# Patient Record
Sex: FEMALE | Race: WHITE | NOT HISPANIC OR LATINO | Employment: OTHER | URBAN - METROPOLITAN AREA
[De-identification: names, ages, dates, MRNs, and addresses within clinical notes are randomized per-mention and may not be internally consistent; named-entity substitution may affect disease eponyms.]

---

## 2017-06-16 ENCOUNTER — HOSPITAL ENCOUNTER (EMERGENCY)
Facility: MEDICAL CENTER | Age: 67
End: 2017-06-16
Attending: EMERGENCY MEDICINE
Payer: MEDICARE

## 2017-06-16 VITALS
HEART RATE: 74 BPM | DIASTOLIC BLOOD PRESSURE: 72 MMHG | OXYGEN SATURATION: 95 % | HEIGHT: 65 IN | TEMPERATURE: 98.1 F | BODY MASS INDEX: 31.65 KG/M2 | RESPIRATION RATE: 18 BRPM | WEIGHT: 190 LBS | SYSTOLIC BLOOD PRESSURE: 130 MMHG

## 2017-06-16 DIAGNOSIS — J40 BRONCHITIS: ICD-10-CM

## 2017-06-16 DIAGNOSIS — J04.0 LARYNGITIS: ICD-10-CM

## 2017-06-16 PROCEDURE — 99283 EMERGENCY DEPT VISIT LOW MDM: CPT

## 2017-06-16 RX ORDER — AMOXICILLIN 500 MG/1
500 CAPSULE ORAL 3 TIMES DAILY
Qty: 30 CAP | Refills: 0 | Status: SHIPPED | OUTPATIENT
Start: 2017-06-16 | End: 2017-06-26

## 2017-06-16 NOTE — DISCHARGE INSTRUCTIONS
Bronchitis  Bronchitis is the body's way of reacting to injury and/or infection (inflammation) of the bronchi. Bronchi are the air tubes that extend from the windpipe into the lungs. If the inflammation becomes severe, it may cause shortness of breath.  CAUSES   Inflammation may be caused by:  · A virus.  · Germs (bacteria).  · Dust.  · Allergens.  · Pollutants and many other irritants.  The cells lining the bronchial tree are covered with tiny hairs (cilia). These constantly beat upward, away from the lungs, toward the mouth. This keeps the lungs free of pollutants. When these cells become too irritated and are unable to do their job, mucus begins to develop. This causes the characteristic cough of bronchitis. The cough clears the lungs when the cilia are unable to do their job. Without either of these protective mechanisms, the mucus would settle in the lungs. Then you would develop pneumonia.  Smoking is a common cause of bronchitis and can contribute to pneumonia. Stopping this habit is the single most important thing you can do to help yourself.  TREATMENT   · Your caregiver may prescribe an antibiotic if the cough is caused by bacteria. Also, medicines that open up your airways make it easier to breathe. Your caregiver may also recommend or prescribe an expectorant. It will loosen the mucus to be coughed up. Only take over-the-counter or prescription medicines for pain, discomfort, or fever as directed by your caregiver.  · Removing whatever causes the problem (smoking, for example) is critical to preventing the problem from getting worse.  · Cough suppressants may be prescribed for relief of cough symptoms.  · Inhaled medicines may be prescribed to help with symptoms now and to help prevent problems from returning.  · For those with recurrent (chronic) bronchitis, there may be a need for steroid medicines.  SEEK IMMEDIATE MEDICAL CARE IF:   · During treatment, you develop more pus-like mucus (purulent  sputum).  · You have a fever.  · Your baby is older than 3 months with a rectal temperature of 102° F (38.9° C) or higher.  · Your baby is 3 months old or younger with a rectal temperature of 100.4° F (38° C) or higher.  · You become progressively more ill.  · You have increased difficulty breathing, wheezing, or shortness of breath.  It is necessary to seek immediate medical care if you are elderly or sick from any other disease.  MAKE SURE YOU:   · Understand these instructions.  · Will watch your condition.  · Will get help right away if you are not doing well or get worse.  Document Released: 12/18/2006 Document Revised: 03/11/2013 Document Reviewed: 10/27/2009  [employer]® Patient Information ©2014 [employer].    Laryngitis  Laryngitis is inflammation of your vocal cords. This causes hoarseness, coughing, loss of voice, sore throat, or a dry throat. Your vocal cords are two bands of muscles that are found in your throat. When you speak, these cords come together and vibrate. These vibrations come out through your mouth as sound. When your vocal cords are inflamed, your voice sounds different.  Laryngitis can be temporary (acute) or long-term (chronic). Most cases of acute laryngitis improve with time. Chronic laryngitis is laryngitis that lasts for more than three weeks.  CAUSES  Acute laryngitis may be caused by:  · A viral infection.  · Lots of talking, yelling, or singing. This is also called vocal strain.  · Bacterial infections.  Chronic laryngitis may be caused by:  · Vocal strain.  · Injury to your vocal cords.  · Acid reflux (gastroesophageal reflux disease or GERD).  · Allergies.  · Sinus infection.  · Smoking.  · Alcohol abuse.  · Breathing in chemicals or dust.  · Growths on the vocal cords.  RISK FACTORS  Risk factors for laryngitis include:  · Smoking.  · Alcohol abuse.  · Having allergies.  SIGNS AND SYMPTOMS  Symptoms of laryngitis may include:  · Low, hoarse voice.  · Loss of voice.  · Dry  cough.  · Sore throat.  · Stuffy nose.  DIAGNOSIS  Laryngitis may be diagnosed by:  · Physical exam.  · Throat culture.  · Blood test.  · Laryngoscopy. This procedure allows your health care provider to look at your vocal cords with a mirror or viewing tube.  TREATMENT  Treatment for laryngitis depends on what is causing it. Usually, treatment involves resting your voice and using medicines to soothe your throat. However, if your laryngitis is caused by a bacterial infection, you may need to take antibiotic medicine. If your laryngitis is caused by a growth, you may need to have a procedure to remove it.  HOME CARE INSTRUCTIONS  · Drink enough fluid to keep your urine clear or pale yellow.  · Breathe in moist air. Use a humidifier if you live in a dry climate.  · Take medicines only as directed by your health care provider.  · If you were prescribed an antibiotic medicine, finish it all even if you start to feel better.  · Do not smoke cigarettes or electronic cigarettes. If you need help quitting, ask your health care provider.  · Talk as little as possible. Also avoid whispering, which can cause vocal strain.  · Write instead of talking. Do this until your voice is back to normal.  SEEK MEDICAL CARE IF:  · You have a fever.  · You have increasing pain.  · You have difficulty swallowing.  SEEK IMMEDIATE MEDICAL CARE IF:  · You cough up blood.  · You have trouble breathing.     This information is not intended to replace advice given to you by your health care provider. Make sure you discuss any questions you have with your health care provider.     Document Released: 12/18/2006 Document Revised: 01/08/2016 Document Reviewed: 06/02/2015  [employer] Interactive Patient Education ©2016 [employer]    Upper Respiratory Infection, Adult  Most upper respiratory infections (URIs) are a viral infection of the air passages leading to the lungs. A URI affects the nose, throat, and upper air passages. The most common type of  "URI is nasopharyngitis and is typically referred to as \"the common cold.\"  URIs run their course and usually go away on their own. Most of the time, a URI does not require medical attention, but sometimes a bacterial infection in the upper airways can follow a viral infection. This is called a secondary infection. Sinus and middle ear infections are common types of secondary upper respiratory infections.  Bacterial pneumonia can also complicate a URI. A URI can worsen asthma and chronic obstructive pulmonary disease (COPD). Sometimes, these complications can require emergency medical care and may be life threatening.   CAUSES  Almost all URIs are caused by viruses. A virus is a type of germ and can spread from one person to another.   RISKS FACTORS  You may be at risk for a URI if:   · You smoke.    · You have chronic heart or lung disease.  · You have a weakened defense (immune) system.    · You are very young or very old.    · You have nasal allergies or asthma.  · You work in crowded or poorly ventilated areas.  · You work in health care facilities or schools.  SIGNS AND SYMPTOMS   Symptoms typically develop 2-3 days after you come in contact with a cold virus. Most viral URIs last 7-10 days. However, viral URIs from the influenza virus (flu virus) can last 14-18 days and are typically more severe. Symptoms may include:   · Runny or stuffy (congested) nose.    · Sneezing.    · Cough.    · Sore throat.    · Headache.    · Fatigue.    · Fever.    · Loss of appetite.    · Pain in your forehead, behind your eyes, and over your cheekbones (sinus pain).  · Muscle aches.    DIAGNOSIS   Your health care provider may diagnose a URI by:  · Physical exam.  · Tests to check that your symptoms are not due to another condition such as:  ¨ Strep throat.  ¨ Sinusitis.  ¨ Pneumonia.  ¨ Asthma.  TREATMENT   A URI goes away on its own with time. It cannot be cured with medicines, but medicines may be prescribed or recommended to " relieve symptoms. Medicines may help:  · Reduce your fever.  · Reduce your cough.  · Relieve nasal congestion.  HOME CARE INSTRUCTIONS   · Take medicines only as directed by your health care provider.    · Gargle warm saltwater or take cough drops to comfort your throat as directed by your health care provider.  · Use a warm mist humidifier or inhale steam from a shower to increase air moisture. This may make it easier to breathe.  · Drink enough fluid to keep your urine clear or pale yellow.    · Eat soups and other clear broths and maintain good nutrition.    · Rest as needed.    · Return to work when your temperature has returned to normal or as your health care provider advises. You may need to stay home longer to avoid infecting others. You can also use a face mask and careful hand washing to prevent spread of the virus.  · Increase the usage of your inhaler if you have asthma.    · Do not use any tobacco products, including cigarettes, chewing tobacco, or electronic cigarettes. If you need help quitting, ask your health care provider.  PREVENTION   The best way to protect yourself from getting a cold is to practice good hygiene.   · Avoid oral or hand contact with people with cold symptoms.    · Wash your hands often if contact occurs.    There is no clear evidence that vitamin C, vitamin E, echinacea, or exercise reduces the chance of developing a cold. However, it is always recommended to get plenty of rest, exercise, and practice good nutrition.   SEEK MEDICAL CARE IF:   · You are getting worse rather than better.    · Your symptoms are not controlled by medicine.    · You have chills.  · You have worsening shortness of breath.  · You have brown or red mucus.  · You have yellow or brown nasal discharge.  · You have pain in your face, especially when you bend forward.  · You have a fever.  · You have swollen neck glands.  · You have pain while swallowing.  · You have white areas in the back of your  throat.  SEEK IMMEDIATE MEDICAL CARE IF:   · You have severe or persistent:  ¨ Headache.  ¨ Ear pain.  ¨ Sinus pain.  ¨ Chest pain.  · You have chronic lung disease and any of the following:  ¨ Wheezing.  ¨ Prolonged cough.  ¨ Coughing up blood.  ¨ A change in your usual mucus.  · You have a stiff neck.  · You have changes in your:  ¨ Vision.  ¨ Hearing.  ¨ Thinking.  ¨ Mood.  MAKE SURE YOU:   · Understand these instructions.  · Will watch your condition.  · Will get help right away if you are not doing well or get worse.     This information is not intended to replace advice given to you by your health care provider. Make sure you discuss any questions you have with your health care provider.     Document Released: 06/13/2002 Document Revised: 05/03/2016 Document Reviewed: 03/25/2015  [employer] Interactive Patient Education ©2016 [employer].

## 2017-06-16 NOTE — ED AVS SNAPSHOT
OPHTHONIX Access Code: [email or web address]  Expires: 7/16/2017  2:32 PM    Your email address is not on file at [facility].  Email Addresses are required for you to sign up for OPHTHONIX, please contact [phone] to verify your personal information and to provide your email address prior to attempting to register for OPHTHONIX.    Fe Jodi Agn\[facility] [place] [facility]\""  [address] [place] [address]    instruMagict  A secure, online tool to manage your health information     [facility]’s OPHTHONIX® is a secure, online tool that connects you to your personalized health information from the privacy of your home -- day or night - making it very easy for you to manage your healthcare. Once the activation process is completed, you can even access your medical information using the OPHTHONIX neri, which is available for free in the Apple Neri store or Google Play store.     To learn more about OPHTHONIX, visit [email or web address]    There are two levels of access available (as shown below):   My Chart Features  [facility] Primary Care Doctor [facility]  Specialists [facility]  Urgent  Care Non-[facility] Primary Care Doctor   Email your healthcare team securely and privately 24/7 X X X    Manage appointments: schedule your next appointment; view details of past/upcoming appointments X      Request prescription refills. X      View recent personal medical records, including lab and immunizations X X X X   View health record, including health history, allergies, medications X X X X   Read reports about your outpatient visits, procedures, consult and ER notes X X X X   See your discharge summary, which is a recap of your hospital and/or ER visit that includes your diagnosis, lab results, and care plan X X  X     How to register for instruMagict:  Once your e-mail address has been verified, follow the following steps to sign up for instruMagict.     1. Go to  [email or web address]  2. Click on the Sign Up Now box, which takes you to the New Member Sign Up page. You  will need to provide the following information:  a. Enter your [employer] Access Code exactly as it appears at the top of this page. (You will not need to use this code after you’ve completed the sign-up process. If you do not sign up before the expiration date, you must request a new code.)   b. Enter your date of birth.   c. Enter your home email address.   d. Click Submit, and follow the next screen’s instructions.  3. Create a V Wavet ID. This will be your [employer]Blog.com login ID and cannot be changed, so think of one that is secure and easy to remember.  4. Create a [employer] password. You can change your password at any time.  5. Enter your Password Reset Question and Answer. This can be used at a later time if you forget your password.   6. Enter your e-mail address. This allows you to receive e-mail notifications when new information is available in SportsBlog.com.  7. Click Sign Up. You can now view your health information.    For assistance activating your [employer]com account, call [phone]

## 2017-06-16 NOTE — ED AVS SNAPSHOT
6/16/2017    Fe [facility]  [address] [place] [address]    Dear Fe:    [facility] wants to ensure your discharge home is safe and you or your loved ones have had all of your questions answered regarding your care after you leave the hospital.    Below is a list of resources and contact information should you have any questions regarding your hospital stay, follow-up instructions, or active medical symptoms.    Questions or Concerns Regarding… Contact   Medical Questions Related to Your Discharge  (7 days a week, 8am-5pm) Contact a Nurse Care Coordinator   [phone]   Medical Questions Not Related to Your Discharge  (24 hours a day / 7 days a week)  Contact the Nurse Health Line   [phone]    Medications or Discharge Instructions Refer to your discharge packet   or contact your [facility] Primary Care Provider   [phone]   Follow-up Appointment(s) Schedule your appointment via [employer]   or contact Scheduling [phone]   Billing Review your statement via [employer]  or contact Billing [phone]   Medical Records Review your records via [employer]   or contact Medical Records [phone]     You may receive a telephone call within two days of discharge. This call is to make certain you understand your discharge instructions and have the opportunity to have any questions answered. You can also easily access your medical information, test results and upcoming appointments via the [employer] free online health management tool. You can learn more and sign up at GoodRx/Atomic M[employer]uls. For assistance setting up your [employer] account, please call [phone].    Once again, we want to ensure your discharge home is safe and that you have a clear understanding of any next steps in your care. If you have any questions or concerns, please do not hesitate to contact us, we are here for you. Thank you for choosing [facility] for your healthcare needs.    Sincerely,    Your [facility] Healthcare Team

## 2017-06-16 NOTE — ED NOTES
"Pt states \"I woke up and didn't have a voice this morning \", pt has hoarse sounding voice at this time, pt states \"my left eye is red and sometimes the words I want to say don't come our right\".  Pt moves all extremities without difficulty, no facial deficits noted, pt does not have any teeth and is not wearing dentures, pt sitting on bed drinking soda and playing games on her Ipad, Grandson at bedside.  "

## 2017-06-16 NOTE — ED PROVIDER NOTES
"ED Provider Note    Scribed for Danny Gibson M.D. by Zulema Sutton. 6/16/2017  2:13 PM    Primary Care Provider: Pcp Pt States None  Means of arrival: walk-in  History limited by: none    CHIEF COMPLAINT  Chief Complaint   Patient presents with   • Possible Stroke   • Numbness       HPI  Fe Clark is a 66 y.o. female who presents to the ED complaining of several upper respiratory symptoms developing over this last week. Patient reports that 1 week ago she was experiencing cough, congestion, and white foamy sputum productions. She states that this cold has worsened since onset to include a sore throat, \"popping\" sensation to left ear, difficulty swallowing secondary to pain, and difficulty speaking secondary to pain, will all of these symptoms beginning about 4 days ago. Patient is also reporting weakness to her left leg, dizziness, and bilateral finger numbness that has been intermittently occuring since onset of symptoms. She states that she is currently being treated for depression at this time, denies smoking and admits to occasional alcohol use.  No complaints of fever,runny nose, blurred vision, weight changes, chest pain, abdominal pain, vomiting, nausea, diarrhea, rashes.    REVIEW OF SYSTEMS    CONSTITUTIONAL:  Denies fever, weight gain/loss,  ENT:  Positive sore throat, congestion, popping sensation in left ear, difficulty swallowing and difficulty with speech  CARDIOVASCULAR:  Denies chest pain  RESPIRATORY: positive cough and white foamy sputum production  GI:  Denies abdominal pain, nausea, vomiting, or diarrhea.  MUSCULOSKELETAL:  Denies weakness, joint swelling, or back pain.  SKIN:  No rash or bruising.  NEUROLOGIC:  Denies headache, focal weakness, or numbness.  PSYCHIATRIC:  Denies depression.    PAST MEDICAL HISTORY  Past Medical History   Diagnosis Date   • High cholesterol    • ASTHMA    • Hypertension    • Psychiatric problem    • GERD (gastroesophageal reflux disease)    • " "Hiatal hernia    • Diverticulosis    • Duodenal ulcer, unspecified as acute or chronic, without hemorrhage, perforation, or obstruction        FAMILY HISTORY  History reviewed. No pertinent family history.    SOCIAL HISTORY   reports that she has never smoked. She does not have any smokeless tobacco history on file. She reports that she uses illicit drugs. She reports that she does not drink alcohol.    SURGICAL HISTORY  Past Surgical History   Procedure Laterality Date   • Bladder suspension     • Bowel resection     • Colonoscopy       with removal of pollyps   • Hysterectomy radical     • Other orthopedic surgery       L ankle   • Ankle arthroscopy  7/31/2013     Performed by Paco Clifton M.D. at [facility]   • Hardware removal ortho  7/31/2013     Performed by Paco Clifton M.D. at [facility]   • Lacrimal duct probe  3/11/2015     Performed by Alo Cheatham M.D. at [facility]   • Submandible abscess incision and drainage  11/19/2016     Procedure: SUBMANDIBLE ABSCESS INCISION AND DRAINAGE;  Surgeon: Lior Hutchison D.D.S.;  Location: SURGERY [facility];  Service:    • Dental extraction(s)  11/19/2016     Procedure: DENTAL EXTRACTION(S);  Surgeon: Lior Hutchison D.D.S.;  Location: [facility];  Service:        CURRENT MEDICATIONS  Home Medications     **Home medications have not yet been reviewed for this encounter**          ALLERGIES  Allergies   Allergen Reactions   • Nkda [No Known Drug Allergy]        PHYSICAL EXAM  VITAL SIGNS: /79 mmHg  Pulse 77  Temp(Src) 36.7 °C (98.1 °F)  Resp 18  Ht 1.651 m (5' 5\")  Wt 86.183 kg (190 lb)  BMI 31.62 kg/m2  SpO2 95%     Constitutional: Patient is awake and alert. No acute respiratory distress. Well developed, Well nourished, Non-toxic appearance. Mostly whispering however intermittently able to use stronger voice that is still raspy  HENT: Normocephalic, Atraumatic, Bilateral external ears " normal, Oropharynx pink moist with no exudates, slight erythema and swelling to posterior oropharynx Nose patent. Left TM has fluid present behind it, right TM normal  Eyes: PERRLA, EOMI, Sclera and conjunctiva clear, No discharge.   Neck:  Supple no nuchal rigidity, no thyromegaly or mass. Non-tender  Lymphatic: No supraclavicular lymph nodes.   Cardiovascular: Heart is regular rate and rhythm no murmur, rub or thrill.   Thorax & Lungs: Chest is symmetrical, with good breath sounds. No wheezing or crackles. No respiratory distress, No chest tenderness.   Abdomen: Soft, No tenderness no hepatosplenomegaly there is no guarding or rebound, No masses, No pulsatile masses. Bowel sounds are present.  Skin: Warm, Dry, no petechia, purpura, or rash.   Back: Non tender with palpation, No CVA tenderness.   Extremities: No edema. Non tender.   Musculoskeletal: Good range of motion to wrists, elbows, shoulders, hips, knees, and ankles. Pulses 2+ radially and femorally. No gross deformities noted.   Neurologic: Alert & oriented to person, time, and place.  Strength is 5 over 5 and symmetric in bilateral upper and lower extremities.  Sensory is intact to light touch to face, arms, and legs.  DTRs are symmetrical in biceps brachioradialis, patella and Achilles. Face is symmetric, no drift to arms and legs, heal to shin normal, finger to nose normal  Psychiatric: Normal affect    COURSE & MEDICAL DECISION MAKING  Pertinent Labs & Imaging studies reviewed. (See chart for details)    Differential diagnoses include but are not limited to URI, bronchitis, ear infection    2:13 PM - Patient seen and examined at bedside. I informed the patient that she is most likely not experiencing a stroke at this time but more likely a URI. I explained that she will be prescribed anti-biotics to treat her ear infection and respiratory infection at this time. I advised the patient to stay out of the heat and keep hydrated. She will be  discharged.    I do not believe that the patient's symptoms at this time demonstrate a stroke.    Decision Making  Patient presents with cough congestion of sputum now with laryngitis and a sore throat. Him and also some crackling to her left ear. She states that she didn't know what to call it so she called a stroke however neurologically do not believe that she has had a stroke. I believe his an upper respiratory tract infection and laryngitis. She is resting comfortably. We'll put her on oral antibiotics for close follow-up as an outpatient.     The patient will return for new or worsening symptoms and is stable at the time of discharge.      DISPOSITION:  Patient will be discharged home in stable condition.    FOLLOW UP:      In 3 days        OUTPATIENT MEDICATIONS:  New Prescriptions    AMOXICILLIN (AMOXIL) 500 MG CAP    Take 1 Cap by mouth 3 times a day for 10 days.           FINAL IMPRESSION  1. Bronchitis    2. Laryngitis        PLAN  1. Follow-up primary care doctor in 3 days  2. Rhonchi T-sheet/pharyngitis sheet/bronchitis sheet  3. Return to the emergency department for increased pains, fevers, vomiting or change in condition.     Zulema LINN (Scribe), am scribing for, and in the presence of, Danny Gibson M.D..    Electronically signed by: Zulema Sutton (Marcial), 6/16/2017    Danny LINN M.D. personally performed the services described in this documentation, as scribed by Zulema Sutton in my presence, and it is both accurate and complete.    The note accurately reflects work and decisions made by me.  Danny Gibson  6/16/2017  6:42 PM

## 2017-06-16 NOTE — ED AVS SNAPSHOT
Home Care Instructions                                                                                                                Fe Clark   MRN: [id number]    Department:  [facility], Emergency Dept   Date of Visit:  6/16/2017            [facility], Emergency Dept    [address] [address]    Phone:  [phone]      You were seen by     Danny Gibson M.D.      Your Diagnosis Was     Bronchitis     J40       Follow-up Information     1. Follow up In 3 days.      Medication Information     Review all of your home medications and newly ordered medications with your primary doctor and/or pharmacist as soon as possible. Follow medication instructions as directed by your doctor and/or pharmacist.     Please keep your complete medication list with you and share with your physician. Update the information when medications are discontinued, doses are changed, or new medications (including over-the-counter products) are added; and carry medication information at all times in the event of emergency situations.               Medication List      START taking these medications        Instructions    Morning Afternoon Evening Bedtime    amoxicillin 500 MG Caps   Commonly known as:  AMOXIL        Take 1 Cap by mouth 3 times a day for 10 days.   Dose:  500 mg                          ASK your doctor about these medications        Instructions    Morning Afternoon Evening Bedtime    albuterol 108 (90 BASE) MCG/ACT Aers inhalation aerosol        Inhale 2 Puffs by mouth every four hours as needed (cough).   Dose:  2 Puff                        alprazolam 0.25 MG Tabs   Commonly known as:  XANAX        Take 0.25 mg by mouth 2 times a day as needed for Sleep or Anxiety.   Dose:  0.25 mg                        atorvastatin 20 MG Tabs   Commonly known as:  LIPITOR        Take 1 Tab by mouth every day.   Dose:  20 mg                        carvedilol 3.125 MG  Tabs   Commonly known as:  COREG        Take 3.125 mg by mouth every day.   Dose:  3.125 mg                        CULTURELLE DIG[employer]IVE HEALTH Caps        Take 1 Cap by mouth every day.   Dose:  1 Cap                        cyclobenzaprine 10 MG Tabs   Commonly known as:  FLEXERIL        Take 1 Tab by mouth 3 times a day as needed for Muscle Spasms.   Dose:  10 mg                        fluoxetine 40 MG capsule   Commonly known as:  PROZAC        Take 40 mg by mouth every day.   Dose:  40 mg                        hydrocodone-acetaminophen 5-325 MG Tabs per tablet   Commonly known as:  NORCO        Take 1-2 Tabs by mouth every 6 hours as needed (pain).   Dose:  1-2 Tab                        lisinopril-hydrochlorothiazide 20-25 MG per tablet   Commonly known as:  PRINZIDE, ZESTORETIC        Take 1 Tab by mouth every day.   Dose:  1 Tab                        naproxen 500 MG Tabs   Commonly known as:  NAPROSYN        Take 1 Tab by mouth 2 times a day, with meals.   Dose:  500 mg                        omeprazole 20 MG delayed-release capsule   Commonly known as:  PRILOSEC        Take 1 Cap by mouth 2 times a day.   Dose:  20 mg                        risperidone 2 MG Tabs   Commonly known as:  RISPERDAL        Take 2 mg by mouth every bedtime.   Dose:  2 mg                             Where to Get Your Medications      You can get these medications from any pharmacy     Bring a paper prescription for each of these medications    - amoxicillin 500 MG Caps              Discharge Instructions       Bronchitis  Bronchitis is the body's way of reacting to injury and/or infection (inflammation) of the bronchi. Bronchi are the air tubes that extend from the windpipe into the lungs. If the inflammation becomes severe, it may cause shortness of breath.  CAUSES   Inflammation may be caused by:  · A virus.  · Germs (bacteria).  · Dust.  · Allergens.  · Pollutants and many other irritants.  The cells lining the bronchial tree  are covered with tiny hairs (cilia). These constantly beat upward, away from the lungs, toward the mouth. This keeps the lungs free of pollutants. When these cells become too irritated and are unable to do their job, mucus begins to develop. This causes the characteristic cough of bronchitis. The cough clears the lungs when the cilia are unable to do their job. Without either of these protective mechanisms, the mucus would settle in the lungs. Then you would develop pneumonia.  Smoking is a common cause of bronchitis and can contribute to pneumonia. Stopping this habit is the single most important thing you can do to help yourself.  TREATMENT   · Your caregiver may prescribe an antibiotic if the cough is caused by bacteria. Also, medicines that open up your airways make it easier to breathe. Your caregiver may also recommend or prescribe an expectorant. It will loosen the mucus to be coughed up. Only take over-the-counter or prescription medicines for pain, discomfort, or fever as directed by your caregiver.  · Removing whatever causes the problem (smoking, for example) is critical to preventing the problem from getting worse.  · Cough suppressants may be prescribed for relief of cough symptoms.  · Inhaled medicines may be prescribed to help with symptoms now and to help prevent problems from returning.  · For those with recurrent (chronic) bronchitis, there may be a need for steroid medicines.  SEEK IMMEDIATE MEDICAL CARE IF:   · During treatment, you develop more pus-like mucus (purulent sputum).  · You have a fever.  · Your baby is older than 3 months with a rectal temperature of 102° F (38.9° C) or higher.  · Your baby is 3 months old or younger with a rectal temperature of 100.4° F (38° C) or higher.  · You become progressively more ill.  · You have increased difficulty breathing, wheezing, or shortness of breath.  It is necessary to seek immediate medical care if you are elderly or sick from any other  disease.  MAKE SURE YOU:   · Understand these instructions.  · Will watch your condition.  · Will get help right away if you are not doing well or get worse.  Document Released: 12/18/2006 Document Revised: 03/11/2013 Document Reviewed: 10/27/2009  [employer]® Patient Information ©2014 [employer]: [employer].    Laryngitis  Laryngitis is inflammation of your vocal cords. This causes hoarseness, coughing, loss of voice, sore throat, or a dry throat. Your vocal cords are two bands of muscles that are found in your throat. When you speak, these cords come together and vibrate. These vibrations come out through your mouth as sound. When your vocal cords are inflamed, your voice sounds different.  Laryngitis can be temporary (acute) or long-term (chronic). Most cases of acute laryngitis improve with time. Chronic laryngitis is laryngitis that lasts for more than three weeks.  CAUSES  Acute laryngitis may be caused by:  · A viral infection.  · Lots of talking, yelling, or singing. This is also called vocal strain.  · Bacterial infections.  Chronic laryngitis may be caused by:  · Vocal strain.  · Injury to your vocal cords.  · Acid reflux (gastroesophageal reflux disease or GERD).  · Allergies.  · Sinus infection.  · Smoking.  · Alcohol abuse.  · Breathing in chemicals or dust.  · Growths on the vocal cords.  RISK FACTORS  Risk factors for laryngitis include:  · Smoking.  · Alcohol abuse.  · Having allergies.  SIGNS AND SYMPTOMS  Symptoms of laryngitis may include:  · Low, hoarse voice.  · Loss of voice.  · Dry cough.  · Sore throat.  · Stuffy nose.  DIAGNOSIS  Laryngitis may be diagnosed by:  · Physical exam.  · Throat culture.  · Blood test.  · Laryngoscopy. This procedure allows your health care provider to look at your vocal cords with a mirror or viewing tube.  TREATMENT  Treatment for laryngitis depends on what is causing it. Usually, treatment involves resting your voice and using medicines to soothe your throat. However, if  "your laryngitis is caused by a bacterial infection, you may need to take antibiotic medicine. If your laryngitis is caused by a growth, you may need to have a procedure to remove it.  HOME CARE INSTRUCTIONS  · Drink enough fluid to keep your urine clear or pale yellow.  · Breathe in moist air. Use a humidifier if you live in a dry climate.  · Take medicines only as directed by your health care provider.  · If you were prescribed an antibiotic medicine, finish it all even if you start to feel better.  · Do not smoke cigarettes or electronic cigarettes. If you need help quitting, ask your health care provider.  · Talk as little as possible. Also avoid whispering, which can cause vocal strain.  · Write instead of talking. Do this until your voice is back to normal.  SEEK MEDICAL CARE IF:  · You have a fever.  · You have increasing pain.  · You have difficulty swallowing.  SEEK IMMEDIATE MEDICAL CARE IF:  · You cough up blood.  · You have trouble breathing.     This information is not intended to replace advice given to you by your health care provider. Make sure you discuss any questions you have with your health care provider.     Document Released: 12/18/2006 Document Revised: 01/08/2016 Document Reviewed: 06/02/2015  "[employer] Interactive Patient Education ©2016 "[employer] Inc.    Upper Respiratory Infection, Adult  Most upper respiratory infections (URIs) are a viral infection of the air passages leading to the lungs. A URI affects the nose, throat, and upper air passages. The most common type of URI is nasopharyngitis and is typically referred to as \"the common cold.\"  URIs run their course and usually go away on their own. Most of the time, a URI does not require medical attention, but sometimes a bacterial infection in the upper airways can follow a viral infection. This is called a secondary infection. Sinus and middle ear infections are common types of secondary upper respiratory infections.  Bacterial pneumonia " can also complicate a URI. A URI can worsen asthma and chronic obstructive pulmonary disease (COPD). Sometimes, these complications can require emergency medical care and may be life threatening.   CAUSES  Almost all URIs are caused by viruses. A virus is a type of germ and can spread from one person to another.   RISKS FACTORS  You may be at risk for a URI if:   · You smoke.    · You have chronic heart or lung disease.  · You have a weakened defense (immune) system.    · You are very young or very old.    · You have nasal allergies or asthma.  · You work in crowded or poorly ventilated areas.  · You work in health care facilities or schools.  SIGNS AND SYMPTOMS   Symptoms typically develop 2-3 days after you come in contact with a cold virus. Most viral URIs last 7-10 days. However, viral URIs from the influenza virus (flu virus) can last 14-18 days and are typically more severe. Symptoms may include:   · Runny or stuffy (congested) nose.    · Sneezing.    · Cough.    · Sore throat.    · Headache.    · Fatigue.    · Fever.    · Loss of appetite.    · Pain in your forehead, behind your eyes, and over your cheekbones (sinus pain).  · Muscle aches.    DIAGNOSIS   Your health care provider may diagnose a URI by:  · Physical exam.  · Tests to check that your symptoms are not due to another condition such as:  ¨ Strep throat.  ¨ Sinusitis.  ¨ Pneumonia.  ¨ Asthma.  TREATMENT   A URI goes away on its own with time. It cannot be cured with medicines, but medicines may be prescribed or recommended to relieve symptoms. Medicines may help:  · Reduce your fever.  · Reduce your cough.  · Relieve nasal congestion.  HOME CARE INSTRUCTIONS   · Take medicines only as directed by your health care provider.    · Gargle warm saltwater or take cough drops to comfort your throat as directed by your health care provider.  · Use a warm mist humidifier or inhale steam from a shower to increase air moisture. This may make it easier to  breathe.  · Drink enough fluid to keep your urine clear or pale yellow.    · Eat soups and other clear broths and maintain good nutrition.    · Rest as needed.    · Return to work when your temperature has returned to normal or as your health care provider advises. You may need to stay home longer to avoid infecting others. You can also use a face mask and careful hand washing to prevent spread of the virus.  · Increase the usage of your inhaler if you have asthma.    · Do not use any tobacco products, including cigarettes, chewing tobacco, or electronic cigarettes. If you need help quitting, ask your health care provider.  PREVENTION   The best way to protect yourself from getting a cold is to practice good hygiene.   · Avoid oral or hand contact with people with cold symptoms.    · Wash your hands often if contact occurs.    There is no clear evidence that vitamin C, vitamin E, echinacea, or exercise reduces the chance of developing a cold. However, it is always recommended to get plenty of rest, exercise, and practice good nutrition.   SEEK MEDICAL CARE IF:   · You are getting worse rather than better.    · Your symptoms are not controlled by medicine.    · You have chills.  · You have worsening shortness of breath.  · You have brown or red mucus.  · You have yellow or brown nasal discharge.  · You have pain in your face, especially when you bend forward.  · You have a fever.  · You have swollen neck glands.  · You have pain while swallowing.  · You have white areas in the back of your throat.  SEEK IMMEDIATE MEDICAL CARE IF:   · You have severe or persistent:  ¨ Headache.  ¨ Ear pain.  ¨ Sinus pain.  ¨ Chest pain.  · You have chronic lung disease and any of the following:  ¨ Wheezing.  ¨ Prolonged cough.  ¨ Coughing up blood.  ¨ A change in your usual mucus.  · You have a stiff neck.  · You have changes in your:  ¨ Vision.  ¨ Hearing.  ¨ Thinking.  ¨ Mood.  MAKE SURE YOU:   · Understand these  instructions.  · Will watch your condition.  · Will get help right away if you are not doing well or get worse.     This information is not intended to replace advice given to you by your health care provider. Make sure you discuss any questions you have with your health care provider.     Document Released: 06/13/2002 Document Revised: 05/03/2016 Document Reviewed: 03/25/2015  [employer] Interactive Patient Education ©2016 [employer]            Patient Information     Patient Information    Following emergency treatment: all patient requiring follow-up care must return either to a private physician or a clinic if your condition worsens before you are able to obtain further medical attention, please return to the emergency room.     Billing Information    At [facility], we work to make the billing process streamlined for our patients.  Our Representatives are here to answer any questions you may have regarding your hospital bill.  If you have insurance coverage and have supplied your insurance information to us, we will submit a claim to your insurer on your behalf.  Should you have any questions regarding your bill, we can be reached online or by phone as follows:  Online: You are able pay your bills online or live chat with our representatives about any billing questions you may have. We are here to help Monday - Friday from 8:00am to 7:30pm and 9:00am - 12:00pm on Saturdays.  Please visit [email or web address]  for more information.   Phone:  [phone] or [phone]    Please note that your emergency physician, surgeon, pathologist, radiologist, anesthesiologist, and other specialists are not employed by [facility] and will therefore bill separately for their services.  Please contact them directly for any questions concerning their bills at the numbers below:     Emergency Physician Services:  [phone]  [facility]:  [phone]  [facility]:  [phone]  [facility]:  [phone]    1. Your final bill may vary from the amount quoted upon discharge if all procedures are not complete at that time, or if your doctor has additional procedures of which we are not aware. You will receive an additional bill if you return to the Emergency Department at [facility] for suture removal regardless of the facility of which the sutures were placed.     2. Please arrange for settlement of this account at the emergency registration.    3. All self-pay accounts are due in full at the time of treatment.  If you are unable to meet this obligation then payment is expected within 4-5 days.     4. If you have had radiology studies (CT, X-ray, Ultrasound, MRI), you have received a preliminary result during your emergency department visit. Please contact the radiology department [phone] to receive a copy of your final result. Please discuss the Final result with your primary physician or with the follow up physician provided.     Crisis Hotline:  [place] Crisis Hotline:  3-452-NRETPVP or 1-462.920.5290  [place] Crisis Hotline:    1-892.102.9084 or 098-259-4447         ED Discharge Follow Up Questions    1. In order to provide you with very good care, we would like to follow up with a phone call in the next few days.  May we have your permission to contact you?     YES /  NO    2. What is the best phone number to call you? (       )_____-__________    3. What is the best time to call you?      Morning  /  Afternoon  /  Evening                   Patient Signature:  ____________________________________________________________    Date:  ____________________________________________________________

## 2017-06-16 NOTE — ED NOTES
Discharge instructions received and reviewed with pt, pt verbalizes understanding of medication and follow up care.

## 2017-06-16 NOTE — ED NOTES
Pt reports waking up with left sided facial numbness and pain. Pt reports she has had recent cold. Pt in NAD. VSS. No facial asymmetry noted. SASHA

## 2017-07-28 ENCOUNTER — HOSPITAL ENCOUNTER (OUTPATIENT)
Dept: RADIOLOGY | Facility: MEDICAL CENTER | Age: 67
End: 2017-07-28
Attending: ANESTHESIOLOGY
Payer: MEDICARE

## 2017-07-28 ENCOUNTER — HOSPITAL ENCOUNTER (OUTPATIENT)
Dept: RADIOLOGY | Facility: MEDICAL CENTER | Age: 67
End: 2017-07-28
Attending: NURSE PRACTITIONER
Payer: MEDICARE

## 2017-07-28 DIAGNOSIS — M47.816 OSTEOARTHRITIS OF LUMBAR SPINE, UNSPECIFIED SPINAL OSTEOARTHRITIS COMPLICATION STATUS: ICD-10-CM

## 2017-07-28 DIAGNOSIS — M79.7 RHEUMATISM, UNSPECIFIED AND FIBROSITIS: ICD-10-CM

## 2017-07-28 DIAGNOSIS — M79.0 RHEUMATISM, UNSPECIFIED AND FIBROSITIS: ICD-10-CM

## 2017-07-28 PROCEDURE — 73030 X-RAY EXAM OF SHOULDER: CPT | Mod: LT

## 2017-07-28 PROCEDURE — 73130 X-RAY EXAM OF HAND: CPT | Mod: RT

## 2017-07-28 PROCEDURE — 72100 X-RAY EXAM L-S SPINE 2/3 VWS: CPT

## 2017-07-28 PROCEDURE — 73521 X-RAY EXAM HIPS BI 2 VIEWS: CPT

## 2017-08-22 ENCOUNTER — HOSPITAL ENCOUNTER (OUTPATIENT)
Dept: PAIN MANAGEMENT | Facility: REHABILITATION | Age: 67
End: 2017-08-22
Attending: SPECIALIST
Payer: MEDICARE

## 2017-08-22 ENCOUNTER — HOSPITAL ENCOUNTER (OUTPATIENT)
Dept: RADIOLOGY | Facility: REHABILITATION | Age: 67
End: 2017-08-22
Attending: SPECIALIST
Payer: MEDICARE

## 2017-08-22 VITALS
SYSTOLIC BLOOD PRESSURE: 176 MMHG | HEIGHT: 65 IN | BODY MASS INDEX: 29.57 KG/M2 | HEART RATE: 57 BPM | RESPIRATION RATE: 17 BRPM | WEIGHT: 177.47 LBS | TEMPERATURE: 95.8 F | DIASTOLIC BLOOD PRESSURE: 87 MMHG | OXYGEN SATURATION: 98 %

## 2017-08-22 PROCEDURE — 64494 INJ PARAVERT F JNT L/S 2 LEV: CPT

## 2017-08-22 PROCEDURE — 64493 INJ PARAVERT F JNT L/S 1 LEV: CPT

## 2017-08-22 PROCEDURE — 700111 HCHG RX REV CODE 636 W/ 250 OVERRIDE (IP)

## 2017-08-22 PROCEDURE — 64495 INJ PARAVERT F JNT L/S 3 LEV: CPT

## 2017-08-22 PROCEDURE — 700117 HCHG RX CONTRAST REV CODE 255

## 2017-08-22 RX ORDER — TRIAMCINOLONE ACETONIDE 40 MG/ML
INJECTION, SUSPENSION INTRA-ARTICULAR; INTRAMUSCULAR
Status: COMPLETED
Start: 2017-08-22 | End: 2017-08-22

## 2017-08-22 RX ORDER — BUPIVACAINE HYDROCHLORIDE 2.5 MG/ML
INJECTION, SOLUTION EPIDURAL; INFILTRATION; INTRACAUDAL
Status: COMPLETED
Start: 2017-08-22 | End: 2017-08-22

## 2017-08-22 RX ADMIN — IOHEXOL 3 ML: 240 INJECTION, SOLUTION INTRATHECAL; INTRAVASCULAR; INTRAVENOUS; ORAL at 07:51

## 2017-08-22 RX ADMIN — BUPIVACAINE HYDROCHLORIDE 5 ML: 2.5 INJECTION, SOLUTION EPIDURAL; INFILTRATION; INTRACAUDAL; PERINEURAL at 07:50

## 2017-08-22 RX ADMIN — TRIAMCINOLONE ACETONIDE 40 MG: 40 INJECTION, SUSPENSION INTRA-ARTICULAR; INTRAMUSCULAR at 07:50

## 2017-08-22 ASSESSMENT — PAIN SCALES - GENERAL
PAINLEVEL_OUTOF10: 0
PAINLEVEL_OUTOF10: 0
PAINLEVEL_OUTOF10: 5

## 2017-08-22 NOTE — PROGRESS NOTES
Medication reconciliation reviewed with patient denied taking any blood thinners  and any anti- inflammatories medication. Discharge instruction given to patient and verbalized understanding. Patient  has ride home ( Gabe/ son /  ). Dr. Johnson notified.

## 2017-08-22 NOTE — PROCEDURES
DATE OF SERVICE:  08/22/2017    PROCEDURES:  1.  Left L2 medial branch block.  2.  Left L3 medial branch block.  3.  Left L4 medial branch block.  4.  Left L5 medial branch block.  5.  Fluoroscopy for needle guidance, confirmation of placement.    DIAGNOSES:  1.  Lumbar spondylosis with facet syndrome.  2.  Left low back pain.    DESCRIPTION OF PROCEDURE:  After informed consent with the patient prone,   fluoroscopy was utilized to identify the junction of the transverse process   with the superior articular process on the left at L3, L4, L5 and the sacral   alar depression.  These areas are prepped with Betadine, sterile draped and   anesthetized.  Under intermittent fluoroscopic guidance and local anesthesia,   a 22-gauge 5-inch spinal needle was passed through the first target at L3   making contact with periosteum.  Contrast was injected confirming spread along   the path where the medial branch nerve.  I slowly injected 0.5 mL of 0.25%   bupivacaine, 5 mg Kenalog.  Needle was removed.    An L4 target was approached with the same technique and injected with the same   medication, needle was removed.    The L5 target was approached with the same technique and injected with the   same medication, needle was removed.    The sacral alar target was approached with the same technique, injected with   the same medication, needle was removed.    She tolerated this well.    PLAN:  She will be discharged with a pain diary to document pain relief today   hourly for the next 6 hours.  She is instructed to bring that with her to her   followup appointment to screen for radiofrequency ablation.       ____________________________________     MD MARYURI STARK / MELISSA    DD:  08/22/2017 07:58:47  DT:  08/22/2017 10:12:04    D#:  [id number]  Job#:  [id number]    cc: [facility]

## 2018-01-04 ENCOUNTER — HOSPITAL ENCOUNTER (INPATIENT)
Facility: MEDICAL CENTER | Age: 68
LOS: 1 days | DRG: 312 | End: 2018-01-05
Attending: EMERGENCY MEDICINE | Admitting: HOSPITALIST
Payer: MEDICARE

## 2018-01-04 ENCOUNTER — APPOINTMENT (OUTPATIENT)
Dept: RADIOLOGY | Facility: MEDICAL CENTER | Age: 68
DRG: 312 | End: 2018-01-04
Attending: EMERGENCY MEDICINE
Payer: MEDICARE

## 2018-01-04 ENCOUNTER — RESOLUTE PROFESSIONAL BILLING HOSPITAL PROF FEE (OUTPATIENT)
Dept: HOSPITALIST | Facility: MEDICAL CENTER | Age: 68
End: 2018-01-04
Payer: MEDICARE
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
## 2018-01-04 DIAGNOSIS — R55 NEAR SYNCOPE: ICD-10-CM

## 2018-01-04 DIAGNOSIS — R42 DIZZINESS: ICD-10-CM

## 2018-01-04 LAB
ALBUMIN SERPL BCP-MCNC: 3.6 G/DL (ref 3.2–4.9)
ALBUMIN/GLOB SERPL: 1.4 G/DL
ALP SERPL-CCNC: 74 U/L (ref 30–99)
ALT SERPL-CCNC: 16 U/L (ref 2–50)
ANION GAP SERPL CALC-SCNC: 8 MMOL/L (ref 0–11.9)
APTT PPP: 30.7 SEC (ref 24.7–36)
AST SERPL-CCNC: 19 U/L (ref 12–45)
BASOPHILS # BLD AUTO: 0.5 % (ref 0–1.8)
BASOPHILS # BLD: 0.04 K/UL (ref 0–0.12)
BILIRUB SERPL-MCNC: 0.3 MG/DL (ref 0.1–1.5)
BUN SERPL-MCNC: 15 MG/DL (ref 8–22)
CALCIUM SERPL-MCNC: 9.2 MG/DL (ref 8.5–10.5)
CHLORIDE SERPL-SCNC: 106 MMOL/L (ref 96–112)
CO2 SERPL-SCNC: 27 MMOL/L (ref 20–33)
CREAT SERPL-MCNC: 0.87 MG/DL (ref 0.5–1.4)
EKG IMPRESSION: NORMAL
EOSINOPHIL # BLD AUTO: 0.24 K/UL (ref 0–0.51)
EOSINOPHIL NFR BLD: 3.1 % (ref 0–6.9)
ERYTHROCYTE [DISTWIDTH] IN BLOOD BY AUTOMATED COUNT: 43.4 FL (ref 35.9–50)
GFR SERPL CREATININE-BSD FRML MDRD: >60 ML/MIN/1.73 M 2
GLOBULIN SER CALC-MCNC: 2.5 G/DL (ref 1.9–3.5)
GLUCOSE SERPL-MCNC: 105 MG/DL (ref 65–99)
HCT VFR BLD AUTO: 40 % (ref 37–47)
HGB BLD-MCNC: 13.6 G/DL (ref 12–16)
IMM GRANULOCYTES # BLD AUTO: 0.03 K/UL (ref 0–0.11)
IMM GRANULOCYTES NFR BLD AUTO: 0.4 % (ref 0–0.9)
INR PPP: 0.99 (ref 0.87–1.13)
LYMPHOCYTES # BLD AUTO: 2.62 K/UL (ref 1–4.8)
LYMPHOCYTES NFR BLD: 33.6 % (ref 22–41)
MCH RBC QN AUTO: 32.9 PG (ref 27–33)
MCHC RBC AUTO-ENTMCNC: 34 G/DL (ref 33.6–35)
MCV RBC AUTO: 96.6 FL (ref 81.4–97.8)
MONOCYTES # BLD AUTO: 0.72 K/UL (ref 0–0.85)
MONOCYTES NFR BLD AUTO: 9.2 % (ref 0–13.4)
NEUTROPHILS # BLD AUTO: 4.15 K/UL (ref 2–7.15)
NEUTROPHILS NFR BLD: 53.2 % (ref 44–72)
NRBC # BLD AUTO: 0 K/UL
NRBC BLD-RTO: 0 /100 WBC
PLATELET # BLD AUTO: 308 K/UL (ref 164–446)
PMV BLD AUTO: 8.8 FL (ref 9–12.9)
POTASSIUM SERPL-SCNC: 3.3 MMOL/L (ref 3.6–5.5)
PROT SERPL-MCNC: 6.1 G/DL (ref 6–8.2)
PROTHROMBIN TIME: 12.8 SEC (ref 12–14.6)
RBC # BLD AUTO: 4.14 M/UL (ref 4.2–5.4)
SODIUM SERPL-SCNC: 141 MMOL/L (ref 135–145)
TROPONIN I SERPL-MCNC: <0.01 NG/ML (ref 0–0.04)
TSH SERPL DL<=0.005 MIU/L-ACNC: 3.17 UIU/ML (ref 0.38–5.33)
WBC # BLD AUTO: 7.8 K/UL (ref 4.8–10.8)

## 2018-01-04 PROCEDURE — 80053 COMPREHEN METABOLIC PANEL: CPT

## 2018-01-04 PROCEDURE — 85610 PROTHROMBIN TIME: CPT

## 2018-01-04 PROCEDURE — 99285 EMERGENCY DEPT VISIT HI MDM: CPT

## 2018-01-04 PROCEDURE — 72125 CT NECK SPINE W/O DYE: CPT

## 2018-01-04 PROCEDURE — 71045 X-RAY EXAM CHEST 1 VIEW: CPT

## 2018-01-04 PROCEDURE — 93005 ELECTROCARDIOGRAM TRACING: CPT | Performed by: EMERGENCY MEDICINE

## 2018-01-04 PROCEDURE — 85025 COMPLETE CBC W/AUTO DIFF WBC: CPT

## 2018-01-04 PROCEDURE — 84484 ASSAY OF TROPONIN QUANT: CPT

## 2018-01-04 PROCEDURE — 72131 CT LUMBAR SPINE W/O DYE: CPT

## 2018-01-04 PROCEDURE — 84443 ASSAY THYROID STIM HORMONE: CPT

## 2018-01-04 PROCEDURE — 85730 THROMBOPLASTIN TIME PARTIAL: CPT

## 2018-01-04 PROCEDURE — 70450 CT HEAD/BRAIN W/O DYE: CPT

## 2018-01-04 PROCEDURE — 72128 CT CHEST SPINE W/O DYE: CPT

## 2018-01-04 PROCEDURE — G0378 HOSPITAL OBSERVATION PER HR: HCPCS

## 2018-01-04 PROCEDURE — 99219 PR INITIAL OBSERVATION CARE,LEVL II: CPT | Performed by: HOSPITALIST

## 2018-01-04 RX ORDER — CYCLOBENZAPRINE HCL 10 MG
10 TABLET ORAL EVERY EVENING
COMMUNITY
End: 2018-06-15

## 2018-01-04 ASSESSMENT — ENCOUNTER SYMPTOMS
HEADACHES: 1
FALLS: 1
BACK PAIN: 1
NAUSEA: 0
LOSS OF CONSCIOUSNESS: 1
VOMITING: 0
SHORTNESS OF BREATH: 0
DIZZINESS: 1
FEVER: 0

## 2018-01-04 ASSESSMENT — PAIN SCALES - GENERAL: PAINLEVEL_OUTOF10: 8

## 2018-01-05 ENCOUNTER — PATIENT OUTREACH (OUTPATIENT)
Dept: HEALTH INFORMATION MANAGEMENT | Facility: OTHER | Age: 68
End: 2018-01-05

## 2018-01-05 VITALS
DIASTOLIC BLOOD PRESSURE: 65 MMHG | HEIGHT: 65 IN | OXYGEN SATURATION: 95 % | RESPIRATION RATE: 19 BRPM | HEART RATE: 64 BPM | WEIGHT: 182.98 LBS | BODY MASS INDEX: 30.49 KG/M2 | TEMPERATURE: 97 F | SYSTOLIC BLOOD PRESSURE: 130 MMHG

## 2018-01-05 PROBLEM — R55 NEAR SYNCOPE: Status: RESOLVED | Noted: 2018-01-04 | Resolved: 2018-01-05

## 2018-01-05 PROBLEM — E87.6 HYPOKALEMIA: Status: RESOLVED | Noted: 2018-01-05 | Resolved: 2018-01-05

## 2018-01-05 PROBLEM — E87.6 HYPOKALEMIA: Status: ACTIVE | Noted: 2018-01-05

## 2018-01-05 LAB
DEPRECATED D DIMER PPP IA-ACNC: 250 NG/ML(D-DU)
LV EJECT FRACT  99904: 55
LV EJECT FRACT MOD 2C 99903: 63.7
LV EJECT FRACT MOD 4C 99902: 51.39
LV EJECT FRACT MOD BP 99901: 58.76
MAGNESIUM SERPL-MCNC: 2 MG/DL (ref 1.5–2.5)
TROPONIN I SERPL-MCNC: <0.01 NG/ML (ref 0–0.04)
TSH SERPL DL<=0.005 MIU/L-ACNC: 3.67 UIU/ML (ref 0.38–5.33)

## 2018-01-05 PROCEDURE — 700105 HCHG RX REV CODE 258: Performed by: HOSPITALIST

## 2018-01-05 PROCEDURE — G8979 MOBILITY GOAL STATUS: HCPCS | Mod: CH

## 2018-01-05 PROCEDURE — 700111 HCHG RX REV CODE 636 W/ 250 OVERRIDE (IP): Performed by: HOSPITALIST

## 2018-01-05 PROCEDURE — 84443 ASSAY THYROID STIM HORMONE: CPT

## 2018-01-05 PROCEDURE — 99239 HOSP IP/OBS DSCHRG MGMT >30: CPT | Performed by: HOSPITALIST

## 2018-01-05 PROCEDURE — 93306 TTE W/DOPPLER COMPLETE: CPT

## 2018-01-05 PROCEDURE — 306637 HCHG MISC ORTHO ITEM RC 0274

## 2018-01-05 PROCEDURE — 83735 ASSAY OF MAGNESIUM: CPT

## 2018-01-05 PROCEDURE — 96372 THER/PROPH/DIAG INJ SC/IM: CPT

## 2018-01-05 PROCEDURE — G8978 MOBILITY CURRENT STATUS: HCPCS | Mod: CH

## 2018-01-05 PROCEDURE — 93880 EXTRACRANIAL BILAT STUDY: CPT

## 2018-01-05 PROCEDURE — G0378 HOSPITAL OBSERVATION PER HR: HCPCS

## 2018-01-05 PROCEDURE — 85379 FIBRIN DEGRADATION QUANT: CPT

## 2018-01-05 PROCEDURE — 700102 HCHG RX REV CODE 250 W/ 637 OVERRIDE(OP): Performed by: HOSPITALIST

## 2018-01-05 PROCEDURE — 93306 TTE W/DOPPLER COMPLETE: CPT | Mod: 26 | Performed by: INTERNAL MEDICINE

## 2018-01-05 PROCEDURE — A9270 NON-COVERED ITEM OR SERVICE: HCPCS | Performed by: NURSE PRACTITIONER

## 2018-01-05 PROCEDURE — G8980 MOBILITY D/C STATUS: HCPCS | Mod: CH

## 2018-01-05 PROCEDURE — 97161 PT EVAL LOW COMPLEX 20 MIN: CPT

## 2018-01-05 PROCEDURE — 84484 ASSAY OF TROPONIN QUANT: CPT

## 2018-01-05 PROCEDURE — 700102 HCHG RX REV CODE 250 W/ 637 OVERRIDE(OP): Performed by: NURSE PRACTITIONER

## 2018-01-05 PROCEDURE — A9270 NON-COVERED ITEM OR SERVICE: HCPCS | Performed by: HOSPITALIST

## 2018-01-05 PROCEDURE — 36415 COLL VENOUS BLD VENIPUNCTURE: CPT

## 2018-01-05 RX ORDER — HYDROCODONE BITARTRATE AND ACETAMINOPHEN 5; 325 MG/1; MG/1
1 TABLET ORAL EVERY 6 HOURS PRN
Status: DISCONTINUED | OUTPATIENT
Start: 2018-01-05 | End: 2018-01-05 | Stop reason: HOSPADM

## 2018-01-05 RX ORDER — CYCLOBENZAPRINE HCL 10 MG
10 TABLET ORAL EVERY EVENING
Status: DISCONTINUED | OUTPATIENT
Start: 2018-01-05 | End: 2018-01-05 | Stop reason: HOSPADM

## 2018-01-05 RX ORDER — AMOXICILLIN 250 MG
2 CAPSULE ORAL 2 TIMES DAILY
Status: DISCONTINUED | OUTPATIENT
Start: 2018-01-05 | End: 2018-01-05 | Stop reason: HOSPADM

## 2018-01-05 RX ORDER — BISACODYL 10 MG
10 SUPPOSITORY, RECTAL RECTAL
Status: DISCONTINUED | OUTPATIENT
Start: 2018-01-05 | End: 2018-01-05 | Stop reason: HOSPADM

## 2018-01-05 RX ORDER — LISINOPRIL 20 MG/1
20 TABLET ORAL
Status: DISCONTINUED | OUTPATIENT
Start: 2018-01-05 | End: 2018-01-05 | Stop reason: HOSPADM

## 2018-01-05 RX ORDER — RISPERIDONE 2 MG/1
2 TABLET ORAL
Status: DISCONTINUED | OUTPATIENT
Start: 2018-01-05 | End: 2018-01-05 | Stop reason: HOSPADM

## 2018-01-05 RX ORDER — HYDROCODONE BITARTRATE AND ACETAMINOPHEN 5; 325 MG/1; MG/1
1 TABLET ORAL EVERY 6 HOURS PRN
Status: DISCONTINUED | OUTPATIENT
Start: 2018-01-05 | End: 2018-01-05

## 2018-01-05 RX ORDER — LISINOPRIL AND HYDROCHLOROTHIAZIDE 25; 20 MG/1; MG/1
1 TABLET ORAL DAILY
Status: DISCONTINUED | OUTPATIENT
Start: 2018-01-05 | End: 2018-01-05

## 2018-01-05 RX ORDER — OMEPRAZOLE 20 MG/1
20 CAPSULE, DELAYED RELEASE ORAL 2 TIMES DAILY
Status: DISCONTINUED | OUTPATIENT
Start: 2018-01-05 | End: 2018-01-05 | Stop reason: HOSPADM

## 2018-01-05 RX ORDER — ACETAMINOPHEN 325 MG/1
650 TABLET ORAL EVERY 6 HOURS
Status: DISCONTINUED | OUTPATIENT
Start: 2018-01-05 | End: 2018-01-05 | Stop reason: HOSPADM

## 2018-01-05 RX ORDER — ENALAPRILAT 1.25 MG/ML
1.25 INJECTION INTRAVENOUS EVERY 6 HOURS PRN
Status: DISCONTINUED | OUTPATIENT
Start: 2018-01-05 | End: 2018-01-05

## 2018-01-05 RX ORDER — POLYETHYLENE GLYCOL 3350 17 G/17G
1 POWDER, FOR SOLUTION ORAL
Status: DISCONTINUED | OUTPATIENT
Start: 2018-01-05 | End: 2018-01-05 | Stop reason: HOSPADM

## 2018-01-05 RX ORDER — ATORVASTATIN CALCIUM 20 MG/1
20 TABLET, FILM COATED ORAL DAILY
Status: DISCONTINUED | OUTPATIENT
Start: 2018-01-05 | End: 2018-01-05 | Stop reason: HOSPADM

## 2018-01-05 RX ORDER — ASPIRIN 325 MG
325 TABLET ORAL DAILY
Status: DISCONTINUED | OUTPATIENT
Start: 2018-01-05 | End: 2018-01-05 | Stop reason: HOSPADM

## 2018-01-05 RX ORDER — POTASSIUM CHLORIDE 20 MEQ/1
40 TABLET, EXTENDED RELEASE ORAL ONCE
Status: COMPLETED | OUTPATIENT
Start: 2018-01-05 | End: 2018-01-05

## 2018-01-05 RX ORDER — HYDROCHLOROTHIAZIDE 25 MG/1
25 TABLET ORAL
Status: DISCONTINUED | OUTPATIENT
Start: 2018-01-05 | End: 2018-01-05 | Stop reason: HOSPADM

## 2018-01-05 RX ORDER — SODIUM CHLORIDE 9 MG/ML
INJECTION, SOLUTION INTRAVENOUS CONTINUOUS
Status: DISCONTINUED | OUTPATIENT
Start: 2018-01-05 | End: 2018-01-05 | Stop reason: HOSPADM

## 2018-01-05 RX ORDER — ACETAMINOPHEN 325 MG/1
650 TABLET ORAL EVERY 6 HOURS PRN
Status: DISCONTINUED | OUTPATIENT
Start: 2018-01-05 | End: 2018-01-05

## 2018-01-05 RX ORDER — ONDANSETRON 4 MG/1
4 TABLET, ORALLY DISINTEGRATING ORAL EVERY 4 HOURS PRN
Status: DISCONTINUED | OUTPATIENT
Start: 2018-01-05 | End: 2018-01-05 | Stop reason: HOSPADM

## 2018-01-05 RX ORDER — FLUOXETINE HYDROCHLORIDE 20 MG/1
40 CAPSULE ORAL DAILY
Status: DISCONTINUED | OUTPATIENT
Start: 2018-01-05 | End: 2018-01-05 | Stop reason: HOSPADM

## 2018-01-05 RX ORDER — ALPRAZOLAM 0.25 MG/1
0.25 TABLET ORAL 2 TIMES DAILY PRN
Status: DISCONTINUED | OUTPATIENT
Start: 2018-01-05 | End: 2018-01-05 | Stop reason: HOSPADM

## 2018-01-05 RX ORDER — ONDANSETRON 2 MG/ML
4 INJECTION INTRAMUSCULAR; INTRAVENOUS EVERY 4 HOURS PRN
Status: DISCONTINUED | OUTPATIENT
Start: 2018-01-05 | End: 2018-01-05 | Stop reason: HOSPADM

## 2018-01-05 RX ADMIN — ATORVASTATIN CALCIUM 20 MG: 20 TABLET, FILM COATED ORAL at 10:13

## 2018-01-05 RX ADMIN — HYDROCHLOROTHIAZIDE 25 MG: 25 TABLET ORAL at 10:13

## 2018-01-05 RX ADMIN — LISINOPRIL 20 MG: 20 TABLET ORAL at 10:13

## 2018-01-05 RX ADMIN — SODIUM CHLORIDE: 9 INJECTION, SOLUTION INTRAVENOUS at 10:18

## 2018-01-05 RX ADMIN — RISPERIDONE 2 MG: 2 TABLET, FILM COATED ORAL at 02:47

## 2018-01-05 RX ADMIN — POTASSIUM CHLORIDE 40 MEQ: 1500 TABLET, EXTENDED RELEASE ORAL at 10:13

## 2018-01-05 RX ADMIN — HYDROCODONE BITARTRATE AND ACETAMINOPHEN 1 TABLET: 5; 325 TABLET ORAL at 01:53

## 2018-01-05 RX ADMIN — FLUOXETINE HYDROCHLORIDE 40 MG: 20 CAPSULE ORAL at 10:13

## 2018-01-05 RX ADMIN — OMEPRAZOLE 20 MG: 20 CAPSULE, DELAYED RELEASE ORAL at 10:13

## 2018-01-05 RX ADMIN — ENOXAPARIN SODIUM 40 MG: 100 INJECTION SUBCUTANEOUS at 10:14

## 2018-01-05 ASSESSMENT — COGNITIVE AND FUNCTIONAL STATUS - GENERAL
SUGGESTED CMS G CODE MODIFIER MOBILITY: CH
MOBILITY SCORE: 24

## 2018-01-05 ASSESSMENT — ENCOUNTER SYMPTOMS
HEADACHES: 0
ABDOMINAL PAIN: 0
WEAKNESS: 0
NERVOUS/ANXIOUS: 0
STRIDOR: 0
CHILLS: 0
NAUSEA: 0
NECK PAIN: 0
FEVER: 0
BACK PAIN: 1
BLOOD IN STOOL: 0
SHORTNESS OF BREATH: 0
FOCAL WEAKNESS: 0
BLURRED VISION: 0
DIARRHEA: 0
VOMITING: 0
COUGH: 0
DIZZINESS: 0
PALPITATIONS: 0
SENSORY CHANGE: 0

## 2018-01-05 ASSESSMENT — GAIT ASSESSMENTS
GAIT LEVEL OF ASSIST: INDEPENDENT
DISTANCE (FEET): 150

## 2018-01-05 ASSESSMENT — LIFESTYLE VARIABLES
EVER_SMOKED: NEVER
ALCOHOL_USE: NO
SUBSTANCE_ABUSE: 0

## 2018-01-05 ASSESSMENT — PAIN SCALES - GENERAL: PAINLEVEL_OUTOF10: 7

## 2018-01-05 NOTE — H&P
Hospital Medicine History and Physical    Date of Service 1/4/2018  Note complete:1/5/2018    Chief Complaint  Chief Complaint   Patient presents with   • T-5000 GLF     got dizzy and fell   • Back Pain       History of Presenting Illness  67 y.o. female who presented 1/4/2018 with A ground-level fall with near syncope while carrying some laundry down her hallway at home. Patient states she felt lightheaded the room was not spinning that she felt weak in her legs and fell down and hit the side of her shoulder and her back. She does not remember passing out doesn't expressly remember falling.  She denies any shortness of breath no nausea or vomiting no palpitations. She denies any chest pain. She has not had prior episode of syncope. Patient did not lose bowel or bladder control. She does have some current low back pain. She denies any loss of sensory of the arms or legs. Speech has been clear.    Primary Care Physician  Aly Renae M.D.    Consultants  None    Code Status  Full code    Review of Systems  Review of Systems   Constitutional: Negative for chills and fever.   HENT: Negative for congestion and tinnitus.    Eyes: Negative for blurred vision.   Respiratory: Negative for cough, shortness of breath and stridor.    Cardiovascular: Negative for chest pain, palpitations and leg swelling.   Gastrointestinal: Negative for abdominal pain, blood in stool, diarrhea, melena, nausea and vomiting.   Genitourinary: Negative for dysuria and hematuria.   Musculoskeletal: Positive for back pain (low back pain after fall). Negative for joint pain and neck pain.   Skin: Negative for rash.   Neurological: Negative for dizziness, sensory change, focal weakness, weakness and headaches.   Psychiatric/Behavioral: Negative for substance abuse. The patient is not nervous/anxious.         Past Medical History  Past Medical History:   Diagnosis Date   • ASTHMA    • Diverticulosis    • Duodenal ulcer, unspecified as acute or  chronic, without hemorrhage, perforation, or obstruction    • GERD (gastroesophageal reflux disease)    • Hiatal hernia    • High cholesterol    • Hypertension    • Psychiatric problem        Surgical History  Past Surgical History:   Procedure Laterality Date   • SUBMANDIBLE ABSCESS INCISION AND DRAINAGE  11/19/2016    Procedure: SUBMANDIBLE ABSCESS INCISION AND DRAINAGE;  Surgeon: Lior Hutchison D.D.S.;  Location: SURGERY [facility];  Service:    • DENTAL EXTRACTION(S)  11/19/2016    Procedure: DENTAL EXTRACTION(S);  Surgeon: Lior Hutchison D.D.S.;  Location: SURGERY [facility];  Service:    • LACRIMAL DUCT PROBE  3/11/2015    Performed by Alo Cheatham M.D. at SURGERY [facility] ORS   • ANKLE ARTHROSCOPY  7/31/2013    Performed by Paco Clifton M.D. at [facility] ORS   • HARDWARE REMOVAL ORTHO  7/31/2013    Performed by Paco Clifton M.D. at [facility] ORS   • BLADDER SUSPENSION     • BOWEL RESECTION     • COLONOSCOPY      with removal of pollyps   • HYSTERECTOMY RADICAL     • OTHER ORTHOPEDIC SURGERY      L ankle       Medications  No current facility-administered medications on file prior to encounter.      Current Outpatient Prescriptions on File Prior to Encounter   Medication Sig Dispense Refill   • omeprazole (PRILOSEC) 20 MG delayed-release capsule Take 1 Cap by mouth 2 times a day. 60 Cap 1   • atorvastatin (LIPITOR) 20 MG Tab Take 1 Tab by mouth every day. 30 Tab 1   • hydrocodone-acetaminophen (NORCO) 5-325 MG Tab per tablet Take 1 Tab by mouth every 6 hours as needed (pain).     • fluoxetine (PROZAC) 40 MG capsule Take 40 mg by mouth every day.     • alprazolam (XANAX) 0.25 MG Tab Take 0.25 mg by mouth 2 times a day as needed for Anxiety (panic attacks).     • risperidone (RISPERDAL) 2 MG Tab Take 2 mg by mouth every bedtime.     • lisinopril-hydrochlorothiazide (PRINZIDE, ZESTORETIC) 20-25 MG per tablet Take 1 Tab by mouth every day.         Family History  Family  History   Problem Relation Age of Onset   • No Known Problems Mother      2017 is age 91   • Other Father 57     unknown issue       Social History  Social History   Substance Use Topics   • Smoking status: Never Smoker   • Smokeless tobacco: Never Used   • Alcohol use No      Comment: recovering alcoholic       Allergies  Allergies   Allergen Reactions   • Nkda [No Known Drug Allergy]         Physical Exam  Laboratory   Hemodynamics  Temp (24hrs), Av.2 °C (97.2 °F), Min:35.9 °C (96.7 °F), Max:36.6 °C (97.9 °F)   Temperature: 36.2 °C (97.2 °F)  Pulse  Av.4  Min: 53  Max: 91 Heart Rate (Monitored): 64  Blood Pressure : 129/60, NIBP: 147/71      Respiratory      Respiration: 16, Pulse Oximetry: 98 %     Work Of Breathing / Effort: Mild       Physical Exam   Constitutional: She is oriented to person, place, and time. She appears well-developed and well-nourished. No distress.   Overweight   HENT:   Head: Normocephalic and atraumatic.   Nose: Nose normal.   Mouth/Throat: Oropharynx is clear and moist.   Eyes: Conjunctivae and EOM are normal. Right eye exhibits no discharge. Left eye exhibits no discharge. No scleral icterus.   Neck: Normal range of motion. Carotid bruit is not present. No tracheal deviation present.   Cardiovascular: Normal rate, regular rhythm, normal heart sounds and intact distal pulses.    No murmur heard.  Pulses:       Radial pulses are 2+ on the right side, and 2+ on the left side.        Dorsalis pedis pulses are 2+ on the right side, and 2+ on the left side.   Pulmonary/Chest: Effort normal and breath sounds normal. No stridor. No respiratory distress. She has no wheezes.   Abdominal: Soft. Bowel sounds are normal. She exhibits no distension. There is no tenderness.   Musculoskeletal: She exhibits no edema.   Lymphadenopathy:     She has no cervical adenopathy.   Neurological: She is alert and oriented to person, place, and time. No cranial nerve deficit. Coordination normal.   Skin:  Skin is warm. She is not diaphoretic.   Psychiatric: She has a normal mood and affect. Her behavior is normal. Thought content normal.   Vitals reviewed.      Recent Labs      01/04/18 2134   WBC  7.8   RBC  4.14*   HEMOGLOBIN  13.6   HEMATOCRIT  40.0   MCV  96.6   MCH  32.9   MCHC  34.0   RDW  43.4   PLATELETCT  308   MPV  8.8*     Recent Labs      01/04/18 2134   SODIUM  141   POTASSIUM  3.3*   CHLORIDE  106   CO2  27   GLUCOSE  105*   BUN  15   CREATININE  0.87   CALCIUM  9.2     Recent Labs      01/04/18 2134   ALTSGPT  16   ASTSGOT  19   ALKPHOSPHAT  74   TBILIRUBIN  0.3   GLUCOSE  105*     Recent Labs      01/04/18 2134   APTT  30.7   INR  0.99             Lab Results   Component Value Date    TROPONINI <0.01 01/05/2018     Urinalysis:    Lab Results  Component Value Date/Time   SPECGRAVITY 1.030 03/17/2010 2135   GLUCOSEUR Negative 03/17/2010 2135   KETONES Negative 03/17/2010 2135   NITRITE Negative 03/17/2010 2135   WBCURINE 0-2 02/20/2009 1930   RBCURINE 0-2 02/20/2009 1930   BACTERIA Moderate (A) 01/15/2008 2200   EPITHELCELL Few 02/20/2009 1930        Imaging  No acute cardiopulmonary abnormality    Assessment/Plan     I anticipate this patient is appropriate for observation status at this time.    Near syncope   Assessment & Plan    Echocardiogram and carotid Doppler ordered  Orthostatics unremarkable  Monitor on telemetry  Carefully ambulate patient prior to discharge  The current signs of any neurologic deficit will hold brain MRI  Aspirin 325 daily        Hypokalemia   Assessment & Plan    Replete  Check a.m. Lab  Check magnesium level  Monitor telemetry        HTN (hypertension)- (present on admission)   Assessment & Plan    Monitor vitals continue outpatient medications for active treatment        Hyperlipidemia- (present on admission)   Assessment & Plan    Atorvastatin            VTE prophylaxis: SCD .

## 2018-01-05 NOTE — DISCHARGE SUMMARY
Hospital Medicine Discharge Note     Patient ID:  Fe Clark  [id number]  67 y.o.female  1950    Admit Date:  1/4/2018       Discharge Date:   1/5/2018    Primary Care Provider: Aly Renae M.D.    Admitting Physician: Alfredo Langston D.O.  Discharging Physician: Luis Kelly M.D.    Chief Complaint: Near Syncope, Fall     Discharge Diagnoses:   Active Problems:    Near syncope- Resolved no further episodes     Hypokalemia- Replaced       Chronic Medical Problems:  Past Medical History:   Diagnosis Date   • ASTHMA    • Diverticulosis    • Duodenal ulcer, unspecified as acute or chronic, without hemorrhage, perforation, or obstruction    • GERD (gastroesophageal reflux disease)    • Hiatal hernia    • High cholesterol    • Hypertension    • Psychiatric problem        Code Status: Full Code    Hospital Summary:       Please refer to H&P by Alfredo Langston D.O.on 1/4/2018 for full details.      In brief, Fe Clark is a 67 y.o. female who was admitted 1/4/2018 for possible syncope, back pain.   Patient has significant medical history of asthma, diverticulosis, duodenal ulcer, GERD, hiatal hernia, high cholesterol, hypertension and psychiatric problem. Patient states she had a ground-level fall with near syncope while caring some laundry down her hallway at home. Patient does not state she remembers passing out. Patient was brought to the emergency room for further evaluation.   Patient was admitted to CDU for further monitoring and workup. Patient was placed on telemetry monitoring with no acute cardiac events noted.   Orthostatic blood pressures remained unremarkable. Patient did not exhibit any neurological deficits. Patient was started on aspirin and statin therapy after CT head was negative for any hemorrhage or bleed. Chest x-ray shows no acute cardiopulmonary abnormality. CT C-spine showed no acute fracture or traumatic subluxation. CT T-spine and L-spine did show some  evidence of hemangiomata. Patient was noted to be hypokalemic this was replaced. Troponin remained negative. TSH within normal limits. Magnesium level to be within normal limits. D-dimer negative. Patient was noted to have moderate stenosis to the right internal carotid artery with 50-69% blockage which she will need to follow up with vascular outpatient. Echocardiogram was obtained which shows EF of 55%. Vital signs remained stable at this time, patient was noted to have episode of hypertension that has since resolved. Patient was provided with IV fluids as well as analgesics and antiemetics as necessary.   On exam today patient states she is ready to go home. Reports her back pain has improved significantly. Patient states no further episodes of dizziness. Patient states no chest pain, shortness of breath, abdominal pain, nausea or vomiting. Patient is evaluated by physical therapy which states she is safe to return home at this time we'll need to ask for help with more intense activities.patient does state that she had palpable available at home. Patient is up ambulating independently in the halls with no assist. Patient's dizziness/presyncope has completely resolved patient states no symptoms at this time.  Patient is on statin therapy at home, and she will continue on discharge. Patient's lungs are clear bilaterally on auscultation. Patient does not have any lower extremity edema noted. Patient will follow up with PCP on discharge. Encouraged to discuss possible sleep study with PCP. Patient cautioned on her home Xanax as this may cause syncopal type symptoms or increased falls.     Therefore, she is discharged in good and stable condition with close outpatient follow-up.  Patient return to baseline health was more rapid than anticipated.     Consultants:      None     Studies:    Imaging/ Testing:      Carotid Duplex ([facility] Only)   CONCLUSIONS   Moderate stenosis of the right internal  carotid (50-69%).    Mild stenosis of the left internal carotid (< 50%).       DX-CHEST-PORTABLE (1 VIEW)   Final Result      No acute cardiopulmonary abnormality.      CT-CSPINE WITHOUT PLUS RECONS   Final Result      No acute fracture or traumatic subluxation.      CT-HEAD W/O   Final Result      Normal CT scan of the head without contrast.               INTERPRETING LOCATION:  [address], [place], [address]      CT-TSPINE W/O PLUS RECONS   Final Result      1.  No evidence of acute fracture or traumatic malalignment.   2.  Hemangiomata involving the T3 and L1 vertebral bodies.      CT-LSPINE W/O PLUS RECONS   Final Result      1.  No evidence of fracture or traumatic subluxation.   2.  Bone hemangiomata involving the L1 and L2 vertebral bodies.      Echocardiogram Comp W/O Cont      CONCLUSIONS  Normal left ventricular systolic function.  Left ventricular ejection fraction is visually estimated to be 55%.  Normal diastolic function.  Dilated inferior vena cava without inspiratory collapse.  Estimated right ventricular systolic pressure  is 36 mmHg.         Laboratory:   Recent Labs      01/04/18 2134   WBC  7.8   RBC  4.14*   HEMOGLOBIN  13.6   HEMATOCRIT  40.0   MCV  96.6   MCH  32.9   MCHC  34.0   RDW  43.4   PLATELETCT  308   MPV  8.8*       Recent Labs      01/04/18 2134   SODIUM  141   POTASSIUM  3.3*   CHLORIDE  106   CO2  27   GLUCOSE  105*   BUN  15   CREATININE  0.87   CALCIUM  9.2       Recent Labs      01/04/18 2134   ALTSGPT  16   ASTSGOT  19   ALKPHOSPHAT  74   TBILIRUBIN  0.3   GLUCOSE  105*                    Recent Labs      01/04/18   2134  01/05/18   0250   TROPONINI  <0.01  <0.01       Recent Labs      01/05/18   0250   TSHULTRASEN  3.670           Procedures/Surgeries:        None    Disposition:    Discharge home    Condition:  Stable    Instructions:   Activity: As tolerated.  Diet: Cardiac  Followup: With PCP   Instructions:  -Change positions slowly  -Stay well hydrated  -Given  instructions to return to the ER if any new or worsening symptoms, worsening condition, or failure to improve  -Call PCP for followup  -No smoking, no alcohol, no caffeine  -Encourage risk factor reduction with tobacco and alcohol abstinence, diet changes, weight loss, and exercise.     Follow-Up  Aly Renae M.D.  [address]  [address]  [place] [address]  [phone]    Go on 1/9/2018  Please arrive at 2:15 pm for your appointment.         Discharge Medications:           Medication List      CONTINUE taking these medications      Instructions   alprazolam 0.25 MG Tabs  Commonly known as:  XANAX   Take 0.25 mg by mouth 2 times a day as needed for Anxiety (panic attacks).  Dose:  0.25 mg     atorvastatin 20 MG Tabs  Commonly known as:  LIPITOR   Take 1 Tab by mouth every day.  Dose:  20 mg     cyclobenzaprine 10 MG Tabs  Commonly known as:  FLEXERIL   Take 10 mg by mouth every evening.  Dose:  10 mg     fluoxetine 40 MG capsule  Commonly known as:  PROZAC   Take 40 mg by mouth every day.  Dose:  40 mg     hydrocodone-acetaminophen 5-325 MG Tabs per tablet  Commonly known as:  NORCO   Take 1 Tab by mouth every 6 hours as needed (pain).  Dose:  1 Tab     lisinopril-hydrochlorothiazide 20-25 MG per tablet  Commonly known as:  PRINZIDE, ZESTORETIC   Take 1 Tab by mouth every day.  Dose:  1 Tab     omeprazole 20 MG delayed-release capsule  Commonly known as:  PRILOSEC   Take 1 Cap by mouth 2 times a day.  Dose:  20 mg     risperidone 2 MG Tabs  Commonly known as:  RISPERDAL   Take 2 mg by mouth every bedtime.  Dose:  2 mg            Total time of the discharge process exceeds 40 minutes. This included face to face with the patient, medication reconciliation, care co ordination with nursing  involved in patient care and discussion and co ordination with case management.   Patient return to baseline health was more rapid than anticipated.     Please CC the above physicians    Gabrielle Crawley,  GEMA  1/5/2018  8:15 AM

## 2018-01-05 NOTE — ED NOTES
"Pt brought back from lobby by wheelchair. Pt aox4, breathing even and unlabored. Pt c/o pain to back of head and pain, 8/10, throughout her back and spine \"from my neck to my tailbone\" after sudden onset dizziness resulting in a fall while walking. Pt reports \"probably\" hitting head and is unsure of loc, \" I don't remember falling\". Pt denies current anticoagulants.   "

## 2018-01-05 NOTE — ASSESSMENT & PLAN NOTE
Echocardiogram and carotid Doppler ordered  Orthostatics unremarkable  Monitor on telemetry  Carefully ambulate patient prior to discharge  The current signs of any neurologic deficit will hold brain MRI  Aspirin 325 daily

## 2018-01-05 NOTE — ED PROVIDER NOTES
ED Provider Note    Scribed for Layne Sandoval M.D. by Bahman Jordan. 1/4/2018, 8:47 PM.    Primary care provider: Aly Renae M.D.  Means of arrival: walk-in  History obtained from: patient  History limited by: none    CHIEF COMPLAINT  Chief Complaint   Patient presents with   • T-5000 GLF     got dizzy and fell   • Back Pain       HPI  Fe Clark is a 67 y.o. female who presents to the Emergency Department for evaluation of back pain status post ground level fall that occurred today at 6:00 PM. Per patient, she was walking down a hallway at home when she had a sudden onset of dizziness. Afterwards, patientBelieves she had a syncopal or near syncopal episode. The patient states she recalls becoming very dizzy, and then she can't recall if she woke up on the ground or continued to stumble down the hallway. She is now complaining of slight posterior headache and back pain from her neck to her lower back. The patient states her pain has been constant since onset. She states the pain is mild and throbbing. It she denies numbness, tingling, or weakness. She does not note any exacerbating or alleviating factors. Patient reports she has never had an episode of dizziness or syncope like the one that occurred tonight. She denies any cardiac history other than hypertension. She reports a family history of CABG from her mother when she was in her 60s. Patient confirms she is taking Lisinopril, Hydrochlorothiazide. She confirms she is non-smoker, and she is currently living with her daughters. Patient denies fevers, nausea, vomiting, chest pain, shortness of breath. Patient is not on blood thinners.     REVIEW OF SYSTEMS  Review of Systems   Constitutional: Negative for fever.   Respiratory: Negative for shortness of breath.    Cardiovascular: Negative for chest pain.   Gastrointestinal: Negative for nausea and vomiting.   Musculoskeletal: Positive for back pain (low back, radiating down to legs and  up to head) and falls.   Neurological: Positive for dizziness, loss of consciousness and headaches.   All other systems reviewed and are negative.    C.      PAST MEDICAL HISTORY   has a past medical history of ASTHMA; Diverticulosis; Duodenal ulcer, unspecified as acute or chronic, without hemorrhage, perforation, or obstruction; GERD (gastroesophageal reflux disease); Hiatal hernia; High cholesterol; Hypertension; and Psychiatric problem.    SURGICAL HISTORY   has a past surgical history that includes bladder suspension; bowel resection; colonoscopy; hysterectomy radical; other orthopedic surgery; ankle arthroscopy (7/31/2013); hardware removal ortho (7/31/2013); lacrimal duct probe (3/11/2015); submandible abscess incision and drainage (11/19/2016); and dental extraction(s) (11/19/2016).    SOCIAL HISTORY  Social History   Substance Use Topics   • Smoking status: Never Smoker   • Smokeless tobacco: Never Used   • Alcohol use No      Comment: recovering alcoholic      History   Drug Use     Comment: jo-ann       FAMILY HISTORY  Family History   Problem Relation Age of Onset   • No Known Problems Mother      2017 is age 91   • Other Father 57     unknown issue       CURRENT MEDICATIONS    Current Outpatient Prescriptions on File Prior to Encounter   Medication Sig Dispense Refill   • Lactobacillus-Inulin ([facility]) Cap Take 1 Cap by mouth every day. 30 Cap 0   • albuterol 108 (90 BASE) MCG/ACT Aero Soln inhalation aerosol Inhale 2 Puffs by mouth every four hours as needed (cough). 8.5 g 0   • omeprazole (PRILOSEC) 20 MG delayed-release capsule Take 1 Cap by mouth 2 times a day. 60 Cap 1   • atorvastatin (LIPITOR) 20 MG Tab Take 1 Tab by mouth every day. 30 Tab 1   • hydrocodone-acetaminophen (NORCO) 5-325 MG Tab per tablet Take 1-2 Tabs by mouth every 6 hours as needed (pain).     • fluoxetine (PROZAC) 40 MG capsule Take 40 mg by mouth every day.     • alprazolam (XANAX) 0.25 MG Tab Take 0.25  "mg by mouth 2 times a day as needed for Sleep or Anxiety.     • risperidone (RISPERDAL) 2 MG Tab Take 2 mg by mouth every bedtime.     • lisinopril-hydrochlorothiazide (PRINZIDE, ZESTORETIC) 20-25 MG per tablet Take 1 Tab by mouth every day.     • carvedilol (COREG) 3.125 MG TABS Take 3.125 mg by mouth every day.     • cyclobenzaprine (FLEXERIL) 10 MG TABS Take 1 Tab by mouth 3 times a day as needed for Muscle Spasms. 20 Each 0      ALLERGIES  Allergies   Allergen Reactions   • Nkda [No Known Drug Allergy]        PHYSICAL EXAM  VITAL SIGNS: /77   Pulse 91   Temp 35.9 °C (96.7 °F)   Resp 18   Ht 1.651 m (5' 5\")   Wt 81.6 kg (180 lb)   SpO2 97%   BMI 29.95 kg/m²   Vitals reviewed by myself.  Physical Exam  Nursing note and vitals reviewed.  Constitutional: Well-developed and well-nourished. No acute distress.   HENT: Head is normocephalic and atraumatic.   Eyes: extra-ocular movements intact  Cardiovascular: Regular rate and regular rhythm. No murmur heard.  Pulmonary/Chest: Breath sounds normal. No wheezes or rales.   Abdominal: Soft and non-tender. No distention.    Musculoskeletal: Extremities exhibit normal range of motion without edema. Tender to palpation on T, C and L spine. 5/5 strength on all extremities.   Neurological: Awake and alert. Sensation intact on all extremities  Skin: Skin is warm and dry. No rash.        DIAGNOSTIC STUDIES /  LABS  Labs Reviewed   CBC WITH DIFFERENTIAL - Abnormal; Notable for the following:        Result Value    RBC 4.14 (*)     MPV 8.8 (*)     All other components within normal limits   COMP METABOLIC PANEL - Abnormal; Notable for the following:     Potassium 3.3 (*)     Glucose 105 (*)     All other components within normal limits   TROPONIN   PROTHROMBIN TIME   APTT   TSH   ESTIMATED GFR    Indicate which anticoagulants the patient is on:->UNKNOWN      All labs reviewed by me.    EKG Interpretation:  Interpreted by myself    12 Lead EKG interpreted by me to " show:  Time: 2100  Normal sinus rhythm  Rate 72  Axis: Normal  Intervals: Normal except for mildly prolonged QT of 486  Nonspecific T wave inversions in lead 3, which is unchanged from EKG from 11/18/2016  Normal ST segments  My impression of this EKG: Does not indicate ischemia or arrhythmia at this time.     RADIOLOGY  DX-CHEST-PORTABLE (1 VIEW)   Final Result      No acute cardiopulmonary abnormality.      CT-CSPINE WITHOUT PLUS RECONS   Final Result      No acute fracture or traumatic subluxation.      CT-HEAD W/O   Final Result      Normal CT scan of the head without contrast.               INTERPRETING LOCATION:  [address], [place], [address]      CT-TSPINE W/O PLUS RECONS   Final Result      1.  No evidence of acute fracture or traumatic malalignment.   2.  Hemangiomata involving the T3 and L1 vertebral bodies.      CT-LSPINE W/O PLUS RECONS   Final Result      1.  No evidence of fracture or traumatic subluxation.   2.  Bone hemangiomata involving the L1 and L2 vertebral bodies.        The radiologist's interpretation of all radiological studies have been reviewed by me.      REASSESSMENT    9:00 PM - The patient was seen and examined at bedside. I discussed plans for EKG, labs, and imaging with the patient. She understood and verbalized agreement.      10:41 PM - Patient was reevaluated at bedside. She is resting comfortably in the gurney. I updated the patient on her lab and imaging results as above. I discussed admission plans with the patient. She understood and verbalized agreement.     11:09 PM Spoke with Dr. Langston (Hospitalist) regarding the patient. They have accepted the patient for admission.      COURSE & MEDICAL DECISION MAKING  Nursing notes, VS, PMSFHx reviewed in chart.    Patient is a67-year-old female who comes in for syncope versus near syncope. Differential diagnosis includes electrolyte abnormality, arrhythmia, thyroid dysfunction, intracranial abnormality, acute coronary syndrome, spinal  injury, sprain, strain. Diagnostic workup includes labs, EKG, and CT of the head and spine.    Patient's initial vitals are within normal limits except for slight hypertension. She is neurologically intact and physical exam. EKG returns and demonstrates no evidence of arrhythmia or ischemia. Labs returned and are unremarkable. CT of the head returns demonstrates no acute intracranial abnormalities. Chest x-ray returns demonstrates no acute cardiopulmonary processes. CT of the spine demonstrates no acute traumatic injuries. As patient had a near syncopal/syncope event, is 67 years old, and has significant family history of cardiac disease I elected to admit patient for further cardiac workup given her episode today. Patient is agreeable to this plan. I discussed the case with Dr. Langston who has accepted the admission.      DISPOSITION:  Patient will be admitted to Dr. Langston in guarded condition.     FINAL IMPRESSION  1. Near syncope    2. Dizziness    3. Fall, initial encounter          Bahman LINN (Scribe), am scribing for, and in the presence of, Layne Sandoval M.D..    Electronically signed by: Bahman Jordan (Scribe), 1/4/2018    ILayne M.D. personally performed the services described in this documentation, as scribed by Bahman Jordan in my presence, and it is both accurate and complete.    The note accurately reflects work and decisions made by me.  Layne Sandoval  1/5/2018  4:20 AM

## 2018-01-05 NOTE — PROGRESS NOTES
A/o,admit from ED via andrew,a/o,assessment completed per CDU,poc discussed,verbalized understanding,rates pain 7/10 on her neck to her tail bone,medicated prn,box meal given,bed alarm on for safety,call button within reach.

## 2018-01-05 NOTE — PROGRESS NOTES
telemetry showed  Short run of svt non-sustained,pt assessed found coughing,denies pain,sob,will continue to monitor.strip placed in pt's chart

## 2018-01-05 NOTE — ED NOTES
"PT to triage c/o a glf, pt reports that she became dizzy while walking down the sin and fell to the ground, PT reports back pain \"from my neck to my tailbone\".  PT denies LOC  Chief Complaint   Patient presents with   • T-5000 GLF     got dizzy and fell   • Back Pain     Blood pressure 159/77, pulse 91, temperature 35.9 °C (96.7 °F), resp. rate 18, height 1.651 m (5' 5\"), weight 81.6 kg (180 lb), SpO2 97 %.      "

## 2018-01-05 NOTE — DISCHARGE PLANNING
This CM spoke to pt and gave her an Advance Directives packet. No safety or other issues identified.  Pt states she has many family members living at home with her to assist her when needed.  This does not appear to be a difficult discharge at this time.    Care Transition Team Assessment    Information Source  Orientation : Oriented x 4  Information Given By: Patient    Readmission Evaluation  Is this a readmission?: No    Elopement Risk  Legal Hold: No  Ambulatory or Self Mobile in Wheelchair: No-Not an Elopement Risk  Elopement Risk: Not at Risk for Elopement    Interdisciplinary Discharge Planning  Does Admitting Nurse Feel This Could be a Complex Discharge?: No  Lives with - Patient's Self Care Capacity: Adult Children (Other family members live in home)  Support Systems: Children, Family Member(s)  Housing / Facility: [address]  Do You Take your Prescribed Medications Regularly: Yes  Able to Return to Previous ADL's: Yes  Mobility Issues: No  Prior Services: None  Patient Expects to be Discharged to:: Home  Assistance Needed: No    Discharge Preparedness  What is your plan after discharge?: Other (comment)  What are your discharge supports?:  (Home - family members can assist pt if needed)  Prior Functional Level: Ambulatory, Independent with Activities of Daily Living  Difficulity with ADLs: None    Functional Assesment  Prior Functional Level: Ambulatory, Independent with Activities of Daily Living    Finances  Financial Barriers to Discharge: No  Prescription Coverage: Yes         Values / Beliefs / Concerns  Values / Beliefs Concerns : No    Advance Directive  Advance Directive?: None  Advance Directive offered?: AD Booklet given    Domestic Abuse  Have you ever been the victim of abuse or violence?: No  Physical Abuse or Sexual Abuse: No  Verbal Abuse or Emotional Abuse: No  Possible Abuse Reported to:: Not Applicable         Discharge Risks or Barriers  Discharge risks or barriers?: No    Anticipated  Discharge Information  Anticipated discharge disposition: Home

## 2018-01-05 NOTE — THERAPY
"Physical Therapy Evaluation completed.   Bed Mobility:  Supine to Sit: Independent  Transfers: Sit to Stand: Independent  Gait: Level Of Assist: Independent with No Equipment Needed       Plan of Care: Patient with no further skilled PT needs in the acute care setting at this time  Discharge Recommendations: Equipment: No Equipment Needed. Post-acute therapy Discharge to home with outpatient or home health for additional skilled therapy services.    See \"Rehab Therapy-Acute\" Patient Summary Report for complete documentation.   Pt. was able to ambulate without an AD and tolerated higher level balance tetsing without LOB.  Following ambulation, pt. BP was 177/81 but admits to having higher BP even prior to syncope episode.  She is motivated to continue with her PLOF but was educated on precautions to move slower and allow her body to adjust when transitioning from sitting <>standing.  Pt. will have help at home but this PT is slightly concerned with her having 2 disabled family members at home and may not have full support to limit activity unpon returning home. Pt. reassured us that she will have help to complete heavier tasks as well as take care of those family members and feels safe to go home.  Pt. with no further skilled acute PT at this moment.  "

## 2018-01-06 NOTE — DISCHARGE INSTRUCTIONS
Discharge Instructions    Discharged to home by car with relative. Discharged via wheelchair, hospital escort: Yes.  Special equipment needed: Not Applicable    Be sure to schedule a follow-up appointment with your primary care doctor or any specialists as instructed.     Discharge Plan:   Diet Plan: Discussed  Activity Level: Discussed  Confirmed Follow up Appointment: Patient to Call and Schedule Appointment  Confirmed Symptoms Management: Discussed  Medication Reconciliation Updated: Yes  Influenza Vaccine Indication: Indicated: 9 to 64 years of age    I understand that a diet low in cholesterol, fat, and sodium is recommended for good health. Unless I have been given specific instructions below for another diet, I accept this instruction as my diet prescription.   Other diet:     Special Instructions: None    · Is patient discharged on Warfarin / Coumadin?   No     · Is patient Post Blood Transfusion?  No    Depression / Suicide Risk    As you are discharged from this [facility] facility, it is important to learn how to keep safe from harming yourself.    Recognize the warning signs:  · Abrupt changes in personality, positive or negative- including increase in energy   · Giving away possessions  · Change in eating patterns- significant weight changes-  positive or negative  · Change in sleeping patterns- unable to sleep or sleeping all the time   · Unwillingness or inability to communicate  · Depression  · Unusual sadness, discouragement and loneliness  · Talk of wanting to die  · Neglect of personal appearance   · Rebelliousness- reckless behavior  · Withdrawal from people/activities they love  · Confusion- inability to concentrate     If you or a loved one observes any of these behaviors or has concerns about self-harm, here's what you can do:  · Talk about it- your feelings and reasons for harming yourself  · Remove any means that you might use to hurt yourself (examples: pills, rope, extension cords,  firearm)  · Get professional help from the community (Mental Health, Substance Abuse, psychological counseling)  · Do not be alone:Call your Safe Contact- someone whom you trust who will be there for you.  · Call your local CRISIS HOTLINE 542-4336 or 849-239-6690  · Call your local Children's Mobile Crisis Response Team [place] (806) 935-8757 or www.[employer] / [employer]  · Call the toll free National Suicide Prevention Hotlines   · National Suicide Prevention Lifeline 108-518-GZNJ (3419)  · National Hope Line Network 800-SUICIDE (897-4008)

## 2018-01-06 NOTE — PROGRESS NOTES
MD visited pt,for discharge, IV D/C'd. Discharge instructions provided to pt. Pt states understanding. Pt states all questions have been answered. Copy of discharge provided to pt. Signed copy in chart. No prescriptions,follow up and if symptom get worse explained to come to ED. Pt states that all personal belongings are in possession. Pt escorted off unit without incident.

## 2018-06-15 ENCOUNTER — APPOINTMENT (OUTPATIENT)
Dept: RADIOLOGY | Facility: MEDICAL CENTER | Age: 68
End: 2018-06-15
Payer: MEDICARE

## 2018-06-15 ENCOUNTER — HOSPITAL ENCOUNTER (OUTPATIENT)
Facility: MEDICAL CENTER | Age: 68
End: 2018-06-16
Attending: EMERGENCY MEDICINE | Admitting: HOSPITALIST
Payer: MEDICARE

## 2018-06-15 ENCOUNTER — APPOINTMENT (OUTPATIENT)
Dept: RADIOLOGY | Facility: MEDICAL CENTER | Age: 68
End: 2018-06-15
Attending: EMERGENCY MEDICINE
Payer: MEDICARE

## 2018-06-15 DIAGNOSIS — R07.9 CHEST PAIN, UNSPECIFIED TYPE: ICD-10-CM

## 2018-06-15 LAB
ALBUMIN SERPL BCP-MCNC: 4 G/DL (ref 3.2–4.9)
ALBUMIN/GLOB SERPL: 1.4 G/DL
ALP SERPL-CCNC: 104 U/L (ref 30–99)
ALT SERPL-CCNC: 16 U/L (ref 2–50)
ANION GAP SERPL CALC-SCNC: 10 MMOL/L (ref 0–11.9)
APTT PPP: 28.6 SEC (ref 24.7–36)
AST SERPL-CCNC: 19 U/L (ref 12–45)
BASOPHILS # BLD AUTO: 0.7 % (ref 0–1.8)
BASOPHILS # BLD: 0.04 K/UL (ref 0–0.12)
BILIRUB SERPL-MCNC: 0.8 MG/DL (ref 0.1–1.5)
BNP SERPL-MCNC: 98 PG/ML (ref 0–100)
BUN SERPL-MCNC: 13 MG/DL (ref 8–22)
CALCIUM SERPL-MCNC: 9.3 MG/DL (ref 8.5–10.5)
CHLORIDE SERPL-SCNC: 109 MMOL/L (ref 96–112)
CO2 SERPL-SCNC: 22 MMOL/L (ref 20–33)
CREAT SERPL-MCNC: 0.81 MG/DL (ref 0.5–1.4)
DEPRECATED D DIMER PPP IA-ACNC: 211 NG/ML(D-DU)
EKG IMPRESSION: NORMAL
EOSINOPHIL # BLD AUTO: 0.26 K/UL (ref 0–0.51)
EOSINOPHIL NFR BLD: 4.6 % (ref 0–6.9)
ERYTHROCYTE [DISTWIDTH] IN BLOOD BY AUTOMATED COUNT: 41.8 FL (ref 35.9–50)
GLOBULIN SER CALC-MCNC: 2.9 G/DL (ref 1.9–3.5)
GLUCOSE SERPL-MCNC: 98 MG/DL (ref 65–99)
HCT VFR BLD AUTO: 41.9 % (ref 37–47)
HGB BLD-MCNC: 14.5 G/DL (ref 12–16)
IMM GRANULOCYTES # BLD AUTO: 0.01 K/UL (ref 0–0.11)
IMM GRANULOCYTES NFR BLD AUTO: 0.2 % (ref 0–0.9)
INR PPP: 1.05 (ref 0.87–1.13)
LIPASE SERPL-CCNC: 24 U/L (ref 11–82)
LYMPHOCYTES # BLD AUTO: 1.96 K/UL (ref 1–4.8)
LYMPHOCYTES NFR BLD: 34.9 % (ref 22–41)
MCH RBC QN AUTO: 32.1 PG (ref 27–33)
MCHC RBC AUTO-ENTMCNC: 34.6 G/DL (ref 33.6–35)
MCV RBC AUTO: 92.7 FL (ref 81.4–97.8)
MONOCYTES # BLD AUTO: 0.41 K/UL (ref 0–0.85)
MONOCYTES NFR BLD AUTO: 7.3 % (ref 0–13.4)
NEUTROPHILS # BLD AUTO: 2.93 K/UL (ref 2–7.15)
NEUTROPHILS NFR BLD: 52.3 % (ref 44–72)
NRBC # BLD AUTO: 0 K/UL
NRBC BLD-RTO: 0 /100 WBC
PLATELET # BLD AUTO: 306 K/UL (ref 164–446)
PMV BLD AUTO: 9.1 FL (ref 9–12.9)
POTASSIUM SERPL-SCNC: 3.6 MMOL/L (ref 3.6–5.5)
PROT SERPL-MCNC: 6.9 G/DL (ref 6–8.2)
PROTHROMBIN TIME: 13.4 SEC (ref 12–14.6)
RBC # BLD AUTO: 4.52 M/UL (ref 4.2–5.4)
SODIUM SERPL-SCNC: 141 MMOL/L (ref 135–145)
TROPONIN I SERPL-MCNC: <0.01 NG/ML (ref 0–0.04)
WBC # BLD AUTO: 5.6 K/UL (ref 4.8–10.8)

## 2018-06-15 PROCEDURE — 85730 THROMBOPLASTIN TIME PARTIAL: CPT

## 2018-06-15 PROCEDURE — 93005 ELECTROCARDIOGRAM TRACING: CPT

## 2018-06-15 PROCEDURE — 83690 ASSAY OF LIPASE: CPT

## 2018-06-15 PROCEDURE — 99220 PR INITIAL OBSERVATION CARE,LEVL III: CPT | Performed by: HOSPITALIST

## 2018-06-15 PROCEDURE — 700102 HCHG RX REV CODE 250 W/ 637 OVERRIDE(OP): Performed by: HOSPITALIST

## 2018-06-15 PROCEDURE — 85610 PROTHROMBIN TIME: CPT

## 2018-06-15 PROCEDURE — 83036 HEMOGLOBIN GLYCOSYLATED A1C: CPT

## 2018-06-15 PROCEDURE — G0378 HOSPITAL OBSERVATION PER HR: HCPCS

## 2018-06-15 PROCEDURE — 85025 COMPLETE CBC W/AUTO DIFF WBC: CPT

## 2018-06-15 PROCEDURE — 84484 ASSAY OF TROPONIN QUANT: CPT

## 2018-06-15 PROCEDURE — 93005 ELECTROCARDIOGRAM TRACING: CPT | Performed by: EMERGENCY MEDICINE

## 2018-06-15 PROCEDURE — 99285 EMERGENCY DEPT VISIT HI MDM: CPT

## 2018-06-15 PROCEDURE — 71045 X-RAY EXAM CHEST 1 VIEW: CPT

## 2018-06-15 PROCEDURE — A9270 NON-COVERED ITEM OR SERVICE: HCPCS | Performed by: HOSPITALIST

## 2018-06-15 PROCEDURE — 85379 FIBRIN DEGRADATION QUANT: CPT

## 2018-06-15 PROCEDURE — 80053 COMPREHEN METABOLIC PANEL: CPT

## 2018-06-15 PROCEDURE — 36415 COLL VENOUS BLD VENIPUNCTURE: CPT

## 2018-06-15 PROCEDURE — 83880 ASSAY OF NATRIURETIC PEPTIDE: CPT

## 2018-06-15 RX ORDER — ATORVASTATIN CALCIUM 40 MG/1
40 TABLET, FILM COATED ORAL NIGHTLY
Status: SHIPPED | COMMUNITY
End: 2022-10-21

## 2018-06-15 RX ORDER — RISPERIDONE 2 MG/1
2 TABLET ORAL
Status: DISCONTINUED | OUTPATIENT
Start: 2018-06-15 | End: 2018-06-15

## 2018-06-15 RX ORDER — OMEPRAZOLE 20 MG/1
20 CAPSULE, DELAYED RELEASE ORAL 2 TIMES DAILY
Status: DISCONTINUED | OUTPATIENT
Start: 2018-06-15 | End: 2018-06-15

## 2018-06-15 RX ORDER — TIZANIDINE 4 MG/1
4 TABLET ORAL 2 TIMES DAILY
COMMUNITY
End: 2020-02-22

## 2018-06-15 RX ORDER — AMOXICILLIN 250 MG
2 CAPSULE ORAL 2 TIMES DAILY
Status: DISCONTINUED | OUTPATIENT
Start: 2018-06-15 | End: 2018-06-16 | Stop reason: HOSPADM

## 2018-06-15 RX ORDER — CYCLOBENZAPRINE HCL 10 MG
10 TABLET ORAL EVERY EVENING
Status: DISCONTINUED | OUTPATIENT
Start: 2018-06-15 | End: 2018-06-15

## 2018-06-15 RX ORDER — LISINOPRIL AND HYDROCHLOROTHIAZIDE 25; 20 MG/1; MG/1
1 TABLET ORAL DAILY
Status: DISCONTINUED | OUTPATIENT
Start: 2018-06-15 | End: 2018-06-15

## 2018-06-15 RX ORDER — BISACODYL 10 MG
10 SUPPOSITORY, RECTAL RECTAL
Status: DISCONTINUED | OUTPATIENT
Start: 2018-06-15 | End: 2018-06-16 | Stop reason: HOSPADM

## 2018-06-15 RX ORDER — GABAPENTIN 300 MG/1
300 CAPSULE ORAL 2 TIMES DAILY
Status: SHIPPED | COMMUNITY
End: 2022-10-21

## 2018-06-15 RX ORDER — ATORVASTATIN CALCIUM 40 MG/1
40 TABLET, FILM COATED ORAL
Status: DISCONTINUED | OUTPATIENT
Start: 2018-06-15 | End: 2018-06-16 | Stop reason: HOSPADM

## 2018-06-15 RX ORDER — ONDANSETRON 2 MG/ML
4 INJECTION INTRAMUSCULAR; INTRAVENOUS EVERY 4 HOURS PRN
Status: DISCONTINUED | OUTPATIENT
Start: 2018-06-15 | End: 2018-06-16 | Stop reason: HOSPADM

## 2018-06-15 RX ORDER — HYDROCHLOROTHIAZIDE 25 MG/1
25 TABLET ORAL
Status: DISCONTINUED | OUTPATIENT
Start: 2018-06-15 | End: 2018-06-16 | Stop reason: HOSPADM

## 2018-06-15 RX ORDER — HYDROCODONE BITARTRATE AND ACETAMINOPHEN 5; 325 MG/1; MG/1
1 TABLET ORAL EVERY 6 HOURS PRN
Status: DISCONTINUED | OUTPATIENT
Start: 2018-06-15 | End: 2018-06-15

## 2018-06-15 RX ORDER — DULOXETIN HYDROCHLORIDE 30 MG/1
30 CAPSULE, DELAYED RELEASE ORAL DAILY
Status: ON HOLD | COMMUNITY
End: 2020-07-04

## 2018-06-15 RX ORDER — AMLODIPINE BESYLATE 5 MG/1
5 TABLET ORAL
Status: DISCONTINUED | OUTPATIENT
Start: 2018-06-15 | End: 2018-06-16 | Stop reason: HOSPADM

## 2018-06-15 RX ORDER — FLUOXETINE HYDROCHLORIDE 40 MG/1
40 CAPSULE ORAL DAILY
Status: DISCONTINUED | OUTPATIENT
Start: 2018-06-15 | End: 2018-06-15

## 2018-06-15 RX ORDER — DULOXETIN HYDROCHLORIDE 30 MG/1
30 CAPSULE, DELAYED RELEASE ORAL DAILY
Status: DISCONTINUED | OUTPATIENT
Start: 2018-06-16 | End: 2018-06-16 | Stop reason: HOSPADM

## 2018-06-15 RX ORDER — LISINOPRIL 20 MG/1
20 TABLET ORAL
Status: DISCONTINUED | OUTPATIENT
Start: 2018-06-15 | End: 2018-06-16 | Stop reason: HOSPADM

## 2018-06-15 RX ORDER — ALPRAZOLAM 0.25 MG/1
0.25 TABLET ORAL 2 TIMES DAILY PRN
Status: DISCONTINUED | OUTPATIENT
Start: 2018-06-15 | End: 2018-06-16 | Stop reason: HOSPADM

## 2018-06-15 RX ORDER — ACETAMINOPHEN 325 MG/1
650 TABLET ORAL EVERY 6 HOURS PRN
Status: DISCONTINUED | OUTPATIENT
Start: 2018-06-15 | End: 2018-06-16 | Stop reason: HOSPADM

## 2018-06-15 RX ORDER — TIZANIDINE 4 MG/1
4 TABLET ORAL 2 TIMES DAILY
Status: DISCONTINUED | OUTPATIENT
Start: 2018-06-15 | End: 2018-06-16 | Stop reason: HOSPADM

## 2018-06-15 RX ORDER — POLYETHYLENE GLYCOL 3350 17 G/17G
1 POWDER, FOR SOLUTION ORAL
Status: DISCONTINUED | OUTPATIENT
Start: 2018-06-15 | End: 2018-06-16 | Stop reason: HOSPADM

## 2018-06-15 RX ORDER — GABAPENTIN 300 MG/1
300 CAPSULE ORAL 2 TIMES DAILY
Status: DISCONTINUED | OUTPATIENT
Start: 2018-06-15 | End: 2018-06-16 | Stop reason: HOSPADM

## 2018-06-15 RX ORDER — ONDANSETRON 4 MG/1
4 TABLET, ORALLY DISINTEGRATING ORAL EVERY 4 HOURS PRN
Status: DISCONTINUED | OUTPATIENT
Start: 2018-06-15 | End: 2018-06-16 | Stop reason: HOSPADM

## 2018-06-15 RX ORDER — ASPIRIN 325 MG
325 TABLET ORAL DAILY
Status: DISCONTINUED | OUTPATIENT
Start: 2018-06-15 | End: 2018-06-16 | Stop reason: HOSPADM

## 2018-06-15 RX ADMIN — AMLODIPINE BESYLATE 5 MG: 5 TABLET ORAL at 18:25

## 2018-06-15 RX ADMIN — TIZANIDINE 4 MG: 4 TABLET ORAL at 20:24

## 2018-06-15 RX ADMIN — ALPRAZOLAM 0.25 MG: 0.25 TABLET ORAL at 21:24

## 2018-06-15 RX ADMIN — ASPIRIN 325 MG: 325 TABLET ORAL at 15:25

## 2018-06-15 RX ADMIN — DOCUSATE SODIUM AND SENNOSIDES 2 TABLET: 8.6; 5 TABLET, FILM COATED ORAL at 20:24

## 2018-06-15 RX ADMIN — LISINOPRIL 20 MG: 20 TABLET ORAL at 15:25

## 2018-06-15 RX ADMIN — GABAPENTIN 300 MG: 300 CAPSULE ORAL at 20:24

## 2018-06-15 RX ADMIN — ATORVASTATIN CALCIUM 40 MG: 40 TABLET, FILM COATED ORAL at 20:24

## 2018-06-15 RX ADMIN — HYDROCHLOROTHIAZIDE 25 MG: 25 TABLET ORAL at 15:25

## 2018-06-15 ASSESSMENT — PAIN SCALES - GENERAL
PAINLEVEL_OUTOF10: 0
PAINLEVEL_OUTOF10: 2
PAINLEVEL_OUTOF10: 0
PAINLEVEL_OUTOF10: 3
PAINLEVEL_OUTOF10: 0

## 2018-06-15 ASSESSMENT — LIFESTYLE VARIABLES
DO YOU DRINK ALCOHOL: NO
ALCOHOL_USE: NO
EVER_SMOKED: NEVER

## 2018-06-15 ASSESSMENT — PATIENT HEALTH QUESTIONNAIRE - PHQ9
SUM OF ALL RESPONSES TO PHQ9 QUESTIONS 1 AND 2: 0
1. LITTLE INTEREST OR PLEASURE IN DOING THINGS: NOT AT ALL
2. FEELING DOWN, DEPRESSED, IRRITABLE, OR HOPELESS: NOT AT ALL

## 2018-06-15 NOTE — ED NOTES
IV placed.  Plan for stress test tomorrow, pt informed and requesting food, will provided box lunch per in-pt diet orders.

## 2018-06-15 NOTE — H&P
DATE OF ADMISSION:  06/15/2018    PRIMARY CARE PHYSICIAN:  Aly Renae MD    CHIEF COMPLAINT:  Chest pain.    HISTORY OF PRESENT ILLNESS:  The patient is a 67-year-old female that has a   past medical history of hyperlipidemia, hypertension, and asthma.  She comes   in to the hospital complaining of chest pain.  The patient reports that she   was doing yard work in the heat and initially she thought she had a heat   stroke.  This was about 3 days ago.  Since that time period, she has had   intermittent chest pain on and off.  She also does not feel well.  She feels   that she has been lethargic and foggy.  She has a cough, which is   nonproductive.  Her chest pain is located substernally in the middle of her   chest, does not radiate anywhere.  She states she has been drinking alcohol to   help relieve the pain.  She also says that she had a stress test in the past   and that was negative.  She has no other complaints.    REVIEW OF SYSTEMS:  As per HPI.  All other systems have been reviewed and are   negative.    ALLERGIES:  No known drug allergies.    PAST MEDICAL HISTORY:  1.  GERD.  2.  Hyperlipidemia.  3.  Hypertension.  4.  Asthma.  5.  Psychiatric disorder/bipolar.    PAST SURGICAL HISTORY:  1.  She has had submandibular abscess incision and drainage.  2.  Lacrimal duct probe.  3.  Ankle surgery.  4.  Bladder suspension.  5.  Hysterectomy.    FAMILY HISTORY:  Has been reviewed and is not pertinent to her current   presentation.  Negative for any cardiac disease.    SOCIAL HISTORY:  Negative for tobacco, drugs, or alcohol use.  She has a prior   history of alcohol dependency.    HOME MEDICATIONS:  1.  Lipitor 40 mg daily.  2.  Zanaflex 4 mg b.i.d.  3.  Neurontin 300 mg b.i.d.  4.  Cymbalta 30 mg daily.  5.  Xanax 0.25 mg b.i.d. as needed.  6.  Lisinopril/hydrochlorothiazide 20/25 mg daily.    PHYSICAL EXAMINATION:  VITAL SIGNS:  Blood pressure is 196/92, pulse of 60, respirations 18,   temperature  is 36, oxygen saturation 97% on room air, weight 86.3 kilograms.  GENERAL:  The patient is pleasant, resting comfortably.  She is malodorous.  HEENT:  Dry mucous membranes.  Missing teeth.  Eyes, EOMI, PERRLA.  NECK:  No lymphadenopathy, no JVD.  CARDIOVASCULAR:  Regular rate and rhythm.  No murmurs.  LUNGS:  Clear to auscultation bilaterally.  No rales, rhonchi, or wheezes.  ABDOMEN:  Positive bowel sounds, soft, nontender, nondistended.  EXTREMITIES:  No clubbing, cyanosis, or edema.  NEUROLOGIC:  Awake, alert, and oriented to person, place, time, and situation.    LABORATORY DATA:  CBC is unremarkable.  CMP is within normal limits.  Troponin   less than 0.01.    IMAGING STUDIES:  EKG shows normal sinus rhythm.    ASSESSMENT AND PLAN:  The patient is a 67-year-old female who presents to the   hospital complaining of chest pain.  1.  Chest pain.  Initial workup is negative.  Cardiac enzymes are negative.    EKG is unremarkable.  At this point, we will obtain 2 more sets of cardiac   enzymes and I will treat her with aspirin and we will monitor her on   telemetry.  Furthermore, we will check her cardiac stress test in the morning.  2.  Hypertension.  We are going to continue her   lisinopril/hydrochlorothiazide, but it appears that she needs another agent;   therefore, I will add Norvasc 5 mg daily.  3.  History of alcohol dependence.  4.  Hyperlipidemia.  Continue Lipitor 40 mg at bedtime.  5.  History of bipolar disorder.  Continue with Cymbalta.  6.  Deep venous thrombosis prophylaxis, none needed.  She is observation   status and fully ambulatory.  7.  She is a full code.       ____________________________________     MD DAISY Stewart / MELISSA    DD:  06/15/2018 15:23:41  DT:  06/15/2018 15:34:24    D#:  [id number]  Job#:  [id number]

## 2018-06-15 NOTE — ED NOTES
Chief Complaint   Patient presents with   • Chest Pain     x3days, radiating to left shoulder and arm with sob   • Cough   • Ankle Swelling     Pt wheeled to ekg, here with above complaints.

## 2018-06-15 NOTE — ED PROVIDER NOTES
"ED Provider Note    Scribed for Michael Mandel M.D. by Dana Gutierrez. 6/15/2018  12:23 PM    Primary care provider: Aly Renae M.D.  Means of arrival: Walk in  History obtained from: Patient  History limited by: None    CHIEF COMPLAINT  Chief Complaint   Patient presents with   • Chest Pain     x3days, radiating to left shoulder and arm with sob   • Cough   • Ankle Swelling       HPI  Fe Clark is a 67 y.o. female who presents to the Emergency Department for evaluation of chest pain with associated shortness of breath and ankle swelling, onset 3 days ago. She rates her pain 5/10 here. The pain comes on intermittently and lasts 5-10 minutes each time. Her shortness of breath worsens with walking. She describes feeling \"gaggy\". Patient denies any diaphoresis. Patient also reports a non-productive cough, noting that she has \"thick\" phlegm stuck in the back of her throat which she cannot cough up. She denies any personal history of MI. She was admitted for chest pain last year but cannot recall whether she received a stress test. Patient has a family history of CAD and CABG. She denies tobacco use. Patient does have 1 shot of alcohol per week to relieve stress. She uses CBD oil and lotion for arthritic pain. Patient lives with her children and grandchildren.    REVIEW OF SYSTEMS  Pertinent positives include chest pain, shortness of breath, nonproductive cough, ankle swelling, and ankle pain. Pertinent negatives include no diaphoresis.  All other systems reviewed and negative. C    PAST MEDICAL HISTORY   has a past medical history of ASTHMA; Diverticulosis; Duodenal ulcer, unspecified as acute or chronic, without hemorrhage, perforation, or obstruction; GERD (gastroesophageal reflux disease); Hiatal hernia; High cholesterol; Hypertension; and Psychiatric problem.    SURGICAL HISTORY   has a past surgical history that includes bladder suspension; bowel resection; colonoscopy; hysterectomy " "radical; other orthopedic surgery; ankle arthroscopy (7/31/2013); hardware removal ortho (7/31/2013); lacrimal duct probe (3/11/2015); submandible abscess incision and drainage (11/19/2016); and dental extraction(s) (11/19/2016).    SOCIAL HISTORY  Social History   Substance Use Topics   • Smoking status: Never Smoker   • Smokeless tobacco: Never Used   • Alcohol use No      Comment: recovering alcoholic      History   Drug Use     Comment: rigorobynreji       FAMILY HISTORY  Family History   Problem Relation Age of Onset   • No Known Problems Mother      2017 is age 91   • Other Father 57     unknown issue       CURRENT MEDICATIONS  No current facility-administered medications for this encounter.     Current Outpatient Prescriptions:   •  cyclobenzaprine (FLEXERIL) 10 MG Tab, Take 10 mg by mouth every evening., Disp: , Rfl:   •  omeprazole (PRILOSEC) 20 MG delayed-release capsule, Take 1 Cap by mouth 2 times a day., Disp: 60 Cap, Rfl: 1  •  atorvastatin (LIPITOR) 20 MG Tab, Take 1 Tab by mouth every day., Disp: 30 Tab, Rfl: 1  •  hydrocodone-acetaminophen (NORCO) 5-325 MG Tab per tablet, Take 1 Tab by mouth every 6 hours as needed (pain)., Disp: , Rfl:   •  fluoxetine (PROZAC) 40 MG capsule, Take 40 mg by mouth every day., Disp: , Rfl:   •  alprazolam (XANAX) 0.25 MG Tab, Take 0.25 mg by mouth 2 times a day as needed for Anxiety (panic attacks)., Disp: , Rfl:   •  risperidone (RISPERDAL) 2 MG Tab, Take 2 mg by mouth every bedtime., Disp: , Rfl:   •  lisinopril-hydrochlorothiazide (PRINZIDE, ZESTORETIC) 20-25 MG per tablet, Take 1 Tab by mouth every day., Disp: , Rfl:       ALLERGIES  Allergies   Allergen Reactions   • Nkda [No Known Drug Allergy]        PHYSICAL EXAM  VITAL SIGNS: BP (!) 161/98   Pulse 60   Temp 36.6 °C (97.8 °F)   Resp 20   Ht 1.651 m (5' 5\")   Wt 86.2 kg (190 lb)   SpO2 91%   BMI 31.62 kg/m²     Vital signs reviewed.  Constitutional:  Appears well-developed and well-nourished. No distress. " Elderly appearing.  Head: Normocephalic.   Eyes: EOM are normal. Pupils are equal, round, and reactive to light.  Mouth/Throat: Oropharynx is clear and dry, dentulous   Neck: No JVD  Cardiovascular: Normal rate, regular rhythm and normal heart sounds, pulses are equal  Pulmonary/Chest: Effort normal and breath sounds normal. No wheezes.  Abdominal: Soft. No tenderness, not distended  Musculoskeletal: pitting edema up the mid shins  Lymphadenopathy: No lymphadenopathy  Skin: Skin is warm and dry.    LABS  Results for orders placed or performed during the hospital encounter of 06/15/18   Troponin   Result Value Ref Range    Troponin I <0.01 0.00 - 0.04 ng/mL   Btype Natriuretic Peptide   Result Value Ref Range    B Natriuretic Peptide 98 0 - 100 pg/mL   CBC with Differential   Result Value Ref Range    WBC 5.6 4.8 - 10.8 K/uL    RBC 4.52 4.20 - 5.40 M/uL    Hemoglobin 14.5 12.0 - 16.0 g/dL    Hematocrit 41.9 37.0 - 47.0 %    MCV 92.7 81.4 - 97.8 fL    MCH 32.1 27.0 - 33.0 pg    MCHC 34.6 33.6 - 35.0 g/dL    RDW 41.8 35.9 - 50.0 fL    Platelet Count 306 164 - 446 K/uL    MPV 9.1 9.0 - 12.9 fL    Neutrophils-Polys 52.30 44.00 - 72.00 %    Lymphocytes 34.90 22.00 - 41.00 %    Monocytes 7.30 0.00 - 13.40 %    Eosinophils 4.60 0.00 - 6.90 %    Basophils 0.70 0.00 - 1.80 %    Immature Granulocytes 0.20 0.00 - 0.90 %    Nucleated RBC 0.00 /100 WBC    Neutrophils (Absolute) 2.93 2.00 - 7.15 K/uL    Lymphs (Absolute) 1.96 1.00 - 4.80 K/uL    Monos (Absolute) 0.41 0.00 - 0.85 K/uL    Eos (Absolute) 0.26 0.00 - 0.51 K/uL    Baso (Absolute) 0.04 0.00 - 0.12 K/uL    Immature Granulocytes (abs) 0.01 0.00 - 0.11 K/uL    NRBC (Absolute) 0.00 K/uL   Complete Metabolic Panel (CMP)   Result Value Ref Range    Sodium 141 135 - 145 mmol/L    Potassium 3.6 3.6 - 5.5 mmol/L    Chloride 109 96 - 112 mmol/L    Co2 22 20 - 33 mmol/L    Anion Gap 10.0 0.0 - 11.9    Glucose 98 65 - 99 mg/dL    Bun 13 8 - 22 mg/dL    Creatinine 0.81 0.50 - 1.40  mg/dL    Calcium 9.3 8.5 - 10.5 mg/dL    AST(SGOT) 19 12 - 45 U/L    ALT(SGPT) 16 2 - 50 U/L    Alkaline Phosphatase 104 (H) 30 - 99 U/L    Total Bilirubin 0.8 0.1 - 1.5 mg/dL    Albumin 4.0 3.2 - 4.9 g/dL    Total Protein 6.9 6.0 - 8.2 g/dL    Globulin 2.9 1.9 - 3.5 g/dL    A-G Ratio 1.4 g/dL   Prothrombin Time   Result Value Ref Range    PT 13.4 12.0 - 14.6 sec    INR 1.05 0.87 - 1.13   APTT   Result Value Ref Range    APTT 28.6 24.7 - 36.0 sec   Lipase   Result Value Ref Range    Lipase 24 11 - 82 U/L   ESTIMATED GFR   Result Value Ref Range    GFR If African American >60 >60 mL/min/1.73 m 2    GFR If Non African American >60 >60 mL/min/1.73 m 2   D-DIMER   Result Value Ref Range    D-Dimer Screen 211 <250 ng/mL(D-DU)   EKG (NOW)   Result Value Ref Range    Report       [facility] Emergency Dept.    Test Date:  2018-06-15  Pt Name:    DOROTA AGUIRRE             Department: ER  MRN:        [id number]                      Room:  Gender:     Female                       Technician: [id number]  :        [id number]                   Requested By:ER TRIAGE PROTOCOL  Order #:    [id number]                    Reading MD:    Measurements  Intervals                                Axis  Rate:       66                           P:          -21  OH:         144                          QRS:        -25  QRSD:       84                           T:          12  QT:         444  QTc:        466    Interpretive Statements  SINUS RHYTHM  CONSIDER LEFT VENTRICULAR HYPERTROPHY  Compared to ECG 2018 21:00:19  T-wave abnormality no longer present       All labs reviewed by me.    EKG  12 Lead EKG interpreted by me to show sinus rhythm at 66. Normal P waves. Abnormal QRS with Q waves in V1 and V2. Late R wave progression. LVH. Normal ST segments. Normal T waves. Abnormal EKG. Compared to EKG in January, no acute change.    RADIOLOGY  DX-CHEST-LIMITED (1 VIEW)    (Results Pending)   The radiologist's interpretation  of all radiological studies have been reviewed by me.    COURSE & MEDICAL DECISION MAKING  Pertinent Labs & Imaging studies reviewed. (See chart for details) The patient's [facility] and past medical records were reviewed    12:23 PM - Patient seen and examined at bedside. Ordered DX-Chest, D-Dimer, estimated GFR, Troponin, BNP, CBC, CMP, Prothrombin, APTT, Lipase, and EKG to evaluate her symptoms. The differential diagnoses include but are not limited to: MI, PNA, or DVT.  Her chest x-ray is unremarkable.  Troponin was negative.  D-dimer was negative.  Patient has multiple cardiac risk factors and the oral will therefore be admitted to the CDU.  She will require a Persantine thallium.  She has not had a stress test for over 2 years.        Patient was admitted in stable condition    FINAL IMPRESSION  Chest pain  Unstable angina     Dana LINN (Marcial), am scribing for, and in the presence of, Michael Mandel M.D..    Electronically signed by: Dana Gutierrez (Marcial), 6/15/2018    IMichael M.D. personally performed the services described in this documentation, as scribed by Dana Gutierrez in my presence, and it is both accurate and complete.    The note accurately reflects work and decisions made by me.  Michael Mandel  6/15/2018  2:15 PM

## 2018-06-15 NOTE — DISCHARGE PLANNING
Care Transition Team Assessment    Information Source  Orientation : Oriented x 4  Information Given By: Patient  Who is responsible for making decisions for patient? : Patient         Elopement Risk  Legal Hold: No  Ambulatory or Self Mobile in Wheelchair: No-Not an Elopement Risk    Interdisciplinary Discharge Planning  Primary Care Physician: Dr. Kinney  Lives with - Patient's Self Care Capacity: Spouse, Child Less than 18 Years of Age, Adult Children  Support Systems: Family Member(s)  Able to Return to Previous ADL's: Yes  Mobility Issues: No  Prior Services: None  Patient Expects to be Discharged to:: home  Assistance Needed: No              Finances  Financial Barriers to Discharge: No  Prescription Coverage: Yes                   Domestic Abuse  Have you ever been the victim of abuse or violence?: No         Discharge Risks or Barriers  Discharge risks or barriers?: No

## 2018-06-15 NOTE — ED NOTES
Pt is resting in bed. Assessment complete without issue. Pt denies any needs or concerns at this time.

## 2018-06-15 NOTE — PROGRESS NOTES
PT ARRIVED TO THE  UNIT ORIENTED TO ROOM DISCUSSED PLAN OF CARE VERY STEADY ON HER FEET BED IN LOW POSITION  CALL LIGHT WITHIN REACH NURSING HISTORY AND ASSESSMENT DONE CONDITION STABLE

## 2018-06-15 NOTE — ED NOTES
Pt escorted to [facility] via hospital w/c.  Agree with triage note.  Pt reports chest pressure/ heaviness at this time.  Pt changed in to gown and placed on monitors.  Blood drawn while pt waiting.  ERP to see.

## 2018-06-16 ENCOUNTER — APPOINTMENT (OUTPATIENT)
Dept: RADIOLOGY | Facility: MEDICAL CENTER | Age: 68
End: 2018-06-16
Attending: HOSPITALIST
Payer: MEDICARE

## 2018-06-16 VITALS
SYSTOLIC BLOOD PRESSURE: 129 MMHG | HEIGHT: 65 IN | TEMPERATURE: 97.4 F | HEART RATE: 53 BPM | RESPIRATION RATE: 16 BRPM | DIASTOLIC BLOOD PRESSURE: 62 MMHG | BODY MASS INDEX: 31.7 KG/M2 | OXYGEN SATURATION: 94 % | WEIGHT: 190.26 LBS

## 2018-06-16 PROBLEM — R07.9 CHEST PAIN: Status: RESOLVED | Noted: 2018-06-15 | Resolved: 2018-06-16

## 2018-06-16 LAB
EST. AVERAGE GLUCOSE BLD GHB EST-MCNC: 140 MG/DL
HBA1C MFR BLD: 6.5 % (ref 0–5.6)

## 2018-06-16 PROCEDURE — A9502 TC99M TETROFOSMIN: HCPCS

## 2018-06-16 PROCEDURE — G0378 HOSPITAL OBSERVATION PER HR: HCPCS

## 2018-06-16 PROCEDURE — 700102 HCHG RX REV CODE 250 W/ 637 OVERRIDE(OP): Performed by: HOSPITALIST

## 2018-06-16 PROCEDURE — 700111 HCHG RX REV CODE 636 W/ 250 OVERRIDE (IP)

## 2018-06-16 PROCEDURE — 99217 PR OBSERVATION CARE DISCHARGE: CPT | Performed by: HOSPITALIST

## 2018-06-16 PROCEDURE — A9270 NON-COVERED ITEM OR SERVICE: HCPCS | Performed by: HOSPITALIST

## 2018-06-16 RX ORDER — HYDROCHLOROTHIAZIDE 25 MG/1
25 TABLET ORAL DAILY
Qty: 30 TAB | Refills: 0 | Status: SHIPPED | OUTPATIENT
Start: 2018-06-17 | End: 2018-06-16

## 2018-06-16 RX ORDER — REGADENOSON 0.08 MG/ML
INJECTION, SOLUTION INTRAVENOUS
Status: COMPLETED
Start: 2018-06-16 | End: 2018-06-16

## 2018-06-16 RX ORDER — AMLODIPINE BESYLATE 5 MG/1
5 TABLET ORAL DAILY
Qty: 30 TAB | Refills: 0 | Status: ON HOLD | OUTPATIENT
Start: 2018-06-17 | End: 2020-07-05 | Stop reason: SDUPTHER

## 2018-06-16 RX ADMIN — DOCUSATE SODIUM AND SENNOSIDES 2 TABLET: 8.6; 5 TABLET, FILM COATED ORAL at 09:41

## 2018-06-16 RX ADMIN — GABAPENTIN 300 MG: 300 CAPSULE ORAL at 09:41

## 2018-06-16 RX ADMIN — HYDROCHLOROTHIAZIDE 25 MG: 25 TABLET ORAL at 09:41

## 2018-06-16 RX ADMIN — DULOXETINE HYDROCHLORIDE 30 MG: 30 CAPSULE, DELAYED RELEASE ORAL at 09:41

## 2018-06-16 RX ADMIN — LISINOPRIL 20 MG: 20 TABLET ORAL at 09:41

## 2018-06-16 RX ADMIN — REGADENOSON 0.4 MG: 0.08 INJECTION, SOLUTION INTRAVENOUS at 08:22

## 2018-06-16 RX ADMIN — TIZANIDINE 4 MG: 4 TABLET ORAL at 09:41

## 2018-06-16 RX ADMIN — ASPIRIN 325 MG: 325 TABLET ORAL at 09:41

## 2018-06-16 RX ADMIN — AMLODIPINE BESYLATE 5 MG: 5 TABLET ORAL at 09:41

## 2018-06-16 ASSESSMENT — PATIENT HEALTH QUESTIONNAIRE - PHQ9
2. FEELING DOWN, DEPRESSED, IRRITABLE, OR HOPELESS: NOT AT ALL
1. LITTLE INTEREST OR PLEASURE IN DOING THINGS: NOT AT ALL
SUM OF ALL RESPONSES TO PHQ9 QUESTIONS 1 AND 2: 0

## 2018-06-16 ASSESSMENT — PAIN SCALES - GENERAL
PAINLEVEL_OUTOF10: 0

## 2018-06-16 NOTE — DISCHARGE INSTRUCTIONS
Discharge Instructions    Discharged to home by car with relative. Discharged via wheelchair, hospital escort: Yes.  Special equipment needed: Not Applicable    Be sure to schedule a follow-up appointment with your primary care doctor or any specialists as instructed.     Discharge Plan:   Diet Plan: Discussed  Activity Level: Discussed  Confirmed Follow up Appointment: Patient to Call and Schedule Appointment  Confirmed Symptoms Management: Discussed  Medication Reconciliation Updated: Yes  Influenza Vaccine Indication: Indicated: Not available from distributor/    I understand that a diet low in cholesterol, fat, and sodium is recommended for good health. Unless I have been given specific instructions below for another diet, I accept this instruction as my diet prescription.   Other diet: LOW FAT    Special Instructions: None    · Is patient discharged on Warfarin / Coumadin?   No     Depression / Suicide Risk    As you are discharged from this [facility] facility, it is important to learn how to keep safe from harming yourself.    Recognize the warning signs:  · Abrupt changes in personality, positive or negative- including increase in energy   · Giving away possessions  · Change in eating patterns- significant weight changes-  positive or negative  · Change in sleeping patterns- unable to sleep or sleeping all the time   · Unwillingness or inability to communicate  · Depression  · Unusual sadness, discouragement and loneliness  · Talk of wanting to die  · Neglect of personal appearance   · Rebelliousness- reckless behavior  · Withdrawal from people/activities they love  · Confusion- inability to concentrate     If you or a loved one observes any of these behaviors or has concerns about self-harm, here's what you can do:  · Talk about it- your feelings and reasons for harming yourself  · Remove any means that you might use to hurt yourself (examples: pills, rope, extension cords, firearm)  · Get  professional help from the community (Mental Health, Substance Abuse, psychological counseling)  · Do not be alone:Call your Safe Contact- someone whom you trust who will be there for you.  · Call your local CRISIS HOTLINE 838-2845 or 934-957-9177  · Call your local Children's Mobile Crisis Response Team [place] (983) 407-3668 or www.POTATOSOFT  · Call the toll free National Suicide Prevention Hotlines   · National Suicide Prevention Lifeline 224-430-FNFQ (0852)  · National Hope Line Network 800-SUICIDE (090-9803)

## 2018-06-16 NOTE — DISCHARGE SUMMARY
Discharge Summary    CHIEF COMPLAINT ON ADMISSION  Chief Complaint   Patient presents with   • Chest Pain     x3days, radiating to left shoulder and arm with sob   • Cough   • Ankle Swelling       Reason for Admission  Chest Pain     Admission Date  6/15/2018    CODE STATUS  Full Code    HPI & HOSPITAL COURSE  The patient is a 67-year-old female that has a   past medical history of hyperlipidemia, hypertension, and asthma.  She comes   in to the hospital complaining of chest pain.patient was admitted for further evaluation to rull out cardiac causes. Labs were monitored, vitals were monitored, her BP was elevated and we added on Amlodipine 5mg to her current home BP med regimen. BP controlled better.  CBC, CBMP were negative. Troponin were negative and trended, she had a cardiac stress test which was negative for ischemia or infarct., BNP was normal. Patients chest pain symptoms resolved. She was discharge in stable medical conditions.patient to fu with PCP next week. She understood and agreed with the above plan.     Therefore, she is discharged in good and stable condition to home with close outpatient follow-up.    The patient recovered much more quickly than anticipated on admission.    Discharge Date  6/16/18    FOLLOW UP ITEMS POST DISCHARGE  PCP    DISCHARGE DIAGNOSES  Principal Problem (Resolved):    Chest pain POA: Unknown  Active Problems:    HTN (hypertension) POA: Yes      FOLLOW UP  No future appointments.  No follow-up provider specified.    MEDICATIONS ON DISCHARGE     Medication List      START taking these medications      Instructions   amLODIPine 5 MG Tabs  Start taking on:  6/17/2018  Commonly known as:  NORVASC   Take 1 Tab by mouth every day.  Dose:  5 mg     aspirin EC 81 MG Tbec  Commonly known as:  ECOTRIN   Take 1 Tab by mouth every day.  Dose:  81 mg        CONTINUE taking these medications      Instructions   ALPRAZolam 0.25 MG Tabs  Commonly known as:  XANAX   Take 0.25 mg by mouth 2  times a day as needed for Anxiety (panic attacks).  Dose:  0.25 mg     DULoxetine 30 MG Cpep  Commonly known as:  CYMBALTA   Take 30 mg by mouth every day.  Dose:  30 mg     gabapentin 300 MG Caps  Commonly known as:  NEURONTIN   Take 300 mg by mouth 2 Times a Day.  Dose:  300 mg     LIPITOR 40 MG Tabs  Generic drug:  atorvastatin   Take 40 mg by mouth every evening.  Dose:  40 mg     lisinopril-hydrochlorothiazide 20-25 MG per tablet  Commonly known as:  PRINZIDE, ZESTORETIC   Take 1 Tab by mouth every day.  Dose:  1 Tab     tizanidine 4 MG Tabs  Commonly known as:  ZANAFLEX   Take 4 mg by mouth 2 times a day.  Dose:  4 mg            Allergies  Allergies   Allergen Reactions   • Nkda [No Known Drug Allergy]        DIET  Orders Placed This Encounter   Procedures   • Diet Order     Standing Status:   Standing     Number of Occurrences:   1     Order Specific Question:   Diet:     Answer:   Regular [1]     Order Specific Question:   Texture/Fiber modifications:     Answer:   Dysphagia 3(Mechanical Soft)specify fluid consistency(question 6) [3]     Comments:   chopped food no dentures     Order Specific Question:   Miscellaneous modifications:     Answer:   No Decaf, No Caffeine(for test) [11]       ACTIVITY  As tolerated.  Weight bearing as tolerated    CONSULTATIONS  none    PROCEDURES  none    LABORATORY  Lab Results   Component Value Date    SODIUM 141 06/15/2018    POTASSIUM 3.6 06/15/2018    CHLORIDE 109 06/15/2018    CO2 22 06/15/2018    GLUCOSE 98 06/15/2018    BUN 13 06/15/2018    CREATININE 0.81 06/15/2018    CREATININE 1.0 03/27/2009        Lab Results   Component Value Date    WBC 5.6 06/15/2018    HEMOGLOBIN 14.5 06/15/2018    HEMATOCRIT 41.9 06/15/2018    PLATELETCT 306 06/15/2018        Total time of the discharge process exceeds 40   minutes.

## 2018-06-16 NOTE — PROGRESS NOTES
A/o,assessment completed,poc discussed,verbalized understanding,denies CP,sob, SR on tele hr=66,no ectopy,tolerating soft diet  Well, call button within reach,will continue to monitor.

## 2018-06-16 NOTE — PROGRESS NOTES
Assessment done, Pt educated on plan of care.currently npo for test this am  Waiting on dr rounds  Patient resting in bed, no signs of distress,  no complains of pain at this time. Call light within reach,  side rails up, will monitor. Condition stable

## 2018-12-27 ENCOUNTER — HOSPITAL ENCOUNTER (EMERGENCY)
Facility: MEDICAL CENTER | Age: 68
End: 2018-12-27
Attending: EMERGENCY MEDICINE
Payer: MEDICARE

## 2018-12-27 VITALS
BODY MASS INDEX: 32.03 KG/M2 | HEIGHT: 65 IN | OXYGEN SATURATION: 97 % | WEIGHT: 192.24 LBS | TEMPERATURE: 98 F | RESPIRATION RATE: 17 BRPM | SYSTOLIC BLOOD PRESSURE: 157 MMHG | DIASTOLIC BLOOD PRESSURE: 87 MMHG | HEART RATE: 80 BPM

## 2018-12-27 DIAGNOSIS — B02.8 HERPES ZOSTER WITH OTHER COMPLICATION: ICD-10-CM

## 2018-12-27 DIAGNOSIS — L03.116 CELLULITIS OF LEFT LOWER EXTREMITY: ICD-10-CM

## 2018-12-27 PROCEDURE — 99284 EMERGENCY DEPT VISIT MOD MDM: CPT

## 2018-12-27 RX ORDER — METHYLPREDNISOLONE 4 MG/1
TABLET ORAL
Qty: 1 KIT | Refills: 0 | Status: SHIPPED | OUTPATIENT
Start: 2018-12-27 | End: 2020-03-02

## 2018-12-27 RX ORDER — SULFAMETHOXAZOLE AND TRIMETHOPRIM 800; 160 MG/1; MG/1
1 TABLET ORAL 2 TIMES DAILY
Qty: 14 TAB | Refills: 0 | Status: SHIPPED | OUTPATIENT
Start: 2018-12-27 | End: 2019-01-03

## 2018-12-27 RX ORDER — AMOXICILLIN 500 MG/1
500 CAPSULE ORAL 3 TIMES DAILY
Qty: 21 CAP | Refills: 0 | Status: SHIPPED | OUTPATIENT
Start: 2018-12-27 | End: 2019-01-03

## 2018-12-27 RX ORDER — VALACYCLOVIR HYDROCHLORIDE 1 G/1
1000 TABLET, FILM COATED ORAL 3 TIMES DAILY
Qty: 21 TAB | Refills: 0 | Status: SHIPPED | OUTPATIENT
Start: 2018-12-27 | End: 2019-01-03

## 2018-12-27 ASSESSMENT — LIFESTYLE VARIABLES: DO YOU DRINK ALCOHOL: NO

## 2018-12-27 ASSESSMENT — PAIN SCALES - GENERAL: PAINLEVEL_OUTOF10: 6

## 2018-12-27 NOTE — DISCHARGE INSTRUCTIONS
Like we talked about, this is not classic for shingles, but certainly suspicious.  We will treat for this as well as for a skin infection from bacteria.  Rest, minimize activity, keep leg elevated.  Follow up this upcoming week with Dr BE for recheck.  I expect you to be improving in the next 24-36 hrs, if not or for any turn for the worse, return to ER sooner.

## 2018-12-27 NOTE — ED TRIAGE NOTES
"67 y/o female ambulatory to triage with c/o left lower leg pain along with a red \"rash\", pt states her symptoms began on Tuesday.   "

## 2018-12-27 NOTE — ED PROVIDER NOTES
ED Provider Note    CHIEF COMPLAINT  Chief Complaint   Patient presents with   • Leg Pain   • Rash       HPI  Fe Clark is a 68 y.o. female who presents with a leg pain and leg rash.  On Christmas day she has subjective fever, was just not feeling well.  Then her leg started hurting, it feels like a burn.  This was followed by some itching.  This was followed by some lumps that started popping up.  She describes as looking like blisters without a head.  She has been rubbing at them.  She denies any lesions elsewhere.  Denies any new exposures at all.  No one else has this.  She has some ongoing achiness in her shoulder at her neck and base of head, this is nothing new and she attributes this to old age.  She denies any new aches or pains elsewhere.  There is no other complaint.    PAST MEDICAL HISTORY  Past Medical History:   Diagnosis Date   • ASTHMA    • Diverticulosis    • Duodenal ulcer, unspecified as acute or chronic, without hemorrhage, perforation, or obstruction    • GERD (gastroesophageal reflux disease)    • Hiatal hernia    • High cholesterol    • Hypertension    • Psychiatric problem        FAMILY HISTORY  Family History   Problem Relation Age of Onset   • No Known Problems Mother         2017 is age 91   • Other Father 57        unknown issue       SOCIAL HISTORY  Social History   Substance Use Topics   • Smoking status: Never Smoker   • Smokeless tobacco: Never Used   • Alcohol use No      Comment: recovering alcoholic         SURGICAL HISTORY  Past Surgical History:   Procedure Laterality Date   • SUBMANDIBLE ABSCESS INCISION AND DRAINAGE  11/19/2016    Procedure: SUBMANDIBLE ABSCESS INCISION AND DRAINAGE;  Surgeon: Lior Hutchison D.D.S.;  Location: SURGERY [facility];  Service:    • DENTAL EXTRACTION(S)  11/19/2016    Procedure: DENTAL EXTRACTION(S);  Surgeon: Lior Hutchison D.D.S.;  Location: SURGERY [facility];  Service:    • LACRIMAL DUCT PROBE  3/11/2015    Performed by  "Alo Cheatham M.D. at SURGERY [facility]   • ANKLE ARTHROSCOPY  7/31/2013    Performed by Paco Clifton M.D. at SURGERY [facility]   • HARDWARE REMOVAL ORTHO  7/31/2013    Performed by Paco Clifton M.D. at SURGERY [facility]   • BLADDER SUSPENSION     • BOWEL RESECTION     • COLONOSCOPY      with removal of pollyps   • HYSTERECTOMY RADICAL     • OTHER ORTHOPEDIC SURGERY      L ankle       CURRENT MEDICATIONS  Home Medications    **Home medications have not yet been reviewed for this encounter**         I have reviewed the nurses notes and/or the list brought with the patient.    ALLERGIES  Allergies   Allergen Reactions   • Nkda [No Known Drug Allergy]        REVIEW OF SYSTEMS  See HPI for further details. Review of systems as above, otherwise all other systems are negative.     PHYSICAL EXAM  VITAL SIGNS: /87   Pulse 80   Temp 36.7 °C (98 °F) (Temporal)   Resp 17   Ht 1.651 m (5' 5\")   Wt 87.2 kg (192 lb 3.9 oz)   SpO2 97%   BMI 31.99 kg/m²     Constitutional: Well appearing patient in no acute distress.  Not toxic, nor ill in appearance.  HENT: Mucus membranes moist.  Oropharynx is clear.  Eyes: Pupils equally round.  No scleral icterus.   Neck: Full nontender range of motion.  Lymphatic: No popliteal lymphadenopathy noted.   Cardiovascular: Regular heart rate and rhythm.  No murmurs, rubs, nor gallop appreciated.   Thorax & Lungs: Chest is nontender.  Lungs are clear to auscultation with good air movement bilaterally.  No wheeze, rhonchi, nor rales.   Abdomen: Soft, with no tenderness, rebound nor guarding.  No mass, pulsatile mass, nor hepatosplenomegaly appreciated.  Skin: No purpura nor petechia noted.  See below.  Extremities/Musculoskeletal: The left leg is notable for a patch of papular erythematous lesions over the mid anterior shin around to the lateral shin.  There is some associated edema here.  The entire area is quite erythematous and warm to the touch.  There is " no tenderness at all.  She is uninvolved more distally, as well as more proximally.  Neurologic: Alert & oriented.  Strength and sensation is intact all around.  Gait is normal.  Psychiatric: Normal affect appropriate for the clinical situation.  MEDICAL RECORD  I have reviewed patient's medical record and pertinent results are listed above.    COURSE & MEDICAL DECISION MAKING  I have reviewed any medical record information, laboratory studies and radiographic results as noted above.  Patient presents with leg pain and a rash.  Given the quality of her symptoms with pain and then a rash developing, I am concerned about the possibility of shingles.  All this does not look vesicular at this time, she is giving a description of this.  We will go ahead and treat her with valacyclovir.  Given her age, as well as the amount of inflammation she is having, and that associated itch, I will also add on some Medrol.  I do believe there is likely an element of superinfection as well, however this could just be inflammation.  Regardless, Out of an abundance of caution, I will go ahead and start her on Bactrim and amoxicillin to cover for cellulitis.  She is given instructions about both cellulitis as well as shingles.  I recommended rest for the next few days.  Minimize any activity keeping elevated wall at rest.  If she is not improving in 24-36 hours, or should she have any turn for the worse she is to return to the ER for recheck.  Otherwise, I like her to follow-up with her primary, Dr. Stanley pittman this upcoming week.    FINAL IMPRESSION  1. Herpes zoster with other complication    2. Cellulitis of left lower extremity           This dictation was created using voice recognition software.    Electronically signed by: Paco Parish, 12/27/2018 9:23 AM

## 2018-12-27 NOTE — ED NOTES
Pt discharged home. Explained discharge and medication instructions. Questions and comments addressed. Pt verbalized understanding of instructions. Pt advised to follow-up with PCP or return to ED for any new or worsening of symptoms. Pt is ambulating well and steady on feet. VS stable.

## 2020-02-22 ENCOUNTER — OFFICE VISIT (OUTPATIENT)
Dept: URGENT CARE | Facility: CLINIC | Age: 70
End: 2020-02-22
Payer: MEDICARE

## 2020-02-22 VITALS
HEIGHT: 65 IN | OXYGEN SATURATION: 93 % | WEIGHT: 209 LBS | HEART RATE: 74 BPM | DIASTOLIC BLOOD PRESSURE: 74 MMHG | SYSTOLIC BLOOD PRESSURE: 120 MMHG | BODY MASS INDEX: 34.82 KG/M2 | RESPIRATION RATE: 14 BRPM | TEMPERATURE: 97 F

## 2020-02-22 DIAGNOSIS — M62.830 BACK MUSCLE SPASM: ICD-10-CM

## 2020-02-22 DIAGNOSIS — M62.830 SPASM OF THORACIC BACK MUSCLE: ICD-10-CM

## 2020-02-22 PROCEDURE — 99204 OFFICE O/P NEW MOD 45 MIN: CPT | Performed by: NURSE PRACTITIONER

## 2020-02-22 RX ORDER — CYCLOBENZAPRINE HCL 5 MG
5-10 TABLET ORAL 3 TIMES DAILY PRN
Qty: 30 TAB | Refills: 0 | Status: ON HOLD | OUTPATIENT
Start: 2020-02-22 | End: 2020-07-04

## 2020-02-22 ASSESSMENT — ENCOUNTER SYMPTOMS
VOMITING: 0
FEVER: 0
WEAKNESS: 0
NAUSEA: 0
TINGLING: 0
LEG PAIN: 0
DIZZINESS: 0
CHILLS: 0
PERIANAL NUMBNESS: 0
NUMBNESS: 0
MYALGIAS: 0
SORE THROAT: 0
BACK PAIN: 1
WEIGHT LOSS: 0
EYE PAIN: 0
SHORTNESS OF BREATH: 0

## 2020-02-23 NOTE — PROGRESS NOTES
Subjective:     Fe Clark  is a 69 y.o. female who presents for Head Pain (migraine, back pain, hurts to breathe on the side on the right side, when it cramps up it pulls her down,  right side leg, and shoulder right side, the back pops and cracks that you can here it, has osteoporisis, x this morning 6 am treating it like spasms took tylenol did not help but tried CBD oil helps a little)       Back Pain   This is a new problem. The current episode started today. The problem occurs constantly. The problem is unchanged. The pain is present in the thoracic spine. Quality: spasm. The pain does not radiate. The pain is at a severity of 7/10. The pain is severe. The pain is the same all the time. The symptoms are aggravated by standing. Stiffness is present all day. Pertinent negatives include no chest pain, fever, leg pain, numbness, perianal numbness, tingling, weakness or weight loss. Risk factors include history of osteoporosis. Treatments tried: cbd oil. The treatment provided no relief.     Past Medical History:   Diagnosis Date   • ASTHMA    • Diverticulosis    • Duodenal ulcer, unspecified as acute or chronic, without hemorrhage, perforation, or obstruction    • GERD (gastroesophageal reflux disease)    • Hiatal hernia    • High cholesterol    • Hypertension    • Psychiatric problem      Past Surgical History:   Procedure Laterality Date   • SUBMANDIBLE ABSCESS INCISION AND DRAINAGE  11/19/2016    Procedure: SUBMANDIBLE ABSCESS INCISION AND DRAINAGE;  Surgeon: Lior Hutchison D.D.S.;  Location: [facility];  Service:    • DENTAL EXTRACTION(S)  11/19/2016    Procedure: DENTAL EXTRACTION(S);  Surgeon: Lior Hutchison D.D.S.;  Location: [facility];  Service:    • LACRIMAL DUCT PROBE  3/11/2015    Performed by Alo Cheatham M.D. at [facility]   • ANKLE ARTHROSCOPY  7/31/2013    Performed by Paco Clifton M.D. at [facility]   • HARDWARE REMOVAL  ORTHO  7/31/2013    Performed by Paco Clifton M.D. at [facility]   • BLADDER SUSPENSION     • BOWEL RESECTION     • COLONOSCOPY      with removal of pollyps   • HYSTERECTOMY RADICAL     • OTHER ORTHOPEDIC SURGERY      L ankle     Social History     Socioeconomic History   • Marital status:      Spouse name: Not on file   • Number of children: Not on file   • Years of education: Not on file   • Highest education level: Not on file   Occupational History   • Not on file   Social Needs   • Financial resource strain: Not on file   • Food insecurity     Worry: Not on file     Inability: Not on file   • Transportation needs     Medical: Not on file     Non-medical: Not on file   Tobacco Use   • Smoking status: Never Smoker   • Smokeless tobacco: Never Used   Substance and Sexual Activity   • Alcohol use: No     Comment: recovering alcoholic   • Drug use: Yes     Comment: marajuna   • Sexual activity: Not on file   Lifestyle   • Physical activity     Days per week: Not on file     Minutes per session: Not on file   • Stress: Not on file   Relationships   • Social connections     Talks on phone: Not on file     Gets together: Not on file     Attends [employer] service: Not on file     Active member of club or organization: Not on file     Attends meetings of clubs or organizations: Not on file     Relationship status: Not on file   • Intimate partner violence     Fear of current or ex partner: Not on file     Emotionally abused: Not on file     Physically abused: Not on file     Forced sexual activity: Not on file   Other Topics Concern   • Not on file   Social History Narrative   • Not on file      Family History   Problem Relation Age of Onset   • No Known Problems Mother         2017 is age 91   • Other Father 57        unknown issue    Review of Systems   Constitutional: Negative for chills, fever and weight loss.   HENT: Negative for sore throat.    Eyes: Negative for pain.   Respiratory:  "Negative for shortness of breath.    Cardiovascular: Negative for chest pain.   Gastrointestinal: Negative for nausea and vomiting.   Genitourinary: Negative for hematuria.   Musculoskeletal: Positive for back pain. Negative for myalgias.   Skin: Negative for rash.   Neurological: Negative for dizziness, tingling, weakness and numbness.     Allergies   Allergen Reactions   • Nkda [No Known Drug Allergy]       Objective:   /74   Pulse 74   Temp 36.1 °C (97 °F) (Temporal)   Resp 14   Ht 1.651 m (5' 5\")   Wt 94.8 kg (209 lb)   SpO2 93%   BMI 34.78 kg/m²   Physical Exam  Vitals signs and nursing note reviewed.   Constitutional:       General: She is not in acute distress.     Appearance: She is well-developed.   HENT:      Head: Normocephalic and atraumatic.      Right Ear: External ear normal.      Left Ear: External ear normal.      Nose: Nose normal.      Mouth/Throat:      Mouth: Mucous membranes are moist.   Eyes:      Conjunctiva/sclera: Conjunctivae normal.      Pupils: Pupils are equal, round, and reactive to light.   Cardiovascular:      Rate and Rhythm: Normal rate and regular rhythm.      Heart sounds: No murmur.   Pulmonary:      Effort: Pulmonary effort is normal. No respiratory distress.      Breath sounds: Normal breath sounds.   Abdominal:      General: There is no distension.      Palpations: Abdomen is soft.      Tenderness: There is no abdominal tenderness.   Musculoskeletal:      Thoracic back: She exhibits tenderness, pain and spasm. She exhibits normal range of motion, no bony tenderness and no swelling.        Back:    Skin:     General: Skin is warm and dry.   Neurological:      General: No focal deficit present.      Mental Status: She is alert and oriented to person, place, and time. Mental status is at baseline.      Gait: Gait (gait at baseline) normal.   Psychiatric:         Judgment: Judgment normal.           Assessment/Plan:   Assessment    1. Back muscle spasm  " cyclobenzaprine (FLEXERIL) 5 MG tablet   2. Spasm of thoracic back muscle       Avoid bending, stooping, heavy or repetitive lifting until symptoms resolve.  Avoid prolonged sitting; frequent changes of position may be helpful.  Begin gentle stretching before activity when tolerated.  Pt instructed to take Flexeril only while not at work or while driving. PT instructed not to drive or operate heavy machinery while taking this medication as it causes drowsiness.  PT also instructed not to drink alcohol while taking this medication because it contains benzodiazepines.  PT verbalized understanding of these instructions.   Patient and/or guardian given precautionary s/sx that mandate immediate follow up and evaluation in the ED. Advised of risks of not doing so.  Side effects of the above medications discussed.   DDX, Supportive care, and indications for immediate follow-up discussed with patient.    Instructed to return to clinic or nearest emergency department if we are not available for any change in condition, further concerns, or worsening of symptoms.    The patient demonstrated a good understanding and agreed with the treatment plan.    Please note that this dictation was created using voice recognition software. I have made every reasonable attempt to correct obvious errors, but I expect that there are errors of grammar and possibly content that I did not discover before finalizing the note.

## 2020-03-02 ENCOUNTER — OFFICE VISIT (OUTPATIENT)
Dept: URGENT CARE | Facility: CLINIC | Age: 70
End: 2020-03-02
Payer: MEDICARE

## 2020-03-02 VITALS
OXYGEN SATURATION: 96 % | HEIGHT: 65 IN | HEART RATE: 88 BPM | WEIGHT: 206 LBS | DIASTOLIC BLOOD PRESSURE: 76 MMHG | RESPIRATION RATE: 16 BRPM | BODY MASS INDEX: 34.32 KG/M2 | SYSTOLIC BLOOD PRESSURE: 124 MMHG | TEMPERATURE: 98.1 F

## 2020-03-02 DIAGNOSIS — R05.9 COUGH: ICD-10-CM

## 2020-03-02 DIAGNOSIS — J11.1 INFLUENZA-LIKE ILLNESS: ICD-10-CM

## 2020-03-02 DIAGNOSIS — J45.901 MODERATE ASTHMA WITH ACUTE EXACERBATION, UNSPECIFIED WHETHER PERSISTENT: ICD-10-CM

## 2020-03-02 LAB
FLUAV+FLUBV AG SPEC QL IA: NEGATIVE
INT CON NEG: NEGATIVE
INT CON POS: POSITIVE

## 2020-03-02 PROCEDURE — 99214 OFFICE O/P EST MOD 30 MIN: CPT | Performed by: NURSE PRACTITIONER

## 2020-03-02 PROCEDURE — 87804 INFLUENZA ASSAY W/OPTIC: CPT | Performed by: NURSE PRACTITIONER

## 2020-03-02 RX ORDER — OMEPRAZOLE 20 MG/1
20 CAPSULE, DELAYED RELEASE ORAL 2 TIMES DAILY
COMMUNITY
Start: 2020-02-23 | End: 2023-01-09 | Stop reason: SDUPTHER

## 2020-03-02 RX ORDER — DOXYCYCLINE HYCLATE 100 MG
100 TABLET ORAL 2 TIMES DAILY
Qty: 10 TAB | Refills: 0 | Status: ON HOLD | OUTPATIENT
Start: 2020-03-02 | End: 2020-07-04

## 2020-03-02 RX ORDER — PREDNISONE 20 MG/1
40 TABLET ORAL 2 TIMES DAILY
Qty: 10 TAB | Refills: 0 | Status: SHIPPED | OUTPATIENT
Start: 2020-03-02 | End: 2020-03-07

## 2020-03-02 RX ORDER — POTASSIUM CHLORIDE 750 MG/1
TABLET, FILM COATED, EXTENDED RELEASE ORAL
Status: ON HOLD | COMMUNITY
Start: 2020-02-23 | End: 2020-07-04

## 2020-03-02 RX ORDER — FUROSEMIDE 40 MG/1
TABLET ORAL
Status: ON HOLD | COMMUNITY
Start: 2020-02-23 | End: 2020-07-04

## 2020-03-02 RX ORDER — BENZONATATE 100 MG/1
100 CAPSULE ORAL 3 TIMES DAILY PRN
Qty: 30 CAP | Refills: 0 | Status: ON HOLD | OUTPATIENT
Start: 2020-03-02 | End: 2020-07-04

## 2020-03-02 RX ORDER — OLANZAPINE 5 MG/1
TABLET ORAL
Status: ON HOLD | COMMUNITY
Start: 2020-02-22 | End: 2020-07-04

## 2020-03-02 ASSESSMENT — FIBROSIS 4 INDEX: FIB4 SCORE: 1.09

## 2020-03-02 NOTE — PROGRESS NOTES
"Subjective:      Fe Clark is a 69 y.o. female who presents with Cough (x2 days chest congestion, post nasal drip, cough, sore throat, bilateral ear fullness, SOB, fever, chills and bodyaches)          Cough  This is a new problem. The current episode started 2 days ago. The problem has been worsening. The problem occurs constantly. The cough is dry. Associated symptoms include : fatigue, congestion, headaches, chills, muscle aches.   Pertinent negatives include no fevers, nausea, vomiting, diarrhea, sweats, weight loss or wheezing. Nothing aggravates the symptoms.  Patient has tried ibuprofen and whiskey for sore throat symptoms. There is history of asthma.         Past Medical History:   Diagnosis Date   • Heart burn     rarely         Social History     Tobacco Use   • Smoking status: Former Smoker     Packs/day: 1.00     Years: 10.00     Pack years: 10.00     Types: Cigarettes     Last attempt to quit: 1990     Years since quittin.1   • Smokeless tobacco: Never Used   Substance Use Topics   • Alcohol use: No   • Drug use: No           No family history on file.    Review of Systems   Constitutional: positive for chills. Negative for fever  HENT: Positive for otalgia, sore throat  Cardiovascular - denies chest pain or dyspnea  Respiratory: Positive for cough. Negative for wheezing or SOB.    Neurological: Positive for headaches. Denies dizziness   GI - denies nausea, vomiting or diarrhea   - denies dysuria, discharge  Psych - denies depression, anxiety  Neuro - denies numbness or tingling.   10 point ROS otherwise negative, except per HPI         Objective:     /84 (BP Location: Right arm)   Pulse 82   Temp 36.8 °C (98.2 °F) (Temporal)   Resp 18   Ht 1.753 m (5' 9\")   Wt 97.5 kg (215 lb)   SpO2 99%       Physical Exam   Constitutional: Obese. patient is oriented to person, place, and time. Patient appears well-developed and well-nourished. No distress.   HENT:   Head: " Normocephalic and atraumatic.   Right Ear: External ear normal.   Left Ear: External ear normal.   TMs with cerumen impaction  Nose: Mucosal edema and rhinorrhea  present. Right sinus exhibits no maxillary sinus tenderness. Left sinus exhibits no maxillary sinus tenderness.   Mouth/Throat: Mucous membranes are normal. No oral lesions.  There is posterior pharyngeal erythema.  No oropharyngeal exudate or posterior oropharyngeal edema.   Eyes: Conjunctivae and EOM are normal. Pupils are equal, round, and reactive to light. Right eye exhibits no discharge. Left eye exhibits no discharge. No scleral icterus.   Neck: Normal range of motion. Neck supple. No tracheal deviation present.   Cardiovascular: Normal rate, regular rhythm and normal heart sounds.  Exam reveals no friction rub.    Pulmonary/Chest: Effort normal. No respiratory distress. Patient has no wheezes or rhonchi. Patient has no rales.    Musculoskeletal:  exhibits no edema.   Lymphadenopathy:     Patient has no cervical adenopathy.      Neurological: patient is alert and oriented to person, place, and time.   Skin: Skin is warm and dry. No rash noted. No erythema.   Psychiatric: patient  has a normal mood and affect.  behavior is normal.       Assesment/Plan:    POCT Influenza A/B: Negative for Influenza A    1. Influenza-like symptoms  Discussed likely viral etiology, possibly influenza.  Vitals and exam unremarkable.  Low suspicion for pneumonia.  Discussed appropriate over-the-counter symptomatic medication, and when to return to clinic. Follow up for worsening or persistent.  Rest, fluids encouraged.  OTC decongestant for congestion/cough  Pt was in full understanding and agreement with the plan.    Declines tamiflu  - POCT Influenza A/B  - benzonatate (TESSALON) 100 MG Cap; Take 1 Cap by mouth 3 times a day as needed for Cough.  Dispense: 30 Cap; Refill: 0    2. Cough  - benzonatate (TESSALON) 100 MG Cap; Take 1 Cap by mouth 3 times a day as needed for  Cough.  Dispense: 30 Cap; Refill: 0    3. Moderate asthma with acute exacerbation, unspecified whether persistent  -It is not recommended that you fill the antibiotic unless you start to feel worse in 3-5 days because at this time your illness is most likely viral and taking an antibiotic will not help.    -An antibiotic was also prescribed with instructions that pt should wait several days before filling and if symptoms do not improve they can then fill prescription. Pt understands that at this time it is unlikely the symptoms are of bacterial origin and agrees with the plan. Pt also understands the risks associated with taking abx for viral infections.  - predniSONE (DELTASONE) 20 MG Tab; Take 2 Tabs by mouth 1 time a day for 5 days.  Dispense: 10 Tab; Refill: 0  - doxycycline (VIBRAMYCIN) 100 MG Tab; Take 1 Tab by mouth 2 times a day.  Dispense: 10 Tab; Refill: 0  Differential diagnosis, natural history, supportive care, and indications for immediate follow-up discussed. All questions answered. Patient agrees with the plan of care.  Follow-up as needed if symptoms worsen or fail to improve to PCP, Urgent care or Emergency Room.

## 2020-06-23 ENCOUNTER — APPOINTMENT (OUTPATIENT)
Dept: RADIOLOGY | Facility: MEDICAL CENTER | Age: 70
End: 2020-06-23
Attending: EMERGENCY MEDICINE
Payer: MEDICARE

## 2020-06-23 ENCOUNTER — HOSPITAL ENCOUNTER (EMERGENCY)
Facility: MEDICAL CENTER | Age: 70
End: 2020-06-23
Attending: EMERGENCY MEDICINE
Payer: MEDICARE

## 2020-06-23 VITALS
BODY MASS INDEX: 35.37 KG/M2 | SYSTOLIC BLOOD PRESSURE: 169 MMHG | WEIGHT: 212.3 LBS | TEMPERATURE: 97.8 F | HEIGHT: 65 IN | OXYGEN SATURATION: 91 % | DIASTOLIC BLOOD PRESSURE: 76 MMHG | HEART RATE: 58 BPM | RESPIRATION RATE: 16 BRPM

## 2020-06-23 DIAGNOSIS — R60.9 PERIPHERAL EDEMA: ICD-10-CM

## 2020-06-23 LAB
ALBUMIN SERPL BCP-MCNC: 4.1 G/DL (ref 3.2–4.9)
ALBUMIN/GLOB SERPL: 1.6 G/DL
ALP SERPL-CCNC: 133 U/L (ref 30–99)
ALT SERPL-CCNC: 49 U/L (ref 2–50)
ANION GAP SERPL CALC-SCNC: 13 MMOL/L (ref 7–16)
AST SERPL-CCNC: 42 U/L (ref 12–45)
BASOPHILS # BLD AUTO: 0.6 % (ref 0–1.8)
BASOPHILS # BLD: 0.06 K/UL (ref 0–0.12)
BILIRUB SERPL-MCNC: 0.3 MG/DL (ref 0.1–1.5)
BUN SERPL-MCNC: 17 MG/DL (ref 8–22)
CALCIUM SERPL-MCNC: 9.2 MG/DL (ref 8.5–10.5)
CHLORIDE SERPL-SCNC: 105 MMOL/L (ref 96–112)
CO2 SERPL-SCNC: 20 MMOL/L (ref 20–33)
CREAT SERPL-MCNC: 1.45 MG/DL (ref 0.5–1.4)
EOSINOPHIL # BLD AUTO: 0.29 K/UL (ref 0–0.51)
EOSINOPHIL NFR BLD: 2.9 % (ref 0–6.9)
ERYTHROCYTE [DISTWIDTH] IN BLOOD BY AUTOMATED COUNT: 46.5 FL (ref 35.9–50)
GLOBULIN SER CALC-MCNC: 2.6 G/DL (ref 1.9–3.5)
GLUCOSE SERPL-MCNC: 142 MG/DL (ref 65–99)
HCT VFR BLD AUTO: 41.9 % (ref 37–47)
HGB BLD-MCNC: 14.2 G/DL (ref 12–16)
IMM GRANULOCYTES # BLD AUTO: 0.06 K/UL (ref 0–0.11)
IMM GRANULOCYTES NFR BLD AUTO: 0.6 % (ref 0–0.9)
LYMPHOCYTES # BLD AUTO: 3.24 K/UL (ref 1–4.8)
LYMPHOCYTES NFR BLD: 32.3 % (ref 22–41)
MCH RBC QN AUTO: 33.1 PG (ref 27–33)
MCHC RBC AUTO-ENTMCNC: 33.9 G/DL (ref 33.6–35)
MCV RBC AUTO: 97.7 FL (ref 81.4–97.8)
MONOCYTES # BLD AUTO: 0.86 K/UL (ref 0–0.85)
MONOCYTES NFR BLD AUTO: 8.6 % (ref 0–13.4)
NEUTROPHILS # BLD AUTO: 5.52 K/UL (ref 2–7.15)
NEUTROPHILS NFR BLD: 55 % (ref 44–72)
NRBC # BLD AUTO: 0 K/UL
NRBC BLD-RTO: 0 /100 WBC
NT-PROBNP SERPL IA-MCNC: 149 PG/ML (ref 0–125)
PLATELET # BLD AUTO: 289 K/UL (ref 164–446)
PMV BLD AUTO: 9.3 FL (ref 9–12.9)
POTASSIUM SERPL-SCNC: 3.5 MMOL/L (ref 3.6–5.5)
PROT SERPL-MCNC: 6.7 G/DL (ref 6–8.2)
RBC # BLD AUTO: 4.29 M/UL (ref 4.2–5.4)
SODIUM SERPL-SCNC: 138 MMOL/L (ref 135–145)
WBC # BLD AUTO: 10 K/UL (ref 4.8–10.8)

## 2020-06-23 PROCEDURE — 80053 COMPREHEN METABOLIC PANEL: CPT

## 2020-06-23 PROCEDURE — 83880 ASSAY OF NATRIURETIC PEPTIDE: CPT

## 2020-06-23 PROCEDURE — 99283 EMERGENCY DEPT VISIT LOW MDM: CPT

## 2020-06-23 PROCEDURE — 85025 COMPLETE CBC W/AUTO DIFF WBC: CPT

## 2020-06-23 PROCEDURE — 93970 EXTREMITY STUDY: CPT

## 2020-06-23 PROCEDURE — 36415 COLL VENOUS BLD VENIPUNCTURE: CPT

## 2020-06-24 NOTE — ED NOTES
Patient verbalizes understanding of follow up, care and when to return to the ED.  All questions answered.  Discharged with family

## 2020-06-24 NOTE — ED TRIAGE NOTES
"Chief Complaint   Patient presents with   • Leg Swelling     BLE edema x few days.      Pt amb to triage with steady gait for above complaint. Pt anxious, states she has panic attacks in past. Denies SOB, CP.   Pt is alert and oriented, speaking in full sentences, follows commands and responds appropriately to questions. Resp are even and unlabored. No behavioral indicators of pain.   Pt placed in lobby. Pt educated on triage process. Pt encouraged to alert staff for any changes.    BP (!) 169/86   Pulse 81   Temp 36.6 °C (97.8 °F) (Temporal)   Resp 18   Ht 1.651 m (5' 5\")   Wt 96.3 kg (212 lb 4.9 oz)   SpO2 95%   BMI 35.33 kg/m²     "

## 2020-06-24 NOTE — ED PROVIDER NOTES
ED Provider Note    CHIEF COMPLAINT  Chief Complaint   Patient presents with   • Leg Swelling     BLE edema x few days.        HPI  Fe Clark is a 69 y.o. female who presents to the emergency department with complaint of bilateral lower extremity swelling and discomfort.  Past medical history as documented below and reviewed with the patient.  She explains that her primary care physician had actually placed her on Lasix and potassium which she finished 2 weeks ago with no significant changes in her lower extremity swelling.  She notes that the swelling has not significantly changed but became slightly more prominent and more uncomfortable this afternoon after 4 PM over the last 5 hours.  She tried to elevate her legs for approximate 1 hour with no significant change ultimately leading her to come to the emergency department.  Denies any recent travel.  No prior history of DVT although this is a concern of hers.  No chest pain or shortness of breath.  She states that she is relatively active with daily chores around the house.  She is noncompliant with her compression stockings.    REVIEW OF SYSTEMS  See HPI for further details. All other systems are negative.     PAST MEDICAL HISTORY   has a past medical history of ASTHMA, Diverticulosis, Duodenal ulcer, unspecified as acute or chronic, without hemorrhage, perforation, or obstruction, GERD (gastroesophageal reflux disease), Hiatal hernia, High cholesterol, Hypertension, and Psychiatric problem.    SOCIAL HISTORY  Social History     Tobacco Use   • Smoking status: Never Smoker   • Smokeless tobacco: Never Used   Substance and Sexual Activity   • Alcohol use: No     Comment: recovering alcoholic   • Drug use: Yes     Comment: marajuna   • Sexual activity: Not on file       SURGICAL HISTORY   has a past surgical history that includes bladder suspension; bowel resection; colonoscopy; hysterectomy radical; other orthopedic surgery; ankle arthroscopy  "(7/31/2013); hardware removal ortho (7/31/2013); lacrimal duct probe (3/11/2015); submandible abscess incision and drainage (11/19/2016); and dental extraction(s) (11/19/2016).    CURRENT MEDICATIONS  Home Medications     Reviewed by Jennifer Irizarry R.N. (Registered Nurse) on 06/23/20 at 2000  Med List Status: <None>   Medication Last Dose Status   alprazolam (XANAX) 0.25 MG Tab  Active   amLODIPine (NORVASC) 5 MG Tab  Active   aspirin EC (ECOTRIN) 81 MG Tablet Delayed Response  Active   atorvastatin (LIPITOR) 40 MG Tab  Active   benzonatate (TESSALON) 100 MG Cap  Active   cyclobenzaprine (FLEXERIL) 5 MG tablet  Active   doxycycline (VIBRAMYCIN) 100 MG Tab  Active   DULoxetine (CYMBALTA) 30 MG Cap DR Particles  Active   furosemide (LASIX) 40 MG Tab  Active   gabapentin (NEURONTIN) 300 MG Cap  Active   lisinopril-hydrochlorothiazide (PRINZIDE, ZESTORETIC) 20-25 MG per tablet  Active   OLANZapine (ZYPREXA) 5 MG Tab  Active   omeprazole (PRILOSEC) 20 MG delayed-release capsule  Active   potassium chloride ER (KLOR-CON) 10 MEQ tablet  Active                ALLERGIES  Allergies   Allergen Reactions   • Nkda [No Known Drug Allergy]        PHYSICAL EXAM  VITAL SIGNS: BP (!) 169/76   Pulse (!) 58   Temp 36.6 °C (97.8 °F) (Temporal)   Resp 16   Ht 1.651 m (5' 5\")   Wt 96.3 kg (212 lb 4.9 oz)   SpO2 91%   BMI 35.33 kg/m²  @CHULA[[id number]::@   Pulse ox interpretation: I interpret this pulse ox as normal.  Constitutional: Alert in no apparent distress.  HENT: No signs of trauma, Bilateral external ears normal, Nose normal.   Eyes: Pupils are equal and reactive, Conjunctiva normal, Non-icteric.   Neck: Normal range of motion, No tenderness, Supple, No stridor.    Cardiovascular: Regular rate and rhythm, no murmurs.   Thorax & Lungs: Normal breath sounds, No respiratory distress, No wheezing, No chest tenderness.   Abdomen: Bowel sounds normal, Soft, No tenderness, No masses, No pulsatile masses. No peritoneal signs.  Skin: " Warm, Dry, No erythema, No rash.   Back: No bony tenderness, No CVA tenderness.   Extremities: Intact distal pulses, 2+ pitting edema bilateral lower extremities, remedies are symmetric, no tenderness, No cyanosis  Musculoskeletal: Good range of motion in all major joints. No tenderness to palpation or major deformities noted.   Neurologic: Alert , Normal motor function, Normal sensory function, No focal deficits noted.   Psychiatric: Affect normal, Judgment normal, Mood normal.       DIAGNOSTIC STUDIES / PROCEDURES    EKG  Results for orders placed or performed during the hospital encounter of 06/15/18   EKG (NOW)   Result Value Ref Range    Report       [facility] Emergency Dept.    Test Date:  2018-06-15  Pt Name:    DOROTA AGUIRRE             Department: ER  MRN:        [id number]                      Room:  Gender:     Female                       Technician: [id number]  :        1950                   Requested By:ER TRIAGE PROTOCOL  Order #:    [id number]                    Reading MD:    Measurements  Intervals                                Axis  Rate:       66                           P:          -21  MO:         144                          QRS:        -25  QRSD:       84                           T:          12  QT:         444  QTc:        466    Interpretive Statements  SINUS RHYTHM  CONSIDER LEFT VENTRICULAR HYPERTROPHY  Compared to ECG 2018 21:00:19  T-wave abnormality no longer present           LABS  Results for orders placed or performed during the hospital encounter of 20   CBC w/ Differential   Result Value Ref Range    WBC 10.0 4.8 - 10.8 K/uL    RBC 4.29 4.20 - 5.40 M/uL    Hemoglobin 14.2 12.0 - 16.0 g/dL    Hematocrit 41.9 37.0 - 47.0 %    MCV 97.7 81.4 - 97.8 fL    MCH 33.1 (H) 27.0 - 33.0 pg    MCHC 33.9 33.6 - 35.0 g/dL    RDW 46.5 35.9 - 50.0 fL    Platelet Count 289 164 - 446 K/uL    MPV 9.3 9.0 - 12.9 fL    Neutrophils-Polys 55.00 44.00 - 72.00 %     Lymphocytes 32.30 22.00 - 41.00 %    Monocytes 8.60 0.00 - 13.40 %    Eosinophils 2.90 0.00 - 6.90 %    Basophils 0.60 0.00 - 1.80 %    Immature Granulocytes 0.60 0.00 - 0.90 %    Nucleated RBC 0.00 /100 WBC    Neutrophils (Absolute) 5.52 2.00 - 7.15 K/uL    Lymphs (Absolute) 3.24 1.00 - 4.80 K/uL    Monos (Absolute) 0.86 (H) 0.00 - 0.85 K/uL    Eos (Absolute) 0.29 0.00 - 0.51 K/uL    Baso (Absolute) 0.06 0.00 - 0.12 K/uL    Immature Granulocytes (abs) 0.06 0.00 - 0.11 K/uL    NRBC (Absolute) 0.00 K/uL   Complete Metabolic Panel (CMP)   Result Value Ref Range    Sodium 138 135 - 145 mmol/L    Potassium 3.5 (L) 3.6 - 5.5 mmol/L    Chloride 105 96 - 112 mmol/L    Co2 20 20 - 33 mmol/L    Anion Gap 13.0 7.0 - 16.0    Glucose 142 (H) 65 - 99 mg/dL    Bun 17 8 - 22 mg/dL    Creatinine 1.45 (H) 0.50 - 1.40 mg/dL    Calcium 9.2 8.5 - 10.5 mg/dL    AST(SGOT) 42 12 - 45 U/L    ALT(SGPT) 49 2 - 50 U/L    Alkaline Phosphatase 133 (H) 30 - 99 U/L    Total Bilirubin 0.3 0.1 - 1.5 mg/dL    Albumin 4.1 3.2 - 4.9 g/dL    Total Protein 6.7 6.0 - 8.2 g/dL    Globulin 2.6 1.9 - 3.5 g/dL    A-G Ratio 1.6 g/dL   proBrain Natriuretic Peptide, NT   Result Value Ref Range    NT-proBNP 149 (H) 0 - 125 pg/mL   ESTIMATED GFR   Result Value Ref Range    GFR If  43 (A) >60 mL/min/1.73 m 2    GFR If Non  36 (A) >60 mL/min/1.73 m 2         RADIOLOGY  US-EXTREMITY VENOUS LOWER BILAT   Final Result            COURSE & MEDICAL DECISION MAKING  Pertinent Labs & Imaging studies reviewed. (See chart for details)  Patient presenting to the emergency department for chronic leg edema.  Sounds like her PCP is continue to monitor this.  Patient admits noncompliance with home instructions and diet.  I do believe that she would benefit from ongoing usage of stockings, leg elevations and potentially even a second round of diuretics to assist with her peripheral edema.  There is no evidence of DVT tonight and I strongly suspect  that the finding on the right lower extremity is more artifact than true DVT.  I will have her PCP follow-up and do a second ultrasound to confirm this suspicion to continue to rule out the DVT pathology.  If anything however the left leg looks slightly more swollen than the right which is opposite of the findings or consistency of DVT on the right.  Again I have very low suspicion for this.  At this point I will discharge home with follow-up with PCP but she is understanding return precautions here to the ER if needed.    The patient will return for worsening symptoms and is stable at the time of discharge. The patient verbalizes understanding and will comply.    FINAL IMPRESSION  1. Peripheral edema            Electronically signed by: Bereket Magana M.D., 6/23/2020 9:15 PM

## 2020-07-02 NOTE — DOCUMENTATION QUERY
[facility]                                                                       Query Response Note      PATIENT:               DOROTA AGUIRRE  ACCT #:                  [id number]  MRN:                     [id number]  :                      1950  ADMIT DATE:       2020 8:36 PM  DISCH DATE:        2020 10:52 PM  RESPONDING  PROVIDER #:        [id number]           QUERY TEXT:    Your assistance is needed in determining the reason  for NATRIURETIC PEPTIDE (BNP) that was ordered.  This service is non-covered by the diagnoses documented in the medical record.      Can you please provide an additional diagnosis that describes the sign or symptom that  (prompted/initiated) the NATRIURETIC PEPTIDE (BNP).    NOTE:  If an appropriate answer is not listed below, please respond with a new note.        The patient's Clinical Indicators include:  NATRIURETIC PEPTIDE (BNP)  Options provided:   -- Other related diagnosis, Please specify diagnosis demonstrating medical necessity for lab/imaging/other test.)   -- Unable to determine      Query created by: Lisa Argueta on 2020 10:47 AM    RESPONSE TEXT:    Peripheral edema secondary to chf          Electronically signed by:  DENI HERRERA MD 2020 2:24 AM

## 2020-07-04 ENCOUNTER — APPOINTMENT (OUTPATIENT)
Dept: RADIOLOGY | Facility: MEDICAL CENTER | Age: 70
End: 2020-07-04
Attending: INTERNAL MEDICINE
Payer: MEDICARE

## 2020-07-04 ENCOUNTER — APPOINTMENT (OUTPATIENT)
Dept: CARDIOLOGY | Facility: MEDICAL CENTER | Age: 70
End: 2020-07-04
Attending: INTERNAL MEDICINE
Payer: MEDICARE

## 2020-07-04 ENCOUNTER — HOSPITAL ENCOUNTER (OUTPATIENT)
Facility: MEDICAL CENTER | Age: 70
End: 2020-07-05
Attending: EMERGENCY MEDICINE | Admitting: INTERNAL MEDICINE
Payer: MEDICARE

## 2020-07-04 ENCOUNTER — APPOINTMENT (OUTPATIENT)
Dept: RADIOLOGY | Facility: MEDICAL CENTER | Age: 70
End: 2020-07-04
Attending: EMERGENCY MEDICINE
Payer: MEDICARE

## 2020-07-04 DIAGNOSIS — R07.9 CHEST PAIN, UNSPECIFIED TYPE: ICD-10-CM

## 2020-07-04 DIAGNOSIS — R53.1 GENERALIZED WEAKNESS: ICD-10-CM

## 2020-07-04 PROBLEM — R06.02 SHORTNESS OF BREATH: Status: ACTIVE | Noted: 2020-07-04

## 2020-07-04 PROBLEM — R60.0 LOWER EXTREMITY EDEMA: Status: ACTIVE | Noted: 2020-07-04

## 2020-07-04 LAB
ALBUMIN SERPL BCP-MCNC: 4.2 G/DL (ref 3.2–4.9)
ALBUMIN/GLOB SERPL: 1.6 G/DL
ALP SERPL-CCNC: 140 U/L (ref 30–99)
ALT SERPL-CCNC: 48 U/L (ref 2–50)
ANION GAP SERPL CALC-SCNC: 10 MMOL/L (ref 7–16)
APPEARANCE UR: CLEAR
AST SERPL-CCNC: 42 U/L (ref 12–45)
BACTERIA #/AREA URNS HPF: NEGATIVE /HPF
BASOPHILS # BLD AUTO: 0.7 % (ref 0–1.8)
BASOPHILS # BLD: 0.06 K/UL (ref 0–0.12)
BILIRUB SERPL-MCNC: 0.6 MG/DL (ref 0.1–1.5)
BILIRUB UR QL STRIP.AUTO: NEGATIVE
BLOOD CULTURE HOLD CXBCH: NORMAL
BUN SERPL-MCNC: 16 MG/DL (ref 8–22)
CALCIUM SERPL-MCNC: 9.5 MG/DL (ref 8.5–10.5)
CHLORIDE SERPL-SCNC: 104 MMOL/L (ref 96–112)
CK SERPL-CCNC: 114 U/L (ref 0–154)
CO2 SERPL-SCNC: 19 MMOL/L (ref 20–33)
COLOR UR: YELLOW
COVID ORDER STATUS COVID19: NORMAL
CREAT SERPL-MCNC: 0.75 MG/DL (ref 0.5–1.4)
D DIMER PPP IA.FEU-MCNC: 0.34 UG/ML (FEU) (ref 0–0.5)
EKG IMPRESSION: NORMAL
EOSINOPHIL # BLD AUTO: 0.43 K/UL (ref 0–0.51)
EOSINOPHIL NFR BLD: 5.1 % (ref 0–6.9)
EPI CELLS #/AREA URNS HPF: NEGATIVE /HPF
ERYTHROCYTE [DISTWIDTH] IN BLOOD BY AUTOMATED COUNT: 46.4 FL (ref 35.9–50)
GLOBULIN SER CALC-MCNC: 2.7 G/DL (ref 1.9–3.5)
GLUCOSE SERPL-MCNC: 122 MG/DL (ref 65–99)
GLUCOSE UR STRIP.AUTO-MCNC: NEGATIVE MG/DL
HCT VFR BLD AUTO: 45.2 % (ref 37–47)
HGB BLD-MCNC: 14.6 G/DL (ref 12–16)
HYALINE CASTS #/AREA URNS LPF: ABNORMAL /LPF
IMM GRANULOCYTES # BLD AUTO: 0.03 K/UL (ref 0–0.11)
IMM GRANULOCYTES NFR BLD AUTO: 0.4 % (ref 0–0.9)
KETONES UR STRIP.AUTO-MCNC: NEGATIVE MG/DL
LEUKOCYTE ESTERASE UR QL STRIP.AUTO: NEGATIVE
LIPASE SERPL-CCNC: 31 U/L (ref 11–82)
LV EJECT FRACT  99904: 55
LV EJECT FRACT MOD 2C 99903: 60.53
LV EJECT FRACT MOD 4C 99902: 58.34
LV EJECT FRACT MOD BP 99901: 59.1
LYMPHOCYTES # BLD AUTO: 3.31 K/UL (ref 1–4.8)
LYMPHOCYTES NFR BLD: 39.2 % (ref 22–41)
MAGNESIUM SERPL-MCNC: 2 MG/DL (ref 1.5–2.5)
MCH RBC QN AUTO: 31.7 PG (ref 27–33)
MCHC RBC AUTO-ENTMCNC: 32.3 G/DL (ref 33.6–35)
MCV RBC AUTO: 98.3 FL (ref 81.4–97.8)
MICRO URNS: ABNORMAL
MONOCYTES # BLD AUTO: 0.74 K/UL (ref 0–0.85)
MONOCYTES NFR BLD AUTO: 8.8 % (ref 0–13.4)
NEUTROPHILS # BLD AUTO: 3.87 K/UL (ref 2–7.15)
NEUTROPHILS NFR BLD: 45.8 % (ref 44–72)
NITRITE UR QL STRIP.AUTO: NEGATIVE
NRBC # BLD AUTO: 0 K/UL
NRBC BLD-RTO: 0 /100 WBC
NT-PROBNP SERPL IA-MCNC: 215 PG/ML (ref 0–125)
PH UR STRIP.AUTO: 6.5 [PH] (ref 5–8)
PLATELET # BLD AUTO: 317 K/UL (ref 164–446)
PMV BLD AUTO: 9.2 FL (ref 9–12.9)
POTASSIUM SERPL-SCNC: 3.8 MMOL/L (ref 3.6–5.5)
PROT SERPL-MCNC: 6.9 G/DL (ref 6–8.2)
PROT UR QL STRIP: NEGATIVE MG/DL
RBC # BLD AUTO: 4.6 M/UL (ref 4.2–5.4)
RBC # URNS HPF: ABNORMAL /HPF
RBC UR QL AUTO: ABNORMAL
SARS-COV-2 RNA RESP QL NAA+PROBE: NOTDETECTED
SODIUM SERPL-SCNC: 133 MMOL/L (ref 135–145)
SP GR UR STRIP.AUTO: 1.01
SPECIMEN SOURCE: NORMAL
TROPONIN T SERPL-MCNC: 12 NG/L (ref 6–19)
TROPONIN T SERPL-MCNC: 15 NG/L (ref 6–19)
TROPONIN T SERPL-MCNC: 8 NG/L (ref 6–19)
UROBILINOGEN UR STRIP.AUTO-MCNC: 0.2 MG/DL
WBC # BLD AUTO: 8.4 K/UL (ref 4.8–10.8)
WBC #/AREA URNS HPF: ABNORMAL /HPF

## 2020-07-04 PROCEDURE — 82550 ASSAY OF CK (CPK): CPT

## 2020-07-04 PROCEDURE — 83735 ASSAY OF MAGNESIUM: CPT

## 2020-07-04 PROCEDURE — 700111 HCHG RX REV CODE 636 W/ 250 OVERRIDE (IP): Performed by: EMERGENCY MEDICINE

## 2020-07-04 PROCEDURE — 93005 ELECTROCARDIOGRAM TRACING: CPT | Performed by: INTERNAL MEDICINE

## 2020-07-04 PROCEDURE — 99285 EMERGENCY DEPT VISIT HI MDM: CPT

## 2020-07-04 PROCEDURE — C9803 HOPD COVID-19 SPEC COLLECT: HCPCS | Performed by: EMERGENCY MEDICINE

## 2020-07-04 PROCEDURE — 85025 COMPLETE CBC W/AUTO DIFF WBC: CPT

## 2020-07-04 PROCEDURE — 84484 ASSAY OF TROPONIN QUANT: CPT | Mod: 91

## 2020-07-04 PROCEDURE — 96372 THER/PROPH/DIAG INJ SC/IM: CPT | Mod: XU

## 2020-07-04 PROCEDURE — 71045 X-RAY EXAM CHEST 1 VIEW: CPT

## 2020-07-04 PROCEDURE — 96375 TX/PRO/DX INJ NEW DRUG ADDON: CPT | Mod: XU

## 2020-07-04 PROCEDURE — 96374 THER/PROPH/DIAG INJ IV PUSH: CPT | Mod: XU

## 2020-07-04 PROCEDURE — 83880 ASSAY OF NATRIURETIC PEPTIDE: CPT

## 2020-07-04 PROCEDURE — G0378 HOSPITAL OBSERVATION PER HR: HCPCS

## 2020-07-04 PROCEDURE — 700102 HCHG RX REV CODE 250 W/ 637 OVERRIDE(OP): Performed by: INTERNAL MEDICINE

## 2020-07-04 PROCEDURE — 700111 HCHG RX REV CODE 636 W/ 250 OVERRIDE (IP): Performed by: INTERNAL MEDICINE

## 2020-07-04 PROCEDURE — 36415 COLL VENOUS BLD VENIPUNCTURE: CPT

## 2020-07-04 PROCEDURE — U0004 COV-19 TEST NON-CDC HGH THRU: HCPCS

## 2020-07-04 PROCEDURE — 93306 TTE W/DOPPLER COMPLETE: CPT | Mod: 26 | Performed by: INTERNAL MEDICINE

## 2020-07-04 PROCEDURE — 80053 COMPREHEN METABOLIC PANEL: CPT

## 2020-07-04 PROCEDURE — 81001 URINALYSIS AUTO W/SCOPE: CPT

## 2020-07-04 PROCEDURE — 85379 FIBRIN DEGRADATION QUANT: CPT

## 2020-07-04 PROCEDURE — 76705 ECHO EXAM OF ABDOMEN: CPT

## 2020-07-04 PROCEDURE — 83690 ASSAY OF LIPASE: CPT

## 2020-07-04 PROCEDURE — 99220 PR INITIAL OBSERVATION CARE,LEVL III: CPT | Performed by: INTERNAL MEDICINE

## 2020-07-04 PROCEDURE — A9270 NON-COVERED ITEM OR SERVICE: HCPCS | Performed by: INTERNAL MEDICINE

## 2020-07-04 PROCEDURE — 93306 TTE W/DOPPLER COMPLETE: CPT

## 2020-07-04 PROCEDURE — 93005 ELECTROCARDIOGRAM TRACING: CPT | Performed by: EMERGENCY MEDICINE

## 2020-07-04 RX ORDER — GABAPENTIN 300 MG/1
300 CAPSULE ORAL 2 TIMES DAILY
Status: DISCONTINUED | OUTPATIENT
Start: 2020-07-04 | End: 2020-07-05 | Stop reason: HOSPADM

## 2020-07-04 RX ORDER — OMEPRAZOLE 20 MG/1
20 CAPSULE, DELAYED RELEASE ORAL 2 TIMES DAILY
Status: DISCONTINUED | OUTPATIENT
Start: 2020-07-04 | End: 2020-07-05 | Stop reason: HOSPADM

## 2020-07-04 RX ORDER — AMLODIPINE BESYLATE 5 MG/1
5 TABLET ORAL DAILY
Status: DISCONTINUED | OUTPATIENT
Start: 2020-07-04 | End: 2020-07-05 | Stop reason: HOSPADM

## 2020-07-04 RX ORDER — ATORVASTATIN CALCIUM 20 MG/1
40 TABLET, FILM COATED ORAL NIGHTLY
Status: DISCONTINUED | OUTPATIENT
Start: 2020-07-04 | End: 2020-07-05 | Stop reason: HOSPADM

## 2020-07-04 RX ORDER — LISINOPRIL AND HYDROCHLOROTHIAZIDE 25; 20 MG/1; MG/1
1 TABLET ORAL DAILY
Status: DISCONTINUED | OUTPATIENT
Start: 2020-07-04 | End: 2020-07-04

## 2020-07-04 RX ORDER — DULOXETIN HYDROCHLORIDE 30 MG/1
30 CAPSULE, DELAYED RELEASE ORAL DAILY
Status: DISCONTINUED | OUTPATIENT
Start: 2020-07-04 | End: 2020-07-04

## 2020-07-04 RX ORDER — ASPIRIN 81 MG/1
324 TABLET, CHEWABLE ORAL DAILY
Status: DISCONTINUED | OUTPATIENT
Start: 2020-07-04 | End: 2020-07-04

## 2020-07-04 RX ORDER — SUCRALFATE 1 G/1
1 TABLET ORAL EVERY 6 HOURS
Status: DISCONTINUED | OUTPATIENT
Start: 2020-07-04 | End: 2020-07-05 | Stop reason: HOSPADM

## 2020-07-04 RX ORDER — ALBUTEROL SULFATE 90 UG/1
2 AEROSOL, METERED RESPIRATORY (INHALATION)
Status: DISCONTINUED | OUTPATIENT
Start: 2020-07-04 | End: 2020-07-05 | Stop reason: HOSPADM

## 2020-07-04 RX ORDER — ONDANSETRON 4 MG/1
4 TABLET, ORALLY DISINTEGRATING ORAL EVERY 4 HOURS PRN
Status: DISCONTINUED | OUTPATIENT
Start: 2020-07-04 | End: 2020-07-05 | Stop reason: HOSPADM

## 2020-07-04 RX ORDER — OXYCODONE HYDROCHLORIDE 5 MG/1
5 TABLET ORAL
Status: DISCONTINUED | OUTPATIENT
Start: 2020-07-04 | End: 2020-07-05 | Stop reason: HOSPADM

## 2020-07-04 RX ORDER — POLYETHYLENE GLYCOL 3350 17 G/17G
1 POWDER, FOR SOLUTION ORAL
Status: DISCONTINUED | OUTPATIENT
Start: 2020-07-04 | End: 2020-07-05 | Stop reason: HOSPADM

## 2020-07-04 RX ORDER — AMINOPHYLLINE 25 MG/ML
100 INJECTION, SOLUTION INTRAVENOUS
Status: DISCONTINUED | OUTPATIENT
Start: 2020-07-04 | End: 2020-07-05 | Stop reason: HOSPADM

## 2020-07-04 RX ORDER — ACETAMINOPHEN 325 MG/1
650 TABLET ORAL EVERY 6 HOURS PRN
Status: DISCONTINUED | OUTPATIENT
Start: 2020-07-04 | End: 2020-07-05 | Stop reason: HOSPADM

## 2020-07-04 RX ORDER — HYDRALAZINE HYDROCHLORIDE 20 MG/ML
20 INJECTION INTRAMUSCULAR; INTRAVENOUS EVERY 6 HOURS PRN
Status: DISCONTINUED | OUTPATIENT
Start: 2020-07-04 | End: 2020-07-05 | Stop reason: HOSPADM

## 2020-07-04 RX ORDER — REGADENOSON 0.08 MG/ML
0.4 INJECTION, SOLUTION INTRAVENOUS
Status: COMPLETED | OUTPATIENT
Start: 2020-07-04 | End: 2020-07-05

## 2020-07-04 RX ORDER — OXYCODONE HYDROCHLORIDE 5 MG/1
10 TABLET ORAL
Status: DISCONTINUED | OUTPATIENT
Start: 2020-07-04 | End: 2020-07-05 | Stop reason: HOSPADM

## 2020-07-04 RX ORDER — MORPHINE SULFATE 4 MG/ML
4 INJECTION, SOLUTION INTRAMUSCULAR; INTRAVENOUS
Status: DISCONTINUED | OUTPATIENT
Start: 2020-07-04 | End: 2020-07-05 | Stop reason: HOSPADM

## 2020-07-04 RX ORDER — CYCLOBENZAPRINE HCL 5 MG
5-10 TABLET ORAL 3 TIMES DAILY PRN
Status: DISCONTINUED | OUTPATIENT
Start: 2020-07-04 | End: 2020-07-04

## 2020-07-04 RX ORDER — BISACODYL 10 MG
10 SUPPOSITORY, RECTAL RECTAL
Status: DISCONTINUED | OUTPATIENT
Start: 2020-07-04 | End: 2020-07-05 | Stop reason: HOSPADM

## 2020-07-04 RX ORDER — OLANZAPINE 5 MG/1
5 TABLET ORAL EVERY EVENING
Status: DISCONTINUED | OUTPATIENT
Start: 2020-07-04 | End: 2020-07-04

## 2020-07-04 RX ORDER — AMOXICILLIN 250 MG
2 CAPSULE ORAL 2 TIMES DAILY
Status: DISCONTINUED | OUTPATIENT
Start: 2020-07-04 | End: 2020-07-05 | Stop reason: HOSPADM

## 2020-07-04 RX ORDER — POTASSIUM CHLORIDE 750 MG/1
10 TABLET, FILM COATED, EXTENDED RELEASE ORAL DAILY
Status: DISCONTINUED | OUTPATIENT
Start: 2020-07-04 | End: 2020-07-04

## 2020-07-04 RX ORDER — METHYLPREDNISOLONE SODIUM SUCCINATE 125 MG/2ML
125 INJECTION, POWDER, LYOPHILIZED, FOR SOLUTION INTRAMUSCULAR; INTRAVENOUS ONCE
Status: COMPLETED | OUTPATIENT
Start: 2020-07-04 | End: 2020-07-04

## 2020-07-04 RX ORDER — LABETALOL HYDROCHLORIDE 5 MG/ML
10 INJECTION, SOLUTION INTRAVENOUS EVERY 4 HOURS PRN
Status: DISCONTINUED | OUTPATIENT
Start: 2020-07-04 | End: 2020-07-05 | Stop reason: HOSPADM

## 2020-07-04 RX ORDER — ONDANSETRON 2 MG/ML
4 INJECTION INTRAMUSCULAR; INTRAVENOUS EVERY 4 HOURS PRN
Status: DISCONTINUED | OUTPATIENT
Start: 2020-07-04 | End: 2020-07-05 | Stop reason: HOSPADM

## 2020-07-04 RX ORDER — ASPIRIN 325 MG
325 TABLET ORAL DAILY
Status: DISCONTINUED | OUTPATIENT
Start: 2020-07-04 | End: 2020-07-05 | Stop reason: HOSPADM

## 2020-07-04 RX ORDER — GUAIFENESIN/DEXTROMETHORPHAN 100-10MG/5
10 SYRUP ORAL EVERY 6 HOURS PRN
Status: DISCONTINUED | OUTPATIENT
Start: 2020-07-04 | End: 2020-07-05 | Stop reason: HOSPADM

## 2020-07-04 RX ORDER — GABAPENTIN 300 MG/1
300 CAPSULE ORAL 2 TIMES DAILY
Status: DISCONTINUED | OUTPATIENT
Start: 2020-07-04 | End: 2020-07-04

## 2020-07-04 RX ORDER — ALPRAZOLAM 0.25 MG/1
0.25 TABLET ORAL 2 TIMES DAILY PRN
Status: DISCONTINUED | OUTPATIENT
Start: 2020-07-04 | End: 2020-07-04

## 2020-07-04 RX ORDER — ASPIRIN 300 MG/1
300 SUPPOSITORY RECTAL DAILY
Status: DISCONTINUED | OUTPATIENT
Start: 2020-07-04 | End: 2020-07-04

## 2020-07-04 RX ADMIN — METHYLPREDNISOLONE SODIUM SUCCINATE 125 MG: 125 INJECTION, POWDER, FOR SOLUTION INTRAMUSCULAR; INTRAVENOUS at 08:31

## 2020-07-04 RX ADMIN — ENOXAPARIN SODIUM 40 MG: 40 INJECTION SUBCUTANEOUS at 12:31

## 2020-07-04 RX ADMIN — GABAPENTIN 300 MG: 300 CAPSULE ORAL at 17:53

## 2020-07-04 RX ADMIN — SUCRALFATE 1 G: 1 TABLET ORAL at 17:53

## 2020-07-04 RX ADMIN — MORPHINE SULFATE 4 MG: 4 INJECTION INTRAVENOUS at 14:28

## 2020-07-04 RX ADMIN — ASPIRIN 325 MG: 325 TABLET, FILM COATED ORAL at 12:31

## 2020-07-04 RX ADMIN — HYDRALAZINE HYDROCHLORIDE 20 MG: 20 INJECTION INTRAMUSCULAR; INTRAVENOUS at 12:31

## 2020-07-04 RX ADMIN — SUCRALFATE 1 G: 1 TABLET ORAL at 12:31

## 2020-07-04 RX ADMIN — AMLODIPINE BESYLATE 5 MG: 5 TABLET ORAL at 12:31

## 2020-07-04 RX ADMIN — DOCUSATE SODIUM 50 MG AND SENNOSIDES 8.6 MG 2 TABLET: 8.6; 5 TABLET, FILM COATED ORAL at 17:53

## 2020-07-04 RX ADMIN — ATORVASTATIN CALCIUM 40 MG: 20 TABLET, FILM COATED ORAL at 20:42

## 2020-07-04 RX ADMIN — OMEPRAZOLE 20 MG: 20 CAPSULE, DELAYED RELEASE ORAL at 17:53

## 2020-07-04 ASSESSMENT — ENCOUNTER SYMPTOMS
HEARTBURN: 0
PALPITATIONS: 0
DIAPHORESIS: 0
FLANK PAIN: 0
POLYDIPSIA: 0
HEMOPTYSIS: 0
MYALGIAS: 1
DIARRHEA: 1
DIZZINESS: 1
ABDOMINAL PAIN: 0
COUGH: 1
SPEECH CHANGE: 0
SHORTNESS OF BREATH: 1
PHOTOPHOBIA: 0
SORE THROAT: 1
DOUBLE VISION: 0
HEADACHES: 0
VOMITING: 0
HEADACHES: 1
WEIGHT LOSS: 0
HALLUCINATIONS: 0
NAUSEA: 1
CHILLS: 0
NERVOUS/ANXIOUS: 0
ORTHOPNEA: 0
NAUSEA: 0
BLURRED VISION: 0
WHEEZING: 0
BRUISES/BLEEDS EASILY: 0
TREMORS: 0
FEVER: 0
FOCAL WEAKNESS: 0
SPUTUM PRODUCTION: 0
NECK PAIN: 0
BACK PAIN: 1

## 2020-07-04 ASSESSMENT — LIFESTYLE VARIABLES
TOTAL SCORE: 0
ON A TYPICAL DAY WHEN YOU DRINK ALCOHOL HOW MANY DRINKS DO YOU HAVE: 0
AVERAGE NUMBER OF DAYS PER WEEK YOU HAVE A DRINK CONTAINING ALCOHOL: 0
EVER_SMOKED: NEVER
EVER_SMOKED: NEVER
EVER HAD A DRINK FIRST THING IN THE MORNING TO STEADY YOUR NERVES TO GET RID OF A HANGOVER: NO
HOW MANY TIMES IN THE PAST YEAR HAVE YOU HAD 5 OR MORE DRINKS IN A DAY: 0
ALCOHOL_USE: NO
CONSUMPTION TOTAL: NEGATIVE
TOTAL SCORE: 0
HAVE PEOPLE ANNOYED YOU BY CRITICIZING YOUR DRINKING: NO
HAVE YOU EVER FELT YOU SHOULD CUT DOWN ON YOUR DRINKING: NO
EVER FELT BAD OR GUILTY ABOUT YOUR DRINKING: NO
TOTAL SCORE: 0
SUBSTANCE_ABUSE: 0

## 2020-07-04 ASSESSMENT — PATIENT HEALTH QUESTIONNAIRE - PHQ9
SUM OF ALL RESPONSES TO PHQ9 QUESTIONS 1 AND 2: 0
2. FEELING DOWN, DEPRESSED, IRRITABLE, OR HOPELESS: NOT AT ALL
1. LITTLE INTEREST OR PLEASURE IN DOING THINGS: NOT AT ALL

## 2020-07-04 ASSESSMENT — COPD QUESTIONNAIRES
DURING THE PAST 4 WEEKS HOW MUCH DID YOU FEEL SHORT OF BREATH: NONE/LITTLE OF THE TIME
DO YOU EVER COUGH UP ANY MUCUS OR PHLEGM?: YES, A FEW DAYS A WEEK OR MONTH
HAVE YOU SMOKED AT LEAST 100 CIGARETTES IN YOUR ENTIRE LIFE: NO/DON'T KNOW
COPD SCREENING SCORE: 4

## 2020-07-04 ASSESSMENT — FIBROSIS 4 INDEX
FIB4 SCORE: 1.34
FIB4 SCORE: 1.45

## 2020-07-04 NOTE — ED PROVIDER NOTES
ED Provider Note    ED Provider Note    Primary care provider: Aly Renae M.D.  Means of arrival: POV  History obtained from: patient  History limited by: None    CHIEF COMPLAINT  Chief Complaint   Patient presents with   • Cough     since this AM   • Chest Pain     since yesterday    • Dizziness       HPI  Fe Clark is a 69 y.o. female who presents to the Emergency Department with a chief complaint of feeling lightheaded and dizzy, occasionally near syncopal.  She is complaining of shortness of breath and cough and chest pain.  She states that she has had chest pain for a few days.  She has mostly been staying indoors but yesterday, she was feeling well she states that she decided to go to the [employer] store.  When asked further, she does state that she had chest pain when she was at the [employer] store.  She was feeling weak while she was out so she came back home and went back to bed.  She denies having a fever.  She does have a history of asthma and emphysema.  She used her [place], prior to arrival and does report some improvement.  Denies nausea or vomiting.  He does have some radiation of her chest pain which she describes into her left chest, just to the left of her sternal border, radiating into her left arm.  Nothing seems to make it better or worse.  NO Known exposures to COVID.  She does complain of a sore throat.  Patient has a history of lower extremity edema which she states is chronic and unchanged.  She previously had been on diuretics but her doctor told her to stop and she did as instructed.    REVIEW OF SYSTEMS  Review of Systems   Constitutional: Positive for malaise/fatigue. Negative for diaphoresis and fever.   HENT: Positive for sore throat. Negative for congestion.    Respiratory: Positive for cough and shortness of breath.    Cardiovascular: Positive for chest pain and leg swelling.   Gastrointestinal: Negative for abdominal pain, nausea and vomiting.   Genitourinary:  Negative for dysuria.   Neurological: Positive for dizziness. Negative for headaches.   All other systems reviewed and are negative.      PAST MEDICAL HISTORY   has a past medical history of ASTHMA, Diverticulosis, Duodenal ulcer, unspecified as acute or chronic, without hemorrhage, perforation, or obstruction, GERD (gastroesophageal reflux disease), Hiatal hernia, High cholesterol, Hypertension, and Psychiatric problem.    SURGICAL HISTORY   has a past surgical history that includes bladder suspension; bowel resection; colonoscopy; hysterectomy radical; other orthopedic surgery; ankle arthroscopy (7/31/2013); hardware removal ortho (7/31/2013); lacrimal duct probe (3/11/2015); submandible abscess incision and drainage (11/19/2016); and dental extraction(s) (11/19/2016).    SOCIAL HISTORY  Social History     Tobacco Use   • Smoking status: Never Smoker   • Smokeless tobacco: Never Used   Substance Use Topics   • Alcohol use: No     Comment: recovering alcoholic   • Drug use: Yes     Comment: jo-ann      Social History     Substance and Sexual Activity   Drug Use Yes    Comment: jo-ann       FAMILY HISTORY  Family History   Problem Relation Age of Onset   • No Known Problems Mother         2017 is age 91   • Other Father 57        unknown issue       CURRENT MEDICATIONS  Home Medications     Reviewed by Elise Rao R.N. (Registered Nurse) on 07/04/20 at 0728  Med List Status: <None>   Medication Last Dose Status   alprazolam (XANAX) 0.25 MG Tab  Active   amLODIPine (NORVASC) 5 MG Tab  Active   aspirin EC (ECOTRIN) 81 MG Tablet Delayed Response  Active   atorvastatin (LIPITOR) 40 MG Tab  Active   benzonatate (TESSALON) 100 MG Cap  Active   cyclobenzaprine (FLEXERIL) 5 MG tablet  Active   doxycycline (VIBRAMYCIN) 100 MG Tab  Active   DULoxetine (CYMBALTA) 30 MG Cap DR Particles  Active   furosemide (LASIX) 40 MG Tab  Active   gabapentin (NEURONTIN) 300 MG Cap  Active   lisinopril-hydrochlorothiazide  "(PRINZIDE, ZESTORETIC) 20-25 MG per tablet  Active   OLANZapine (ZYPREXA) 5 MG Tab  Active   omeprazole (PRILOSEC) 20 MG delayed-release capsule  Active   potassium chloride ER (KLOR-CON) 10 MEQ tablet  Active                ALLERGIES  Allergies   Allergen Reactions   • Nkda [No Known Drug Allergy]        PHYSICAL EXAM  VITAL SIGNS: BP (!) 199/90 Comment: RN notified  Pulse (!) 57   Temp 36.2 °C (97.1 °F) (Temporal)   Resp (!) 22   Ht 1.651 m (5' 5\")   Wt 93.5 kg (206 lb 2.1 oz)   SpO2 95%   BMI 34.30 kg/m²   Vitals reviewed.  Constitutional: Patient is oriented to person, place, and time. Appears well-developed and well-nourished.  Mild distress.    Head: Normocephalic and atraumatic.   Ears: Normal external ears bilaterally.   Mouth/Throat: Oropharynx is clear and moist, no exudates.   Eyes: Conjunctivae are normal. Pupils are equal, round, and reactive to light.   Neck: Normal range of motion. Neck supple.  Cardiovascular: Normal rate, regular rhythm and normal heart sounds. Normal peripheral pulses.  Pulmonary/Chest: Effort normal and breath sounds normal. No respiratory distress, no wheezes, rhonchi, or rales. left lower parasternal  chest wall tenderness.  Abdominal: Soft. Bowel sounds are normal. There is no tenderness. No rebound or guarding, or peritoneal signs.   Musculoskeletal: bilateral LE pitting edema and no tenderness.   Neurological: No focal deficits.   Skin: Skin is warm and dry. No erythema. No pallor.   Psychiatric: Patient has a normal mood and affect.     LABS  Results for orders placed or performed during the hospital encounter of 07/04/20   CBC with Differential   Result Value Ref Range    WBC 8.4 4.8 - 10.8 K/uL    RBC 4.60 4.20 - 5.40 M/uL    Hemoglobin 14.6 12.0 - 16.0 g/dL    Hematocrit 45.2 37.0 - 47.0 %    MCV 98.3 (H) 81.4 - 97.8 fL    MCH 31.7 27.0 - 33.0 pg    MCHC 32.3 (L) 33.6 - 35.0 g/dL    RDW 46.4 35.9 - 50.0 fL    Platelet Count 317 164 - 446 K/uL    MPV 9.2 9.0 - 12.9 " fL    Neutrophils-Polys 45.80 44.00 - 72.00 %    Lymphocytes 39.20 22.00 - 41.00 %    Monocytes 8.80 0.00 - 13.40 %    Eosinophils 5.10 0.00 - 6.90 %    Basophils 0.70 0.00 - 1.80 %    Immature Granulocytes 0.40 0.00 - 0.90 %    Nucleated RBC 0.00 /100 WBC    Neutrophils (Absolute) 3.87 2.00 - 7.15 K/uL    Lymphs (Absolute) 3.31 1.00 - 4.80 K/uL    Monos (Absolute) 0.74 0.00 - 0.85 K/uL    Eos (Absolute) 0.43 0.00 - 0.51 K/uL    Baso (Absolute) 0.06 0.00 - 0.12 K/uL    Immature Granulocytes (abs) 0.03 0.00 - 0.11 K/uL    NRBC (Absolute) 0.00 K/uL   Complete Metabolic Panel (CMP)   Result Value Ref Range    Sodium 133 (L) 135 - 145 mmol/L    Potassium 3.8 3.6 - 5.5 mmol/L    Chloride 104 96 - 112 mmol/L    Co2 19 (L) 20 - 33 mmol/L    Anion Gap 10.0 7.0 - 16.0    Glucose 122 (H) 65 - 99 mg/dL    Bun 16 8 - 22 mg/dL    Creatinine 0.75 0.50 - 1.40 mg/dL    Calcium 9.5 8.5 - 10.5 mg/dL    AST(SGOT) 42 12 - 45 U/L    ALT(SGPT) 48 2 - 50 U/L    Alkaline Phosphatase 140 (H) 30 - 99 U/L    Total Bilirubin 0.6 0.1 - 1.5 mg/dL    Albumin 4.2 3.2 - 4.9 g/dL    Total Protein 6.9 6.0 - 8.2 g/dL    Globulin 2.7 1.9 - 3.5 g/dL    A-G Ratio 1.6 g/dL   Troponin   Result Value Ref Range    Troponin T 15 6 - 19 ng/L   URINALYSIS,CULTURE IF INDICATED    Specimen: Urine, Clean Catch   Result Value Ref Range    Color Yellow     Character Clear     Specific Gravity 1.015 <1.035    Ph 6.5 5.0 - 8.0    Glucose Negative Negative mg/dL    Ketones Negative Negative mg/dL    Protein Negative Negative mg/dL    Bilirubin Negative Negative    Urobilinogen, Urine 0.2 Negative    Nitrite Negative Negative    Leukocyte Esterase Negative Negative    Occult Blood Trace (A) Negative    Micro Urine Req Microscopic    ESTIMATED GFR   Result Value Ref Range    GFR If African American >60 >60 mL/min/1.73 m 2    GFR If Non African American >60 >60 mL/min/1.73 m 2   COVID/SARS CoV-2 PCR    Specimen: Nasopharyngeal; Respirate   Result Value Ref Range    COVID  Order Status Received    URINE MICROSCOPIC (W/UA)   Result Value Ref Range    WBC 2-5 /hpf    RBC 2-5 (A) /hpf    Bacteria Negative None /hpf    Epithelial Cells Negative /hpf    Hyaline Cast 0-2 /lpf   SARS-CoV-2, PCR (In-House)   Result Value Ref Range    SARS-CoV-2 Source NP Swab     SARS-CoV-2 by PCR NotDetected    Blood Culture,Hold   Result Value Ref Range    Blood Culture Hold Collected    EKG   Result Value Ref Range    Report       [facility] Emergency Dept.    Test Date:  2020  Pt Name:    DOROTA AGUIRRE             Department: ER  MRN:        [id number]                      Room:       [facility]  Gender:     Female                       Technician: [id number]  :        [id number]                   Requested By:ER TRIAGE PROTOCOL  Order #:    [id number]                    Reading MD:    Measurements  Intervals                                Axis  Rate:       69                           P:          0  MN:         130                          QRS:        -22  QRSD:       90                           T:          31  QT:         432  QTc:        463    Interpretive Statements  SINUS RHYTHM  LEFT VENTRICULAR HYPERTROPHY  Compared to ECG 06/15/2018 10:57:53  No significant changes         All labs reviewed by me.    EKG Interpretation  Interpreted by me    Rhythm: normal sinus   Rate: 69  Axis: normal  Ectopy: none  Conduction: normal  ST Segments: no acute change  T Waves: no acute change  Q Waves: none    Clinical Impression: Comparison made to prior EKG from 2018.  No significant changes noted.  Previously noted LVH which is persistent.  Sinus rhythm today no acute ST segment elevation.  No acute changes and normal EKG    RADIOLOGY  DX-CHEST-PORTABLE (1 VIEW)   Final Result      No acute cardiac or pulmonary abnormality is noted.      NM-CARDIAC STRESS TEST    (Results Pending)   EC-ECHOCARDIOGRAM LTD W/O CONT    (Results Pending)   US-RUQ    (Results Pending)     The radiologist's  interpretation of all radiological studies have been reviewed by me.    COURSE & MEDICAL DECISION MAKING  Pertinent Labs & Imaging studies reviewed. (See chart for details)    Obtained and reviewed past medical records.  Since last encounter is from June 23 of this year he was seen for bilateral lower extremity edema.    7:57 AM - Patient seen and examined at bedside.  Is a 69-year-old female with a history of asthma and emphysema who presents with cough and as of breath left-sided chest pain.  She reports the symptoms for the last few days.  No fever.  Reports chronic, unchanged bilateral lower extremity edema.  She is experiencing chest pain which she describes as heaviness with radiation to her left arm.  EKG is reassuring and unchanged from prior.  And IVs been established, labs drawn per nursing protocols including CBC, chemistry, troponin.     The differential diagnoses include but are not limited to: ACS, heart failure, pneumonia    1030AM patient's reevaluated bedside.  She reports still having heaviness in her chest.  Troponin was negative, labs overall unrevealing.  White blood cell count is normal.  She is not anemic.  There is no neutrophilic predominance.  Glucose slightly elevated 122.  Sodium low 123.  Troponin was normal at 15.  I recommended admission to the hospital for their evaluation of her ongoing symptoms.  Patient is agreeable.    1048AM D/W Dr. Brooks, hospitalist, who agrees to admit this patient for further evaluation of her chest pain symptoms.    Patient is admitted in stable condition.    FINAL IMPRESSION  1. Chest pain, unspecified type    2. Generalized weakness

## 2020-07-04 NOTE — ASSESSMENT & PLAN NOTE
With cough.  This did negative for COVID-19, low risk  Chest x-ray without acute abnormalities  Plan: D-dimer, transthoracic echo, RT protocol, stress test pulse oximetry

## 2020-07-04 NOTE — ASSESSMENT & PLAN NOTE
Chest Pain ruleout  - Troponin, chest x-ray and EKG were unremarkable in the ER.  Check d-dimer, transthoracic echo, lipase, right upper quadrant ultrasound  - The patient will be monitored on telemetry and troponins will be trended. If these are negative, then a stress test will be performed  - lipid panel will be checked.   - Aspirin

## 2020-07-04 NOTE — PROGRESS NOTES
Triage officer note /transfer note     Room G24    D/W Dr Shea     CC: CP     ED course: trop (-)     Need to do: CP rule out     Admit to     Obstelemetry admission with cardiac monitoring.      Admitting hospitalist is Dr Harrington

## 2020-07-04 NOTE — ED NOTES
Pt brought back to room via w/c. Mask applied to pt from triage and droplet precautions sign placed on doorway.

## 2020-07-04 NOTE — ED TRIAGE NOTES
Pt arrived from triage via w/c c/o cough, chest pain, and dizziness. Pt states she has not been feeling well x 1 week. Pt denies being around anyone with covid. Pt A&O x 4 and able to talk in full sentences.

## 2020-07-04 NOTE — PROGRESS NOTES
Stress test got cancel. Patient got Morphine dose and contraindicated per Nuc Med. First in line tomorrow for stress test.

## 2020-07-04 NOTE — H&P
Hospital Medicine History & Physical Note    Date of Service  7/4/2020    Primary Care Physician  Aly Renae M.D.    Code Status  Full Code    Chief Complaint  Chief Complaint   Patient presents with   • Cough     since this AM   • Chest Pain     since yesterday    • Dizziness       History of Presenting Illness  69 y.o. female with past medical history of asthma, GERD, hypertension, dyslipidemia, schizophrenia, who presented 7/4/2020 with complaints of chest pain, shortness of breath, cough.  Patient states she feels ill for the last 3 days.  She denies sick contact or recent travel.  Chest pain is left-sided, worsening with inspiration, sharp, radiating to the left side, up to 6 out of 10.      Review of Systems  Review of Systems   Constitutional: Positive for malaise/fatigue. Negative for chills, fever and weight loss.   HENT: Negative for ear pain, hearing loss and tinnitus.    Eyes: Negative for blurred vision, double vision and photophobia.   Respiratory: Positive for cough and shortness of breath. Negative for hemoptysis, sputum production and wheezing.    Cardiovascular: Positive for chest pain and leg swelling. Negative for palpitations and orthopnea.   Gastrointestinal: Positive for diarrhea and nausea. Negative for abdominal pain, heartburn and vomiting.   Genitourinary: Negative for dysuria, flank pain, frequency and hematuria.   Musculoskeletal: Positive for back pain, joint pain and myalgias. Negative for neck pain.   Skin: Negative for itching and rash.   Neurological: Positive for dizziness and headaches. Negative for tremors, speech change and focal weakness.   Endo/Heme/Allergies: Negative for environmental allergies and polydipsia. Does not bruise/bleed easily.   Psychiatric/Behavioral: Negative for hallucinations and substance abuse. The patient is not nervous/anxious.        Past Medical History   has a past medical history of ASTHMA, Diverticulosis, Duodenal ulcer, unspecified as  acute or chronic, without hemorrhage, perforation, or obstruction, GERD (gastroesophageal reflux disease), Hiatal hernia, High cholesterol, Hypertension, and Psychiatric problem.    Surgical History   has a past surgical history that includes bladder suspension; bowel resection; colonoscopy; hysterectomy radical; other orthopedic surgery; ankle arthroscopy (7/31/2013); hardware removal ortho (7/31/2013); lacrimal duct probe (3/11/2015); submandible abscess incision and drainage (11/19/2016); and dental extraction(s) (11/19/2016).     Family History  family history includes No Known Problems in her mother; Other (age of onset: 57) in her father.     Social History   reports that she has never smoked. She has never used smokeless tobacco. She reports current drug use. She reports that she does not drink alcohol.    Allergies  Allergies   Allergen Reactions   • Nkda [No Known Drug Allergy]        Medications  Prior to Admission Medications   Prescriptions Last Dose Informant Patient Reported? Taking?   DULoxetine (CYMBALTA) 30 MG Cap DR Particles  Patient Yes No   Sig: Take 30 mg by mouth every day.   OLANZapine (ZYPREXA) 5 MG Tab   Yes No   Sig: TAKE 1 TABLET BY MOUTH ONCE DAILY FOR 90 DAYS   alprazolam (XANAX) 0.25 MG Tab  Patient Yes No   Sig: Take 0.25 mg by mouth 2 times a day as needed for Anxiety (panic attacks).   amLODIPine (NORVASC) 5 MG Tab   No No   Sig: Take 1 Tab by mouth every day.   aspirin EC (ECOTRIN) 81 MG Tablet Delayed Response   No No   Sig: Take 1 Tab by mouth every day.   atorvastatin (LIPITOR) 40 MG Tab  Patient Yes No   Sig: Take 40 mg by mouth every evening.   benzonatate (TESSALON) 100 MG Cap   No No   Sig: Take 1 Cap by mouth 3 times a day as needed for Cough.   cyclobenzaprine (FLEXERIL) 5 MG tablet   No No   Sig: Take 1-2 Tabs by mouth 3 times a day as needed.   Patient not taking: Reported on 3/2/2020   doxycycline (VIBRAMYCIN) 100 MG Tab   No No   Sig: Take 1 Tab by mouth 2 times a day.    furosemide (LASIX) 40 MG Tab   Yes No   Sig: TAKE 1 TABLET BY MOUTH ONCE DAILY FOR 30 DAYS   gabapentin (NEURONTIN) 300 MG Cap  Patient Yes No   Sig: Take 300 mg by mouth 2 Times a Day.   lisinopril-hydrochlorothiazide (PRINZIDE, ZESTORETIC) 20-25 MG per tablet  Patient Yes No   Sig: Take 1 Tab by mouth every day.   omeprazole (PRILOSEC) 20 MG delayed-release capsule   Yes No   Sig: TAKE 1 CAPSULE BY MOUTH TWICE DAILY FOR 90 DAYS   potassium chloride ER (KLOR-CON) 10 MEQ tablet   Yes No   Sig: TAKE 1 TABLET BY MOUTH TWICE DAILY FOR 30 DAYS      Facility-Administered Medications: None       Physical Exam  Temp:  [36.9 °C (98.5 °F)] 36.9 °C (98.5 °F)  Pulse:  [58-75] 58  Resp:  [14-20] 17  BP: (176-198)/(81-89) 198/89  SpO2:  [95 %-98 %] 95 %    Physical Exam  Vitals signs and nursing note reviewed.   Constitutional:       General: She is not in acute distress.     Appearance: Normal appearance. She is ill-appearing.   HENT:      Head: Normocephalic and atraumatic.      Nose: Nose normal.      Mouth/Throat:      Mouth: Mucous membranes are moist.   Eyes:      Extraocular Movements: Extraocular movements intact.      Pupils: Pupils are equal, round, and reactive to light.   Neck:      Musculoskeletal: Normal range of motion and neck supple.   Cardiovascular:      Rate and Rhythm: Normal rate and regular rhythm.      Heart sounds: Heart sounds are distant.   Pulmonary:      Effort: Pulmonary effort is normal.      Breath sounds: Normal breath sounds.   Chest:      Chest wall: Tenderness present.   Abdominal:      General: Abdomen is flat. There is no distension.      Tenderness: There is no abdominal tenderness.   Musculoskeletal: Normal range of motion.         General: No swelling or deformity.      Right lower leg: Edema present.      Left lower leg: Edema present.   Skin:     General: Skin is warm and dry.   Neurological:      General: No focal deficit present.      Mental Status: She is alert and oriented to  person, place, and time.   Psychiatric:         Mood and Affect: Mood normal.         Behavior: Behavior normal.         Laboratory:  Recent Labs     07/04/20  0728   WBC 8.4   RBC 4.60   HEMOGLOBIN 14.6   HEMATOCRIT 45.2   MCV 98.3*   MCH 31.7   MCHC 32.3*   RDW 46.4   PLATELETCT 317   MPV 9.2     Recent Labs     07/04/20  0728   SODIUM 133*   POTASSIUM 3.8   CHLORIDE 104   CO2 19*   GLUCOSE 122*   BUN 16   CREATININE 0.75   CALCIUM 9.5     Recent Labs     07/04/20  0728   ALTSGPT 48   ASTSGOT 42   ALKPHOSPHAT 140*   TBILIRUBIN 0.6   GLUCOSE 122*         No results for input(s): NTPROBNP in the last 72 hours.      Recent Labs     07/04/20  0728   TROPONINT 15       Imaging:  DX-CHEST-PORTABLE (1 VIEW)   Final Result      No acute cardiac or pulmonary abnormality is noted.      NM-CARDIAC STRESS TEST    (Results Pending)   EC-ECHOCARDIOGRAM LTD W/O CONT    (Results Pending)         Assessment/Plan:      Atypical chest pain- (present on admission)  Assessment & Plan  Chest Pain ruleout  - Troponin, chest x-ray and EKG were unremarkable in the ER.  Check d-dimer, transthoracic echo, lipase, right upper quadrant ultrasound  - The patient will be monitored on telemetry and troponins will be trended. If these are negative, then a stress test will be performed  - lipid panel will be checked.   - Aspirin      Shortness of breath  Assessment & Plan  With cough.  This did negative for COVID-19, low risk  Chest x-ray without acute abnormalities  Plan: D-dimer, transthoracic echo, RT protocol, stress test pulse oximetry    Lower extremity edema  Assessment & Plan  Bilateral.  Recent ultrasound DVT study negative  Ordered echo    HTN (hypertension)- (present on admission)  Assessment & Plan  Uncontrolled.  Resume her blood pressure medications  IV hydralazine and labetalol as needed    GERD (gastroesophageal reflux disease)- (present on admission)  Assessment & Plan  Uncontrolled  Continue PPI, sucralfate, clear liquid diet

## 2020-07-04 NOTE — ED NOTES
Pt resting in stretcher with monitor and call bell in reach. Pt has no further requests at this time. Pt awaiting to be seen by ERP.

## 2020-07-05 ENCOUNTER — APPOINTMENT (OUTPATIENT)
Dept: RADIOLOGY | Facility: MEDICAL CENTER | Age: 70
End: 2020-07-05
Attending: INTERNAL MEDICINE
Payer: MEDICARE

## 2020-07-05 VITALS
SYSTOLIC BLOOD PRESSURE: 153 MMHG | BODY MASS INDEX: 34.34 KG/M2 | DIASTOLIC BLOOD PRESSURE: 68 MMHG | TEMPERATURE: 97.6 F | OXYGEN SATURATION: 94 % | WEIGHT: 206.13 LBS | RESPIRATION RATE: 18 BRPM | HEIGHT: 65 IN | HEART RATE: 76 BPM

## 2020-07-05 LAB
ALBUMIN SERPL BCP-MCNC: 3.9 G/DL (ref 3.2–4.9)
ALBUMIN/GLOB SERPL: 1.7 G/DL
ALP SERPL-CCNC: 121 U/L (ref 30–99)
ALT SERPL-CCNC: 45 U/L (ref 2–50)
ANION GAP SERPL CALC-SCNC: 13 MMOL/L (ref 7–16)
AST SERPL-CCNC: 34 U/L (ref 12–45)
BASOPHILS # BLD AUTO: 0.2 % (ref 0–1.8)
BASOPHILS # BLD: 0.02 K/UL (ref 0–0.12)
BILIRUB SERPL-MCNC: 0.4 MG/DL (ref 0.1–1.5)
BUN SERPL-MCNC: 11 MG/DL (ref 8–22)
CALCIUM SERPL-MCNC: 9.3 MG/DL (ref 8.5–10.5)
CHLORIDE SERPL-SCNC: 105 MMOL/L (ref 96–112)
CO2 SERPL-SCNC: 18 MMOL/L (ref 20–33)
CREAT SERPL-MCNC: 0.63 MG/DL (ref 0.5–1.4)
EKG IMPRESSION: NORMAL
EOSINOPHIL # BLD AUTO: 0 K/UL (ref 0–0.51)
EOSINOPHIL NFR BLD: 0 % (ref 0–6.9)
ERYTHROCYTE [DISTWIDTH] IN BLOOD BY AUTOMATED COUNT: 45.1 FL (ref 35.9–50)
GLOBULIN SER CALC-MCNC: 2.3 G/DL (ref 1.9–3.5)
GLUCOSE SERPL-MCNC: 145 MG/DL (ref 65–99)
HCT VFR BLD AUTO: 43.9 % (ref 37–47)
HGB BLD-MCNC: 14.4 G/DL (ref 12–16)
IMM GRANULOCYTES # BLD AUTO: 0.07 K/UL (ref 0–0.11)
IMM GRANULOCYTES NFR BLD AUTO: 0.8 % (ref 0–0.9)
LYMPHOCYTES # BLD AUTO: 1.02 K/UL (ref 1–4.8)
LYMPHOCYTES NFR BLD: 11.3 % (ref 22–41)
MCH RBC QN AUTO: 31.5 PG (ref 27–33)
MCHC RBC AUTO-ENTMCNC: 32.8 G/DL (ref 33.6–35)
MCV RBC AUTO: 96.1 FL (ref 81.4–97.8)
MONOCYTES # BLD AUTO: 0.19 K/UL (ref 0–0.85)
MONOCYTES NFR BLD AUTO: 2.1 % (ref 0–13.4)
NEUTROPHILS # BLD AUTO: 7.69 K/UL (ref 2–7.15)
NEUTROPHILS NFR BLD: 85.6 % (ref 44–72)
NRBC # BLD AUTO: 0 K/UL
NRBC BLD-RTO: 0 /100 WBC
PLATELET # BLD AUTO: 308 K/UL (ref 164–446)
PMV BLD AUTO: 9.1 FL (ref 9–12.9)
POTASSIUM SERPL-SCNC: 3.9 MMOL/L (ref 3.6–5.5)
PROT SERPL-MCNC: 6.2 G/DL (ref 6–8.2)
RBC # BLD AUTO: 4.57 M/UL (ref 4.2–5.4)
SODIUM SERPL-SCNC: 136 MMOL/L (ref 135–145)
WBC # BLD AUTO: 9 K/UL (ref 4.8–10.8)

## 2020-07-05 PROCEDURE — A9502 TC99M TETROFOSMIN: HCPCS

## 2020-07-05 PROCEDURE — 36415 COLL VENOUS BLD VENIPUNCTURE: CPT

## 2020-07-05 PROCEDURE — 99217 PR OBSERVATION CARE DISCHARGE: CPT | Performed by: INTERNAL MEDICINE

## 2020-07-05 PROCEDURE — 80053 COMPREHEN METABOLIC PANEL: CPT

## 2020-07-05 PROCEDURE — 700102 HCHG RX REV CODE 250 W/ 637 OVERRIDE(OP): Performed by: INTERNAL MEDICINE

## 2020-07-05 PROCEDURE — 93970 EXTREMITY STUDY: CPT

## 2020-07-05 PROCEDURE — 85025 COMPLETE CBC W/AUTO DIFF WBC: CPT

## 2020-07-05 PROCEDURE — 93970 EXTREMITY STUDY: CPT | Mod: 26 | Performed by: INTERNAL MEDICINE

## 2020-07-05 PROCEDURE — 96372 THER/PROPH/DIAG INJ SC/IM: CPT | Mod: XU

## 2020-07-05 PROCEDURE — G0378 HOSPITAL OBSERVATION PER HR: HCPCS

## 2020-07-05 PROCEDURE — 93010 ELECTROCARDIOGRAM REPORT: CPT | Performed by: INTERNAL MEDICINE

## 2020-07-05 PROCEDURE — 700111 HCHG RX REV CODE 636 W/ 250 OVERRIDE (IP)

## 2020-07-05 PROCEDURE — 700111 HCHG RX REV CODE 636 W/ 250 OVERRIDE (IP): Performed by: INTERNAL MEDICINE

## 2020-07-05 PROCEDURE — A9270 NON-COVERED ITEM OR SERVICE: HCPCS | Performed by: INTERNAL MEDICINE

## 2020-07-05 RX ORDER — AMLODIPINE BESYLATE 5 MG/1
10 TABLET ORAL DAILY
Qty: 30 TAB | Refills: 0 | Status: SHIPPED | OUTPATIENT
Start: 2020-07-05 | End: 2022-10-21 | Stop reason: SDUPTHER

## 2020-07-05 RX ORDER — ALBUTEROL SULFATE 90 UG/1
2 AEROSOL, METERED RESPIRATORY (INHALATION) EVERY 6 HOURS PRN
Qty: 8.5 G | Refills: 0 | Status: SHIPPED | OUTPATIENT
Start: 2020-07-05 | End: 2022-11-16

## 2020-07-05 RX ORDER — SUCRALFATE 1 G/1
1 TABLET ORAL
Qty: 30 TAB | Refills: 3 | Status: SHIPPED | OUTPATIENT
Start: 2020-07-05 | End: 2022-10-21

## 2020-07-05 RX ORDER — REGADENOSON 0.08 MG/ML
INJECTION, SOLUTION INTRAVENOUS
Status: COMPLETED
Start: 2020-07-05 | End: 2020-07-05

## 2020-07-05 RX ADMIN — SUCRALFATE 1 G: 1 TABLET ORAL at 06:16

## 2020-07-05 RX ADMIN — SUCRALFATE 1 G: 1 TABLET ORAL at 00:11

## 2020-07-05 RX ADMIN — ENOXAPARIN SODIUM 40 MG: 40 INJECTION SUBCUTANEOUS at 06:16

## 2020-07-05 RX ADMIN — REGADENOSON 0.4 MG: 0.08 INJECTION, SOLUTION INTRAVENOUS at 08:41

## 2020-07-05 RX ADMIN — OMEPRAZOLE 20 MG: 20 CAPSULE, DELAYED RELEASE ORAL at 06:15

## 2020-07-05 RX ADMIN — ASPIRIN 325 MG: 325 TABLET, FILM COATED ORAL at 06:15

## 2020-07-05 RX ADMIN — GABAPENTIN 300 MG: 300 CAPSULE ORAL at 06:15

## 2020-07-05 RX ADMIN — AMLODIPINE BESYLATE 5 MG: 5 TABLET ORAL at 06:15

## 2020-07-05 RX ADMIN — DOCUSATE SODIUM 50 MG AND SENNOSIDES 8.6 MG 2 TABLET: 8.6; 5 TABLET, FILM COATED ORAL at 06:16

## 2020-07-05 NOTE — DISCHARGE INSTRUCTIONS
Discharge Instructions    Discharged to home by car with relative. Discharged via wheelchair, hospital escort: Yes.  Special equipment needed: Not Applicable    Be sure to schedule a follow-up appointment with your primary care doctor or any specialists as instructed.     Discharge Plan:   Diet Plan: Discussed  Activity Level: Discussed  Confirmed Follow up Appointment: Patient to Call and Schedule Appointment  Confirmed Symptoms Management: Discussed  Medication Reconciliation Updated: Yes    I understand that a diet low in cholesterol, fat, and sodium is recommended for good health. Unless I have been given specific instructions below for another diet, I accept this instruction as my diet prescription.   Other diet: Regular Diet    Special Instructions: None    · Is patient discharged on Warfarin / Coumadin?   No     Depression / Suicide Risk    As you are discharged from this [facility] facility, it is important to learn how to keep safe from harming yourself.    Recognize the warning signs:  · Abrupt changes in personality, positive or negative- including increase in energy   · Giving away possessions  · Change in eating patterns- significant weight changes-  positive or negative  · Change in sleeping patterns- unable to sleep or sleeping all the time   · Unwillingness or inability to communicate  · Depression  · Unusual sadness, discouragement and loneliness  · Talk of wanting to die  · Neglect of personal appearance   · Rebelliousness- reckless behavior  · Withdrawal from people/activities they love  · Confusion- inability to concentrate     If you or a loved one observes any of these behaviors or has concerns about self-harm, here's what you can do:  · Talk about it- your feelings and reasons for harming yourself  · Remove any means that you might use to hurt yourself (examples: pills, rope, extension cords, firearm)  · Get professional help from the community (Mental Health, Substance Abuse, psychological  counseling)  · Do not be alone:Call your Safe Contact- someone whom you trust who will be there for you.  · Call your local CRISIS HOTLINE 267-2734 or 177-289-2249  · Call your local Children's Mobile Crisis Response Team [place] (058) 740-4065 or www.[employer]  · Call the toll free National Suicide Prevention Hotlines   · National Suicide Prevention Lifeline 135-645-JPUA (4407)  · National Hope Line Network 800-SUICIDE (743-5960)

## 2020-07-05 NOTE — DISCHARGE SUMMARY
Discharge Summary    CHIEF COMPLAINT ON ADMISSION  Chief Complaint   Patient presents with   • Cough     since this AM   • Chest Pain     since yesterday    • Dizziness       Reason for Admission  OTHER     Admission Date  7/4/2020    CODE STATUS  Full Code    HPI & HOSPITAL COURSE  69 y.o. female with past medical history of asthma, GERD, hypertension, dyslipidemia, schizophrenia, who presented 7/4/2020 with complaints of chest pain, shortness of breath, cough, lower extremity edema.  Patient states she feels ill for the last 3 days.  She denies sick contact or recent travel.  Chest pain is left-sided, worsening with inspiration, sharp, radiating to the left side, up to 6 out of 10.  Patient was evaluated with transthoracic echo and stress test, both of which were normal.  Patient had repeat lower extremity ultrasound, which did not reveal DVT.  Patient was treated for uncontrolled hypertension with improvement of her blood pressure control.  She received PPI and sucralfate for GERD.  Right upper quadrant ultrasound revealed fatty liver infiltration.  Recommended weight loss, diet modifications and avoiding alcohol.    Therefore, she is discharged in good and stable condition to home with close outpatient follow-up.        Discharge Date  7/5/2020    FOLLOW UP ITEMS POST DISCHARGE  PCP    DISCHARGE DIAGNOSES  Active Problems:    Atypical chest pain POA: Yes    Lower extremity edema POA: Unknown    Shortness of breath POA: Unknown    GERD (gastroesophageal reflux disease) POA: Yes    HTN (hypertension) POA: Yes  Resolved Problems:    * No resolved hospital problems. *      FOLLOW UP  No future appointments.  Aly Renae M.D.  [address] [facility] [id number]  [phone]    In 2 weeks        MEDICATIONS ON DISCHARGE     Medication List      START taking these medications      Instructions   albuterol 108 (90 Base) MCG/ACT Aers inhalation aerosol   Inhale 2 Puffs by mouth every 6 hours as needed  for Shortness of Breath.  Dose:  2 Puff     sucralfate 1 GM Tabs  Commonly known as:  CARAFATE   Take 1 Tab by mouth 4 Times a Day,Before Meals and at Bedtime.  Dose:  1 g        CHANGE how you take these medications      Instructions   amLODIPine 5 MG Tabs  What changed:  how much to take  Commonly known as:  NORVASC   Take 2 Tabs by mouth every day.  Dose:  10 mg        CONTINUE taking these medications      Instructions   aspirin EC 81 MG Tbec  Commonly known as:  ECOTRIN   Take 1 Tab by mouth every day.  Dose:  81 mg     gabapentin 300 MG Caps  Commonly known as:  NEURONTIN   Take 300 mg by mouth 2 Times a Day.  Dose:  300 mg     Lipitor 40 MG Tabs  Generic drug:  atorvastatin   Take 40 mg by mouth every evening.  Dose:  40 mg     omeprazole 20 MG delayed-release capsule  Commonly known as:  PRILOSEC   TAKE 1 CAPSULE BY MOUTH TWICE DAILY FOR 90 DAYS            Allergies  Allergies   Allergen Reactions   • Nkda [No Known Drug Allergy]        DIET  Orders Placed This Encounter   Procedures   • Diet Order Cardiac     Standing Status:   Standing     Number of Occurrences:   1     Order Specific Question:   Diet:     Answer:   Cardiac [6]     Order Specific Question:   Miscellaneous modifications:     Answer:   No Decaf, No Caffeine(for test) [11]       ACTIVITY  As tolerated.  Weight bearing as tolerated    CONSULTATIONS  None    PROCEDURES  None    LABORATORY  Lab Results   Component Value Date    SODIUM 136 07/05/2020    POTASSIUM 3.9 07/05/2020    CHLORIDE 105 07/05/2020    CO2 18 (L) 07/05/2020    GLUCOSE 145 (H) 07/05/2020    BUN 11 07/05/2020    CREATININE 0.63 07/05/2020    CREATININE 1.0 03/27/2009        Lab Results   Component Value Date    WBC 9.0 07/05/2020    HEMOGLOBIN 14.4 07/05/2020    HEMATOCRIT 43.9 07/05/2020    PLATELETCT 308 07/05/2020      US-EXTREMITY VENOUS LOWER BILAT   Final Result      NM-CARDIAC STRESS TEST   Final Result      EC-ECHOCARDIOGRAM COMPLETE W/O CONT   Final Result      US-RUQ    Final Result      1.  The liver is diffusely echogenic, most compatible with fatty infiltration, however other hepatocellular disease processes are in the differential diagnosis.      DX-CHEST-PORTABLE (1 VIEW)   Final Result      No acute cardiac or pulmonary abnormality is noted.          Total time of the discharge process exceeds 37 minutes.

## 2020-07-05 NOTE — PROGRESS NOTES
Sleeping, no distress, sr on tele. Kept npo for stress test. Call light within reach. To continue to monitor.

## 2020-07-30 ENCOUNTER — HOSPITAL ENCOUNTER (OUTPATIENT)
Dept: CARDIOLOGY | Facility: MEDICAL CENTER | Age: 70
End: 2020-07-30
Attending: STUDENT IN AN ORGANIZED HEALTH CARE EDUCATION/TRAINING PROGRAM
Payer: MEDICARE

## 2020-07-30 DIAGNOSIS — R94.31 NONSPECIFIC ABNORMAL ELECTROCARDIOGRAM (ECG) (EKG): ICD-10-CM

## 2020-07-30 LAB — EKG IMPRESSION: NORMAL

## 2020-07-30 PROCEDURE — 93010 ELECTROCARDIOGRAM REPORT: CPT | Performed by: INTERNAL MEDICINE

## 2020-07-30 PROCEDURE — 93005 ELECTROCARDIOGRAM TRACING: CPT

## 2021-01-15 DIAGNOSIS — Z23 NEED FOR VACCINATION: ICD-10-CM

## 2022-06-11 ENCOUNTER — APPOINTMENT (OUTPATIENT)
Dept: RADIOLOGY | Facility: MEDICAL CENTER | Age: 72
End: 2022-06-11
Attending: EMERGENCY MEDICINE
Payer: MEDICARE

## 2022-06-11 ENCOUNTER — HOSPITAL ENCOUNTER (EMERGENCY)
Facility: MEDICAL CENTER | Age: 72
End: 2022-06-11
Attending: EMERGENCY MEDICINE
Payer: MEDICARE

## 2022-06-11 VITALS
HEIGHT: 65 IN | OXYGEN SATURATION: 93 % | SYSTOLIC BLOOD PRESSURE: 147 MMHG | BODY MASS INDEX: 32.51 KG/M2 | TEMPERATURE: 99.7 F | WEIGHT: 195.11 LBS | HEART RATE: 65 BPM | DIASTOLIC BLOOD PRESSURE: 76 MMHG | RESPIRATION RATE: 20 BRPM

## 2022-06-11 DIAGNOSIS — R60.0 LOWER EXTREMITY EDEMA: ICD-10-CM

## 2022-06-11 DIAGNOSIS — R06.02 SHORTNESS OF BREATH: ICD-10-CM

## 2022-06-11 DIAGNOSIS — R60.9 PERIPHERAL EDEMA: ICD-10-CM

## 2022-06-11 LAB
ALBUMIN SERPL BCP-MCNC: 4.4 G/DL (ref 3.2–4.9)
ALBUMIN/GLOB SERPL: 1.8 G/DL
ALP SERPL-CCNC: 121 U/L (ref 30–99)
ALT SERPL-CCNC: 21 U/L (ref 2–50)
ANION GAP SERPL CALC-SCNC: 13 MMOL/L (ref 7–16)
AST SERPL-CCNC: 19 U/L (ref 12–45)
BASOPHILS # BLD AUTO: 0.5 % (ref 0–1.8)
BASOPHILS # BLD: 0.04 K/UL (ref 0–0.12)
BILIRUB SERPL-MCNC: 0.4 MG/DL (ref 0.1–1.5)
BUN SERPL-MCNC: 22 MG/DL (ref 8–22)
CALCIUM SERPL-MCNC: 9.9 MG/DL (ref 8.5–10.5)
CHLORIDE SERPL-SCNC: 106 MMOL/L (ref 96–112)
CO2 SERPL-SCNC: 21 MMOL/L (ref 20–33)
CREAT SERPL-MCNC: 0.88 MG/DL (ref 0.5–1.4)
D DIMER PPP IA.FEU-MCNC: 0.39 UG/ML (FEU) (ref 0–0.5)
EKG IMPRESSION: NORMAL
EOSINOPHIL # BLD AUTO: 0.25 K/UL (ref 0–0.51)
EOSINOPHIL NFR BLD: 3.2 % (ref 0–6.9)
ERYTHROCYTE [DISTWIDTH] IN BLOOD BY AUTOMATED COUNT: 45.1 FL (ref 35.9–50)
GFR SERPLBLD CREATININE-BSD FMLA CKD-EPI: 69 ML/MIN/1.73 M 2
GLOBULIN SER CALC-MCNC: 2.5 G/DL (ref 1.9–3.5)
GLUCOSE SERPL-MCNC: 115 MG/DL (ref 65–99)
HCT VFR BLD AUTO: 43.7 % (ref 37–47)
HGB BLD-MCNC: 14.4 G/DL (ref 12–16)
IMM GRANULOCYTES # BLD AUTO: 0.03 K/UL (ref 0–0.11)
IMM GRANULOCYTES NFR BLD AUTO: 0.4 % (ref 0–0.9)
LYMPHOCYTES # BLD AUTO: 2.22 K/UL (ref 1–4.8)
LYMPHOCYTES NFR BLD: 28.8 % (ref 22–41)
MCH RBC QN AUTO: 31.6 PG (ref 27–33)
MCHC RBC AUTO-ENTMCNC: 33 G/DL (ref 33.6–35)
MCV RBC AUTO: 95.8 FL (ref 81.4–97.8)
MONOCYTES # BLD AUTO: 0.7 K/UL (ref 0–0.85)
MONOCYTES NFR BLD AUTO: 9.1 % (ref 0–13.4)
NEUTROPHILS # BLD AUTO: 4.48 K/UL (ref 2–7.15)
NEUTROPHILS NFR BLD: 58 % (ref 44–72)
NRBC # BLD AUTO: 0 K/UL
NRBC BLD-RTO: 0 /100 WBC
NT-PROBNP SERPL IA-MCNC: 104 PG/ML (ref 0–125)
PLATELET # BLD AUTO: 333 K/UL (ref 164–446)
PMV BLD AUTO: 9.2 FL (ref 9–12.9)
POTASSIUM SERPL-SCNC: 3.9 MMOL/L (ref 3.6–5.5)
PROT SERPL-MCNC: 6.9 G/DL (ref 6–8.2)
RBC # BLD AUTO: 4.56 M/UL (ref 4.2–5.4)
SODIUM SERPL-SCNC: 140 MMOL/L (ref 135–145)
TROPONIN T SERPL-MCNC: 13 NG/L (ref 6–19)
WBC # BLD AUTO: 7.7 K/UL (ref 4.8–10.8)

## 2022-06-11 PROCEDURE — 83880 ASSAY OF NATRIURETIC PEPTIDE: CPT

## 2022-06-11 PROCEDURE — 84484 ASSAY OF TROPONIN QUANT: CPT

## 2022-06-11 PROCEDURE — 71045 X-RAY EXAM CHEST 1 VIEW: CPT

## 2022-06-11 PROCEDURE — 85379 FIBRIN DEGRADATION QUANT: CPT

## 2022-06-11 PROCEDURE — 80053 COMPREHEN METABOLIC PANEL: CPT

## 2022-06-11 PROCEDURE — 99284 EMERGENCY DEPT VISIT MOD MDM: CPT

## 2022-06-11 PROCEDURE — 93005 ELECTROCARDIOGRAM TRACING: CPT | Performed by: EMERGENCY MEDICINE

## 2022-06-11 PROCEDURE — 93970 EXTREMITY STUDY: CPT

## 2022-06-11 PROCEDURE — 85025 COMPLETE CBC W/AUTO DIFF WBC: CPT

## 2022-06-11 PROCEDURE — 93005 ELECTROCARDIOGRAM TRACING: CPT

## 2022-06-11 PROCEDURE — 36415 COLL VENOUS BLD VENIPUNCTURE: CPT

## 2022-06-11 ASSESSMENT — FIBROSIS 4 INDEX: FIB4 SCORE: 1.18

## 2022-06-12 NOTE — ED NOTES
"Patient asleep in bed in no acute distress. vss as below.   BP (!) 147/76   Pulse 65   Temp 37.6 °C (99.6 °F) (Temporal)   Resp 20   Ht 1.651 m (5' 5\")   Wt 88.5 kg (195 lb 1.7 oz)   SpO2 93%   BMI 32.47 kg/m²   "

## 2022-06-12 NOTE — DISCHARGE INSTRUCTIONS
Please call your primary care physician Monday morning to schedule follow-up appointment for complete recheck.  Be sure to get up and walk around frequently to make sure that you do not develop blood clots in your legs, as we discussed there is no evidence of blood clot in the legs or the lungs today.  Continue to take your asthma medications.  Return to the emergency department if you develop any new or worsening symptoms including worsening shortness of breath, fevers, leg pain, leg swelling, or if you have any further concerns.

## 2022-06-12 NOTE — ED PROVIDER NOTES
ED Physician Note    Chief Concern:   Lower extremity edema, shortness of breath    HPI:  Fe Clark is a very pleasant 71-year-old woman who presents to the emergency department for lower extremity swelling, and shortness of breath.  Her symptoms began about a week ago when she noticed symmetric swelling to the lower extremities bilaterally.  She has had peripheral edema in the past, and has previously been evaluated for DVT, though she states her symptoms are not as severe.  She tried use compression stockings but was worried she would not be able to get them off due to the swelling.  This did not alleviate her symptoms.  Ambulation made the swelling worse, so she states she remained in bed for the past week as much as possible to try and elevate her legs, though her swelling did not improve.  Within the past 24 to 48 hours she developed chest tightness and shortness of breath, exacerbated by activity and ambulation.  She has noticed chest discomfort is worse by taking a deep breath.  She reports no prior history of DVT, no prior history of pulmonary embolism.  She is not currently on any exogenous estrogen, has no history of recent travel or immobilization, though states she stayed in bed for the past week.  She has no recent history of hospitalization, or surgical procedures.  She is unable to identify any alleviating factors.    Review of Systems:  See HPI for pertinent positives and negatives. All other systems negative.    Past Medical History:   has a past medical history of ASTHMA, Diverticulosis, Duodenal ulcer, unspecified as acute or chronic, without hemorrhage, perforation, or obstruction, GERD (gastroesophageal reflux disease), Hiatal hernia, High cholesterol, Hypertension, and Psychiatric problem.    Social History:  Social History     Tobacco Use   • Smoking status: Never Smoker   • Smokeless tobacco: Never Used   Vaping Use   • Vaping Use: Never used   Substance and Sexual Activity  "  • Alcohol use: Yes     Comment: daily - bottle of wine/day   • Drug use: Not Currently   • Sexual activity: Not on file       Surgical History:   has a past surgical history that includes bladder suspension; bowel resection; colonoscopy; hysterectomy radical; other orthopedic surgery; ankle arthroscopy (7/31/2013); hardware removal ortho (7/31/2013); lacrimal duct probe (3/11/2015); submandible abscess incision and drainage (11/19/2016); and dental extraction(s) (11/19/2016).    Current Medications:  Home Medications     Reviewed by Theresa Hayden R.N. (Registered Nurse) on 06/11/22 at 1731  Med List Status: Partial   Medication Last Dose Status   albuterol 108 (90 Base) MCG/ACT Aero Soln inhalation aerosol  Active   amLODIPine (NORVASC) 5 MG Tab  Active   aspirin EC (ECOTRIN) 81 MG Tablet Delayed Response  Active   atorvastatin (LIPITOR) 40 MG Tab  Active   gabapentin (NEURONTIN) 300 MG Cap  Active   omeprazole (PRILOSEC) 20 MG delayed-release capsule  Active   sucralfate (CARAFATE) 1 GM Tab  Active                Allergies:  Allergies   Allergen Reactions   • Nkda [No Known Drug Allergy]        Physical Exam:  Vital Signs: BP (!) 147/76   Pulse 65   Temp 37.6 °C (99.6 °F) (Temporal)   Resp 20   Ht 1.651 m (5' 5\")   Wt 88.5 kg (195 lb 1.7 oz)   SpO2 93%   BMI 32.47 kg/m²   Constitutional: Alert, no acute distress  HENT: Normocephalic, mask in place  Eyes: Pupils equal and reactive, normal conjunctiva  Neck: Supple, normal range of motion, no stridor  Cardiovascular: Extremities are warm and well perfused, no murmur appreciated, normal cardiac auscultation  Pulmonary: No respiratory distress, normal work of breathing, no accessory muscule usage, breath sounds clear and equal bilaterally, no wheezing, no coarse breath sounds, no tachypnea, room air oxygen saturation 94%.  Abdomen: Soft, non-distended, non-tender to palpation, no peritoneal signs  Skin: Warm, dry, no rashes or lesions  Musculoskeletal: Normal " range of motion in all extremities, 2+ pretibial edema bilaterally, lower extremities are symmetric bilaterally, posterior calves are nontender to palpation bilaterally  Neurologic: Alert, oriented, normal speech, normal motor function  Psychiatric: Normal and appropriate mood and affect    Medical records reviewed for continuity of care.  Discharge summary reviewed from 7/5/2020. Ms. Clark reports presented to the emergency department for evaluation of left-sided chest pain.  She is noted history of asthma, GERD, hypertension, dyslipidemia, schizophrenia.  She was evaluated transthoracic echo and stress test, both of which were normal.  She did repeat lower extremity ultrasound which did not reveal DVT.  Right upper quadrant ultrasound revealed fatty liver infiltration.  She was discharged in stable condition with close outpatient follow-up.    EKG: EKG independently interpreted.  Rate 80, normal sinus rhythm, no ST elevation or depression, no T wave inversions, T wave flattening in inferior leads, unchanged from prior.    Labs:  Labs Reviewed   CBC WITH DIFFERENTIAL - Abnormal; Notable for the following components:       Result Value    MCHC 33.0 (*)     All other components within normal limits   COMP METABOLIC PANEL - Abnormal; Notable for the following components:    Glucose 115 (*)     Alkaline Phosphatase 121 (*)     All other components within normal limits   TROPONIN   ESTIMATED GFR   D-DIMER   PROBRAIN NATRIURETIC PEPTIDE, NT       Radiology:  US-EXTREMITY VENOUS LOWER BILAT   Final Result      DX-CHEST-PORTABLE (1 VIEW)   Final Result      1.  There is no acute cardiopulmonary process.           ED Medications Administered:  Medications - No data to display    Differential diagnosis:  Fluid overload, new onset heart failure, peripheral edema, DVT, pulmonary embolism, asthma exacerbation    MDM:  Ms. Clark presents to the emergency department today for lower extremity edema for the past week, as well  as shortness of breath and chest discomfort for the past 24 hours.  Risk factors for pulmonary embolism include clinical symptoms of DVT, as well as self-imposed bedrest for the past week.  She is hemodynamically stable on arrival with no fever, no tachycardia, no hypotension.  There is no evidence of infectious etiology.  She has no associated cough or congestion, no flulike symptoms.  She has normal pulmonary auscultation with no crackles, no tachypnea, no clinical evidence of pulmonary edema.  She has no wheezing on physical exam, less likely asthma exacerbation.    On laboratory evaluation CMP is reassuring without any significant changes.  High-sensitivity troponin is negative, less concerning for myocardial injury.  She has a normal white blood count, normal hemoglobin without evidence of anemia, and a normal platelet count.  D-dimer is negative, proBNP is within normal limits, no evidence of fluid overload, no evidence of new onset heart failure.    Chest x-ray is negative for acute process.  No radiographic evidence of pulmonary edema.    Bilateral lower extremity ultrasound is negative for evidence of DVT on pulmonary read.    At this time, I do not believe she requires any further emergent diagnostics nor treatment.  She may follow-up with her primary care clinic for further evaluation.  She is counseled to keep her legs elevated, and continue with her compression stockings, she is also counseled to ambulate, rather than staying in bed.  She will contact her primary care clinician Monday morning to schedule follow-up appointment for complete recheck. Return precautions were discussed with the patient, and provided in written form with the patient's discharge instructions.     Personal protective equipment including N95 surgical respirator, goggles, and gloves were used during this encounter.       Disposition:  Discharge home in stable condition    Final Impression:  1. Peripheral edema    2. Shortness of  breath        Electronically signed by: Emliee Anglin MD, 6/11/2022 9:27 PM

## 2022-06-12 NOTE — ED TRIAGE NOTES
"Chief Complaint   Patient presents with   • Chest Pressure     Generalized mid sternal \"chest heaviness\", gradual onset yesterday, non radiating   • Shortness of Breath     Gradual onset this afternoon; speaking in full sentences.    • Peripheral Edema     BLE edema x1 week     Patient to triage via ambulation, with a personal cane, patient A&O x4.  Appropriate precautions in place.     Protocol initiated. EKG completed.       Explained wait time and triage process to pt. Pt placed back in lobby, told to notify ED tech or triage RN of any changes, verbalized understanding.    "

## 2022-06-12 NOTE — ED NOTES
Patient given discharge instructions, including the importance of establishing primary care, and verbalized understanding appropriately, denies any further questions or concerns at this time. Patient is alert and oriented and ambulates with a steady gait. Discharged to self.

## 2022-06-12 NOTE — ED NOTES
Assessment made. Chart up for MD to see. C/o chest pain x 4 days and bilateral lower leg swelling x 3 days.

## 2022-06-13 ENCOUNTER — PATIENT OUTREACH (OUTPATIENT)
Dept: SCHEDULING | Facility: IMAGING CENTER | Age: 72
End: 2022-06-13
Payer: MEDICARE

## 2022-06-15 ENCOUNTER — TELEPHONE (OUTPATIENT)
Dept: SCHEDULING | Facility: IMAGING CENTER | Age: 72
End: 2022-06-15

## 2022-07-14 ENCOUNTER — APPOINTMENT (OUTPATIENT)
Dept: MEDICAL GROUP | Facility: PHYSICIAN GROUP | Age: 72
End: 2022-07-14
Payer: MEDICARE

## 2022-09-02 NOTE — PROGRESS NOTES
[facility] refill request for Atorvastatin 40MG has been REFUSED and [facility] notified by fax that patient needs appointment.

## 2022-10-21 ENCOUNTER — OFFICE VISIT (OUTPATIENT)
Dept: INTERNAL MEDICINE | Facility: OTHER | Age: 72
End: 2022-10-21
Payer: MEDICARE

## 2022-10-21 VITALS
DIASTOLIC BLOOD PRESSURE: 76 MMHG | BODY MASS INDEX: 31.75 KG/M2 | SYSTOLIC BLOOD PRESSURE: 141 MMHG | WEIGHT: 190.6 LBS | HEART RATE: 60 BPM | OXYGEN SATURATION: 96 % | HEIGHT: 65 IN | TEMPERATURE: 97.5 F

## 2022-10-21 DIAGNOSIS — E66.09 CLASS 1 OBESITY DUE TO EXCESS CALORIES WITHOUT SERIOUS COMORBIDITY WITH BODY MASS INDEX (BMI) OF 31.0 TO 31.9 IN ADULT: ICD-10-CM

## 2022-10-21 DIAGNOSIS — G89.29 CHRONIC PAIN OF RIGHT KNEE: ICD-10-CM

## 2022-10-21 DIAGNOSIS — G62.9 NEUROPATHY: ICD-10-CM

## 2022-10-21 DIAGNOSIS — E78.5 HYPERLIPIDEMIA, UNSPECIFIED HYPERLIPIDEMIA TYPE: ICD-10-CM

## 2022-10-21 DIAGNOSIS — F33.9 RECURRENT MAJOR DEPRESSIVE DISORDER, REMISSION STATUS UNSPECIFIED (HCC): ICD-10-CM

## 2022-10-21 DIAGNOSIS — F10.10 ALCOHOL ABUSE: ICD-10-CM

## 2022-10-21 DIAGNOSIS — Z79.899 CURRENT USE OF PROTON PUMP INHIBITOR: ICD-10-CM

## 2022-10-21 DIAGNOSIS — M25.561 CHRONIC PAIN OF RIGHT KNEE: ICD-10-CM

## 2022-10-21 DIAGNOSIS — I10 PRIMARY HYPERTENSION: ICD-10-CM

## 2022-10-21 DIAGNOSIS — Z23 NEEDS FLU SHOT: ICD-10-CM

## 2022-10-21 DIAGNOSIS — K21.9 GASTROESOPHAGEAL REFLUX DISEASE, UNSPECIFIED WHETHER ESOPHAGITIS PRESENT: ICD-10-CM

## 2022-10-21 PROBLEM — R06.02 SHORTNESS OF BREATH: Status: RESOLVED | Noted: 2020-07-04 | Resolved: 2022-10-21

## 2022-10-21 PROBLEM — R60.0 LOWER EXTREMITY EDEMA: Status: RESOLVED | Noted: 2020-07-04 | Resolved: 2022-10-21

## 2022-10-21 PROCEDURE — 90662 IIV NO PRSV INCREASED AG IM: CPT

## 2022-10-21 PROCEDURE — 99204 OFFICE O/P NEW MOD 45 MIN: CPT | Mod: 25,GC

## 2022-10-21 PROCEDURE — G0008 ADMIN INFLUENZA VIRUS VAC: HCPCS

## 2022-10-21 RX ORDER — AMLODIPINE BESYLATE 5 MG/1
10 TABLET ORAL DAILY
Qty: 30 TABLET | Refills: 1 | Status: SHIPPED | OUTPATIENT
Start: 2022-10-21 | End: 2022-11-16

## 2022-10-21 RX ORDER — LISINOPRIL 10 MG/1
20 TABLET ORAL DAILY
Qty: 30 TABLET | Refills: 1 | Status: SHIPPED | OUTPATIENT
Start: 2022-10-21 | End: 2022-11-16

## 2022-10-21 RX ORDER — LISINOPRIL 10 MG/1
20 TABLET ORAL DAILY
COMMUNITY
End: 2022-10-21 | Stop reason: SDUPTHER

## 2022-10-21 RX ORDER — DULOXETIN HYDROCHLORIDE 20 MG/1
20 CAPSULE, DELAYED RELEASE ORAL DAILY
Qty: 60 CAPSULE | Refills: 1 | Status: SHIPPED | OUTPATIENT
Start: 2022-10-21 | End: 2023-03-20 | Stop reason: SDUPTHER

## 2022-10-21 ASSESSMENT — ENCOUNTER SYMPTOMS
SORE THROAT: 0
NERVOUS/ANXIOUS: 0
BLURRED VISION: 0
MYALGIAS: 0
BACK PAIN: 0
TINGLING: 1
FEVER: 0
SHORTNESS OF BREATH: 0
WEAKNESS: 0
DOUBLE VISION: 0
INSOMNIA: 1
VOMITING: 0
WEIGHT LOSS: 0
ABDOMINAL PAIN: 0
EYE REDNESS: 0
PALPITATIONS: 0
SENSORY CHANGE: 1
DIARRHEA: 0
HEARTBURN: 0
LOSS OF CONSCIOUSNESS: 0
DEPRESSION: 1
CONSTIPATION: 0
SPUTUM PRODUCTION: 0
CLAUDICATION: 0
NAUSEA: 0
HEADACHES: 0
COUGH: 0
DIZZINESS: 0
CHILLS: 0

## 2022-10-21 ASSESSMENT — PATIENT HEALTH QUESTIONNAIRE - PHQ9
CLINICAL INTERPRETATION OF PHQ2 SCORE: 6
5. POOR APPETITE OR OVEREATING: 0 - NOT AT ALL
SUM OF ALL RESPONSES TO PHQ QUESTIONS 1-9: 14

## 2022-10-21 ASSESSMENT — FIBROSIS 4 INDEX: FIB4 SCORE: 0.91

## 2022-10-21 ASSESSMENT — LIFESTYLE VARIABLES: SUBSTANCE_ABUSE: 1

## 2022-10-21 NOTE — ASSESSMENT & PLAN NOTE
Right knee pain + sensation of instability + locking sensation since 1 month ago.   No trauma she can recall. Uses cane for ambulatory support.   On physical exam: medial tenderness, ACL & PCL not affected, elicited pain with knee flexion/extension/eversion.   We will placed Home Health referral for PT/OT as this is more convenient for patient.   Voltaren gel for pain management.

## 2022-10-21 NOTE — ASSESSMENT & PLAN NOTE
BP upon admission to clinic was 141/76; when rechecked it was 140/70.   We will continue current BP regimen with Amlodipine and Lisinopril.

## 2022-10-21 NOTE — ASSESSMENT & PLAN NOTE
Decreased sensitivity and numbness/tingling sensation in stocking like pattern bilaterally.   Possible due to Alcohol Abuse VS Vit B12 deficiency VS Hypothyroidism.   We will check Vit B12 level, TSH.

## 2022-10-21 NOTE — PROGRESS NOTES
Fe Clark is a 72 y.o. female who presents today with the following:    Chief Complaint:  Establishment of care.  Right knee pain and instability.  Falls.    History of Present Illness:   Mrs. Clark is a 72-year-old female patient with a past medical history of hypertension, hyperlipidemia, asthma, osteoporosis, GERD (has been using omeprazole for more than 10 years per her report), RAMIRO diagnosed on 2006 but has never used a CPAP machine, and questionable psychiatric diagnosis of schizophrenia.  Presents to our clinic to establish care and referring right knee pain and instability for almost 1 month as well as falling up to 6 times in the past year due to decreased sensation and tingling in both feet; and feeling depressed since her  passed away earlier this year in February.  Right knee pain has no trauma associated, waxes and wanes, notices it is worse when extending her leg as well as locking sensation; she uses cane for ambulation support.  She also notes lower back pain more to the right side, episodic, stabbing.  Regarding decreased sensation and tingling in both feet, she reports this has been going on for approximately 2 years and has progressively worsened within the last year; denies similar symptoms in any other region of her body.  Regarding her mood, she refers loss of interest on daily and extracurricular activities, loss of energy, altered sleep.  Denies recent acute illnesses, shortness of breath, chest pain, headaches, dizziness, changes in bowel movement or any other symptoms asides from the previously specified.    *Patient received flu shot her during this admission.  *For her convenience we have requested home health for PT/OT.  *Patient is not interested in medical aid regarding alcohol cessation and states she will do it on her own.  *Patient is not interested in mental health referral as she believes she has good support system at home.    Review of Systems    Constitutional:  Negative for chills, fever, malaise/fatigue and weight loss.   HENT:  Negative for congestion, hearing loss, sore throat and tinnitus.    Eyes:  Negative for blurred vision, double vision and redness.   Respiratory:  Negative for cough, sputum production and shortness of breath.    Cardiovascular:  Negative for chest pain, palpitations, claudication and leg swelling.   Gastrointestinal:  Negative for abdominal pain, constipation, diarrhea, heartburn, nausea and vomiting.   Genitourinary:  Positive for frequency (Overnight for the past 5 year.). Negative for dysuria.   Musculoskeletal:  Positive for joint pain (Right knee pain; Lower back pain.). Negative for back pain and myalgias.   Skin:  Negative for rash.   Neurological:  Positive for tingling (Distal BLE) and sensory change (Decreased sensation on soles of feet.). Negative for dizziness, loss of consciousness, weakness and headaches.   Endo/Heme/Allergies:  Negative for environmental allergies.   Psychiatric/Behavioral:  Positive for depression (Loss of enery. Loss of interest.) and substance abuse (Alcohol: 1 bottle of wine/6 shots of hard liquor). Negative for suicidal ideas. The patient has insomnia. The patient is not nervous/anxious.       Past Medical History:   Past Medical History:   Diagnosis Date    ASTHMA     Diverticulosis     Duodenal ulcer, unspecified as acute or chronic, without hemorrhage, perforation, or obstruction     GERD (gastroesophageal reflux disease)     Hiatal hernia     High cholesterol     Hypertension     Psychiatric problem        Patient Active Problem List    Diagnosis Date Noted    Alcohol abuse 10/21/2022    Recurrent major depressive disorder (HCC) 10/21/2022    Neuropathy 10/21/2022    Current use of proton pump inhibitor 10/21/2022    Chronic pain of right knee 10/21/2022    Needs flu shot 10/21/2022    GERD (gastroesophageal reflux disease) 02/07/2016    Hyperlipidemia 02/07/2016    HTN (hypertension)  "02/07/2016    Pain in joint, ankle and foot 07/31/2013       Past Surgical History:   Procedure Laterality Date    SUBMANDIBLE ABSCESS INCISION AND DRAINAGE  11/19/2016    Procedure: SUBMANDIBLE ABSCESS INCISION AND DRAINAGE;  Surgeon: Lior Hutchison D.D.S.;  Location: [facility];  Service:     DENTAL EXTRACTION(S)  11/19/2016    Procedure: DENTAL EXTRACTION(S);  Surgeon: Lior Hutchison D.D.S.;  Location: SURGERY [facility];  Service:     LACRIMAL DUCT PROBE  3/11/2015    Performed by Alo Cheatham M.D. at SURGERY [facility]    ANKLE ARTHROSCOPY  7/31/2013    Performed by Paco Clifton M.D. at [facility] ORS    HARDWARE REMOVAL ORTHO  7/31/2013    Performed by Paco Clifton M.D. at SURGERY [facility]    BLADDER SUSPENSION      BOWEL RESECTION      COLONOSCOPY      with removal of pollyps    HYSTERECTOMY RADICAL      OTHER ORTHOPEDIC SURGERY      L ankle        Allergies:  Nkda [no known drug allergy]    Medications:     Current Outpatient Medications:     DULoxetine, 20 mg, Oral, DAILY    diclofenac sodium, 2 g, Topical, 4X/DAY PRN    lisinopril, 20 mg, Oral, DAILY    amLODIPine, 10 mg, Oral, DAILY    albuterol, 2 Puff, Inhalation, Q6HRS PRN, PRN    omeprazole, TAKE 1 CAPSULE BY MOUTH TWICE DAILY FOR 90 DAYS, Taking    aspirin EC, 81 mg, Oral, DAILY, Taking     Social History:   Social History     Tobacco Use    Smoking status: Never    Smokeless tobacco: Never   Vaping Use    Vaping Use: Never used   Substance Use Topics    Alcohol use: Yes     Comment: daily - bottle of wine/day    Drug use: Not Currently       Family History:   Family History   Problem Relation Age of Onset    No Known Problems Mother         2017 is age 91    Other Father 57        unknown issue       Vitals:   BP (!) 141/76 (BP Location: Right arm, Patient Position: Sitting, BP Cuff Size: Adult)   Pulse 60   Temp 36.4 °C (97.5 °F) (Temporal)   Ht 1.651 m (5' 5\")   Wt 86.5 kg (190 lb 9.6 oz)   " SpO2 96%  Body mass index is 31.72 kg/m².    Physical Exam  Constitutional:       General: She is not in acute distress.     Appearance: She is obese. She is not ill-appearing or toxic-appearing.   HENT:      Head: Normocephalic and atraumatic.      Right Ear: External ear normal.      Left Ear: External ear normal.      Nose: Nose normal. No congestion.      Mouth/Throat:      Mouth: Mucous membranes are moist.      Pharynx: No oropharyngeal exudate or posterior oropharyngeal erythema.   Eyes:      Extraocular Movements: Extraocular movements intact.      Pupils: Pupils are equal, round, and reactive to light.   Cardiovascular:      Rate and Rhythm: Normal rate and regular rhythm.      Pulses: Normal pulses.      Heart sounds: Normal heart sounds. No murmur heard.    No gallop.   Pulmonary:      Effort: Pulmonary effort is normal.      Breath sounds: Normal breath sounds. No stridor. No wheezing, rhonchi or rales.   Abdominal:      General: Bowel sounds are normal. There is no distension.      Palpations: Abdomen is soft. There is no mass.      Tenderness: There is no abdominal tenderness. There is no right CVA tenderness or left CVA tenderness.   Musculoskeletal:         General: Swelling, tenderness and deformity present. Normal range of motion.      Cervical back: Normal range of motion. No rigidity or tenderness.      Right lower leg: No edema.      Left lower leg: No edema.      Comments: Right knee: increased in sized compared to left knee; medial tenderness to palpation; pain to extension, flexion, and eversion of knee.   Lower back: mild tenderness to palpation (more right).   No positive leg raise.      Lymphadenopathy:      Cervical: No cervical adenopathy.   Skin:     General: Skin is warm.      Capillary Refill: Capillary refill takes less than 2 seconds.      Coloration: Skin is not jaundiced or pale.      Findings: No lesion or rash.   Neurological:      General: No focal deficit present.      Mental  Status: She is alert and oriented to person, place, and time.      Cranial Nerves: No cranial nerve deficit.      Sensory: Sensory deficit (Distal BLE.) present.      Motor: No weakness.      Gait: Gait abnormal.        Assessment and Plan    Recurrent major depressive disorder (HCC)  Decreased level of energy, loss of interest of daily and extracurricular activities, altered sleep pattern, grieving. This started on Feb 2022 after her  passed away.   Will start Duloxetine 20 mg daily and assess response to medication during follow-up in 5 weeks.   Patient is not interested on mental health referral at this time.       Hyperlipidemia  Currently not taking atorvastatin since June 2022.   Last lipid panel was on 2016: total chol 229, .  We will order lipid panel and assess need for medication based on results.   Patient is currently working towards improving her diet.     Alcohol abuse  Patient states she drinks 1 bottle of wine per day and on occasions has up to 6 shots of hard liquor for the past year.   We have counseled her on the importance of discontinuing her current amount of alcohol consumption and she states she is currently cutting down and can do it on her own with no medical aid.     HTN (hypertension)  BP upon admission to clinic was 141/76; when rechecked it was 140/70.   We will continue current BP regimen with Amlodipine and Lisinopril.       GERD (gastroesophageal reflux disease)  Patient states chronic use of PPI: Omeprazole for approximately 10 years.   No specific GI symptoms reported.   We will address need for waning medications due to possible side effects due to chronic use on next appointment.       Chronic pain of right knee  Right knee pain + sensation of instability + locking sensation since 1 month ago.   No trauma she can recall. Uses cane for ambulatory support.   On physical exam: medial tenderness, ACL & PCL not affected, elicited pain with knee  flexion/extension/eversion.   We will placed Home Health referral for PT/OT as this is more convenient for patient.   Voltaren gel for pain management.       Current use of proton pump inhibitor  Refers use of Omeprazole for >10 years.   We will check Vit D levels, Vit B12.     Neuropathy  Decreased sensitivity and numbness/tingling sensation in stocking like pattern bilaterally.   Possible due to Alcohol Abuse VS Vit B12 deficiency VS Hypothyroidism.   We will check Vit B12 level, TSH.    Needs flu shot  Patient received seasonal flu shot during this visit.      Orders Placed This Encounter    INFLUENZA VACCINE, HIGH DOSE (65+ ONLY)    VITAMIN B12    VITAMIN D,25 HYDROXY (DEFICIENCY)    Lipid Profile    HEMOGLOBIN A1C    TSH+FREE T4    Referral to Home Health    DISCONTD: lisinopril (PRINIVIL) 10 MG Tab    DULoxetine (CYMBALTA) 20 MG Cap DR Particles    diclofenac sodium (VOLTAREN) 1 % Gel    lisinopril (PRINIVIL) 10 MG Tab    amLODIPine (NORVASC) 5 MG Tab       Return in about 5 weeks (around 11/25/2022).      Please note that this dictation was created using voice recognition software. I have made every reasonable attempt to correct obvious errors, but I expect that there are errors of grammar and possibly content that I did not discover before finalizing the note.    Patient case was seen/ assessed/ discussed with Dr. Rakesh Page, PGY-1   Internal Medicine

## 2022-10-21 NOTE — ASSESSMENT & PLAN NOTE
Patient states chronic use of PPI: Omeprazole for approximately 10 years.   No specific GI symptoms reported.   We will address need for waning medications due to possible side effects due to chronic use on next appointment.

## 2022-10-21 NOTE — PATIENT INSTRUCTIONS
Complete lab work prior to next appointment.   Start Duloxetine 20 mg daily; do not stop medication suddenly.   Referral to Home Health for physical and occupational therapy placed; will be called to schedule.   Use Voltaren gel for knee pain; 4 times a day.   We encourage decrease of alcohol consumption.   Follow-up in 5 weeks.

## 2022-10-21 NOTE — ASSESSMENT & PLAN NOTE
Patient states she drinks 1 bottle of wine per day and on occasions has up to 6 shots of hard liquor for the past year.   We have counseled her on the importance of discontinuing her current amount of alcohol consumption and she states she is currently cutting down and can do it on her own with no medical aid.

## 2022-10-21 NOTE — ASSESSMENT & PLAN NOTE
Decreased level of energy, loss of interest of daily and extracurricular activities, altered sleep pattern, grieving. This started on Feb 2022 after her  passed away.   Will start Duloxetine 20 mg daily and assess response to medication during follow-up in 5 weeks.   Patient is not interested on mental health referral at this time.

## 2022-10-21 NOTE — ASSESSMENT & PLAN NOTE
Currently not taking atorvastatin since June 2022.   Last lipid panel was on 2016: total chol 229, .  We will order lipid panel and assess need for medication based on results.   Patient is currently working towards improving her diet.

## 2022-11-10 ENCOUNTER — APPOINTMENT (OUTPATIENT)
Dept: LAB | Facility: MEDICAL CENTER | Age: 72
End: 2022-11-10
Payer: MEDICARE

## 2022-11-16 ENCOUNTER — HOSPITAL ENCOUNTER (OUTPATIENT)
Facility: MEDICAL CENTER | Age: 72
End: 2022-11-17
Attending: EMERGENCY MEDICINE | Admitting: GENERAL PRACTICE
Payer: MEDICARE

## 2022-11-16 ENCOUNTER — APPOINTMENT (OUTPATIENT)
Dept: RADIOLOGY | Facility: MEDICAL CENTER | Age: 72
End: 2022-11-16
Attending: GENERAL PRACTICE
Payer: MEDICARE

## 2022-11-16 ENCOUNTER — APPOINTMENT (OUTPATIENT)
Dept: RADIOLOGY | Facility: MEDICAL CENTER | Age: 72
End: 2022-11-16
Attending: EMERGENCY MEDICINE
Payer: MEDICARE

## 2022-11-16 ENCOUNTER — APPOINTMENT (OUTPATIENT)
Dept: CARDIOLOGY | Facility: MEDICAL CENTER | Age: 72
End: 2022-11-16
Attending: GENERAL PRACTICE
Payer: MEDICARE

## 2022-11-16 DIAGNOSIS — E78.2 MIXED HYPERLIPIDEMIA: ICD-10-CM

## 2022-11-16 DIAGNOSIS — I10 PRIMARY HYPERTENSION: ICD-10-CM

## 2022-11-16 DIAGNOSIS — R07.81 PLEURITIC CHEST PAIN: ICD-10-CM

## 2022-11-16 DIAGNOSIS — J06.9 UPPER RESPIRATORY TRACT INFECTION, UNSPECIFIED TYPE: ICD-10-CM

## 2022-11-16 DIAGNOSIS — J13 COMMUNITY ACQUIRED PNEUMONIA DUE TO PNEUMOCOCCUS (HCC): ICD-10-CM

## 2022-11-16 DIAGNOSIS — R07.9 ACUTE CHEST PAIN: ICD-10-CM

## 2022-11-16 DIAGNOSIS — I16.0 HYPERTENSIVE URGENCY: ICD-10-CM

## 2022-11-16 PROBLEM — E83.52 HYPERCALCEMIA: Status: ACTIVE | Noted: 2022-11-16

## 2022-11-16 LAB
ALBUMIN SERPL BCP-MCNC: 4.5 G/DL (ref 3.2–4.9)
ALBUMIN/GLOB SERPL: 1.7 G/DL
ALP SERPL-CCNC: 112 U/L (ref 30–99)
ALT SERPL-CCNC: 24 U/L (ref 2–50)
AMPHET UR QL SCN: NEGATIVE
ANION GAP SERPL CALC-SCNC: 12 MMOL/L (ref 7–16)
AST SERPL-CCNC: 24 U/L (ref 12–45)
BARBITURATES UR QL SCN: NEGATIVE
BASOPHILS # BLD AUTO: 0.9 % (ref 0–1.8)
BASOPHILS # BLD: 0.06 K/UL (ref 0–0.12)
BENZODIAZ UR QL SCN: NEGATIVE
BILIRUB SERPL-MCNC: 0.6 MG/DL (ref 0.1–1.5)
BUN SERPL-MCNC: 14 MG/DL (ref 8–22)
BZE UR QL SCN: NEGATIVE
CALCIUM SERPL-MCNC: 10.6 MG/DL (ref 8.5–10.5)
CANNABINOIDS UR QL SCN: NEGATIVE
CHLORIDE SERPL-SCNC: 106 MMOL/L (ref 96–112)
CO2 SERPL-SCNC: 22 MMOL/L (ref 20–33)
CREAT SERPL-MCNC: 0.91 MG/DL (ref 0.5–1.4)
CRP SERPL HS-MCNC: 1.7 MG/L (ref 0–3)
D DIMER PPP IA.FEU-MCNC: 1.01 UG/ML (FEU) (ref 0–0.5)
EKG IMPRESSION: NORMAL
EKG IMPRESSION: NORMAL
EOSINOPHIL # BLD AUTO: 0.28 K/UL (ref 0–0.51)
EOSINOPHIL NFR BLD: 4.2 % (ref 0–6.9)
ERYTHROCYTE [DISTWIDTH] IN BLOOD BY AUTOMATED COUNT: 45 FL (ref 35.9–50)
ERYTHROCYTE [SEDIMENTATION RATE] IN BLOOD BY WESTERGREN METHOD: 6 MM/HOUR (ref 0–25)
FLUAV RNA SPEC QL NAA+PROBE: NEGATIVE
FLUBV RNA SPEC QL NAA+PROBE: NEGATIVE
GFR SERPLBLD CREATININE-BSD FMLA CKD-EPI: 67 ML/MIN/1.73 M 2
GLOBULIN SER CALC-MCNC: 2.7 G/DL (ref 1.9–3.5)
GLUCOSE SERPL-MCNC: 104 MG/DL (ref 65–99)
HCT VFR BLD AUTO: 47.6 % (ref 37–47)
HGB BLD-MCNC: 15.7 G/DL (ref 12–16)
IMM GRANULOCYTES # BLD AUTO: 0.02 K/UL (ref 0–0.11)
IMM GRANULOCYTES NFR BLD AUTO: 0.3 % (ref 0–0.9)
LV EJECT FRACT MOD 2C 99903: 51.67
LV EJECT FRACT MOD 4C 99902: 65.34
LV EJECT FRACT MOD BP 99901: 58.77
LYMPHOCYTES # BLD AUTO: 2.26 K/UL (ref 1–4.8)
LYMPHOCYTES NFR BLD: 33.5 % (ref 22–41)
MCH RBC QN AUTO: 32 PG (ref 27–33)
MCHC RBC AUTO-ENTMCNC: 33 G/DL (ref 33.6–35)
MCV RBC AUTO: 96.9 FL (ref 81.4–97.8)
METHADONE UR QL SCN: NEGATIVE
MONOCYTES # BLD AUTO: 0.49 K/UL (ref 0–0.85)
MONOCYTES NFR BLD AUTO: 7.3 % (ref 0–13.4)
NEUTROPHILS # BLD AUTO: 3.63 K/UL (ref 2–7.15)
NEUTROPHILS NFR BLD: 53.8 % (ref 44–72)
NRBC # BLD AUTO: 0 K/UL
NRBC BLD-RTO: 0 /100 WBC
OPIATES UR QL SCN: NEGATIVE
OXYCODONE UR QL SCN: NEGATIVE
PCP UR QL SCN: NEGATIVE
PLATELET # BLD AUTO: 333 K/UL (ref 164–446)
PMV BLD AUTO: 9.4 FL (ref 9–12.9)
POTASSIUM SERPL-SCNC: 4 MMOL/L (ref 3.6–5.5)
PROPOXYPH UR QL SCN: NEGATIVE
PROT SERPL-MCNC: 7.2 G/DL (ref 6–8.2)
RBC # BLD AUTO: 4.91 M/UL (ref 4.2–5.4)
RSV RNA SPEC QL NAA+PROBE: NEGATIVE
SARS-COV-2 RNA RESP QL NAA+PROBE: NOTDETECTED
SODIUM SERPL-SCNC: 140 MMOL/L (ref 135–145)
SPECIMEN SOURCE: NORMAL
TROPONIN T SERPL-MCNC: 15 NG/L (ref 6–19)
TROPONIN T SERPL-MCNC: 16 NG/L (ref 6–19)
WBC # BLD AUTO: 6.7 K/UL (ref 4.8–10.8)

## 2022-11-16 PROCEDURE — G0378 HOSPITAL OBSERVATION PER HR: HCPCS

## 2022-11-16 PROCEDURE — 93010 ELECTROCARDIOGRAM REPORT: CPT | Performed by: INTERNAL MEDICINE

## 2022-11-16 PROCEDURE — C9803 HOPD COVID-19 SPEC COLLECT: HCPCS | Performed by: EMERGENCY MEDICINE

## 2022-11-16 PROCEDURE — A9270 NON-COVERED ITEM OR SERVICE: HCPCS | Performed by: GENERAL PRACTICE

## 2022-11-16 PROCEDURE — 94760 N-INVAS EAR/PLS OXIMETRY 1: CPT

## 2022-11-16 PROCEDURE — 85025 COMPLETE CBC W/AUTO DIFF WBC: CPT

## 2022-11-16 PROCEDURE — 80053 COMPREHEN METABOLIC PANEL: CPT

## 2022-11-16 PROCEDURE — 80307 DRUG TEST PRSMV CHEM ANLYZR: CPT

## 2022-11-16 PROCEDURE — 85652 RBC SED RATE AUTOMATED: CPT

## 2022-11-16 PROCEDURE — 0241U HCHG SARS-COV-2 COVID-19 NFCT DS RESP RNA 4 TRGT MIC: CPT

## 2022-11-16 PROCEDURE — 87899 AGENT NOS ASSAY W/OPTIC: CPT

## 2022-11-16 PROCEDURE — 87449 NOS EACH ORGANISM AG IA: CPT

## 2022-11-16 PROCEDURE — 85379 FIBRIN DEGRADATION QUANT: CPT

## 2022-11-16 PROCEDURE — 99285 EMERGENCY DEPT VISIT HI MDM: CPT

## 2022-11-16 PROCEDURE — 36415 COLL VENOUS BLD VENIPUNCTURE: CPT

## 2022-11-16 PROCEDURE — 700102 HCHG RX REV CODE 250 W/ 637 OVERRIDE(OP): Performed by: GENERAL PRACTICE

## 2022-11-16 PROCEDURE — 71045 X-RAY EXAM CHEST 1 VIEW: CPT

## 2022-11-16 PROCEDURE — 93005 ELECTROCARDIOGRAM TRACING: CPT | Performed by: EMERGENCY MEDICINE

## 2022-11-16 PROCEDURE — 96372 THER/PROPH/DIAG INJ SC/IM: CPT

## 2022-11-16 PROCEDURE — 700111 HCHG RX REV CODE 636 W/ 250 OVERRIDE (IP): Performed by: GENERAL PRACTICE

## 2022-11-16 PROCEDURE — 99220 PR INITIAL OBSERVATION CARE,LEVL III: CPT | Performed by: GENERAL PRACTICE

## 2022-11-16 PROCEDURE — 84484 ASSAY OF TROPONIN QUANT: CPT

## 2022-11-16 PROCEDURE — 93306 TTE W/DOPPLER COMPLETE: CPT

## 2022-11-16 PROCEDURE — 93306 TTE W/DOPPLER COMPLETE: CPT | Mod: 26 | Performed by: STUDENT IN AN ORGANIZED HEALTH CARE EDUCATION/TRAINING PROGRAM

## 2022-11-16 PROCEDURE — 93005 ELECTROCARDIOGRAM TRACING: CPT

## 2022-11-16 PROCEDURE — 71101 X-RAY EXAM UNILAT RIBS/CHEST: CPT | Mod: LT

## 2022-11-16 PROCEDURE — 86141 C-REACTIVE PROTEIN HS: CPT

## 2022-11-16 RX ORDER — POLYETHYLENE GLYCOL 3350 17 G/17G
1 POWDER, FOR SOLUTION ORAL
Status: DISCONTINUED | OUTPATIENT
Start: 2022-11-16 | End: 2022-11-17 | Stop reason: HOSPADM

## 2022-11-16 RX ORDER — LABETALOL HYDROCHLORIDE 5 MG/ML
10 INJECTION, SOLUTION INTRAVENOUS EVERY 4 HOURS PRN
Status: DISCONTINUED | OUTPATIENT
Start: 2022-11-16 | End: 2022-11-17 | Stop reason: HOSPADM

## 2022-11-16 RX ORDER — LORAZEPAM 2 MG/ML
2 INJECTION INTRAMUSCULAR
Status: DISCONTINUED | OUTPATIENT
Start: 2022-11-16 | End: 2022-11-16

## 2022-11-16 RX ORDER — LORAZEPAM 2 MG/1
2 TABLET ORAL
Status: DISCONTINUED | OUTPATIENT
Start: 2022-11-16 | End: 2022-11-16

## 2022-11-16 RX ORDER — DEXTROMETHORPHAN HBR. AND GUAIFENESIN 10; 100 MG/5ML; MG/5ML
10 SOLUTION ORAL EVERY 6 HOURS PRN
Status: DISCONTINUED | OUTPATIENT
Start: 2022-11-16 | End: 2022-11-17 | Stop reason: HOSPADM

## 2022-11-16 RX ORDER — DULOXETIN HYDROCHLORIDE 20 MG/1
20 CAPSULE, DELAYED RELEASE ORAL DAILY
Status: DISCONTINUED | OUTPATIENT
Start: 2022-11-17 | End: 2022-11-17 | Stop reason: HOSPADM

## 2022-11-16 RX ORDER — AMLODIPINE BESYLATE 10 MG/1
10 TABLET ORAL
Status: DISCONTINUED | OUTPATIENT
Start: 2022-11-16 | End: 2022-11-17 | Stop reason: HOSPADM

## 2022-11-16 RX ORDER — LORAZEPAM 2 MG/ML
0.5 INJECTION INTRAMUSCULAR EVERY 4 HOURS PRN
Status: DISCONTINUED | OUTPATIENT
Start: 2022-11-16 | End: 2022-11-16

## 2022-11-16 RX ORDER — LORAZEPAM 2 MG/ML
1.5 INJECTION INTRAMUSCULAR
Status: DISCONTINUED | OUTPATIENT
Start: 2022-11-16 | End: 2022-11-16

## 2022-11-16 RX ORDER — GUAIFENESIN 600 MG/1
1200 TABLET, EXTENDED RELEASE ORAL EVERY 12 HOURS
Status: DISCONTINUED | OUTPATIENT
Start: 2022-11-16 | End: 2022-11-17 | Stop reason: HOSPADM

## 2022-11-16 RX ORDER — LISINOPRIL 10 MG/1
10 TABLET ORAL EVERY MORNING
Status: ON HOLD | COMMUNITY
End: 2022-11-17

## 2022-11-16 RX ORDER — ECHINACEA PURPUREA EXTRACT 125 MG
2 TABLET ORAL
Status: DISCONTINUED | OUTPATIENT
Start: 2022-11-16 | End: 2022-11-17 | Stop reason: HOSPADM

## 2022-11-16 RX ORDER — LORAZEPAM 2 MG/1
4 TABLET ORAL
Status: DISCONTINUED | OUTPATIENT
Start: 2022-11-16 | End: 2022-11-16

## 2022-11-16 RX ORDER — ONDANSETRON 4 MG/1
4 TABLET, ORALLY DISINTEGRATING ORAL EVERY 4 HOURS PRN
Status: DISCONTINUED | OUTPATIENT
Start: 2022-11-16 | End: 2022-11-17 | Stop reason: HOSPADM

## 2022-11-16 RX ORDER — ACETAMINOPHEN 325 MG/1
650 TABLET ORAL EVERY 6 HOURS PRN
Status: DISCONTINUED | OUTPATIENT
Start: 2022-11-16 | End: 2022-11-17 | Stop reason: HOSPADM

## 2022-11-16 RX ORDER — LISINOPRIL 20 MG/1
40 TABLET ORAL
Status: DISCONTINUED | OUTPATIENT
Start: 2022-11-16 | End: 2022-11-17 | Stop reason: HOSPADM

## 2022-11-16 RX ORDER — AMOXICILLIN 250 MG
2 CAPSULE ORAL 2 TIMES DAILY
Status: DISCONTINUED | OUTPATIENT
Start: 2022-11-16 | End: 2022-11-17 | Stop reason: HOSPADM

## 2022-11-16 RX ORDER — OMEPRAZOLE 20 MG/1
20 CAPSULE, DELAYED RELEASE ORAL 2 TIMES DAILY
Status: DISCONTINUED | OUTPATIENT
Start: 2022-11-16 | End: 2022-11-17 | Stop reason: HOSPADM

## 2022-11-16 RX ORDER — LORAZEPAM 1 MG/1
0.5 TABLET ORAL EVERY 4 HOURS PRN
Status: DISCONTINUED | OUTPATIENT
Start: 2022-11-16 | End: 2022-11-16

## 2022-11-16 RX ORDER — HEPARIN SODIUM 5000 [USP'U]/ML
5000 INJECTION, SOLUTION INTRAVENOUS; SUBCUTANEOUS EVERY 8 HOURS
Status: DISCONTINUED | OUTPATIENT
Start: 2022-11-16 | End: 2022-11-17 | Stop reason: HOSPADM

## 2022-11-16 RX ORDER — ATORVASTATIN CALCIUM 80 MG/1
80 TABLET, FILM COATED ORAL EVERY EVENING
Status: DISCONTINUED | OUTPATIENT
Start: 2022-11-16 | End: 2022-11-17 | Stop reason: HOSPADM

## 2022-11-16 RX ORDER — BISACODYL 10 MG
10 SUPPOSITORY, RECTAL RECTAL
Status: DISCONTINUED | OUTPATIENT
Start: 2022-11-16 | End: 2022-11-17 | Stop reason: HOSPADM

## 2022-11-16 RX ORDER — LORAZEPAM 1 MG/1
1 TABLET ORAL EVERY 4 HOURS PRN
Status: DISCONTINUED | OUTPATIENT
Start: 2022-11-16 | End: 2022-11-16

## 2022-11-16 RX ORDER — ONDANSETRON 2 MG/ML
4 INJECTION INTRAMUSCULAR; INTRAVENOUS EVERY 4 HOURS PRN
Status: DISCONTINUED | OUTPATIENT
Start: 2022-11-16 | End: 2022-11-17 | Stop reason: HOSPADM

## 2022-11-16 RX ORDER — LORAZEPAM 2 MG/ML
1 INJECTION INTRAMUSCULAR
Status: DISCONTINUED | OUTPATIENT
Start: 2022-11-16 | End: 2022-11-16

## 2022-11-16 RX ADMIN — ACETAMINOPHEN 650 MG: 325 TABLET, FILM COATED ORAL at 14:06

## 2022-11-16 RX ADMIN — OMEPRAZOLE 20 MG: 20 CAPSULE, DELAYED RELEASE ORAL at 17:46

## 2022-11-16 RX ADMIN — ATORVASTATIN CALCIUM 80 MG: 80 TABLET, FILM COATED ORAL at 17:46

## 2022-11-16 RX ADMIN — HEPARIN SODIUM 5000 UNITS: 5000 INJECTION, SOLUTION INTRAVENOUS; SUBCUTANEOUS at 22:48

## 2022-11-16 RX ADMIN — HEPARIN SODIUM 5000 UNITS: 5000 INJECTION, SOLUTION INTRAVENOUS; SUBCUTANEOUS at 14:06

## 2022-11-16 RX ADMIN — GUAIFENESIN 1200 MG: 600 TABLET, EXTENDED RELEASE ORAL at 17:46

## 2022-11-16 RX ADMIN — LISINOPRIL 40 MG: 20 TABLET ORAL at 14:06

## 2022-11-16 RX ADMIN — AMLODIPINE BESYLATE 10 MG: 10 TABLET ORAL at 14:06

## 2022-11-16 ASSESSMENT — LIFESTYLE VARIABLES
EVER HAD A DRINK FIRST THING IN THE MORNING TO STEADY YOUR NERVES TO GET RID OF A HANGOVER: NO
HOW MANY TIMES IN THE PAST YEAR HAVE YOU HAD 5 OR MORE DRINKS IN A DAY: 0
TOTAL SCORE: 0
ON A TYPICAL DAY WHEN YOU DRINK ALCOHOL HOW MANY DRINKS DO YOU HAVE: 0
HAVE YOU EVER FELT YOU SHOULD CUT DOWN ON YOUR DRINKING: NO
EVER FELT BAD OR GUILTY ABOUT YOUR DRINKING: NO
CONSUMPTION TOTAL: NEGATIVE
TOTAL SCORE: 0
AVERAGE NUMBER OF DAYS PER WEEK YOU HAVE A DRINK CONTAINING ALCOHOL: 0
TOTAL SCORE: 0
HAVE PEOPLE ANNOYED YOU BY CRITICIZING YOUR DRINKING: NO
ALCOHOL_USE: NO
DOES PATIENT WANT TO STOP DRINKING: NO

## 2022-11-16 ASSESSMENT — HEART SCORE
HEART SCORE: 5
RISK FACTORS: >2 RISK FACTORS OR HX OF ATHEROSCLEROTIC DISEASE
HISTORY: SLIGHTLY SUSPICIOUS
ECG: NON-SPECIFIC REPOLARIZATION DISTURBANCE
TROPONIN: LESS THAN OR EQUAL TO NORMAL LIMIT
AGE: 65+

## 2022-11-16 ASSESSMENT — ENCOUNTER SYMPTOMS
SPUTUM PRODUCTION: 1
COUGH: 1
WEAKNESS: 1
SHORTNESS OF BREATH: 1

## 2022-11-16 ASSESSMENT — FIBROSIS 4 INDEX
FIB4 SCORE: 1.07
FIB4 SCORE: 0.91

## 2022-11-16 ASSESSMENT — PAIN DESCRIPTION - PAIN TYPE: TYPE: ACUTE PAIN

## 2022-11-16 NOTE — ASSESSMENT & PLAN NOTE
Patient formally consumed 1 bottle of wine per day last consumed about 2-3 weeks ago  Patient had a few shots of hard liquor greater than a week ago   No concern for any active alcoholic withdrawal

## 2022-11-16 NOTE — ED TRIAGE NOTES
"Fe Jodimary Clark  72 y.o. female  Chief Complaint   Patient presents with    Chest Pain     Starting 0700    Headache     Woke her form a sleep 0700    Shortness of Breath     SOB 1x week with a cough     BP (!) 217/86   Pulse 60   Temp 36.7 °C (98.1 °F) (Temporal)   Resp (!) 22   Ht 1.651 m (5' 5\")   Wt 83.6 kg (184 lb 4.9 oz)   SpO2 97%   BMI 30.67 kg/m²       Patient educated on triage process and encouraged to alert staff of any changes in condition.    Pt states chest pain is 8/10. Pt has had a cough, a sore throat, and fevers for the last week. Pt lives with other sick people in the house currently. Pt is partially vaccinated for covid and has been vaccinated for flu this season.  "

## 2022-11-16 NOTE — ED PROVIDER NOTES
ED Provider Note  CHIEF COMPLAINT  Chief Complaint   Patient presents with    Chest Pain     Starting 0700    Headache     Woke her form a sleep 0700    Shortness of Breath     SOB 1x week with a cough       HPI  Fe Clark is a 72 y.o. female who presents with chest pain headache and shortness of breath.  Patient states she has had fever diarrhea and a cough for about a week.  She has a grandson and son who are ill as well.  She woke up this morning and she said her eyes were swollen and she had a little bit of laryngitis.  However she also experienced chest pain.  She states that the pressure in her chest.  It is worse with coughing.  It is a constant pressure.  Patient denies any previous cardiac history.  She does have a history of hypertension.  She has been taking her medicines as directed but has noticed her blood pressures been elevated over the last few days.  Denies any blood in her sputum.  No leg swelling or calf pain.  Denies dizziness or syncope.    REVIEW OF SYSTEMS  See HPI for further details. All other systems are negative.     PAST MEDICAL HISTORY  Past Medical History:   Diagnosis Date    ASTHMA     Diverticulosis     Duodenal ulcer, unspecified as acute or chronic, without hemorrhage, perforation, or obstruction     GERD (gastroesophageal reflux disease)     Hiatal hernia     High cholesterol     Hypertension     Psychiatric problem        FAMILY HISTORY  [unfilled]    SOCIAL HISTORY  Social History     Socioeconomic History    Marital status:    Tobacco Use    Smoking status: Never    Smokeless tobacco: Never   Vaping Use    Vaping Use: Never used   Substance and Sexual Activity    Alcohol use: Yes    Drug use: Not Currently       SURGICAL HISTORY  Past Surgical History:   Procedure Laterality Date    SUBMANDIBLE ABSCESS INCISION AND DRAINAGE  11/19/2016    Procedure: SUBMANDIBLE ABSCESS INCISION AND DRAINAGE;  Surgeon: Lior Hutchison D.D.S.;  Location: SURGERY [facility]  "ORS;  Service:     DENTAL EXTRACTION(S)  11/19/2016    Procedure: DENTAL EXTRACTION(S);  Surgeon: Lior Hutchison D.D.S.;  Location: [facility];  Service:     LACRIMAL DUCT PROBE  3/11/2015    Performed by Alo Cheatham M.D. at SURGERY [facility] ORS    ANKLE ARTHROSCOPY  7/31/2013    Performed by Paco Clifton M.D. at SURGERY [facility] ORS    HARDWARE REMOVAL ORTHO  7/31/2013    Performed by Paco Clifton M.D. at SURGERY [facility] ORS    BLADDER SUSPENSION      BOWEL RESECTION      COLONOSCOPY      with removal of pollyps    HYSTERECTOMY RADICAL      OTHER ORTHOPEDIC SURGERY      L ankle       CURRENT MEDICATIONS   Home Medications    **Home medications have not yet been reviewed for this encounter**         ALLERGIES  Allergies   Allergen Reactions    Nkda [No Known Drug Allergy]        PHYSICAL EXAM  VITAL SIGNS: BP (!) 195/91   Pulse 60   Temp 36.7 °C (98.1 °F) (Temporal)   Resp 19   Ht 1.651 m (5' 5\")   Wt 83.6 kg (184 lb 4.9 oz)   SpO2 95%   BMI 30.67 kg/m²       Constitutional:  Well developed, No acute distress, Non-toxic appearance.   HENT: Normocephalic, Atraumatic, Bilateral external ears normal, Nose normal. Normal voice  Eyes: PERRL, EOMI, Conjunctiva normal, No discharge.  No swelling to the eyelids noted.  Neck: Normal range of motion, No tenderness, Supple, No stridor.   Cardiovascular: Normal heart rate, Normal rhythm  Thorax & Lungs: Normal breath sounds, No respiratory distress,  No wheezing, No chest tenderness.   Abdomen: Benign abdominal exam, no guarding no rebound, no pulsatile mass, no tenderness, no distention  Skin: Warm, Dry, No erythema, No rash.   Back: No tenderness, No CVA tenderness.   Extremities: Intact distal pulses, No edema, No tenderness   Neurologic: Alert & oriented x 3, Normal motor function, Normal sensory function, No focal deficits noted.   Psychiatric: appropriate      Labs  Results for orders placed or performed during the hospital " encounter of 11/16/22   Comp Metabolic Panel   Result Value Ref Range    Sodium 140 135 - 145 mmol/L    Potassium 4.0 3.6 - 5.5 mmol/L    Chloride 106 96 - 112 mmol/L    Co2 22 20 - 33 mmol/L    Anion Gap 12.0 7.0 - 16.0    Glucose 104 (H) 65 - 99 mg/dL    Bun 14 8 - 22 mg/dL    Creatinine 0.91 0.50 - 1.40 mg/dL    Calcium 10.6 (H) 8.5 - 10.5 mg/dL    AST(SGOT) 24 12 - 45 U/L    ALT(SGPT) 24 2 - 50 U/L    Alkaline Phosphatase 112 (H) 30 - 99 U/L    Total Bilirubin 0.6 0.1 - 1.5 mg/dL    Albumin 4.5 3.2 - 4.9 g/dL    Total Protein 7.2 6.0 - 8.2 g/dL    Globulin 2.7 1.9 - 3.5 g/dL    A-G Ratio 1.7 g/dL   CBC with Differential   Result Value Ref Range    WBC 6.7 4.8 - 10.8 K/uL    RBC 4.91 4.20 - 5.40 M/uL    Hemoglobin 15.7 12.0 - 16.0 g/dL    Hematocrit 47.6 (H) 37.0 - 47.0 %    MCV 96.9 81.4 - 97.8 fL    MCH 32.0 27.0 - 33.0 pg    MCHC 33.0 (L) 33.6 - 35.0 g/dL    RDW 45.0 35.9 - 50.0 fL    Platelet Count 333 164 - 446 K/uL    MPV 9.4 9.0 - 12.9 fL    Neutrophils-Polys 53.80 44.00 - 72.00 %    Lymphocytes 33.50 22.00 - 41.00 %    Monocytes 7.30 0.00 - 13.40 %    Eosinophils 4.20 0.00 - 6.90 %    Basophils 0.90 0.00 - 1.80 %    Immature Granulocytes 0.30 0.00 - 0.90 %    Nucleated RBC 0.00 /100 WBC    Neutrophils (Absolute) 3.63 2.00 - 7.15 K/uL    Lymphs (Absolute) 2.26 1.00 - 4.80 K/uL    Monos (Absolute) 0.49 0.00 - 0.85 K/uL    Eos (Absolute) 0.28 0.00 - 0.51 K/uL    Baso (Absolute) 0.06 0.00 - 0.12 K/uL    Immature Granulocytes (abs) 0.02 0.00 - 0.11 K/uL    NRBC (Absolute) 0.00 K/uL   Troponins NOW   Result Value Ref Range    Troponin T 16 6 - 19 ng/L   CoV-2, FLU A/B, and RSV by PCR (2-4 Hours CEPHEID) : Collect NP swab in VTM    Specimen: Respirate   Result Value Ref Range    Influenza virus A RNA Negative Negative    Influenza virus B, PCR Negative Negative    RSV, PCR Negative Negative    SARS-CoV-2 by PCR NotDetected     SARS-CoV-2 Source NP Swab    ESTIMATED GFR   Result Value Ref Range    GFR (CKD-EPI)  67 >60 mL/min/1.73 m 2   D-DIMER   Result Value Ref Range    D-Dimer Screen 1.01 (H) 0.00 - 0.50 ug/mL (FEU)   EKG (NOW)   Result Value Ref Range    Report       [facility] Emergency Dept.    Test Date:  2022  Pt Name:    DOROTA AGUIRRE             Department: ER  MRN:        [id number]                      Room:  Gender:     Female                       Technician: TCM  :        [id number]                   Requested By:ER TRIAGE PROTOCOL  Order #:    [id number]                    Reading MD: Alesha Alexis MD    Measurements  Intervals                                Axis  Rate:       61                           P:          264  NJ:         142                          QRS:        -21  QRSD:       126                          T:          45  QT:         451  QTc:        455    Interpretive Statements  Sinus or ectopic atrial rhythm  Probable left ventricular hypertrophy  Anterior Q waves, possibly due to LVH  Compared to ECG 2022 17:12:44  Ectopic atrial rhythm now present  Left ventricular hypertrophy now present  Q waves now present  Sinus rhythm no longer present  ST (T wave) deviation no longer present   T-wave abnormality no longer present  Electronically Signed On 2022 11:50:56 PST by Alesha Alexis MD         RADIOLOGY/PROCEDURES  DX-CHEST-PORTABLE (1 VIEW)   Final Result      1.  No acute cardiac or pulmonary abnormalities are identified.          COURSE & MEDICAL DECISION MAKING  Pertinent Labs & Imaging studies reviewed. (See chart for details)  Patient presents to the emergency department with chest pain.  She describes it as a pressure.  She is also had URI type symptoms including cough fever and a sore throat.  Recent exposure to family who are also ill.  Patient is not hypoxic here in acute respiratory distress.  EKG shows no evidence of acute MI.  She has some nonspecific changes.  Her blood pressure is elevated here.  She is not having any ripping or  tearing back pain to suggest dissection.  We will obtain a chest x-ray evaluate for possible pneumonia.  We will also check laboratory studies as well as flu and COVID.    Laboratory studies have returned.  Patient is a normal white count.  No anemia.  Glucose was 104.  No significant electrolyte abnormalities.  Troponin is 16.  EKG shows some nonspecific changes but no evidence of acute MI.    Patient's heart score is 5.  Patient will be hospitalized for further evaluation of her chest pressure.  She also continues to have elevated blood pressure.  We will continue to monitor this although I do not see any signs of endorgan damage except for some mild LVH on her EKG.  She does not have any active chest pressure.  COVID and flu are still pending.    Patient will be hospitalized and monitored for her chest pressure.  Patient has also had 1 episode where her oxygen dipped into the 80s.  She took some deep breaths and it went back up.  We will add a D-dimer.  Chest x-ray was unremarkable.    Discussed the case with the [facility] medicine team.  D-dimer still pending at time of admission.    Patient hospitalized in guarded condition.    FINAL IMPRESSION     1. Acute chest pain    2. Upper respiratory tract infection, unspecified type             Electronically signed by: Alesha Saldivar M.D., 11/16/2022 11:46 AM

## 2022-11-16 NOTE — H&P
Hospital Medicine History & Physical Note    Date of Service  11/16/2022    Primary Care Physician  Leonard Camargo M.D.    Consultants  None    Specialist Names: N/A    Code Status  Full Code    Chief Complaint  Chief Complaint   Patient presents with    Chest Pain     Starting 0700    Headache     Woke her form a sleep 0700    Shortness of Breath     SOB 1x week with a cough       History of Presenting Illness  This is a 72-year-old female with past medical history of hypertension, hyperlipidemia, prediabetic A1C 5.8, RAMIRO, alcohol use disorder, depression, GERD, who presented to the ER on 11/16/2022 with cough, sore throat, subjective fevers over the past week and chest pain x1 day.    Patient reports that she has been having fatigue, malaise, weakness, sore throat, productive cough with white sputum, poor oral intake, diarrhea over the past week.  She admits there are sick contacts at home to have similar symptoms.  Chest pain started started this morning after patient had a coughing episode.  She reports her coughing episodes have been getting worse resulting in almost dry heaving.  Patient has been not taking anything for her fevers or cough.    Patient does have sick contacts at home.  Patient is currently on room air, influenza, RSV, COVID-19 negative. CXR with no evidence of pleural effusion or infiltrate.  Patient is vaccinated against COVID-19 x1, received flu vaccine 3 weeks ago, and no pneumonia vaccine this year or shingles vaccine.     Troponin is negative, EKG with no evidence of acute ischemia.  Echocardiogram ordered.  Serial troponins. Ddimer negative when age adjusted.  Chest pain is reproducible on exam on palpation and when patient was undergoing echocardiogram pain was reproduced.  Added ESR and CRP given pain worsening on deep inspiration.  Patient reports history of pericarditis about 30 years ago.      I discussed the plan of care with patient and bedside RN.    Review of Systems  Review  of Systems   Constitutional:  Positive for malaise/fatigue.   Respiratory:  Positive for cough, sputum production and shortness of breath.    Cardiovascular:  Positive for chest pain.   Neurological:  Positive for weakness.   All other systems reviewed and are negative.    Past Medical History   has a past medical history of ASTHMA, Diverticulosis, Duodenal ulcer, unspecified as acute or chronic, without hemorrhage, perforation, or obstruction, GERD (gastroesophageal reflux disease), Hiatal hernia, High cholesterol, Hypertension, and Psychiatric problem.    Surgical History   has a past surgical history that includes bladder suspension; bowel resection; colonoscopy; hysterectomy radical; other orthopedic surgery; ankle arthroscopy (7/31/2013); hardware removal ortho (7/31/2013); lacrimal duct probe (3/11/2015); submandible abscess incision and drainage (11/19/2016); and dental extraction(s) (11/19/2016).     Family History  family history includes No Known Problems in her mother; Other (age of onset: 57) in her father.   Family history reviewed with patient. There is no family history that is pertinent to the chief complaint.     Social History   reports that she has never smoked. She has never used smokeless tobacco. She reports current alcohol use. She reports that she does not currently use drugs.    Allergies  Allergies   Allergen Reactions    Nkda [No Known Drug Allergy]        Medications  Prior to Admission Medications   Prescriptions Last Dose Informant Patient Reported? Taking?   DULoxetine (CYMBALTA) 20 MG Cap DR Particles   No No   Sig: Take 1 Capsule by mouth every day.   albuterol 108 (90 Base) MCG/ACT Aero Soln inhalation aerosol   No No   Sig: Inhale 2 Puffs by mouth every 6 hours as needed for Shortness of Breath.   amLODIPine (NORVASC) 5 MG Tab   No No   Sig: Take 2 Tablets by mouth every day.   aspirin EC (ECOTRIN) 81 MG Tablet Delayed Response   No No   Sig: Take 1 Tab by mouth every day.    diclofenac sodium (VOLTAREN) 1 % Gel   No No   Sig: Apply 2 g topically 4 times a day as needed (Knee pain .).   lisinopril (PRINIVIL) 10 MG Tab   No No   Sig: Take 2 Tablets by mouth every day.   omeprazole (PRILOSEC) 20 MG delayed-release capsule   Yes No   Sig: TAKE 1 CAPSULE BY MOUTH TWICE DAILY FOR 90 DAYS      Facility-Administered Medications: None       Physical Exam  Temp:  [36.7 °C (98.1 °F)] 36.7 °C (98.1 °F)  Pulse:  [60] 60  Resp:  [19-22] 20  BP: (185-217)/(77-91) 185/77  SpO2:  [95 %-97 %] 95 %  Blood Pressure : (!) 185/77   Temperature: 36.7 °C (98.1 °F)   Pulse: 60   Respiration: 20   Pulse Oximetry: 95 %       Physical Exam  Vitals and nursing note reviewed.   Constitutional:       General: She is not in acute distress.     Appearance: She is ill-appearing.   HENT:      Head: Normocephalic and atraumatic.      Mouth/Throat:      Mouth: Mucous membranes are moist.      Pharynx: No oropharyngeal exudate.   Eyes:      Extraocular Movements: Extraocular movements intact.      Pupils: Pupils are equal, round, and reactive to light.   Cardiovascular:      Rate and Rhythm: Normal rate and regular rhythm.      Pulses: Normal pulses.      Heart sounds: No murmur heard.    No friction rub. No gallop.   Pulmonary:      Effort: Pulmonary effort is normal. No respiratory distress.      Breath sounds: No wheezing, rhonchi or rales.   Abdominal:      General: Bowel sounds are normal. There is no distension.      Palpations: Abdomen is soft. There is no mass.      Tenderness: There is no abdominal tenderness.   Musculoskeletal:         General: No swelling or tenderness. Normal range of motion.      Cervical back: Normal range of motion. No rigidity. No muscular tenderness.      Right lower leg: No edema.      Left lower leg: No edema.   Skin:     General: Skin is warm and dry.      Capillary Refill: Capillary refill takes less than 2 seconds.      Findings: No erythema or rash.   Neurological:      General: No  focal deficit present.      Mental Status: She is alert and oriented to person, place, and time.      Motor: No weakness.      Gait: Gait normal.       Laboratory:  Recent Labs     11/16/22  1103   WBC 6.7   RBC 4.91   HEMOGLOBIN 15.7   HEMATOCRIT 47.6*   MCV 96.9   MCH 32.0   MCHC 33.0*   RDW 45.0   PLATELETCT 333   MPV 9.4     Recent Labs     11/16/22  1103   SODIUM 140   POTASSIUM 4.0   CHLORIDE 106   CO2 22   GLUCOSE 104*   BUN 14   CREATININE 0.91   CALCIUM 10.6*     Recent Labs     11/16/22  1103   ALTSGPT 24   ASTSGOT 24   ALKPHOSPHAT 112*   TBILIRUBIN 0.6   GLUCOSE 104*         No results for input(s): NTPROBNP in the last 72 hours.      Recent Labs     11/16/22  1103   TROPONINT 16       Imaging:  EC-ECHOCARDIOGRAM COMPLETE W/O CONT         DX-CHEST-PORTABLE (1 VIEW)   Final Result      1.  No acute cardiac or pulmonary abnormalities are identified.      IE-[id number] (WITH 1-VIEW CXR) LEFT    (Results Pending)       X-Ray:  I have personally reviewed the images and compared with prior images.  EKG:  I have personally reviewed the images and compared with prior images.    Assessment/Plan:  Justification for Admission Status  I anticipate this patient is appropriate for observation status at this time because will require serial troponins, telemetry monitoring and echocardiogram.    Patient will need a  bed on telemetry service .  The need is secondary to will require serial troponins, telemetry monitoring and echocardiogram.    * Pleuritic chest pain  Assessment & Plan  Patient has been experiencing URI symptoms for the past week associated with productive white sputum excessive coughing resulting in dry heaving, diarrhea  Chest pain is reproducible on palpation, worse on inspiration  Troponins negative  EKG with no evidence of acute ischemia  Echocardiogram pending  D-dimer when age corrected is negative  ESR and CRP pending -given viral prodrome, want to rule out pericarditis  Patient reports having  pericarditis about 30 years ago    Viral URI with cough  Assessment & Plan  Symptomatic management  Strep, Legionella sent    Hypercalcemia  Assessment & Plan  Corrected calcium is 10.2  Monitor  Serial BMP    Hypertensive urgency  Assessment & Plan  Patient reports she was not taking her amlodipine correctly  Increased her lisinopril  Restart her amlodipine    Recurrent major depressive disorder (HCC)- (present on admission)  Assessment & Plan  Resume home medications    Alcohol abuse- (present on admission)  Assessment & Plan  Patient formally consumed 1 bottle of wine per day last consumed about 2-3 weeks ago  Patient had a few shots of hard liquor greater than a week ago   No concern for any active alcoholic withdrawal    HTN (hypertension)- (present on admission)  Assessment & Plan  Increase patient's lisinopril and restarted her amlodipine    Hyperlipidemia- (present on admission)  Assessment & Plan  Increase her risk statin therapy based off of lipid panel ordered by PCP    GERD (gastroesophageal reflux disease)- (present on admission)  Assessment & Plan  Resume PPI      VTE prophylaxis: SCDs/TEDs and heparin ppx

## 2022-11-16 NOTE — ASSESSMENT & PLAN NOTE
Patient has been experiencing URI symptoms for the past week associated with productive white sputum excessive coughing resulting in dry heaving, diarrhea  Chest pain is reproducible on palpation, worse on inspiration  Troponins negative  EKG with no evidence of acute ischemia  Echocardiogram pending  D-dimer when age corrected is negative  ESR and CRP pending -given viral prodrome, want to rule out pericarditis  Patient reports having pericarditis about 30 years ago

## 2022-11-16 NOTE — ASSESSMENT & PLAN NOTE
Patient reports she was not taking her amlodipine correctly  Increased her lisinopril  Restart her amlodipine

## 2022-11-17 ENCOUNTER — APPOINTMENT (OUTPATIENT)
Dept: RADIOLOGY | Facility: MEDICAL CENTER | Age: 72
End: 2022-11-17
Attending: GENERAL PRACTICE
Payer: MEDICARE

## 2022-11-17 VITALS
BODY MASS INDEX: 30.78 KG/M2 | TEMPERATURE: 98.7 F | HEIGHT: 65 IN | DIASTOLIC BLOOD PRESSURE: 72 MMHG | SYSTOLIC BLOOD PRESSURE: 152 MMHG | OXYGEN SATURATION: 95 % | RESPIRATION RATE: 18 BRPM | HEART RATE: 77 BPM | WEIGHT: 184.75 LBS

## 2022-11-17 PROBLEM — I16.0 HYPERTENSIVE URGENCY: Status: RESOLVED | Noted: 2022-11-16 | Resolved: 2022-11-17

## 2022-11-17 PROBLEM — E83.52 HYPERCALCEMIA: Status: RESOLVED | Noted: 2022-11-16 | Resolved: 2022-11-17

## 2022-11-17 LAB
ANION GAP SERPL CALC-SCNC: 11 MMOL/L (ref 7–16)
BUN SERPL-MCNC: 11 MG/DL (ref 8–22)
CALCIUM SERPL-MCNC: 9.3 MG/DL (ref 8.5–10.5)
CHLORIDE SERPL-SCNC: 107 MMOL/L (ref 96–112)
CO2 SERPL-SCNC: 21 MMOL/L (ref 20–33)
CREAT SERPL-MCNC: 0.62 MG/DL (ref 0.5–1.4)
GFR SERPLBLD CREATININE-BSD FMLA CKD-EPI: 94 ML/MIN/1.73 M 2
GLUCOSE SERPL-MCNC: 90 MG/DL (ref 65–99)
POTASSIUM SERPL-SCNC: 3.5 MMOL/L (ref 3.6–5.5)
SODIUM SERPL-SCNC: 139 MMOL/L (ref 135–145)
TROPONIN T SERPL-MCNC: 13 NG/L (ref 6–19)
TSH SERPL DL<=0.005 MIU/L-ACNC: 1.86 UIU/ML (ref 0.38–5.33)

## 2022-11-17 PROCEDURE — 700102 HCHG RX REV CODE 250 W/ 637 OVERRIDE(OP): Performed by: NURSE PRACTITIONER

## 2022-11-17 PROCEDURE — 96372 THER/PROPH/DIAG INJ SC/IM: CPT | Mod: XU

## 2022-11-17 PROCEDURE — 99217 PR OBSERVATION CARE DISCHARGE: CPT | Mod: FS | Performed by: NURSE PRACTITIONER

## 2022-11-17 PROCEDURE — G0378 HOSPITAL OBSERVATION PER HR: HCPCS

## 2022-11-17 PROCEDURE — A9502 TC99M TETROFOSMIN: HCPCS

## 2022-11-17 PROCEDURE — 80048 BASIC METABOLIC PNL TOTAL CA: CPT

## 2022-11-17 PROCEDURE — 84443 ASSAY THYROID STIM HORMONE: CPT

## 2022-11-17 PROCEDURE — 36415 COLL VENOUS BLD VENIPUNCTURE: CPT

## 2022-11-17 PROCEDURE — A9270 NON-COVERED ITEM OR SERVICE: HCPCS | Performed by: NURSE PRACTITIONER

## 2022-11-17 PROCEDURE — A9270 NON-COVERED ITEM OR SERVICE: HCPCS | Performed by: GENERAL PRACTICE

## 2022-11-17 PROCEDURE — 700102 HCHG RX REV CODE 250 W/ 637 OVERRIDE(OP): Performed by: GENERAL PRACTICE

## 2022-11-17 PROCEDURE — 84484 ASSAY OF TROPONIN QUANT: CPT

## 2022-11-17 PROCEDURE — 700111 HCHG RX REV CODE 636 W/ 250 OVERRIDE (IP)

## 2022-11-17 PROCEDURE — 700111 HCHG RX REV CODE 636 W/ 250 OVERRIDE (IP): Performed by: GENERAL PRACTICE

## 2022-11-17 RX ORDER — REGADENOSON 0.08 MG/ML
INJECTION, SOLUTION INTRAVENOUS
Status: DISCONTINUED
Start: 2022-11-17 | End: 2022-11-17 | Stop reason: HOSPADM

## 2022-11-17 RX ORDER — AMINOPHYLLINE 25 MG/ML
100 INJECTION, SOLUTION INTRAVENOUS
Status: DISCONTINUED | OUTPATIENT
Start: 2022-11-17 | End: 2022-11-17 | Stop reason: HOSPADM

## 2022-11-17 RX ORDER — FAMOTIDINE 20 MG/1
20 TABLET, FILM COATED ORAL 2 TIMES DAILY
Status: DISCONTINUED | OUTPATIENT
Start: 2022-11-17 | End: 2022-11-17 | Stop reason: HOSPADM

## 2022-11-17 RX ORDER — ATORVASTATIN CALCIUM 80 MG/1
80 TABLET, FILM COATED ORAL EVERY EVENING
Qty: 30 TABLET | Refills: 3 | Status: SHIPPED | OUTPATIENT
Start: 2022-11-17 | End: 2023-04-17 | Stop reason: SDUPTHER

## 2022-11-17 RX ORDER — LISINOPRIL 20 MG/1
20 TABLET ORAL DAILY
Qty: 30 TABLET | Refills: 3 | Status: SHIPPED | OUTPATIENT
Start: 2022-11-17 | End: 2023-04-17 | Stop reason: SDUPTHER

## 2022-11-17 RX ORDER — DEXTROMETHORPHAN HBR. AND GUAIFENESIN 10; 100 MG/5ML; MG/5ML
10 SOLUTION ORAL EVERY 6 HOURS PRN
Qty: 500 ML | Refills: 0 | Status: SHIPPED | OUTPATIENT
Start: 2022-11-17 | End: 2023-02-23

## 2022-11-17 RX ORDER — SIMETHICONE 125 MG
125 TABLET,CHEWABLE ORAL 3 TIMES DAILY PRN
Status: DISCONTINUED | OUTPATIENT
Start: 2022-11-17 | End: 2022-11-17 | Stop reason: HOSPADM

## 2022-11-17 RX ORDER — REGADENOSON 0.08 MG/ML
0.4 INJECTION, SOLUTION INTRAVENOUS ONCE
Status: DISCONTINUED | OUTPATIENT
Start: 2022-11-17 | End: 2022-11-17 | Stop reason: HOSPADM

## 2022-11-17 RX ORDER — ACETAMINOPHEN 325 MG/1
650 TABLET ORAL EVERY 6 HOURS PRN
Qty: 30 TABLET | Refills: 0 | COMMUNITY
Start: 2022-11-17

## 2022-11-17 RX ORDER — AMLODIPINE BESYLATE 10 MG/1
10 TABLET ORAL DAILY
Qty: 30 TABLET | Refills: 3 | Status: SHIPPED | OUTPATIENT
Start: 2022-11-18 | End: 2022-11-22 | Stop reason: SDUPTHER

## 2022-11-17 RX ORDER — POTASSIUM CHLORIDE 20 MEQ/1
40 TABLET, EXTENDED RELEASE ORAL ONCE
Status: COMPLETED | OUTPATIENT
Start: 2022-11-17 | End: 2022-11-17

## 2022-11-17 RX ADMIN — POTASSIUM CHLORIDE 40 MEQ: 1500 TABLET, EXTENDED RELEASE ORAL at 10:24

## 2022-11-17 RX ADMIN — DULOXETINE HYDROCHLORIDE 20 MG: 20 CAPSULE, DELAYED RELEASE ORAL at 05:49

## 2022-11-17 RX ADMIN — FAMOTIDINE 20 MG: 20 TABLET, FILM COATED ORAL at 10:23

## 2022-11-17 RX ADMIN — HEPARIN SODIUM 5000 UNITS: 5000 INJECTION, SOLUTION INTRAVENOUS; SUBCUTANEOUS at 05:48

## 2022-11-17 RX ADMIN — ACETAMINOPHEN 650 MG: 325 TABLET, FILM COATED ORAL at 05:58

## 2022-11-17 RX ADMIN — OMEPRAZOLE 20 MG: 20 CAPSULE, DELAYED RELEASE ORAL at 05:49

## 2022-11-17 RX ADMIN — SIMETHICONE 125 MG: 125 TABLET, CHEWABLE ORAL at 10:30

## 2022-11-17 RX ADMIN — GUAIFENESIN 1200 MG: 600 TABLET, EXTENDED RELEASE ORAL at 05:49

## 2022-11-17 RX ADMIN — AMLODIPINE BESYLATE 10 MG: 10 TABLET ORAL at 05:49

## 2022-11-17 RX ADMIN — ASPIRIN 81 MG: 81 TABLET, COATED ORAL at 05:49

## 2022-11-17 RX ADMIN — REGADENOSON 0.4 MG: 0.08 INJECTION, SOLUTION INTRAVENOUS at 08:30

## 2022-11-17 RX ADMIN — LISINOPRIL 40 MG: 20 TABLET ORAL at 05:49

## 2022-11-17 ASSESSMENT — PAIN DESCRIPTION - PAIN TYPE: TYPE: ACUTE PAIN

## 2022-11-17 NOTE — PROGRESS NOTES
Report successfully received, assuming care. Pt in bed resting, no complaints or request at this time. Discussed plan of care & shift goals.     Personal items and call light within reach, bed locked and in lowest position, bed alarm on.   Will continue to monitor.

## 2022-11-17 NOTE — PROGRESS NOTES
"Report received from night ER RN. Assume care. Pt. AAOx4 pt is bed,  Assessment completed. VSS. Denies CP  pain, only when coughing some pressure to chest. \"I feel a lot better than when I came in\" able to walk to bathroom with steady gait, good CMS and pulses to anyi LE, denies numbness and tingling.  Pt was update for the care for the day. White board updated, All question answered. Pt has call light within reach,  bed is in the lowest position. Pt has no other needs at this time.    "

## 2022-11-17 NOTE — DISCHARGE SUMMARY
Discharge Summary    CHIEF COMPLAINT ON ADMISSION  Chief Complaint   Patient presents with    Chest Pain     Starting 0700    Headache     Woke her form a sleep 0700    Shortness of Breath     SOB 1x week with a cough       Reason for Admission  chest pain     Admission Date  11/16/2022    CODE STATUS  Full Code    HPI & HOSPITAL COURSE  This is a 72 y.o. female here with past medical history of hypertension, hyperlipidemia,, RAMIRO, alcohol use disorder, depression, GERD, who presented to the ER on 11/16/2022 with cough, sore throat, subjective fevers over the past week and chest pain x1 day.     Patient reports that she has been having fatigue, malaise, weakness, sore throat, productive cough with white sputum, poor oral intake, diarrhea over the past week.     Patient began having chest pain this morning after a severe coughing episode.  She reports her coughing episodes have been getting worse resulting in almost dry heaving.  Patient has been not taking anything for her fevers or cough.     Serial troponins were negative, EKG did not demonstrate any evidence of acute ischemia, echocardiogram demonstrated normal left ventricular systolic function, ejection fraction 55 to 60%.  Grade 1 diastolic dysfunction was noted, mild mitral valve regurgitation and aortic valve sclerosis without significant stenosis.     Nuclear medicine cardiac stress test demonstrated no evidence of significant jeopardized viable myocardium or prior myocardial infarction, normal left ventricular size ejection fraction and wall motion.       Patient had reproducible musculoskeletal pain on my exam across her mid sternal chest area, highly suspicious for costochondritis.     Therefore, she is discharged in good and stable condition to home with close outpatient follow-up.    The patient recovered much more quickly than anticipated on admission.    Discharge Date  11/17/2022    FOLLOW UP ITEMS POST DISCHARGE      DISCHARGE DIAGNOSES  Principal  Problem:    Pleuritic chest pain POA: Unknown  Active Problems:    GERD (gastroesophageal reflux disease) POA: Yes    Hyperlipidemia POA: Yes    HTN (hypertension) POA: Yes    Alcohol abuse POA: Yes    Recurrent major depressive disorder (HCC) POA: Yes    Neuropathy POA: Yes    Viral URI with cough POA: Unknown  Resolved Problems:    Hypertensive urgency POA: Unknown    Hypercalcemia POA: Unknown      FOLLOW UP  Future Appointments   Date Time Provider Department Center   11/22/2022  1:30 PM Leonard Camargo M.D. [facility]     No follow-up provider specified.    MEDICATIONS ON DISCHARGE     Medication List        START taking these medications        Instructions   acetaminophen 325 MG Tabs  Commonly known as: Tylenol   Take 2 Tablets by mouth every 6 hours as needed for Moderate Pain.  Dose: 650 mg     amLODIPine 10 MG Tabs  Start taking on: November 18, 2022  Commonly known as: NORVASC   Take 1 Tablet by mouth every day.  Dose: 10 mg     atorvastatin 80 MG tablet  Commonly known as: LIPITOR   Take 1 Tablet by mouth every evening.  Dose: 80 mg     guaifenesin dextromethorphan sugar free  MG/5ML Liqd soln  Commonly known as: DIABETIC TUSSIN DM   Take 10 mL by mouth every 6 hours as needed for Cough.  Dose: 10 mL            CHANGE how you take these medications        Instructions   lisinopril 20 MG Tabs  What changed:   medication strength  how much to take  when to take this  Commonly known as: PRINIVIL   Take 1 Tablet by mouth every day.  Dose: 20 mg            CONTINUE taking these medications        Instructions   aspirin EC 81 MG Tbec  Commonly known as: ECOTRIN   Take 1 Tab by mouth every day.  Dose: 81 mg     Centrum Silver 50+Women Tabs   Take 1 Tablet by mouth every morning.  Dose: 1 Tablet     diclofenac sodium 1 % Gel  Commonly known as: Voltaren   Apply 2 g topically 4 times a day as needed (Knee pain .).  Dose: 2 g     DULoxetine 20 MG Cpep  Commonly known as: CYMBALTA   Take 1 Capsule by  mouth every day.  Dose: 20 mg     omeprazole 20 MG delayed-release capsule  Commonly known as: PRILOSEC   Take 1 Capsule by mouth 2 times a day.  Dose: 20 mg              Allergies  Allergies   Allergen Reactions    Nkda [No Known Drug Allergy]        DIET  Orders Placed This Encounter   Procedures    Diet Order Diet: Regular     Standing Status:   Standing     Number of Occurrences:   1     Order Specific Question:   Diet:     Answer:   Regular [1]       ACTIVITY  As tolerated.  Weight bearing as tolerated    CONSULTATIONS  None    PROCEDURES      LABORATORY  Lab Results   Component Value Date    SODIUM 139 11/17/2022    POTASSIUM 3.5 (L) 11/17/2022    CHLORIDE 107 11/17/2022    CO2 21 11/17/2022    GLUCOSE 90 11/17/2022    BUN 11 11/17/2022    CREATININE 0.62 11/17/2022    CREATININE 1.0 03/27/2009        Lab Results   Component Value Date    WBC 6.7 11/16/2022    HEMOGLOBIN 15.7 11/16/2022    HEMATOCRIT 47.6 (H) 11/16/2022    PLATELETCT 333 11/16/2022        Total time of the discharge process required the 31 minutes.

## 2022-11-17 NOTE — PROGRESS NOTES
4 Eyes Skin Assessment Completed by Caho, RN and Nely, RN.    Head WDL  Ears WDL  Nose WDL  Mouth WDL  Neck WDL  Breast/Chest WDL  Shoulder Blades WDL  Spine WDL  (R) Arm/Elbow/Hand WDL  (L) Arm/Elbow/Hand WDL  Abdomen WDL  Groin WDL  Scrotum/Coccyx/Buttocks WDL  (R) Leg WDL  (L) Leg WDL  (R) Heel/Foot/Toe WDL  (L) Heel/Foot/Toe WDL          Devices In Places ECG, Tele Box, Blood Pressure Cuff, and Pulse Ox      Interventions In Place N/A    Possible Skin Injury No    Pictures Uploaded Into Epic N/A  Wound Consult Placed N/A  RN Wound Prevention Protocol Ordered No

## 2022-11-17 NOTE — CARE PLAN
Problem: Psychosocial  Goal: Patient's level of anxiety will decrease  Outcome: Progressing     Problem: Pain - Standard  Goal: Alleviation of pain or a reduction in pain to the patient’s comfort goal  Outcome: Progressing   The patient is Stable - Low risk of patient condition declining or worsening    Shift Goals  Clinical Goals: stress test by 1000  Patient Goals: go home today    Progress made toward(s) clinical / shift goals:  Yes Pt is doing her stress test at this time    Patient is not progressing towards the following goals:

## 2022-11-17 NOTE — PROGRESS NOTES
Report received from night shift RN. Assume care. Pt. AAOx4 pt is bed,  Assessment completed. VSS. Denies chest pain, SOB only discomfort to L shoulder, able to wiggle toes and dorsi/plantar flex feet, good CMS and pulses to anyi LE, denies numbness and tingling to all extremities .  Pt was update for the care for the day. White board updated, All question answered. Pt has call light within reach,  bed is in the lowest position. Pt has no other needs at this time.      0741 Pt leaving to get Stress test done.  1000 Pt back from getting stress test done   [facility] EMERGENCY DEPT      CHIEF COMPLAINT       Chief Complaint   Patient presents with    Hypertension     went to get teeth pulled and bp was to high to get them pulled       Nurses Notes reviewed and I agree except as noted in the HPI. HISTORY OF PRESENT ILLNESS    Maria Del Rosario Shaw is a 36 y.o. male who presents for blood pressure medication. 6 days ago patient went to the dentist to get his teeth pulled. Dentist would not pull his teeth secondary to elevated blood pressure. Wife states patient has had hypertension for many years. Patient was put on hypertension medication along with Chantix sometime ago. He stopped both medications because he experienced adverse drug reaction of dizziness and feeling overheated. Patient would like to get on hypertension medication so he can get his teeth pulled. He went to urgent care for that purpose and was sent to the emergency department because they were uncomfortable putting him on medication. Blood pressure at urgent care was 203/127. Patient has no complaints. He denies headache, vision changes, chest pain, dyspnea, dizziness, diaphoresis, or urinary difficulty. REVIEW OF SYSTEMS     Review of Systems   Constitutional: Negative for chills, diaphoresis and fever. Eyes: Negative for visual disturbance. Respiratory: Negative for shortness of breath. Cardiovascular: Negative for chest pain and leg swelling. Gastrointestinal: Negative for abdominal pain, nausea and vomiting. Genitourinary: Negative for decreased urine volume and difficulty urinating. Musculoskeletal: Negative for gait problem and neck stiffness. Skin: Negative for rash. Neurological: Negative for dizziness, weakness, light-headedness and headaches. Psychiatric/Behavioral: Negative for confusion. The patient is not nervous/anxious. PAST MEDICAL HISTORY    has no past medical history on file.     SURGICAL HISTORY      has no past surgical history on file.    CURRENT MEDICATIONS       Current Discharge Medication List      CONTINUE these medications which have NOT CHANGED    Details   AMOXICILLIN PO Take by mouth      acetaminophen (TYLENOL) 500 MG tablet Take 500 mg by mouth every 6 hours as needed for Pain      ibuprofen (ADVIL;MOTRIN) 400 MG tablet Take 400 mg by mouth every 6 hours as needed for Pain             ALLERGIES     has No Known Allergies. FAMILY HISTORY     has no family status information on file. family history is not on file. SOCIAL HISTORY    reports that he has been smoking. He has been smoking about 1.00 pack per day. He has never used smokeless tobacco. He reports that he does not drink alcohol or use drugs. PHYSICAL EXAM     INITIAL VITALS:  height is 6' 1\" (1.854 m) and weight is 270 lb (122.5 kg). His temporal temperature is 97.6 °F (36.4 °C). His blood pressure is 185/118 (abnormal) and his pulse is 94. His respiration is 16 and oxygen saturation is 99%. Physical Exam  Vitals signs and nursing note reviewed. Constitutional:       General: He is not in acute distress. Appearance: He is well-developed. He is not toxic-appearing or diaphoretic. HENT:      Head: Normocephalic and atraumatic. Right Ear: Hearing normal.      Left Ear: Hearing normal.      Nose: Nose normal. No rhinorrhea. Mouth/Throat:      Pharynx: Uvula midline. No oropharyngeal exudate. Eyes:      General: Lids are normal. No scleral icterus. Conjunctiva/sclera: Conjunctivae normal.      Pupils: Pupils are equal, round, and reactive to light. Neck:      Musculoskeletal: Normal range of motion and neck supple. No neck rigidity. Trachea: No tracheal deviation. Cardiovascular:      Rate and Rhythm: Normal rate and regular rhythm. Heart sounds: Normal heart sounds. No murmur. Pulmonary:      Effort: Pulmonary effort is normal. No respiratory distress. Breath sounds: Normal breath sounds. No stridor.  No decreased breath sounds or wheezing. Abdominal:      General: There is no distension. Palpations: Abdomen is soft. Abdomen is not rigid. Tenderness: There is no abdominal tenderness. There is no guarding. Musculoskeletal: Normal range of motion. Lymphadenopathy:      Cervical: No cervical adenopathy. Skin:     General: Skin is warm and dry. Coloration: Skin is not pale. Findings: No rash. Neurological:      Mental Status: He is alert and oriented to person, place, and time. GCS: GCS eye subscore is 4. GCS verbal subscore is 5. GCS motor subscore is 6. Gait: Gait normal.      Comments: No gross deficits observed   Psychiatric:         Mood and Affect: Mood normal.         Speech: Speech normal.         Behavior: Behavior normal.         Thought Content: Thought content normal.         DIFFERENTIAL DIAGNOSIS:   Including but not limited to: Uncontrolled hypertension, obesity, dental caries,    DIAGNOSTIC RESULTS     EKG: All E[facility]'s are [facility] who either signs or Co-signs this chart in the absence of a cardiologist.  Declined    RADIOLOGY: non-plain film images(s) such as CT,Ultrasound and MRI are read by the radiologist.  Plain radiographic images are visualized and preliminarily interpreted by the emergency physician unless otherwise stated below. No orders to display       LABS:   Labs Reviewed - No data to display    EMERGENCY DEPARTMENT COURSE:   Vitals:    Vitals:    03/02/21 1257 03/02/21 1317 03/02/21 1446   BP: (!) 210/125 (!) 203/127 (!) 185/118   Pulse: 85  94   Resp: 16  16   Temp: 97.6 °F (36.4 °C)     TempSrc: Temporal     SpO2: 99%  99%   Weight: 270 lb (122.5 kg)     Height: 6' 1\" (1.854 m)         MDM:  Patient was seen by me in the emergency department with request for hypertensive medication. Patient has known uncontrolled hypertension which is now become a problem since he is unable to get his teeth pulled.   He was sent here from urgent care for evaluation. Patient has no complaints. Vital signs reviewed and patient was found to be hypertensive at 185/118. Vital signs otherwise stable. Old records were reviewed. I offered EKG labs and imaging to the patient which he declined. He had no complaints and felt completely fine. Education given in regards to uncontrolled hypertension. I discussed this patient with my attending physician Dr. Tyler Santana who agreed patient can be discharged and advised Norvasc. I discussed Norvasc dosage with our pharmacist Dr. Mihaela Gonzalez who advised starting the patient on 5 mg daily. Patient was comfortable with plan of discharge home and follow-up at [facility]. I was unable to make the patient appointment because he works and cannot attend an appointment until 4 PM.  Patient instructed to call and make the soonest available proximal appointment. I have given the patient strict written and verbal instructions about care at home, follow-up, and signs and symptoms of worsening of condition and they did verbalize understanding. CRITICAL CARE:   None    CONSULTS:  None    PROCEDURES:  None    FINAL IMPRESSION      1. Uncontrolled hypertension    2. Asymptomatic hypertension          DISPOSITION/PLAN     1. Uncontrolled hypertension    2. Asymptomatic hypertension        PATIENT REFERRED TO:  [address] [place] [address],[address] [address] [facility] [place] [address] [facility]  Schedule an appointment as soon as possible for a visit   Please arrive 15 minutes early for paperwork.       DISCHARGE MEDICATIONS:  Current Discharge Medication List      START taking these medications    Details   amLODIPine (NORVASC) 5 MG tablet Take 1 tablet by mouth daily  Qty: 30 tablet, Refills: 0             (Please note that portions of this note were completed with a voice recognition program.  Efforts were made to edit the dictations but occasionally words are mis-transcribed.)    Aric Martinez PA-C 03/02/21 3:05 PM    TAYLER Kerns PA-C  03/02/21 0088

## 2022-11-18 PROBLEM — J18.9 COMMUNITY ACQUIRED PNEUMONIA: Status: ACTIVE | Noted: 2022-11-18

## 2022-11-18 LAB
L PNEUMO AG UR QL IA: NEGATIVE
S PNEUM AG UR QL: POSITIVE

## 2022-11-18 RX ORDER — AMOXICILLIN AND CLAVULANATE POTASSIUM 875; 125 MG/1; MG/1
1 TABLET, FILM COATED ORAL 2 TIMES DAILY
Qty: 10 TABLET | Refills: 0 | Status: SHIPPED | OUTPATIENT
Start: 2022-11-18 | End: 2022-11-22

## 2022-11-22 ENCOUNTER — OFFICE VISIT (OUTPATIENT)
Dept: INTERNAL MEDICINE | Facility: OTHER | Age: 72
End: 2022-11-22
Payer: MEDICARE

## 2022-11-22 VITALS
OXYGEN SATURATION: 97 % | WEIGHT: 184.4 LBS | HEIGHT: 65 IN | TEMPERATURE: 97 F | BODY MASS INDEX: 30.72 KG/M2 | SYSTOLIC BLOOD PRESSURE: 131 MMHG | DIASTOLIC BLOOD PRESSURE: 71 MMHG | HEART RATE: 70 BPM

## 2022-11-22 DIAGNOSIS — I16.0 HYPERTENSIVE URGENCY: ICD-10-CM

## 2022-11-22 DIAGNOSIS — E78.5 DYSLIPIDEMIA: ICD-10-CM

## 2022-11-22 DIAGNOSIS — I10 PRIMARY HYPERTENSION: ICD-10-CM

## 2022-11-22 DIAGNOSIS — E78.2 MIXED HYPERLIPIDEMIA: ICD-10-CM

## 2022-11-22 DIAGNOSIS — L21.9 SEBORRHEIC DERMATITIS: ICD-10-CM

## 2022-11-22 DIAGNOSIS — G89.29 CHRONIC PAIN OF RIGHT KNEE: ICD-10-CM

## 2022-11-22 DIAGNOSIS — J45.909 UNCOMPLICATED ASTHMA, UNSPECIFIED ASTHMA SEVERITY, UNSPECIFIED WHETHER PERSISTENT: ICD-10-CM

## 2022-11-22 DIAGNOSIS — M25.561 CHRONIC PAIN OF RIGHT KNEE: ICD-10-CM

## 2022-11-22 DIAGNOSIS — M25.571 PAIN IN JOINT INVOLVING RIGHT ANKLE AND FOOT: ICD-10-CM

## 2022-11-22 DIAGNOSIS — E66.09 CLASS 1 OBESITY DUE TO EXCESS CALORIES WITHOUT SERIOUS COMORBIDITY WITH BODY MASS INDEX (BMI) OF 31.0 TO 31.9 IN ADULT: ICD-10-CM

## 2022-11-22 DIAGNOSIS — Z23 NEED FOR PROPHYLACTIC VACCINATION AGAINST STREPTOCOCCUS PNEUMONIAE (PNEUMOCOCCUS): ICD-10-CM

## 2022-11-22 DIAGNOSIS — J06.9 VIRAL URI WITH COUGH: ICD-10-CM

## 2022-11-22 PROBLEM — J18.9 COMMUNITY ACQUIRED PNEUMONIA: Status: RESOLVED | Noted: 2022-11-18 | Resolved: 2022-11-22

## 2022-11-22 PROBLEM — R07.81 PLEURITIC CHEST PAIN: Status: RESOLVED | Noted: 2022-11-16 | Resolved: 2022-11-22

## 2022-11-22 PROCEDURE — 99214 OFFICE O/P EST MOD 30 MIN: CPT | Mod: 25,GC

## 2022-11-22 PROCEDURE — 90677 PCV20 VACCINE IM: CPT

## 2022-11-22 PROCEDURE — 90471 IMMUNIZATION ADMIN: CPT

## 2022-11-22 RX ORDER — SELENIUM SULFIDE 22.5 MG/ML
1 SHAMPOO TOPICAL DAILY
Qty: 180 ML | Refills: 1 | Status: SHIPPED | OUTPATIENT
Start: 2022-11-22 | End: 2023-02-23

## 2022-11-22 RX ORDER — ALBUTEROL SULFATE 90 UG/1
2 AEROSOL, METERED RESPIRATORY (INHALATION) EVERY 4 HOURS PRN
Qty: 1 EACH | Refills: 1 | Status: SHIPPED | OUTPATIENT
Start: 2022-11-22

## 2022-11-22 RX ORDER — AMLODIPINE BESYLATE 10 MG/1
10 TABLET ORAL DAILY
Qty: 30 TABLET | Refills: 3 | Status: SHIPPED | OUTPATIENT
Start: 2022-11-22 | End: 2023-02-23 | Stop reason: SDUPTHER

## 2022-11-22 ASSESSMENT — FIBROSIS 4 INDEX: FIB4 SCORE: 1.07

## 2022-11-22 NOTE — ASSESSMENT & PLAN NOTE
Long standing history of dandruff, scalp pruritus.   Noticeable redness in scalp and seborrheic areas.   Will indicate Selenium shampoo and assess response in following appointment.

## 2022-11-22 NOTE — ASSESSMENT & PLAN NOTE
Recent ED visit for apparent viral respiratory infection > CXR did not shows signs of consolidation/pneumonia. WBC was WNL. Since then no fever, no SOB, no chills, no chest pain, only episodic dry cough. Was prescribed antibiotic after discharge but wasn't able to  prescription and currently feels better in comparison to ED visit and no concerns on physical evaluation; for these reasons, we have reassured patient that there is no need to start antibiotics at this time.

## 2022-11-22 NOTE — PROGRESS NOTES
Fe Clark is a 72 y.o. female who presents today with the following:    Chief Complaint:  Follow-up appointment.  Knee pain    History of Present Illness:   Mrs. Clark is a 72-year-old female patient with a past medical history of hypertension, hyperlipidemia, asthma, osteoporosis, GERD, RAMIRO diagnosed on 2006 but does not use CPAP machine, and depression. Comes in for follow-up appointment regarding right knee pain; she has had 5 sessions of physical therapy with improvement in balance, strength and ambulation per her recollection, and does note the although right knee pain has markedly improved it still persists.   On a non related subject to the follow-up problem, on 11/16/22 patient went to ED due to Upper respiratory symptoms with associated general malaise and discharged on antibiotic therapy URI that she was unfortunately unable to  from pharmacy; nonetheless, patient's symptoms continued to improve without them and denies SOB, cough, chest pain, odynophagia, fever, malaise, chills or any other specific physical complaints.    Review of Systems   Constitutional:  Negative for chills, fever, malaise/fatigue and weight loss.   HENT:  Negative for congestion, hearing loss and sore throat.    Eyes:  Negative for blurred vision, double vision and redness.   Respiratory:  Negative for cough, sputum production and shortness of breath.    Cardiovascular:  Negative for chest pain, palpitations and leg swelling.   Gastrointestinal:  Negative for abdominal pain, constipation, diarrhea, heartburn, nausea and vomiting.   Genitourinary:  Negative for dysuria and flank pain.   Musculoskeletal:  Positive for joint pain. Negative for back pain and myalgias.   Skin:  Negative for rash.   Neurological:  Negative for dizziness, sensory change, loss of consciousness, weakness and headaches.   Endo/Heme/Allergies:  Negative for environmental allergies.   Psychiatric/Behavioral:  Negative for depression and  substance abuse. The patient is not nervous/anxious and does not have insomnia.       Past Medical History:   Past Medical History:   Diagnosis Date    ASTHMA     Diverticulosis     Duodenal ulcer, unspecified as acute or chronic, without hemorrhage, perforation, or obstruction     GERD (gastroesophageal reflux disease)     Hiatal hernia     High cholesterol     Hypertension     Psychiatric problem        Patient Active Problem List    Diagnosis Date Noted    Seborrheic dermatitis 11/22/2022    Viral URI with cough 11/16/2022    Alcohol abuse 10/21/2022    Recurrent major depressive disorder (HCC) 10/21/2022    Neuropathy 10/21/2022    Current use of proton pump inhibitor 10/21/2022    Chronic pain of right knee 10/21/2022    GERD (gastroesophageal reflux disease) 02/07/2016    Hyperlipidemia 02/07/2016    HTN (hypertension) 02/07/2016       Past Surgical History:   Procedure Laterality Date    SUBMANDIBLE ABSCESS INCISION AND DRAINAGE  11/19/2016    Procedure: SUBMANDIBLE ABSCESS INCISION AND DRAINAGE;  Surgeon: Lior Hutchison D.D.S.;  Location: SURGERY [facility];  Service:     DENTAL EXTRACTION(S)  11/19/2016    Procedure: DENTAL EXTRACTION(S);  Surgeon: Lior Hutchison D.D.S.;  Location: SURGERY [facility];  Service:     LACRIMAL DUCT PROBE  3/11/2015    Performed by Alo Cheatham M.D. at SURGERY [facility] ORS    ANKLE ARTHROSCOPY  7/31/2013    Performed by Paco Clifton M.D. at [facility] ORS    HARDWARE REMOVAL ORTHO  7/31/2013    Performed by Paco Clifton M.D. at SURGERY [facility] ORS    BLADDER SUSPENSION      BOWEL RESECTION      COLONOSCOPY      with removal of pollyps    HYSTERECTOMY RADICAL      OTHER ORTHOPEDIC SURGERY      L ankle        Allergies:  Nkda [no known drug allergy]    Medications:     Current Outpatient Medications:     amLODIPine, 10 mg, Oral, DAILY    albuterol, 2 Puff, Inhalation, Q4HRS PRN    Selenium Sulfide, 1 Tube, Apply externally, DAILY     "lisinopril, 20 mg, Oral, DAILY, Taking    acetaminophen, 650 mg, Oral, Q6HRS PRN, PRN    atorvastatin, 80 mg, Oral, Q EVENING, Taking    guaifenesin dextromethorphan sugar free, 10 mL, Oral, Q6HRS PRN, Taking    Centrum Silver 50+Women, 1 Tablet, Oral, QAM, Taking    DULoxetine, 20 mg, Oral, DAILY, Taking    diclofenac sodium, 2 g, Topical, 4X/DAY PRN, PRN    omeprazole, 20 mg, Oral, BID, Taking    aspirin EC, 81 mg, Oral, DAILY, Taking     Social History:   Social History     Tobacco Use    Smoking status: Never    Smokeless tobacco: Never   Vaping Use    Vaping Use: Never used   Substance Use Topics    Alcohol use: Not Currently    Drug use: Not Currently       Family History:   Family History   Problem Relation Age of Onset    No Known Problems Mother         2017 is age 91    Other Father 57        unknown issue       Vitals:   /71 (BP Location: Left arm, Patient Position: Sitting, BP Cuff Size: Adult)   Pulse 70   Temp 36.1 °C (97 °F) (Temporal)   Ht 1.651 m (5' 5\")   Wt 83.6 kg (184 lb 6.4 oz)   SpO2 97%  Body mass index is 30.69 kg/m².    Physical Exam  Constitutional:       General: She is not in acute distress.     Appearance: She is not ill-appearing or toxic-appearing.   HENT:      Head: Normocephalic and atraumatic.      Nose: Nose normal. No congestion.      Mouth/Throat:      Mouth: Mucous membranes are moist.      Pharynx: No posterior oropharyngeal erythema.   Eyes:      General: No scleral icterus.     Extraocular Movements: Extraocular movements intact.      Pupils: Pupils are equal, round, and reactive to light.   Cardiovascular:      Rate and Rhythm: Normal rate and regular rhythm.      Pulses: Normal pulses.      Heart sounds: Normal heart sounds. No murmur heard.  Pulmonary:      Breath sounds: Normal breath sounds. No stridor. No wheezing, rhonchi or rales.   Abdominal:      General: Bowel sounds are normal. There is no distension.      Palpations: Abdomen is soft.      Tenderness: " There is no abdominal tenderness. There is no right CVA tenderness or left CVA tenderness.   Musculoskeletal:         General: Tenderness (Knees bilaterally, more right.) present. Normal range of motion.      Cervical back: Normal range of motion. No tenderness.      Right lower leg: No edema.      Left lower leg: No edema.   Lymphadenopathy:      Cervical: No cervical adenopathy.   Skin:     General: Skin is warm.      Capillary Refill: Capillary refill takes less than 2 seconds.      Findings: No lesion or rash.   Neurological:      General: No focal deficit present.      Mental Status: She is alert and oriented to person, place, and time.      Cranial Nerves: No cranial nerve deficit.      Sensory: No sensory deficit.      Motor: No weakness.        Labs:   11/10/22: A1C 5.8%  22: Hgb 15.7. , WBC 6.7.  22: Total Cholesterol 285, Triglycerides 185, .     Imagin22: CXR - No acute changes noted.     Assessment and Plan    Chronic pain of right knee  Patient has had 5 sessions of physical therapy and notices improvement in lower body strength and stability.   Knee pain still present but controlled with Voltaren gel and PRN Tylenol. Has not worsened.   Will request X-Ray of knee for further assessment.     HTN (hypertension)  BP on admission to clinic was 131/71.   In recent ED visit had SBP > 170s in the setting of apparent viral vs bacterial respiratory infection. Stayed overnight and had stress test and echocardiogram done that did not report specific abnormalities.   We will continue current treatment with Amlodipine and Lisinopril.     Viral URI with cough  Recent ED visit for apparent viral respiratory infection > CXR did not shows signs of consolidation/pneumonia. WBC was WNL. Since then no fever, no SOB, no chills, no chest pain, only episodic dry cough. Was prescribed antibiotic after discharge but wasn't able to  prescription and currently feels better in  comparison to ED visit and no concerns on physical evaluation; for these reasons, we have reassured patient that there is no need to start antibiotics at this time.     Hyperlipidemia  11/17/22: Total Cholesterol 285, Triglycerides 185, .   ASCVD score 25.1%  Started on high dose statin: Atorvastatin 80mg in ED.   Will continue current medication and assess need for dose changes in next appointment with new lab work.   Patient was educated in possible side effects of statin medication.     Seborrheic dermatitis  Long standing history of dandruff, scalp pruritus.   Noticeable redness in scalp and seborrheic areas.   Will indicate Selenium shampoo and assess response in following appointment.      Orders Placed This Encounter    DX-KNEE 2- RIGHT    Pneumococcal Conjugate Vaccine 20-Valent (19 yrs+)    Lipid Profile    Comp Metabolic Panel    Referral to Nutrition Services    amLODIPine (NORVASC) 10 MG Tab    albuterol 108 (90 Base) MCG/ACT Aero Soln inhalation aerosol    Selenium Sulfide 2.25 % Shampoo       Return in about 3 months (around 2/22/2023).      Please note that this dictation was created using voice recognition software. I have made every reasonable attempt to correct obvious errors, but I expect that there are errors of grammar and possibly content that I did not discover before finalizing the note.    Patient case was seen/ assessed/ discussed with Dr. Virginie Page, PGY-1   Internal Medicine

## 2022-11-22 NOTE — ASSESSMENT & PLAN NOTE
BP on admission to clinic was 131/71.   In recent ED visit had SBP > 170s in the setting of apparent viral vs bacterial respiratory infection. Stayed overnight and had stress test and echocardiogram done that did not report specific abnormalities.   We will continue current treatment with Amlodipine and Lisinopril.

## 2022-11-22 NOTE — PATIENT INSTRUCTIONS
Complete lab work prior to next appointment.   Schedule X-Ray of right knee.   Selenium shampoo for seborrheic dermatitis.  Continue physical therapy.  Schedule Nutritionist appointment.   Follow-up in 3 weeks.   Schedule appointment sooner if needed.

## 2022-11-22 NOTE — ASSESSMENT & PLAN NOTE
Patient has had 5 sessions of physical therapy and notices improvement in lower body strength and stability.   Knee pain still present but controlled with Voltaren gel and PRN Tylenol. Has not worsened.   Will request X-Ray of knee for further assessment.

## 2022-11-22 NOTE — ASSESSMENT & PLAN NOTE
11/17/22: Total Cholesterol 285, Triglycerides 185, .   ASCVD score 25.1%  Started on high dose statin: Atorvastatin 80mg in ED.   Will continue current medication and assess need for dose changes in next appointment with new lab work.   Patient was educated in possible side effects of statin medication.

## 2022-12-19 ASSESSMENT — ENCOUNTER SYMPTOMS
DIZZINESS: 0
SPUTUM PRODUCTION: 0
CONSTIPATION: 0
NAUSEA: 0
MYALGIAS: 0
FEVER: 0
DOUBLE VISION: 0
VOMITING: 0
CHILLS: 0
PALPITATIONS: 0
NERVOUS/ANXIOUS: 0
FLANK PAIN: 0
WEIGHT LOSS: 0
WEAKNESS: 0
LOSS OF CONSCIOUSNESS: 0
BLURRED VISION: 0
EYE REDNESS: 0
HEARTBURN: 0
INSOMNIA: 0
DIARRHEA: 0
HEADACHES: 0
COUGH: 0
SHORTNESS OF BREATH: 0
ABDOMINAL PAIN: 0
DEPRESSION: 0
BACK PAIN: 0
SORE THROAT: 0
SENSORY CHANGE: 0

## 2022-12-19 ASSESSMENT — LIFESTYLE VARIABLES: SUBSTANCE_ABUSE: 0

## 2023-01-09 NOTE — TELEPHONE ENCOUNTER
Omeprazole Refill     Received request via: Pharmacy    Was the patient seen in the last year in this department? Yes    Does the patient have an active prescription (recently filled or refills available) for medication(s) requested? No    Does the patient have assisted Plus and need 100 day supply (blood pressure, diabetes and cholesterol meds only)? Patient does not have SCP

## 2023-01-12 ENCOUNTER — TELEPHONE (OUTPATIENT)
Dept: INTERNAL MEDICINE | Facility: OTHER | Age: 73
End: 2023-01-12

## 2023-01-14 RX ORDER — OMEPRAZOLE 20 MG/1
20 CAPSULE, DELAYED RELEASE ORAL 2 TIMES DAILY
Qty: 30 CAPSULE | Refills: 0 | Status: SHIPPED | OUTPATIENT
Start: 2023-01-14 | End: 2023-03-20 | Stop reason: SDUPTHER

## 2023-02-23 ENCOUNTER — OFFICE VISIT (OUTPATIENT)
Dept: INTERNAL MEDICINE | Facility: OTHER | Age: 73
End: 2023-02-23
Payer: MEDICARE

## 2023-02-23 VITALS
BODY MASS INDEX: 30.19 KG/M2 | OXYGEN SATURATION: 98 % | HEART RATE: 68 BPM | SYSTOLIC BLOOD PRESSURE: 139 MMHG | WEIGHT: 181.2 LBS | TEMPERATURE: 97.3 F | HEIGHT: 65 IN | DIASTOLIC BLOOD PRESSURE: 74 MMHG

## 2023-02-23 DIAGNOSIS — R05.8 POST-VIRAL COUGH SYNDROME: ICD-10-CM

## 2023-02-23 DIAGNOSIS — L21.9 SEBORRHEIC DERMATITIS: ICD-10-CM

## 2023-02-23 DIAGNOSIS — G89.29 CHRONIC PAIN OF RIGHT KNEE: ICD-10-CM

## 2023-02-23 DIAGNOSIS — K21.9 GASTROESOPHAGEAL REFLUX DISEASE, UNSPECIFIED WHETHER ESOPHAGITIS PRESENT: ICD-10-CM

## 2023-02-23 DIAGNOSIS — M25.561 CHRONIC PAIN OF RIGHT KNEE: ICD-10-CM

## 2023-02-23 DIAGNOSIS — I16.0 HYPERTENSIVE URGENCY: ICD-10-CM

## 2023-02-23 DIAGNOSIS — E78.2 MIXED HYPERLIPIDEMIA: ICD-10-CM

## 2023-02-23 DIAGNOSIS — I10 PRIMARY HYPERTENSION: ICD-10-CM

## 2023-02-23 PROBLEM — J06.9 VIRAL URI WITH COUGH: Status: RESOLVED | Noted: 2022-11-16 | Resolved: 2023-02-23

## 2023-02-23 PROCEDURE — 99213 OFFICE O/P EST LOW 20 MIN: CPT | Mod: GE

## 2023-02-23 RX ORDER — CELECOXIB 100 MG/1
100 CAPSULE ORAL 2 TIMES DAILY
Qty: 20 CAPSULE | Refills: 0 | Status: SHIPPED | OUTPATIENT
Start: 2023-02-23 | End: 2023-07-02

## 2023-02-23 RX ORDER — AMLODIPINE BESYLATE 10 MG/1
10 TABLET ORAL DAILY
Qty: 30 TABLET | Refills: 3 | Status: SHIPPED | OUTPATIENT
Start: 2023-02-23 | End: 2023-07-21 | Stop reason: SDUPTHER

## 2023-02-23 RX ORDER — LORATADINE 10 MG/1
10 TABLET ORAL DAILY
Qty: 10 TABLET | Refills: 0 | Status: SHIPPED | OUTPATIENT
Start: 2023-02-23 | End: 2023-06-19

## 2023-02-23 RX ORDER — FLUTICASONE PROPIONATE 50 MCG
1 SPRAY, SUSPENSION (ML) NASAL DAILY
Qty: 16 G | Refills: 0 | Status: SHIPPED | OUTPATIENT
Start: 2023-02-23 | End: 2023-07-02

## 2023-02-23 ASSESSMENT — ENCOUNTER SYMPTOMS
PALPITATIONS: 0
LOSS OF CONSCIOUSNESS: 0
NAUSEA: 0
HEARTBURN: 0
EYE REDNESS: 0
INSOMNIA: 0
COUGH: 1
CHILLS: 0
SORE THROAT: 1
WEIGHT LOSS: 0
EYE DISCHARGE: 1
FEVER: 0
NERVOUS/ANXIOUS: 0
CONSTIPATION: 0
STRIDOR: 0
DEPRESSION: 0
MYALGIAS: 0
DOUBLE VISION: 0
SPUTUM PRODUCTION: 0
DIARRHEA: 0
DIZZINESS: 0
EYE PAIN: 0
SINUS PAIN: 0
BLURRED VISION: 0
HEADACHES: 0
VOMITING: 0
ABDOMINAL PAIN: 0
BACK PAIN: 0
SHORTNESS OF BREATH: 0
WEAKNESS: 0

## 2023-02-23 ASSESSMENT — PATIENT HEALTH QUESTIONNAIRE - PHQ9: CLINICAL INTERPRETATION OF PHQ2 SCORE: 0

## 2023-02-23 ASSESSMENT — FIBROSIS 4 INDEX: FIB4 SCORE: 1.07

## 2023-02-23 NOTE — ASSESSMENT & PLAN NOTE
BP during visit 139/74.   Patient has not been controlling BP at home in past month.   Encouraged to continue BP monitor 1-2/week and to bring log to next appointment.   Will continue current treatment with Amlodipine and Lisinopril as patient is compliant and no evidence of side effects.   No CV symptoms or concerns for end organ damage at this time.

## 2023-02-23 NOTE — ASSESSMENT & PLAN NOTE
Patient had COVID-19 approximately late Jan 2022 (3-4 weeks ago). Since then episodic dry cough, post nasal drip/nasal congestion and scratchy/dry throat sensation.   Evaluation: Mild oropharyngeal erythema; nasal mucosa erythema; lungs clear; no lymphadenopathies.   Indicated: Celecoxib 100 mg BID; Flonase once a day; Loratadine 10 mg daily.   Discussed alarm symptoms that would prompt ED visit.   Encouraged increased fluid intake.

## 2023-02-23 NOTE — ASSESSMENT & PLAN NOTE
Was unable to obtain Selenium Sulfide shampoo through insurance due to cost.   Pruritus, dandruff persist. Evident erythema of scalp.   Encouraged to obtain over the counter Selenium Sulfide shampoo at local pharmacy.

## 2023-02-23 NOTE — PROGRESS NOTES
OFFICE VISIT    Fe Clark is a 72 y.o. female who presents today with the following:    Reason for visit:  Post-COVID dry cough and nasal congestion/post-nasal drip.    HPI:  71 yo female with PMH of Hypertension, Dyslipidemia, GERD, chronic right knee pain, Seborrheic dermatitis, and OH use. Presents 02/23/23 due to post-covid 19 dry cough and nasal congestion/post-nasal drip for past 3-4 weeks. Denies fever, SOB, chest pain, body aches, sputum production or other specific symptoms at this time.     Evaluation: Lungs clear; oropharyngeal and nasal mucosa erythema; no cervical lymphadenopathies; no oropharyngeal lesions. Indicated Celecoxib, Loratadine, and Flonase. Informed regarding alarm symptoms that would prompt ED visit. Encouraged hydration.     Regarding GERD. Had stopped Omeprazole and dyspepsia type symptoms, nausea, heartburn, recurred so restarted Omeprazole 5-7 days prior to this visit. Discussed discontinuation to test for H. Pylori 10 days after this visit. Patient in agreement. Follow-up in 2 weeks with results.     Regarding Right knee pain. Has not improved despite Physical therapy and symptom management. Ordered MRI for better assessment and possible need to refer to Orthopedic surgery. Follow-up in 2 weeks with results.     Regarding seborrheic dermatitis. Was unable to obtain Selenium shampoo through insurance. Encouraged to attempt to buy over the counter at local pharmacy.     Review of Systems   Constitutional:  Negative for chills, fever, malaise/fatigue and weight loss.   HENT:  Positive for congestion and sore throat (Scrathcy/dry). Negative for ear discharge, ear pain and sinus pain.         Post-nasal drip.    Eyes:  Positive for discharge. Negative for blurred vision, double vision, pain and redness.   Respiratory:  Positive for cough (Dry). Negative for sputum production, shortness of breath and stridor.    Cardiovascular:  Negative for chest pain and palpitations.    Gastrointestinal:  Negative for abdominal pain, constipation, diarrhea, heartburn, nausea and vomiting.   Genitourinary:  Negative for dysuria.   Musculoskeletal:  Negative for back pain, joint pain and myalgias.   Skin:  Negative for rash.   Neurological:  Negative for dizziness, loss of consciousness, weakness and headaches.   Endo/Heme/Allergies:  Negative for environmental allergies.   Psychiatric/Behavioral:  Negative for depression and suicidal ideas. The patient is not nervous/anxious and does not have insomnia.      Past Medical History:   Diagnosis Date    ASTHMA     Diverticulosis     Duodenal ulcer, unspecified as acute or chronic, without hemorrhage, perforation, or obstruction     GERD (gastroesophageal reflux disease)     Hiatal hernia     High cholesterol     Hypertension     Psychiatric problem      Current Outpatient Medications   Medication Sig Dispense Refill    amLODIPine (NORVASC) 10 MG Tab Take 1 Tablet by mouth every day. 30 Tablet 3    celecoxib (CELEBREX) 100 MG Cap Take 1 Capsule by mouth 2 times a day. 20 Capsule 0    loratadine (CLARITIN) 10 MG Tab Take 1 Tablet by mouth every day. 10 Tablet 0    fluticasone (FLONASE) 50 MCG/ACT nasal spray Administer 1 Spray into affected nostril(S) every day. 16 g 0    omeprazole (PRILOSEC) 20 MG delayed-release capsule Take 1 Capsule by mouth 2 times a day. 30 Capsule 0    albuterol 108 (90 Base) MCG/ACT Aero Soln inhalation aerosol Inhale 2 Puffs every four hours as needed for Shortness of Breath. 1 Each 1    lisinopril (PRINIVIL) 20 MG Tab Take 1 Tablet by mouth every day. 30 Tablet 3    acetaminophen (TYLENOL) 325 MG Tab Take 2 Tablets by mouth every 6 hours as needed for Moderate Pain. 30 Tablet 0    atorvastatin (LIPITOR) 80 MG tablet Take 1 Tablet by mouth every evening. 30 Tablet 3    Multiple Vitamins-Minerals (CENTRUM SILVER 50+WOMEN) Tab Take 1 Tablet by mouth every morning.      DULoxetine (CYMBALTA) 20 MG Cap DR Particles Take 1 Capsule  "by mouth every day. 60 Capsule 1    diclofenac sodium (VOLTAREN) 1 % Gel Apply 2 g topically 4 times a day as needed (Knee pain .). 50 g 2    aspirin EC (ECOTRIN) 81 MG Tablet Delayed Response Take 1 Tab by mouth every day. 30 Tab 0     No current facility-administered medications for this visit.       /74 (BP Location: Left arm, Patient Position: Sitting, BP Cuff Size: Adult)   Pulse 68   Temp 36.3 °C (97.3 °F) (Temporal)   Ht 1.651 m (5' 5\")   Wt 82.2 kg (181 lb 3.2 oz)   SpO2 98%   BMI 30.15 kg/m²     Physical Exam  Constitutional:       General: She is not in acute distress.     Appearance: She is not ill-appearing, toxic-appearing or diaphoretic.   HENT:      Head: Normocephalic and atraumatic.      Nose: Congestion present.      Comments: Nasal mucosa inflammation.      Mouth/Throat:      Mouth: Mucous membranes are moist.      Pharynx: Posterior oropharyngeal erythema present. No oropharyngeal exudate.   Eyes:      Extraocular Movements: Extraocular movements intact.      Pupils: Pupils are equal, round, and reactive to light.   Cardiovascular:      Rate and Rhythm: Normal rate and regular rhythm.      Pulses: Normal pulses.      Heart sounds: Normal heart sounds. No murmur heard.  Pulmonary:      Effort: Pulmonary effort is normal. No respiratory distress.      Breath sounds: Normal breath sounds. No stridor. No wheezing, rhonchi or rales.      Comments: SpO2 95-97% on room air.   Abdominal:      General: Bowel sounds are normal. There is no distension.      Palpations: Abdomen is soft.      Tenderness: There is no abdominal tenderness.   Musculoskeletal:         General: Tenderness (Right knee, more medial. Crepitus.) present. No swelling. Normal range of motion.      Cervical back: Normal range of motion. No tenderness.      Right lower leg: No edema.      Left lower leg: No edema.   Lymphadenopathy:      Cervical: No cervical adenopathy.   Skin:     General: Skin is warm.      Capillary " Refill: Capillary refill takes less than 2 seconds.      Coloration: Skin is not pale.      Findings: No lesion or rash.      Comments: Lower extremity venous statis skin changes.    Neurological:      General: No focal deficit present.      Mental Status: She is alert and oriented to person, place, and time.      Cranial Nerves: No cranial nerve deficit.      Sensory: No sensory deficit.      Motor: No weakness.       Assessment and Plan:     Post-viral cough syndrome  Patient had COVID-19 approximately late Jan 2022 (3-4 weeks ago). Since then episodic dry cough, post nasal drip/nasal congestion and scratchy/dry throat sensation.   Evaluation: Mild oropharyngeal erythema; nasal mucosa erythema; lungs clear; no lymphadenopathies.   Indicated: Celecoxib 100 mg BID; Flonase once a day; Loratadine 10 mg daily.   Discussed alarm symptoms that would prompt ED visit.   Encouraged increased fluid intake.     GERD (gastroesophageal reflux disease)  Discussed discontinuation of Omeprazole due to chronic use and to notify if recurrence of GI intestinal symptoms.   Symptoms did recur and patient restart Omeprazole approximately 5-7 days ago.   Discussed importance of discontinuation to test for H. Pylori. Patient in agreement.   Ordered H. Pylori test to be performed in 10 days from today 02/23/23.       Chronic pain of right knee  Right knee pain had partially improved with physical therapy but has worsened despite continuation of exercises.   Will order MRI right knee for better assessment.   With results will discuss plan going forward. Possible referral to Orthopedic surgery if needed.   In the meantime she is still using Voltaren gel that helps with symptoms.     HTN (hypertension)  BP during visit 139/74.   Patient has not been controlling BP at home in past month.   Encouraged to continue BP monitor 1-2/week and to bring log to next appointment.   Will continue current treatment with Amlodipine and Lisinopril as  patient is compliant and no evidence of side effects.   No CV symptoms or concerns for end organ damage at this time.     Hyperlipidemia  Patient is compliant with medication.   ASCVD-10 score 25.1%  Continue Atorvastatin 80 mg daily as tolerating and no evidence of side effects since initiation.   Will order lab work to be completed prior to follow-up in 3 months.     Seborrheic dermatitis  Was unable to obtain Selenium Sulfide shampoo through insurance due to cost.   Pruritus, dandruff persist. Evident erythema of scalp.   Encouraged to obtain over the counter Selenium Sulfide shampoo at local pharmacy.     Orders Placed This Encounter    MR-KNEE-W/O RIGHT    H.PYLORI STOOL ANTIGEN    amLODIPine (NORVASC) 10 MG Tab    celecoxib (CELEBREX) 100 MG Cap    loratadine (CLARITIN) 10 MG Tab    fluticasone (FLONASE) 50 MCG/ACT nasal spray       Return in about 2 weeks (around 3/9/2023).      Patient case was seen/ assessed/ discussed with Dr. Rachel    Signed by:    AMISHA Erickson, Internal Medicine  [facility]

## 2023-02-23 NOTE — ASSESSMENT & PLAN NOTE
Discussed discontinuation of Omeprazole due to chronic use and to notify if recurrence of GI intestinal symptoms.   Symptoms did recur and patient restart Omeprazole approximately 5-7 days ago.   Discussed importance of discontinuation to test for H. Pylori. Patient in agreement.   Ordered H. Pylori test to be performed in 10 days from today 02/23/23.

## 2023-02-23 NOTE — ASSESSMENT & PLAN NOTE
Patient is compliant with medication.   ASCVD-10 score 25.1%  Continue Atorvastatin 80 mg daily as tolerating and no evidence of side effects since initiation.   Will order lab work to be completed prior to follow-up in 3 months.

## 2023-02-23 NOTE — PATIENT INSTRUCTIONS
Use Celecoxib 100 mg twice a day for 7 days.   Use Loratadine 10 mg once a day for 7 days.   Use Flonase (nasal spray) once a day for 7 days.   Stop Omeprazole and schedule appointment at lab for H. Pylori stool testing in 10 days.  Liquid/Water intake of 1.5-2 liters per day.   Schedule appointment for right knee MRI.   Follow-up in 2 weeks.

## 2023-02-23 NOTE — ASSESSMENT & PLAN NOTE
Right knee pain had partially improved with physical therapy but has worsened despite continuation of exercises.   Will order MRI right knee for better assessment.   With results will discuss plan going forward. Possible referral to Orthopedic surgery if needed.   In the meantime she is still using Voltaren gel that helps with symptoms.

## 2023-02-27 ENCOUNTER — TELEPHONE (OUTPATIENT)
Dept: INTERNAL MEDICINE | Facility: OTHER | Age: 73
End: 2023-02-27
Payer: OTHER GOVERNMENT

## 2023-02-27 NOTE — TELEPHONE ENCOUNTER
Omeprazole Refill    Received request via: Pharmacy    Was the patient seen in the last year in this department? Yes    Does the patient have an active prescription (recently filled or refills available) for medication(s) requested? No    Does the patient have half-way Plus and need 100 day supply (blood pressure, diabetes and cholesterol meds only)? Patient does not have SCP

## 2023-03-09 ENCOUNTER — APPOINTMENT (OUTPATIENT)
Dept: INTERNAL MEDICINE | Facility: OTHER | Age: 73
End: 2023-03-09
Payer: OTHER GOVERNMENT

## 2023-03-20 DIAGNOSIS — F33.9 RECURRENT MAJOR DEPRESSIVE DISORDER, REMISSION STATUS UNSPECIFIED (HCC): ICD-10-CM

## 2023-03-20 RX ORDER — OMEPRAZOLE 20 MG/1
20 CAPSULE, DELAYED RELEASE ORAL 2 TIMES DAILY
Qty: 60 CAPSULE | Refills: 0 | Status: SHIPPED | OUTPATIENT
Start: 2023-03-20 | End: 2023-06-11

## 2023-03-20 NOTE — TELEPHONE ENCOUNTER
Refill request has been sitting here for 3 weeks. Please take care of this medication refill or advise?

## 2023-03-20 NOTE — TELEPHONE ENCOUNTER
Duloxetine Refill     Received request via: Pharmacy    Was the patient seen in the last year in this department? Yes    Does the patient have an active prescription (recently filled or refills available) for medication(s) requested? No    Does the patient have [facility] and need 100 day supply (blood pressure, diabetes and cholesterol meds only)? Patient does not have SCP

## 2023-03-22 RX ORDER — DULOXETIN HYDROCHLORIDE 20 MG/1
20 CAPSULE, DELAYED RELEASE ORAL DAILY
Qty: 60 CAPSULE | Refills: 1 | Status: SHIPPED | OUTPATIENT
Start: 2023-03-22 | End: 2023-06-19

## 2023-04-04 ENCOUNTER — HOSPITAL ENCOUNTER (OUTPATIENT)
Facility: MEDICAL CENTER | Age: 73
End: 2023-04-04
Payer: MEDICARE

## 2023-04-04 ENCOUNTER — APPOINTMENT (OUTPATIENT)
Dept: LAB | Facility: MEDICAL CENTER | Age: 73
End: 2023-04-04
Payer: MEDICARE

## 2023-04-04 DIAGNOSIS — K21.9 GASTROESOPHAGEAL REFLUX DISEASE, UNSPECIFIED WHETHER ESOPHAGITIS PRESENT: ICD-10-CM

## 2023-04-04 LAB — H PYLORI AG STL QL IA: NOT DETECTED

## 2023-04-04 PROCEDURE — 87338 HPYLORI STOOL AG IA: CPT

## 2023-04-17 ENCOUNTER — OFFICE VISIT (OUTPATIENT)
Dept: INTERNAL MEDICINE | Facility: OTHER | Age: 73
End: 2023-04-17
Payer: MEDICARE

## 2023-04-17 VITALS
HEART RATE: 70 BPM | HEIGHT: 65 IN | OXYGEN SATURATION: 97 % | RESPIRATION RATE: 16 BRPM | BODY MASS INDEX: 29.99 KG/M2 | WEIGHT: 180 LBS | SYSTOLIC BLOOD PRESSURE: 144 MMHG | DIASTOLIC BLOOD PRESSURE: 80 MMHG | TEMPERATURE: 97.8 F

## 2023-04-17 DIAGNOSIS — R10.9 STOMACH PAIN: ICD-10-CM

## 2023-04-17 DIAGNOSIS — K21.9 GASTROESOPHAGEAL REFLUX DISEASE, UNSPECIFIED WHETHER ESOPHAGITIS PRESENT: ICD-10-CM

## 2023-04-17 DIAGNOSIS — Z00.00 ENCOUNTER FOR SCREENING AND PREVENTATIVE CARE: ICD-10-CM

## 2023-04-17 DIAGNOSIS — I10 PRIMARY HYPERTENSION: ICD-10-CM

## 2023-04-17 DIAGNOSIS — M81.0 OSTEOPOROSIS, UNSPECIFIED OSTEOPOROSIS TYPE, UNSPECIFIED PATHOLOGICAL FRACTURE PRESENCE: ICD-10-CM

## 2023-04-17 DIAGNOSIS — I16.0 HYPERTENSIVE URGENCY: ICD-10-CM

## 2023-04-17 DIAGNOSIS — M25.561 CHRONIC PAIN OF RIGHT KNEE: ICD-10-CM

## 2023-04-17 DIAGNOSIS — G89.29 CHRONIC PAIN OF RIGHT KNEE: ICD-10-CM

## 2023-04-17 DIAGNOSIS — E78.2 MIXED HYPERLIPIDEMIA: ICD-10-CM

## 2023-04-17 DIAGNOSIS — M17.11 OSTEOARTHRITIS OF RIGHT KNEE, UNSPECIFIED OSTEOARTHRITIS TYPE: ICD-10-CM

## 2023-04-17 PROCEDURE — 99214 OFFICE O/P EST MOD 30 MIN: CPT | Mod: GC | Performed by: STUDENT IN AN ORGANIZED HEALTH CARE EDUCATION/TRAINING PROGRAM

## 2023-04-17 RX ORDER — LISINOPRIL 20 MG/1
20 TABLET ORAL DAILY
Qty: 30 TABLET | Refills: 3 | Status: SHIPPED | OUTPATIENT
Start: 2023-04-17 | End: 2023-09-19 | Stop reason: SDUPTHER

## 2023-04-17 RX ORDER — FAMOTIDINE 20 MG/1
20 TABLET, FILM COATED ORAL 2 TIMES DAILY
Qty: 60 TABLET | Refills: 3 | Status: SHIPPED | OUTPATIENT
Start: 2023-04-17 | End: 2023-06-19

## 2023-04-17 RX ORDER — ATORVASTATIN CALCIUM 80 MG/1
80 TABLET, FILM COATED ORAL EVERY EVENING
Qty: 30 TABLET | Refills: 3 | Status: SHIPPED | OUTPATIENT
Start: 2023-04-17 | End: 2023-09-19 | Stop reason: SDUPTHER

## 2023-04-17 ASSESSMENT — ENCOUNTER SYMPTOMS
HEARTBURN: 1
VOMITING: 0
HEADACHES: 0
NECK PAIN: 0
PALPITATIONS: 0
WEIGHT LOSS: 0
BACK PAIN: 0
NAUSEA: 0
FEVER: 0
ABDOMINAL PAIN: 0
MYALGIAS: 0
BLOOD IN STOOL: 0
CHILLS: 0
CONSTIPATION: 0
DIARRHEA: 0
DIZZINESS: 0
FALLS: 1
HEMOPTYSIS: 0
COUGH: 0
BLURRED VISION: 0
ORTHOPNEA: 0
FLANK PAIN: 0

## 2023-04-17 ASSESSMENT — FIBROSIS 4 INDEX: FIB4 SCORE: 0.93

## 2023-04-17 NOTE — PATIENT INSTRUCTIONS
-Please follow up with your GI referral  -Please follow up with your Orthopedics referral  -Please follow up with your DEXA scan

## 2023-04-17 NOTE — ASSESSMENT & PLAN NOTE
"-H Pylori testing negative  -Exacerbated \"stomach burning\" while discontinued Omeprazole prior to testing, drank baking soda water to relieve symptoms; is currently back on Omeprazole  -Referral to GI for colonoscopy  -Prescribed Famotidine to patient in addition to Omeprazole at this time as bridge to further evaluation  -Consider downtitrating PPI in setting of prior documented osteoporosis (2005 DEXA) as impairs absorption of calcium and vitamin D  "

## 2023-04-17 NOTE — ASSESSMENT & PLAN NOTE
-BP today 144/80  -Continues on Amlodipine 10mg, Lisinopril 20mg; endorses adherence to medication regimen  -Endorses reads systolic 130s typically on BP home reads  -Encouraged to please continue monitoring BP at home and to bring in BP cuff to next visit to compare

## 2023-04-17 NOTE — ASSESSMENT & PLAN NOTE
-Lipid Panel: cholesterol 193, , HDL 49, ; improved from prior  -ASCVD score calculated with these new results 21.3%  -Continues with Atorvastatin 80mg daily

## 2023-04-17 NOTE — PROGRESS NOTES
"     Established Patient    Chief Complaint   Patient presents with    Follow-Up     Lab results and MRI       HISTORY OF PRESENT ILLNESS:     Mrs. Clark is our 72 year old female patient with a past medical history significant for hypertension, hyperlipidemia, GERD, chronic right knee pain, seborrheic dermatitis who presents to clinic today for follow up of her lab results and imaging.    Right Knee with symptoms of \"catching\" and \"locking\" with pain on palpation of the lateral aspect of the right knee joint line. MRI Right Knee with complex tear of anterior horn of lateral meniscus; severe chondromalacia of medial and lateral joint compartment with extensive loss of articular cartilage; moderate to severe chondromalacia of medial patellar facet and central articular cartilage of patellofemoral joint. Patient with history of Left total knee replacement over ten years ago. Providing referral to Orthopedics for further evaluation and management today.    H Pylori testing negative. Patient with exacerbated symptoms of \"stomach burning\" while discontinued Omeprazole prior to testing, drank baking soda water to relieve symptoms; is currently back on Omeprazole. Providing referral to GI for colonoscopy. Prescribing Famotidine to patient in addition to Omeprazole at this time as bridge to further evaluation. Consider downtitrating PPI in setting of prior documented osteoporosis (2005 DEXA) as impairs absorption of calcium and vitamin D; patient has fall risk. Provided order for new DEXA scan.    Lipid Panel: cholesterol 193, , HDL 49, ; improved from prior. Continues with Atorvastatin 80mg daily. ASCVD score calculated with these new results 21.3%.    Provided referral for Mammogram today.    Past Medical History:   Diagnosis Date    ASTHMA     Diverticulosis     Duodenal ulcer, unspecified as acute or chronic, without hemorrhage, perforation, or obstruction     GERD (gastroesophageal reflux disease)     " Hiatal hernia     High cholesterol     Hypertension     Psychiatric problem        Patient Active Problem List    Diagnosis Date Noted    Osteoporosis 04/17/2023    Encounter for screening and preventative care 04/17/2023    Post-viral cough syndrome 02/23/2023    Seborrheic dermatitis 11/22/2022    Alcohol abuse 10/21/2022    Recurrent major depressive disorder (HCC) 10/21/2022    Neuropathy 10/21/2022    Current use of proton pump inhibitor 10/21/2022    Chronic pain of right knee 10/21/2022    GERD (gastroesophageal reflux disease) 02/07/2016    Hyperlipidemia 02/07/2016    HTN (hypertension) 02/07/2016       Allergies:Nkda [no known drug allergy]    Current Outpatient Medications   Medication Sig Dispense Refill    atorvastatin (LIPITOR) 80 MG tablet Take 1 Tablet by mouth every evening. 30 Tablet 3    lisinopril (PRINIVIL) 20 MG Tab Take 1 Tablet by mouth every day. 30 Tablet 3    famotidine (PEPCID) 20 MG Tab Take 1 Tablet by mouth 2 times a day. 60 Tablet 3    omeprazole (PRILOSEC) 20 MG delayed-release capsule Take 1 Capsule by mouth 2 times a day. 60 Capsule 0    amLODIPine (NORVASC) 10 MG Tab Take 1 Tablet by mouth every day. 30 Tablet 3    loratadine (CLARITIN) 10 MG Tab Take 1 Tablet by mouth every day. 10 Tablet 0    fluticasone (FLONASE) 50 MCG/ACT nasal spray Administer 1 Spray into affected nostril(S) every day. 16 g 0    albuterol 108 (90 Base) MCG/ACT Aero Soln inhalation aerosol Inhale 2 Puffs every four hours as needed for Shortness of Breath. 1 Each 1    acetaminophen (TYLENOL) 325 MG Tab Take 2 Tablets by mouth every 6 hours as needed for Moderate Pain. 30 Tablet 0    Multiple Vitamins-Minerals (CENTRUM SILVER 50+WOMEN) Tab Take 1 Tablet by mouth every morning.      diclofenac sodium (VOLTAREN) 1 % Gel Apply 2 g topically 4 times a day as needed (Knee pain .). 50 g 2    aspirin EC (ECOTRIN) 81 MG Tablet Delayed Response Take 1 Tab by mouth every day. 30 Tab 0    DULoxetine (CYMBALTA) 20 MG Cap  "DR Particles Take 1 Capsule by mouth every day. (Patient not taking: Reported on 4/17/2023) 60 Capsule 1    celecoxib (CELEBREX) 100 MG Cap Take 1 Capsule by mouth 2 times a day. (Patient not taking: Reported on 4/17/2023) 20 Capsule 0     No current facility-administered medications for this visit.       Social History     Tobacco Use    Smoking status: Never    Smokeless tobacco: Never   Vaping Use    Vaping Use: Never used   Substance Use Topics    Alcohol use: Not Currently    Drug use: Not Currently       Family History   Problem Relation Age of Onset    No Known Problems Mother         2017 is age 91    Other Father 57        unknown issue       Review of Systems   Constitutional:  Negative for chills, fever, malaise/fatigue and weight loss.   HENT:  Negative for hearing loss and tinnitus.    Eyes:  Negative for blurred vision.   Respiratory:  Negative for cough and hemoptysis.    Cardiovascular:  Negative for chest pain, palpitations and orthopnea.   Gastrointestinal:  Positive for heartburn. Negative for abdominal pain, blood in stool, constipation, diarrhea, melena, nausea and vomiting.   Genitourinary:  Negative for dysuria, flank pain, frequency and urgency.   Musculoskeletal:  Positive for falls and joint pain. Negative for back pain, myalgias and neck pain.   Skin:  Negative for itching and rash.   Neurological:  Negative for dizziness and headaches.     Exam:  BP (!) 144/80 (BP Location: Left arm, Patient Position: Sitting, BP Cuff Size: Adult)   Pulse 70   Temp 36.6 °C (97.8 °F) (Temporal)   Resp 16   Ht 1.651 m (5' 5\")   Wt 81.6 kg (180 lb)   SpO2 97%  Body mass index is 29.95 kg/m².    Constitutional:  Not in acute distress, well appearing.  HEENT:   Normocephalic, atraumatic.  Cardiovascular: S1, S2; Regular rate and rhythm; No murmurs/rubs/gallops.  Lungs:   Clear to auscultation bilaterally; No wheezes/rhales/rhonchi; No respiratory distress.  Abdomen: Soft, nondistended; no guarding no " "rigidity; no masses. No tenderness to palpation epigastric region  Extremities:  No cyanosis/clubbing/edema; No obvious deformities.  Skin:  Warm and dry.  No visible rashes.  Neurologic: Alert Awake & Oriented x 3, CN II-XII grossly intact; strength and sensation grossly intact.  No focal deficits noted.  Psychiatric:  Affect normal, mood normal, judgment normal.    Assessment/Plan:   Mrs. Clark is our 72 year old female patient with a past medical history significant for hypertension, hyperlipidemia, GERD, chronic right knee pain, seborrheic dermatitis who presents to clinic today for follow up of her lab results and imaging.    Chronic pain of right knee  -MRI of Right Knee with Lateral Meniscus Tear and extensive OA  -MRI Right Knee with complex tear of anterior horn of lateral meniscus; severe chondromalacia of medial and lateral joint compartment with extensive loss of articular cartilage; moderate to severe chondromalacia of medial patellar facet and central articular cartilage of patellofemoral joint  -Prior history of Left total knee replacement  -Referral to outpatient Orthopedics for further workup    GERD (gastroesophageal reflux disease)  -H Pylori testing negative  -Exacerbated \"stomach burning\" while discontinued Omeprazole prior to testing, drank baking soda water to relieve symptoms; is currently back on Omeprazole  -Referral to GI for colonoscopy  -Prescribed Famotidine to patient in addition to Omeprazole at this time as bridge to further evaluation  -Consider downtitrating PPI in setting of prior documented osteoporosis (2005 DEXA) as impairs absorption of calcium and vitamin D    Osteoporosis  -Prior documented osteoporosis (2005 DEXA)  -Ordered new DEXA Scan to review  -Patient not on Vitamin D, Calcium, or Bisphosphonate therapy; with fall risk    Hyperlipidemia  -Lipid Panel: cholesterol 193, , HDL 49, ; improved from prior  -ASCVD score calculated with these new results " 21.3%  -Continues with Atorvastatin 80mg daily    Encounter for screening and preventative care  -Provided referral for Mammogram today    HTN (hypertension)  -BP today 144/80  -Continues on Amlodipine 10mg, Lisinopril 20mg; endorses adherence to medication regimen  -Endorses reads systolic 130s typically on BP home reads  -Encouraged to please continue monitoring BP at home and to bring in BP cuff to next visit to compare    All imaging results and lab results and consult notes are reviewed at this visit.  Followup: Return in about 2 months (around 6/17/2023).    Benitez Linda MD  PGY-2 Internal Medicine Resident

## 2023-04-17 NOTE — ASSESSMENT & PLAN NOTE
-Prior documented osteoporosis (2005 DEXA)  -Ordered new DEXA Scan to review  -Patient not on Vitamin D, Calcium, or Bisphosphonate therapy; with fall risk

## 2023-04-17 NOTE — ASSESSMENT & PLAN NOTE
-MRI of Right Knee with Lateral Meniscus Tear and extensive OA  -MRI Right Knee with complex tear of anterior horn of lateral meniscus; severe chondromalacia of medial and lateral joint compartment with extensive loss of articular cartilage; moderate to severe chondromalacia of medial patellar facet and central articular cartilage of patellofemoral joint  -Prior history of Left total knee replacement  -Referral to outpatient Orthopedics for further workup

## 2023-05-02 ENCOUNTER — TELEPHONE (OUTPATIENT)
Dept: INTERNAL MEDICINE | Facility: OTHER | Age: 73
End: 2023-05-02
Payer: OTHER GOVERNMENT

## 2023-06-08 NOTE — TELEPHONE ENCOUNTER
Received request via: Pharmacy     Was the patient seen in the last year in this department? Yes    Does the patient have an active prescription (recently filled or refills available) for medication(s) requested? No    Does the patient have snf Plus and need 100 day supply (blood pressure, diabetes and cholesterol meds only)? Patient does not have SCP

## 2023-06-11 RX ORDER — OMEPRAZOLE 20 MG/1
CAPSULE, DELAYED RELEASE ORAL
Qty: 60 CAPSULE | Refills: 0 | Status: SHIPPED | OUTPATIENT
Start: 2023-06-11 | End: 2023-07-21 | Stop reason: SDUPTHER

## 2023-06-15 DIAGNOSIS — M81.0 OSTEOPOROSIS, UNSPECIFIED OSTEOPOROSIS TYPE, UNSPECIFIED PATHOLOGICAL FRACTURE PRESENCE: ICD-10-CM

## 2023-06-19 ENCOUNTER — OFFICE VISIT (OUTPATIENT)
Dept: INTERNAL MEDICINE | Facility: OTHER | Age: 73
End: 2023-06-19
Payer: MEDICARE

## 2023-06-19 VITALS
HEIGHT: 65 IN | BODY MASS INDEX: 30.02 KG/M2 | HEART RATE: 62 BPM | WEIGHT: 180.2 LBS | DIASTOLIC BLOOD PRESSURE: 78 MMHG | SYSTOLIC BLOOD PRESSURE: 133 MMHG | TEMPERATURE: 97.1 F | OXYGEN SATURATION: 97 %

## 2023-06-19 DIAGNOSIS — M81.0 OSTEOPOROSIS, UNSPECIFIED OSTEOPOROSIS TYPE, UNSPECIFIED PATHOLOGICAL FRACTURE PRESENCE: ICD-10-CM

## 2023-06-19 DIAGNOSIS — K21.9 GASTROESOPHAGEAL REFLUX DISEASE, UNSPECIFIED WHETHER ESOPHAGITIS PRESENT: ICD-10-CM

## 2023-06-19 DIAGNOSIS — G89.29 CHRONIC PAIN OF RIGHT KNEE: ICD-10-CM

## 2023-06-19 DIAGNOSIS — M25.561 CHRONIC PAIN OF RIGHT KNEE: ICD-10-CM

## 2023-06-19 DIAGNOSIS — M25.50 POLYARTHRALGIA: ICD-10-CM

## 2023-06-19 PROBLEM — R05.8 POST-VIRAL COUGH SYNDROME: Status: RESOLVED | Noted: 2023-02-23 | Resolved: 2023-06-19

## 2023-06-19 PROCEDURE — 3078F DIAST BP <80 MM HG: CPT

## 2023-06-19 PROCEDURE — 3075F SYST BP GE 130 - 139MM HG: CPT

## 2023-06-19 PROCEDURE — 99214 OFFICE O/P EST MOD 30 MIN: CPT | Mod: GC

## 2023-06-19 RX ORDER — LANOLIN ALCOHOL/MO/W.PET/CERES
1 CREAM (GRAM) TOPICAL DAILY
Qty: 30 TABLET | Refills: 2 | Status: SHIPPED | OUTPATIENT
Start: 2023-06-19 | End: 2023-07-21

## 2023-06-19 ASSESSMENT — ENCOUNTER SYMPTOMS
HEARTBURN: 0
SHORTNESS OF BREATH: 0
LOSS OF CONSCIOUSNESS: 0
INSOMNIA: 0
FEVER: 0
DEPRESSION: 0
HEADACHES: 0
WEIGHT LOSS: 0
CONSTIPATION: 0
WEAKNESS: 0
BACK PAIN: 1
NERVOUS/ANXIOUS: 0
SPUTUM PRODUCTION: 0
DIARRHEA: 0
MEMORY LOSS: 1
BLURRED VISION: 0
PALPITATIONS: 0
EYE REDNESS: 0
MYALGIAS: 0
VOMITING: 0
SORE THROAT: 0
COUGH: 0
CHILLS: 0
ABDOMINAL PAIN: 0
NAUSEA: 0
DIZZINESS: 0

## 2023-06-19 ASSESSMENT — LIFESTYLE VARIABLES: SUBSTANCE_ABUSE: 0

## 2023-06-19 ASSESSMENT — FIBROSIS 4 INDEX: FIB4 SCORE: 0.93

## 2023-06-19 NOTE — PATIENT INSTRUCTIONS
Schedule appointment with Gastroenterology.   Will reach out to Orthopedic surgery to re-schedule appointment.   Complete ordered lab work.   Use Celecoxib 100 mg twice a day for 10 days.   Follow-up in 1 week with lab results.

## 2023-06-19 NOTE — ASSESSMENT & PLAN NOTE
Persistent.   Had appointment with Orthopedic surgery at [facility] but unfortunately it seems they were unable to view MRI results.   Will call Orthopedic group to clarify and fax results.   Indicated Celecoxib 100 mg BID for 10 days.

## 2023-06-19 NOTE — ASSESSMENT & PLAN NOTE
No recurrence of symptoms after resuming Omeprazole.   Pending GI appointment for assessment and Endoscopy.   Discussed transition to Famotidine to avoid risk of decreased absorption of Vit D but patient states Famotidine did not help as much.

## 2023-06-19 NOTE — PROGRESS NOTES
OFFICE VISIT    Fe Clark is a 72 y.o. female who presents today with the following:    Reason for visit:  Persistent right knee pain.   New onset bilateral hand pain with IP joint swelling.     HPI:  Mrs. Clark is a 71 yo female with PMH of Hypertension, Dyslipidemia, GERD, chronic right knee pain d/t meniscus tear + chondromalacia, Seborrheic dermatitis, and prior OH use (stopped for past 6 months). Presents for follow-up appointment.     Discusses/assessed the following:   - Right knee pain. MRI 03/20/23 showed meniscus tear and chondromalacia. Had appointment with Orthopedic surgery; per patient they weren't able to see MRI results so she was told no surgery at this time until they get an MRI. Will call [facility] to assure we can fax results. Patient in agreement with this.   - Polyarthralgia. Notes that for past 4 months she's had intermittent ankle and bilateral hand pain. The latter mainly IP joints with notable swelling, Heberden nodes and cubital deviation. Will order RF, anti CCP, ESR, CRP to r/o RA.   - Osteoporosis. DEXA scan 06/15 showed T score < -2.5 of left hip and lumbar spine. Started Vit D + Calcium. Will coordinate for Zolendronic acid infusion.     Review of Systems   Constitutional:  Negative for chills, fever and weight loss.   HENT:  Negative for congestion and sore throat.    Eyes:  Negative for blurred vision and redness.   Respiratory:  Negative for cough, sputum production and shortness of breath.    Cardiovascular:  Negative for chest pain and palpitations.   Gastrointestinal:  Negative for abdominal pain, constipation, diarrhea, heartburn, nausea and vomiting.   Genitourinary:  Negative for dysuria.   Musculoskeletal:  Positive for back pain (Lower back) and joint pain (Right knee, bilateral ankles, IP joints both hand). Negative for myalgias.   Skin:  Negative for rash.   Neurological:  Negative for dizziness, loss of consciousness, weakness and headaches.  "  Endo/Heme/Allergies:  Negative for environmental allergies.   Psychiatric/Behavioral:  Positive for memory loss (Feels difficulty recalling recent events). Negative for depression and substance abuse. The patient is not nervous/anxious and does not have insomnia.          /78 (BP Location: Left arm, Patient Position: Sitting, BP Cuff Size: Large adult)   Pulse 62   Temp 36.2 °C (97.1 °F) (Temporal)   Ht 1.651 m (5' 5\")   Wt 81.7 kg (180 lb 3.2 oz)   SpO2 97%   BMI 29.99 kg/m²     Physical Exam  Constitutional:       General: She is not in acute distress.     Appearance: She is not ill-appearing.   HENT:      Head: Normocephalic and atraumatic.      Nose: Nose normal. No congestion.      Mouth/Throat:      Mouth: Mucous membranes are moist.      Pharynx: No posterior oropharyngeal erythema.   Eyes:      Extraocular Movements: Extraocular movements intact.      Pupils: Pupils are equal, round, and reactive to light.   Cardiovascular:      Rate and Rhythm: Normal rate and regular rhythm.      Pulses: Normal pulses.      Heart sounds: Normal heart sounds.   Pulmonary:      Breath sounds: Normal breath sounds. No stridor. No wheezing, rhonchi or rales.   Abdominal:      General: Bowel sounds are normal. There is no distension.      Palpations: Abdomen is soft.      Tenderness: There is no abdominal tenderness. There is no right CVA tenderness or left CVA tenderness.   Musculoskeletal:         General: Swelling (Proximal IP joint of 2nd finger, both hand. Heberden nodes 2nd finger right hand and 3rd finger left hand.), tenderness (Distal IP joint left hand, right knee.) and deformity (Decubital deviation bilateral hands.) present. Normal range of motion.      Cervical back: Normal range of motion. No rigidity or tenderness.      Right lower leg: No edema.      Left lower leg: No edema.      Comments: Right knee crepitus on exam.    Skin:     General: Skin is warm.      Capillary Refill: Capillary refill " takes less than 2 seconds.      Coloration: Skin is not jaundiced or pale.      Findings: No lesion or rash.   Neurological:      General: No focal deficit present.      Mental Status: She is alert and oriented to person, place, and time.      Cranial Nerves: No cranial nerve deficit.      Sensory: No sensory deficit.      Motor: No weakness.       Assessment and Plan:     73 yo female with PMH of Hypertension, Dyslipidemia, GERD, chronic right knee pain d/t meniscus tear + chondromalacia, Seborrheic dermatitis, and prior OH use.    Chronic pain of right knee  Persistent.   Had appointment with Orthopedic surgery at [facility] but unfortunately it seems they were unable to view MRI results.   Will call Orthopedic group to clarify and fax results.   Indicated Celecoxib 100 mg BID for 10 days.     Polyarthralgia  Patient now reporting pain in bilateral ankles, bilateral hands with associated proximal and distal interphalangeal joint swelling.   On evaluation: decubital deviation of both hands noted, heberden nodes, proximal IP joint swelling.   Ordered RF, anti CCP to r/o RA.   CRP, ESR ordered.     Osteoporosis  DEXA scan 06/15/23:   Lumbar spine and left hip osteoporosis (T score < -2.5). Right hip osteopenia (T score -2.1).   No pathological fractures.   Indicated Vit D + Calcium.   Will start treatment with Zolendronic acid. Not choosing oral biphosphonate at this time d/t unclear if patient has GERD with esophagitis as still pending to see GI.     GERD (gastroesophageal reflux disease)  No recurrence of symptoms after resuming Omeprazole.   Pending GI appointment for assessment and Endoscopy.   Discussed transition to Famotidine to avoid risk of decreased absorption of Vit D but patient states Famotidine did not help as much.     Orders Placed This Encounter    RHEUMATOID ARTHRITIS FACTOR    CCP ANTIBODY    CRP QUANTITIVE (NON-CARDIAC)    Sed Rate    Referral to Gastroenterology    calcium citrate 315 mg-vitamin D 5  mcg 315-5 MG-MCG per tablet       Return in about 1 week (around 6/26/2023).      Patient case was seen/ assessed/ discussed with Dr. Szymanski.     Signed by:    AMISHA Erickson, Internal Medicine  [facility], [facility]

## 2023-06-19 NOTE — ASSESSMENT & PLAN NOTE
DEXA scan 06/15/23:   Lumbar spine and left hip osteoporosis (T score < -2.5). Right hip osteopenia (T score -2.1).   No pathological fractures.   Indicated Vit D + Calcium.   Will start treatment with Zolendronic acid. Not choosing oral biphosphonate at this time d/t unclear if patient has GERD with esophagitis as still pending to see GI.

## 2023-06-19 NOTE — ASSESSMENT & PLAN NOTE
Patient now reporting pain in bilateral ankles, bilateral hands with associated proximal and distal interphalangeal joint swelling.   On evaluation: decubital deviation of both hands noted, heberden nodes, proximal IP joint swelling.   Ordered RF, anti CCP to r/o RA.   CRP, ESR ordered.

## 2023-06-21 PROCEDURE — 99284 EMERGENCY DEPT VISIT MOD MDM: CPT

## 2023-06-21 ASSESSMENT — FIBROSIS 4 INDEX: FIB4 SCORE: 0.93

## 2023-06-22 ENCOUNTER — APPOINTMENT (OUTPATIENT)
Dept: RADIOLOGY | Facility: MEDICAL CENTER | Age: 73
End: 2023-06-22
Attending: STUDENT IN AN ORGANIZED HEALTH CARE EDUCATION/TRAINING PROGRAM
Payer: MEDICARE

## 2023-06-22 ENCOUNTER — HOSPITAL ENCOUNTER (EMERGENCY)
Facility: MEDICAL CENTER | Age: 73
End: 2023-06-22
Attending: STUDENT IN AN ORGANIZED HEALTH CARE EDUCATION/TRAINING PROGRAM
Payer: MEDICARE

## 2023-06-22 VITALS
DIASTOLIC BLOOD PRESSURE: 72 MMHG | SYSTOLIC BLOOD PRESSURE: 159 MMHG | HEART RATE: 64 BPM | RESPIRATION RATE: 16 BRPM | HEIGHT: 65 IN | WEIGHT: 181.44 LBS | BODY MASS INDEX: 30.23 KG/M2 | OXYGEN SATURATION: 95 % | TEMPERATURE: 97.8 F

## 2023-06-22 DIAGNOSIS — M25.512 ACUTE PAIN OF LEFT SHOULDER: ICD-10-CM

## 2023-06-22 DIAGNOSIS — S16.1XXA STRAIN OF NECK MUSCLE, INITIAL ENCOUNTER: ICD-10-CM

## 2023-06-22 PROCEDURE — 73030 X-RAY EXAM OF SHOULDER: CPT | Mod: LT

## 2023-06-22 PROCEDURE — 700101 HCHG RX REV CODE 250: Performed by: STUDENT IN AN ORGANIZED HEALTH CARE EDUCATION/TRAINING PROGRAM

## 2023-06-22 PROCEDURE — A9270 NON-COVERED ITEM OR SERVICE: HCPCS | Performed by: STUDENT IN AN ORGANIZED HEALTH CARE EDUCATION/TRAINING PROGRAM

## 2023-06-22 PROCEDURE — 700102 HCHG RX REV CODE 250 W/ 637 OVERRIDE(OP): Performed by: STUDENT IN AN ORGANIZED HEALTH CARE EDUCATION/TRAINING PROGRAM

## 2023-06-22 PROCEDURE — 72125 CT NECK SPINE W/O DYE: CPT

## 2023-06-22 RX ORDER — IBUPROFEN 600 MG/1
600 TABLET ORAL ONCE
Status: COMPLETED | OUTPATIENT
Start: 2023-06-22 | End: 2023-06-22

## 2023-06-22 RX ORDER — METHOCARBAMOL 750 MG/1
750 TABLET, FILM COATED ORAL 4 TIMES DAILY
Qty: 120 TABLET | Refills: 0 | Status: SHIPPED | OUTPATIENT
Start: 2023-06-22 | End: 2023-06-29

## 2023-06-22 RX ORDER — LIDOCAINE 50 MG/G
1 PATCH TOPICAL EVERY 24 HOURS
Qty: 10 PATCH | Refills: 0 | Status: SHIPPED | OUTPATIENT
Start: 2023-06-22 | End: 2023-09-19

## 2023-06-22 RX ORDER — CYCLOBENZAPRINE HCL 10 MG
10 TABLET ORAL ONCE
Status: COMPLETED | OUTPATIENT
Start: 2023-06-22 | End: 2023-06-22

## 2023-06-22 RX ORDER — LIDOCAINE 50 MG/G
1 PATCH TOPICAL EVERY 24 HOURS
Status: DISCONTINUED | OUTPATIENT
Start: 2023-06-22 | End: 2023-06-22 | Stop reason: HOSPADM

## 2023-06-22 RX ORDER — ACETAMINOPHEN 325 MG/1
650 TABLET ORAL ONCE
Status: COMPLETED | OUTPATIENT
Start: 2023-06-22 | End: 2023-06-22

## 2023-06-22 RX ADMIN — LIDOCAINE 1 PATCH: 140 PATCH CUTANEOUS at 03:01

## 2023-06-22 RX ADMIN — IBUPROFEN 600 MG: 600 TABLET, FILM COATED ORAL at 03:01

## 2023-06-22 RX ADMIN — ACETAMINOPHEN 650 MG: 325 TABLET, FILM COATED ORAL at 03:01

## 2023-06-22 RX ADMIN — CYCLOBENZAPRINE 10 MG: 10 TABLET, FILM COATED ORAL at 03:01

## 2023-06-22 NOTE — ED NOTES
Pt ambulated to room  Pt remaining in home gown  Pt placed on the monitor and chart up for ERP  Call light within reach

## 2023-06-22 NOTE — ED TRIAGE NOTES
"Chief Complaint   Patient presents with    Arm Pain     GLF over a dog.  Landed into a wooden chair, states she thinks she broke her arm.  Pain to the L side of the neck down to the elbow. CMS intact distal to injury  Occurred 6/20.  -head strike, -LOC       Pt ambulated to triage. Pt A&Ox4, came in for above complaint. Pt given sling in triage    Pt to lobby . Pt educated on alerting staff in changes to condition. Pt verbalized understanding.     BP (!) 160/87   Pulse 76   Temp 36.3 °C (97.3 °F) (Temporal)   Resp 18   Ht 1.651 m (5' 5\")   Wt 82.3 kg (181 lb 7 oz)   SpO2 97%   BMI 30.19 kg/m²     "

## 2023-06-22 NOTE — ED NOTES
Bedside report done with Eloise GILMORE. All lines traced. Patient on vitals monitor. Patient resting at this time in no acute distress. Patient updated on plan of care. Care assumed of patient at this time. Vital signs stable. Call light within reach of patient

## 2023-06-22 NOTE — DISCHARGE INSTRUCTIONS
It does not appear that you broke your neck or your shoulder today.  You most likely have a muscle strain.  Please take ibuprofen and Tylenol for the pain and you can use the muscle relaxant if needed as breakthrough.  Please do not drink or drive when taking this medication.  Please follow-up with your primary doctor to ensure improving.

## 2023-06-22 NOTE — ED NOTES
Pt given discharge instructions regarding follow up appointments, prescriptions and reasons to return to the ER. Pt verbalized understanding and signed AVS

## 2023-06-22 NOTE — ED PROVIDER NOTES
ED Provider Note    CHIEF COMPLAINT  Chief Complaint   Patient presents with    Arm Pain     GLF over a dog.  Landed into a wooden chair, states she thinks she broke her arm.  Pain to the L side of the neck down to the elbow. CMS intact distal to injury  Occurred 6/20.  -head strike, -LOC       EXTERNAL RECORDS REVIEWED  Outpatient Notes seen as an outpatient on 19 June for polyarthralgia.  She also has a history of hypertension    HPI/ROS  LIMITATION TO HISTORY   Select: : None  OUTSIDE HISTORIAN(S):  None    Richedilberto Jodi Clark is a 72 y.o. female who presents for left arm pain.  She says that yesterday she accidentally tripped over her dog and landed onto her wooden chair.  She fell directly onto her left shoulder.  She said that she was able to handle the pain at first however it is now increased and she says it is mostly in the left shoulder and neck.  She did not hit her head and did not lose consciousness.  She takes a baby aspirin but is otherwise not on any blood thinners.  She denies any dizziness or lightheadedness prior to the fall.    PAST MEDICAL HISTORY   has a past medical history of ASTHMA, COPD (chronic obstructive pulmonary disease) (HCC), Diverticulosis, Duodenal ulcer, unspecified as acute or chronic, without hemorrhage, perforation, or obstruction, GERD (gastroesophageal reflux disease), Hiatal hernia, High cholesterol, Hypertension, and Psychiatric problem.    SURGICAL HISTORY   has a past surgical history that includes bladder suspension; bowel resection; colonoscopy; hysterectomy radical; other orthopedic surgery; ankle arthroscopy (7/31/2013); hardware removal ortho (7/31/2013); lacrimal duct probe (3/11/2015); submandible abscess incision and drainage (11/19/2016); and dental extraction(s) (11/19/2016).    FAMILY HISTORY  Family History   Problem Relation Age of Onset    No Known Problems Mother         2017 is age 91    Other Father 57        unknown issue       SOCIAL  "HISTORY  Social History     Tobacco Use    Smoking status: Never    Smokeless tobacco: Never   Vaping Use    Vaping Use: Never used   Substance and Sexual Activity    Alcohol use: Not Currently    Drug use: Not Currently    Sexual activity: Not on file       CURRENT MEDICATIONS  Home Medications       Reviewed by Cristal Gaston R.N. (Registered Nurse) on 06/21/23 at 2335  Med List Status: Partial     Medication Last Dose Status   acetaminophen (TYLENOL) 325 MG Tab  Active   albuterol 108 (90 Base) MCG/ACT Aero Soln inhalation aerosol  Active   amLODIPine (NORVASC) 10 MG Tab  Active   aspirin EC (ECOTRIN) 81 MG Tablet Delayed Response  Active   atorvastatin (LIPITOR) 80 MG tablet  Active   calcium citrate 315 mg-vitamin D 5 mcg 315-5 MG-MCG per tablet  Active   celecoxib (CELEBREX) 100 MG Cap  Active   fluticasone (FLONASE) 50 MCG/ACT nasal spray  Active   lisinopril (PRINIVIL) 20 MG Tab  Active   Multiple Vitamins-Minerals (CENTRUM SILVER 50+WOMEN) Tab  Active   omeprazole (PRILOSEC) 20 MG delayed-release capsule  Active                    ALLERGIES  Allergies   Allergen Reactions    Nkda [No Known Drug Allergy]        PHYSICAL EXAM  VITAL SIGNS: BP (!) 159/72   Pulse 64   Temp 36.6 °C (97.8 °F) (Temporal)   Resp 16   Ht 1.651 m (5' 5\")   Wt 82.3 kg (181 lb 7 oz)   SpO2 95%   BMI 30.19 kg/m²    Constitutional: Awake and alert. Nontoxic  HENT:  Normocephalic Atraumatic  Eyes: Grossly normal  Neck: She has midline C spine TTP  Cardiovascular: Normal heart rate   Thorax & Lungs: No respiratory distress  Abdomen: Nontender  Skin:  No pathologic rash.   Extremities: Shoulder Exam  No obvious deformity.  No erythema, edema, ecchymosis, or broken skin. Full ROM intact, including abduction and internal and external rotation.  She does have TTP of left shoulder joint. 2+ radial pulses bilaterally. Sensation intact in axillary nerve distribution and radial, median and ulnar nerve distributions distally. 5/5 strength " with shoulder abduction, elbow flexion/extension, wrist flexion/extension.   Psychiatric: Affect normal      DIAGNOSTIC STUDIES / PROCEDURES      RADIOLOGY  I have independently interpreted the diagnostic imaging associated with this visit and am waiting the final reading from the radiologist.   My preliminary interpretation is as follows: No obvious fracture  Radiologist interpretation:   CT-CSPINE WITHOUT PLUS RECONS   Final Result         1.  Multilevel degenerative changes of the cervical spine limit diagnostic sensitivity of this examination, otherwise no acute traumatic bony injury of the cervical spine is apparent.   2.  Bilateral thyroid nodules, recommend follow-up thyroid sonography for further characterization due to nodule size.   3.  Atherosclerosis      DX-SHOULDER 2+ LEFT   Final Result         1.  No acute traumatic bony injury.   2.  Cardiomegaly               COURSE & MEDICAL DECISION MAKING    ED Observation Status? No; Patient does not meet criteria for ED Observation.     INITIAL ASSESSMENT, COURSE AND PLAN  Care Narrative: This is a 72-year-old female, not on blood thinners who presents with left shoulder and neck pain after a ground-level fall.  She did not hit her head or lose consciousness and she has no signs of head trauma on exam and I doubt intracranial bleed.  CT with no obvious evidence of fracture or subluxation.  Although she does have multiple degenerative changes limiting imaging findings,  presentation is much more consistent with strain.  She is neurologically intact and doubt ligamentous injury.  X-ray of the shoulder without any evidence of fracture or dislocation.  Patient was treated in the ER with Tylenol, ibuprofen and Robaxin.  She reports significant improvement.  The fall was mechanical in nature and she denies any dizziness or lightheadedness and no indication for lab work or further testing at this time.  She feels comfortable with the plan for discharge and will  return for any new or worsening symptoms.    HTN/IDDM FOLLOW UP:  The patient has known hypertension and is being followed by their primary care doctor        DISPOSITION AND DISCUSSIONS  I have discussed management of the patient with the following physicians and JORGE's:  None    Discussion of management with other QHP or appropriate source(s): None     Escalation of care considered, and ultimately not performed:blood analysis    Barriers to care at this time, including but not limited to:  None .     Decision tools and prescription drugs considered including, but not limited to: Pain Medications lidocaine patch and Robaxin .    FINAL DIAGNOSIS  1. Acute pain of left shoulder Acute   2. Strain of neck muscle, initial encounter Acute          Electronically signed by: Teresita Flanagan M.D., 6/22/2023 2:36 AM

## 2023-06-29 ENCOUNTER — OFFICE VISIT (OUTPATIENT)
Dept: INTERNAL MEDICINE | Facility: OTHER | Age: 73
End: 2023-06-29
Payer: MEDICARE

## 2023-06-29 VITALS
DIASTOLIC BLOOD PRESSURE: 76 MMHG | WEIGHT: 178.2 LBS | HEIGHT: 65 IN | OXYGEN SATURATION: 97 % | HEART RATE: 63 BPM | TEMPERATURE: 98.7 F | BODY MASS INDEX: 29.69 KG/M2 | SYSTOLIC BLOOD PRESSURE: 124 MMHG

## 2023-06-29 DIAGNOSIS — M54.2 NECK PAIN: ICD-10-CM

## 2023-06-29 DIAGNOSIS — Z63.6 CAREGIVER BURDEN: ICD-10-CM

## 2023-06-29 DIAGNOSIS — E04.1 THYROID NODULE: ICD-10-CM

## 2023-06-29 DIAGNOSIS — F33.9 RECURRENT MAJOR DEPRESSIVE DISORDER, REMISSION STATUS UNSPECIFIED (HCC): ICD-10-CM

## 2023-06-29 DIAGNOSIS — M25.512 CHRONIC LEFT SHOULDER PAIN: ICD-10-CM

## 2023-06-29 DIAGNOSIS — I10 PRIMARY HYPERTENSION: ICD-10-CM

## 2023-06-29 DIAGNOSIS — G89.29 CHRONIC LEFT SHOULDER PAIN: ICD-10-CM

## 2023-06-29 DIAGNOSIS — R35.1 NOCTURIA: ICD-10-CM

## 2023-06-29 DIAGNOSIS — M25.50 POLYARTHRALGIA: ICD-10-CM

## 2023-06-29 PROCEDURE — 99214 OFFICE O/P EST MOD 30 MIN: CPT | Performed by: INTERNAL MEDICINE

## 2023-06-29 PROCEDURE — 3078F DIAST BP <80 MM HG: CPT | Performed by: INTERNAL MEDICINE

## 2023-06-29 PROCEDURE — 3074F SYST BP LT 130 MM HG: CPT | Performed by: INTERNAL MEDICINE

## 2023-06-29 RX ORDER — CYCLOBENZAPRINE HCL 5 MG
5 TABLET ORAL 2 TIMES DAILY PRN
Qty: 60 TABLET | Refills: 1 | Status: SHIPPED | OUTPATIENT
Start: 2023-06-29 | End: 2023-07-21

## 2023-06-29 SDOH — SOCIAL STABILITY - SOCIAL INSECURITY: DEPENDENT RELATIVE NEEDING CARE AT HOME: Z63.6

## 2023-06-29 ASSESSMENT — FIBROSIS 4 INDEX: FIB4 SCORE: 0.93

## 2023-06-29 NOTE — PROGRESS NOTES
Chief Complaint   Patient presents with    Results     Labs        HPI: Fe Clark is a 72 y.o. female with past medical history of following  who presented to the clinic for the following.  Patient had lab work done 2 days ago at [facility] and wanted to discuss labs with is today however we were unable to locate the labs.   We however did address other issues as below : -      Recent mechanical fall    -after havig tipped over dog, with exacerbation of pain on the left shoulder.   -Patient states that she has had fracture of that arm along with tear if muscles and tendons along that region in the past and wants to know if the Xray done in the ER showed any findings of fracture.   -Ct cervical spine showed multilevel degenerative changes of the cervical spine limiting diagnostic sensitivity of exam, otherwise no acute traumatic bony   -Xray left shoulder did not show any fractures.   -On exam patient has painful ROM of neck that does not radiate to the arm, however with flexion of arm, it produces pain in her neck. Patient has pain in the posterior shoulder with flexion extension, external rotation,but internal rotation, adduction does not produce pain. Abduction is limited due to pain. Impingement tests for supraspinatus are positive. Patients strength, sensation are intact.   -Patient was taking robaxin, however states that it has not helped much with the pain ad causes some drowsiness.   **Polyarthralgia, reports pain on neck knees ankle elbows and that feet are associated with stiffness in the mornings - pending rheum labs  *OP- last dexa done in June 2023, left hip and lumbar spine  *Vitamin D deficiency ,last level from novemebr 2022 = 25.9 supplementation     *Incidental Thyroid nodules on Ct done in ER   -Patient is open to getting thyroid USG. Patient wanted to know if this is the same as the endoscopy that was ordered. Explained that that was for a different purpose to look for changes  related to excessive acid production  -TSH last done in November 2022 , wnl     *Caregiver burden   *Recurrent major depressive disorder  -States that all her children live with her and this causes a lot of stress .   -Patient is not open to Therapy at this time, has tried it before and states that she came out sadder than before.   -also frustrated that her drivers license was taken away as she was given diagnosis of schizophrenia In the past and later told that she did not have schizophrenia and was also stressful phase,  -Also states that at one point when her daughter in law was living with her she was so stressed to the point that she had pseudoseizures which resolved after daughter in law left the house.   States that she is having issues with staying focussed sometimes these days.   However states that she has much improved compared to the last year.     *Peripheral artery disease screening   -Patient brought in paper work of screening done as part of insurance which showed negative PAD screening.   -She also reports that they had informed her that she was due mammogram , completed on 06/15/2023- which was normal.      *Hypertension - well controlled on lisinopril, amlodipine     *Nocturia   S/pBladder tie up bowel resection  S/pHysterectomy    Prediabetes - last A1C from November 5.8      Other problems not discussed on this visit   *GERD - omeprazole   *HLD - atorvastatin   *Alcohol use - not discussed on this visit  *Neuropathy -not discussed on this visit   *Current use of PPI - planning for EGD  *Chronic pain of right knee- celecoxib   *Seborrheic dermatitis               Patient Active Problem List    Diagnosis Date Noted    Caregiver burden 07/02/2023    Nocturia 07/02/2023    Polyarthralgia 06/19/2023    Osteoporosis 04/17/2023    Encounter for screening and preventative care 04/17/2023    Seborrheic dermatitis 11/22/2022    Alcohol abuse 10/21/2022    Recurrent major depressive disorder (HCC)  10/21/2022    Neuropathy 10/21/2022    Current use of proton pump inhibitor 10/21/2022    Chronic pain of right knee 10/21/2022    GERD (gastroesophageal reflux disease) 02/07/2016    Hyperlipidemia 02/07/2016    HTN (hypertension) 02/07/2016       Current Outpatient Medications   Medication Sig Dispense Refill    cyclobenzaprine (FLEXERIL) 5 mg tablet Take 1 Tablet by mouth 2 times a day as needed for Muscle Spasms. 60 Tablet 1    lidocaine (LIDODERM) 5 % Patch Place 1 Patch on the skin every 24 hours. 10 Patch 0    calcium citrate 315 mg-vitamin D 5 mcg 315-5 MG-MCG per tablet Take 1 Tablet by mouth every day. 30 Tablet 2    omeprazole (PRILOSEC) 20 MG delayed-release capsule Take 1 capsule by mouth twice daily 60 Capsule 0    atorvastatin (LIPITOR) 80 MG tablet Take 1 Tablet by mouth every evening. 30 Tablet 3    lisinopril (PRINIVIL) 20 MG Tab Take 1 Tablet by mouth every day. 30 Tablet 3    amLODIPine (NORVASC) 10 MG Tab Take 1 Tablet by mouth every day. 30 Tablet 3    albuterol 108 (90 Base) MCG/ACT Aero Soln inhalation aerosol Inhale 2 Puffs every four hours as needed for Shortness of Breath. 1 Each 1    acetaminophen (TYLENOL) 325 MG Tab Take 2 Tablets by mouth every 6 hours as needed for Moderate Pain. 30 Tablet 0    Multiple Vitamins-Minerals (CENTRUM SILVER 50+WOMEN) Tab Take 1 Tablet by mouth every morning.      aspirin EC (ECOTRIN) 81 MG Tablet Delayed Response Take 1 Tab by mouth every day. 30 Tab 0     No current facility-administered medications for this visit.       ROS: As per HPI. Additional pertinent systems as noted below.  Constitutional:  Negative for fever, chills   Cardiovascular:  Negative for chest pain, palpitations   Respiratory:  Negative for cough, sputum production, shortness of breath   Genitourinary: positive as in hpi  MSK : as in hpi      /76 (BP Location: Right arm, Patient Position: Sitting, BP Cuff Size: Adult)   Pulse 63   Temp 37.1 °C (98.7 °F) (Temporal)   Ht 1.651  "m (5' 5\")   Wt 80.8 kg (178 lb 3.2 oz)   SpO2 97%   BMI 29.65 kg/m²     Physical Exam   Constitutional:  Well developed, well nourished. Not in acute distress   Cardiovascular:  Regular rate and rhythm, No pedal edema, Intact distal pulses   Respiratory: Clear to auscultation bilaterally, No use of accessory muscles   Musculoskeletal: Neck and shoulder: -On exam patient has painful ROM of neck that does not radiate to the arm, however with flexion of arm, it produces pain in her neck. Patient has pain in the posterior shoulder with flexion extension, external rotation,but internal rotation, adduction does not produce pain. Abduction is limited due to pain. Impingement tests for supraspinatus are positive. Patients strength, sensation are intact.       Note: I have reviewed all pertinent labs and diagnostic tests associated with this visit with specific comments listed under the assessment and plan below    Assessment and Plan    1. Neck pain  Will try alternate muscle relaxant   Explained side effects especially for her age. Patient understands,   Instructed that if the new medication ( flexeril )is too sedating then we can go back to robaxin  Avoid NSAIDs given GERD   Pending labs for Rheumatic disease.  - cyclobenzaprine (FLEXERIL) 5 mg tablet; Take 1 Tablet by mouth 2 times a day as needed for Muscle Spasms.  Dispense: 60 Tablet; Refill: 1    2. Chronic left shoulder pain  Likely with impingement of royator cuff.   Offered Pt  Patient wants to defer/  Pain management as above for now.   - cyclobenzaprine (FLEXERIL) 5 mg tablet; Take 1 Tablet by mouth 2 times a day as needed for Muscle Spasms.  Dispense: 60 Tablet; Refill: 1    3. Polyarthralgia  Pending labs  On next visit will also ensure that patient is still taking vitamin D , recheck d labs  Need to also discuss regarding meds for osteoporosis, dental check up  - cyclobenzaprine (FLEXERIL) 5 mg tablet; Take 1 Tablet by mouth 2 times a day as needed for " Muscle Spasms.  Dispense: 60 Tablet; Refill: 1    4. Thyroid nodule  Patient is open to Ultrasound  Explained that this id different from EGD,   Will plan for repeat TSH with next set of labs  - US-SOFT TISSUES OF HEAD - NECK; Future    5. Recurrent major depressive disorder, remission status unspecified (HCC)  -Coping well   Deferred Psychology referral for now.     6. Caregiver burden  -Coping well   Deferred Psychology referral for now.    7. Nocturia  Explained behavioral changes,   If not helping then can consider pharmacological management if needed.    8. Primary hypertension  Well controlled.       Followup: Return in about 3 months (around 9/29/2023).        Signed by: Franki Rodas M.D.

## 2023-07-02 PROBLEM — R35.1 NOCTURIA: Status: ACTIVE | Noted: 2023-07-02

## 2023-07-02 PROBLEM — Z63.6 CAREGIVER BURDEN: Status: ACTIVE | Noted: 2023-07-02

## 2023-07-13 ENCOUNTER — TELEPHONE (OUTPATIENT)
Dept: INTERNAL MEDICINE | Facility: OTHER | Age: 73
End: 2023-07-13
Payer: OTHER GOVERNMENT

## 2023-07-13 NOTE — TELEPHONE ENCOUNTER
"Hi Dr. Camargo,   Pt lvm regarding her \"bumps\" in her esophagus. She said it feels like cotton throat and isn't doing too good. Imaging is on Monday. Pt would like a cb if you could. Thank you!  "

## 2023-07-20 ENCOUNTER — TELEPHONE (OUTPATIENT)
Dept: INTERNAL MEDICINE | Facility: OTHER | Age: 73
End: 2023-07-20
Payer: OTHER GOVERNMENT

## 2023-07-20 DIAGNOSIS — E04.2 MULTINODULAR GOITER: ICD-10-CM

## 2023-07-20 NOTE — TELEPHONE ENCOUNTER
Called patient and explained the findings on the thyroid USG.   I will order tpo antibodies and repeat TSH with free t4.  She also has a follow up with Dr. Camargo and she will have labs done before that.    For CONRAD - Please mail labs ordered today for TSH, free Thyroxine and thyroid peroxidase antibody  to patient's home address

## 2023-07-21 ENCOUNTER — OFFICE VISIT (OUTPATIENT)
Dept: INTERNAL MEDICINE | Facility: OTHER | Age: 73
End: 2023-07-21
Payer: MEDICARE

## 2023-07-21 VITALS
TEMPERATURE: 97.6 F | HEART RATE: 64 BPM | DIASTOLIC BLOOD PRESSURE: 58 MMHG | HEIGHT: 65 IN | BODY MASS INDEX: 29.82 KG/M2 | WEIGHT: 179 LBS | SYSTOLIC BLOOD PRESSURE: 119 MMHG | OXYGEN SATURATION: 96 %

## 2023-07-21 DIAGNOSIS — E04.2 MULTINODULAR GOITER: ICD-10-CM

## 2023-07-21 DIAGNOSIS — K21.9 GASTROESOPHAGEAL REFLUX DISEASE, UNSPECIFIED WHETHER ESOPHAGITIS PRESENT: ICD-10-CM

## 2023-07-21 DIAGNOSIS — I10 PRIMARY HYPERTENSION: ICD-10-CM

## 2023-07-21 PROCEDURE — 3074F SYST BP LT 130 MM HG: CPT | Mod: GC

## 2023-07-21 PROCEDURE — 99214 OFFICE O/P EST MOD 30 MIN: CPT | Mod: GC

## 2023-07-21 PROCEDURE — 3078F DIAST BP <80 MM HG: CPT | Mod: GC

## 2023-07-21 RX ORDER — AMLODIPINE BESYLATE 10 MG/1
1 TABLET ORAL DAILY
COMMUNITY
End: 2023-07-21

## 2023-07-21 RX ORDER — AMLODIPINE BESYLATE 10 MG/1
10 TABLET ORAL DAILY
Qty: 30 TABLET | Refills: 3 | Status: SHIPPED | OUTPATIENT
Start: 2023-07-21 | End: 2024-03-05

## 2023-07-21 RX ORDER — OMEPRAZOLE 20 MG/1
20 CAPSULE, DELAYED RELEASE ORAL 2 TIMES DAILY
Qty: 60 CAPSULE | Refills: 0 | Status: SHIPPED | OUTPATIENT
Start: 2023-07-21 | End: 2023-08-20

## 2023-07-21 ASSESSMENT — ENCOUNTER SYMPTOMS
SEIZURES: 0
NERVOUS/ANXIOUS: 0
DEPRESSION: 0
ABDOMINAL PAIN: 0
BLOOD IN STOOL: 0
NAUSEA: 0
SHORTNESS OF BREATH: 0
VOMITING: 0
FEVER: 0
PALPITATIONS: 1
LOSS OF CONSCIOUSNESS: 0
CHILLS: 0
BLURRED VISION: 0
HEARTBURN: 0
FALLS: 0

## 2023-07-21 ASSESSMENT — FIBROSIS 4 INDEX: FIB4 SCORE: 0.93

## 2023-07-21 NOTE — ASSESSMENT & PLAN NOTE
Multinodular goiter with largest nodule 2.2 cm and left thyroid lobe.  TI RADS score of 3, low concern for malignancy at this time.  TSH also WNL in 11/2022.  Currently pending TSH, T4, anti-TPO    -Advised to obtain above-stated labs  -Deferring needle biopsy at this time, will reevaluate next visit  -Follow-up on 7/31 with PCP

## 2023-07-21 NOTE — ASSESSMENT & PLAN NOTE
Symptoms well controlled with Prilosec 20 mg twice daily.    -Continue Prilosec 20 mg twice daily  -Advised to sit upright for 30 minutes - 1 hour after eating  -Also recommended to obtain wedge pillow

## 2023-07-21 NOTE — PROGRESS NOTES
Established Patient    Patient Care Team:  Leonard Camargo M.D. as PCP - General (Internal Medicine)    Fe Clark is a 72 y.o. female who presents today with the following Chief Complaint(s): Follow up for Diagnoses of Multinodular goiter, Primary hypertension, and Gastroesophageal reflux disease, unspecified whether esophagitis present were pertinent to this visit.    HPI:  Patient here for follow-up visit regarding recent neck ultrasound, demonstrating multinodular goiter with largest nodule 2.2 cm and left thyroid lobe.  TI RADS score of 3.  Discussed results with patient and necessity of obtaining labs ordered previously (TSH, T4, TPO antibodies).  Patient states for the past 2 years she has been feeling more fatigued, with poor concentration, hair falling out, and LE edema.  Denying heat/cold intolerance.  Also states that she feels palpitations when lying down especially.  No history of cancer in her family, however does have a family history of hypothyroidism in her aunt, and questionable thyroid issues in her mother (states she was on medications).  Last TSH WNL in 11/2022.  Patient also requesting refills for omeprazole and amlodipine.  BP has been well controlled currently 119/58 this visit.  GERD symptoms are also been controlled on omeprazole.    Review of Systems   Constitutional:  Positive for malaise/fatigue. Negative for chills and fever.   HENT:  Negative for hearing loss.    Eyes:  Negative for blurred vision.   Respiratory:  Negative for shortness of breath.    Cardiovascular:  Positive for palpitations and leg swelling. Negative for chest pain.   Gastrointestinal:  Negative for abdominal pain, blood in stool, heartburn, melena, nausea and vomiting.   Genitourinary:  Negative for dysuria and hematuria.   Musculoskeletal:  Negative for falls and joint pain.   Neurological:  Negative for seizures and loss of consciousness.   Psychiatric/Behavioral:  Negative for depression. The  "patient is not nervous/anxious.        Past Medical History:   Diagnosis Date    ASTHMA     COPD (chronic obstructive pulmonary disease) (HCC)     Diverticulosis     Duodenal ulcer, unspecified as acute or chronic, without hemorrhage, perforation, or obstruction     GERD (gastroesophageal reflux disease)     Hiatal hernia     High cholesterol     Hypertension     Psychiatric problem      Social History     Tobacco Use    Smoking status: Never    Smokeless tobacco: Never   Vaping Use    Vaping Use: Never used   Substance Use Topics    Alcohol use: Not Currently    Drug use: Not Currently     Current Outpatient Medications   Medication Sig Dispense Refill    amLODIPine (NORVASC) 10 MG Tab Take 1 Tablet by mouth every day. 30 Tablet 3    omeprazole (PRILOSEC) 20 MG delayed-release capsule Take 1 Capsule by mouth 2 times a day for 30 days. 60 Capsule 0    lidocaine (LIDODERM) 5 % Patch Place 1 Patch on the skin every 24 hours. 10 Patch 0    atorvastatin (LIPITOR) 80 MG tablet Take 1 Tablet by mouth every evening. 30 Tablet 3    lisinopril (PRINIVIL) 20 MG Tab Take 1 Tablet by mouth every day. 30 Tablet 3    albuterol 108 (90 Base) MCG/ACT Aero Soln inhalation aerosol Inhale 2 Puffs every four hours as needed for Shortness of Breath. 1 Each 1    Multiple Vitamins-Minerals (CENTRUM SILVER 50+WOMEN) Tab Take 1 Tablet by mouth every morning.      aspirin EC (ECOTRIN) 81 MG Tablet Delayed Response Take 1 Tab by mouth every day. 30 Tab 0    acetaminophen (TYLENOL) 325 MG Tab Take 2 Tablets by mouth every 6 hours as needed for Moderate Pain. (Patient not taking: Reported on 7/21/2023) 30 Tablet 0     No current facility-administered medications for this visit.       /58 (BP Location: Right arm, Patient Position: Sitting, BP Cuff Size: Adult)   Pulse 64   Temp 36.4 °C (97.6 °F) (Temporal)   Ht 1.651 m (5' 5\")   Wt 81.2 kg (179 lb)   SpO2 96%   BMI 29.79 kg/m²   Physical Exam  Constitutional:       General: She is " not in acute distress.  HENT:      Head: Normocephalic and atraumatic.      Nose: Nose normal.      Mouth/Throat:      Mouth: Mucous membranes are moist.   Eyes:      Conjunctiva/sclera: Conjunctivae normal.   Neck:      Comments: Enlarged thyroid, mobile  Cardiovascular:      Rate and Rhythm: Normal rate and regular rhythm.      Heart sounds: No murmur heard.     No friction rub. No gallop.   Pulmonary:      Effort: Pulmonary effort is normal.      Breath sounds: Normal breath sounds. No wheezing, rhonchi or rales.   Abdominal:      General: Abdomen is flat. Bowel sounds are normal.      Palpations: Abdomen is soft.      Tenderness: There is no abdominal tenderness. There is no guarding or rebound.   Musculoskeletal:      Right lower leg: Edema present.      Left lower leg: Edema present.   Skin:     General: Skin is warm.      Capillary Refill: Capillary refill takes less than 2 seconds.   Neurological:      General: No focal deficit present.      Mental Status: She is alert and oriented to person, place, and time.   Psychiatric:         Mood and Affect: Mood normal.         Behavior: Behavior normal.         Assessment and Plan:     Multinodular goiter  Multinodular goiter with largest nodule 2.2 cm and left thyroid lobe.  TI RADS score of 3, low concern for malignancy at this time.  TSH also WNL in 11/2022.  Currently pending TSH, T4, anti-TPO    -Advised to obtain above-stated labs  -Deferring needle biopsy at this time, will reevaluate next visit  -Follow-up on 7/31 with PCP    HTN (hypertension)  BP well controlled with Prinivil 20 mg daily, Norvasc 10 mg daily.  This visit 119/58.  Does not routinely check BP at home    -Continue Prinivil 20 mg daily, Norvasc 10 mg daily  -Advised to limit sodium intake in diet    GERD (gastroesophageal reflux disease)  Symptoms well controlled with Prilosec 20 mg twice daily.    -Continue Prilosec 20 mg twice daily  -Advised to sit upright for 30 minutes - 1 hour after  eating  -Also recommended to obtain wedge pillow      Orders Placed This Encounter    DISCONTD: amLODIPine (NORVASC) 10 MG Tab    amLODIPine (NORVASC) 10 MG Tab    omeprazole (PRILOSEC) 20 MG delayed-release capsule       Return in about 10 days (around 7/31/2023).    Manuel Jose D.O. PGY II  Internal Medicine  [facility]

## 2023-07-21 NOTE — ASSESSMENT & PLAN NOTE
BP well controlled with Prinivil 20 mg daily, Norvasc 10 mg daily.  This visit 119/58.  Does not routinely check BP at home    -Continue Prinivil 20 mg daily, Norvasc 10 mg daily  -Advised to limit sodium intake in diet

## 2023-07-26 DIAGNOSIS — E04.2 MULTINODULAR GOITER: ICD-10-CM

## 2023-07-31 ENCOUNTER — OFFICE VISIT (OUTPATIENT)
Dept: INTERNAL MEDICINE | Facility: OTHER | Age: 73
End: 2023-07-31
Payer: MEDICARE

## 2023-07-31 VITALS
SYSTOLIC BLOOD PRESSURE: 146 MMHG | OXYGEN SATURATION: 99 % | TEMPERATURE: 98 F | BODY MASS INDEX: 31.18 KG/M2 | WEIGHT: 176 LBS | HEART RATE: 60 BPM | DIASTOLIC BLOOD PRESSURE: 76 MMHG | HEIGHT: 63 IN

## 2023-07-31 DIAGNOSIS — R09.82 POST-NASAL DRIP: ICD-10-CM

## 2023-07-31 DIAGNOSIS — M25.561 CHRONIC PAIN OF RIGHT KNEE: ICD-10-CM

## 2023-07-31 DIAGNOSIS — G89.29 CHRONIC PAIN OF RIGHT KNEE: ICD-10-CM

## 2023-07-31 DIAGNOSIS — E04.2 MULTINODULAR GOITER: ICD-10-CM

## 2023-07-31 PROCEDURE — 3078F DIAST BP <80 MM HG: CPT | Mod: GC

## 2023-07-31 PROCEDURE — 3077F SYST BP >= 140 MM HG: CPT | Mod: GC

## 2023-07-31 PROCEDURE — 99214 OFFICE O/P EST MOD 30 MIN: CPT | Mod: GC

## 2023-07-31 RX ORDER — LORATADINE 10 MG/1
10 TABLET ORAL DAILY
Qty: 10 TABLET | Refills: 0 | Status: SHIPPED | OUTPATIENT
Start: 2023-07-31 | End: 2023-09-19

## 2023-07-31 ASSESSMENT — ENCOUNTER SYMPTOMS
CONSTIPATION: 0
WEIGHT LOSS: 0
BACK PAIN: 0
ABDOMINAL PAIN: 0
LOSS OF CONSCIOUSNESS: 0
BLURRED VISION: 0
ORTHOPNEA: 0
DOUBLE VISION: 0
DIARRHEA: 0
SINUS PAIN: 0
EYE REDNESS: 0
MYALGIAS: 0
PALPITATIONS: 0
SPUTUM PRODUCTION: 0
VOMITING: 0
DIZZINESS: 0
WEAKNESS: 0
NAUSEA: 0
CHILLS: 0
HEARTBURN: 0
SORE THROAT: 1
DEPRESSION: 0
HEADACHES: 0
SHORTNESS OF BREATH: 0
INSOMNIA: 0
COUGH: 0
NERVOUS/ANXIOUS: 0
FEVER: 0

## 2023-07-31 ASSESSMENT — FIBROSIS 4 INDEX: FIB4 SCORE: 0.93

## 2023-07-31 ASSESSMENT — LIFESTYLE VARIABLES: SUBSTANCE_ABUSE: 0

## 2023-07-31 NOTE — ASSESSMENT & PLAN NOTE
No improvement.   Patient has not been able to schedule appointment.   Gave number for patient to call and re-schedule.

## 2023-07-31 NOTE — ASSESSMENT & PLAN NOTE
Patient describes scratchy throat and has evident nasal congestion.   Concerned that possible thyroid nodules could be related to throat discomfort.   Will trial Loratadine PO and re-assess in next appointment.

## 2023-07-31 NOTE — ASSESSMENT & PLAN NOTE
Confirmed with US on 07/17/23.   Largest: 2.2 cm, left thyroid.   07/24/23: TSH 2.4, Free T4 1.1, TPO Ab negative.   FNA not recommended if largest nodule < 2.5 cm. Will re-assess   Placed referral for Endocrinology for further assessment and recommendations.

## 2023-07-31 NOTE — PROGRESS NOTES
OFFICE VISIT    Fe Clark is a 72 y.o. female who presents today with the following:    Reason for visit:  Follow-up on thyroid lab testing    HPI:  Mrs. Clark is a 73 yo female with PMH of Hypertension, Dyslipidemia, GERD, chronic right knee pain d/t meniscus tear + chondromalacia, Seborrheic dermatitis, and prior OH use.     Presents for follow-up on thyroid testing after recent diagnosis of multinodular goiter based on imaging findings. Also refers concerns for throat discomfort. Discussed:    Multinodular goiter. Found on US from 07/17/23. Lab work from 07/24/23 shows TSH, Free T4 within range and negative TPO Ab. Given size < 25 mm for largest nodule and lab results, FNA not indicated per guideline. Will pursue referral to Endocrinology for recommendations regarding this matter.   Pain in throat. Notes discomfort when swallowing; described as mild pains and in occasions sensations of difficulty bringing big pieces of food down. Notes it started 2 weeks ago approximately Has noticeable nasal congestion ongoing during this same period, likely post nasal drip adding to problem. Unclear if multinodular goiter related but cannot be excluded at this time.     Review of Systems   Constitutional:  Negative for chills, fever, malaise/fatigue and weight loss.   HENT:  Positive for congestion and sore throat. Negative for ear discharge, ear pain, sinus pain and tinnitus.    Eyes:  Negative for blurred vision, double vision and redness.   Respiratory:  Negative for cough, sputum production and shortness of breath.    Cardiovascular:  Negative for chest pain, palpitations and orthopnea.   Gastrointestinal:  Negative for abdominal pain, constipation, diarrhea, heartburn, nausea and vomiting.   Genitourinary:  Negative for dysuria, frequency and hematuria.   Musculoskeletal:  Negative for back pain, joint pain and myalgias.   Skin:  Negative for rash.   Neurological:  Negative for dizziness, loss of  "consciousness, weakness and headaches.   Endo/Heme/Allergies:  Negative for environmental allergies.   Psychiatric/Behavioral:  Negative for depression and substance abuse. The patient is not nervous/anxious and does not have insomnia.        BP (!) 146/76 (BP Location: Right arm, Patient Position: Sitting, BP Cuff Size: Large adult long)   Pulse 60   Temp 36.7 °C (98 °F) (Temporal)   Ht 1.6 m (5' 3\")   Wt 79.8 kg (176 lb)   SpO2 99%   BMI 31.18 kg/m²     Physical Exam  Constitutional:       General: She is not in acute distress.     Appearance: Normal appearance. She is not ill-appearing, toxic-appearing or diaphoretic.   HENT:      Head: Normocephalic and atraumatic.      Nose: Nose normal. No congestion.      Mouth/Throat:      Mouth: Mucous membranes are moist.      Pharynx: No oropharyngeal exudate or posterior oropharyngeal erythema.   Eyes:      General: No scleral icterus.     Extraocular Movements: Extraocular movements intact.      Pupils: Pupils are equal, round, and reactive to light.   Neck:      Comments: Palpable thyroid nodules, more left.   Cardiovascular:      Rate and Rhythm: Normal rate and regular rhythm.      Pulses: Normal pulses.      Heart sounds: Normal heart sounds.   Pulmonary:      Effort: No respiratory distress.      Breath sounds: Normal breath sounds.   Chest:      Chest wall: No tenderness.   Abdominal:      General: Bowel sounds are normal. There is no distension.      Palpations: Abdomen is soft.      Tenderness: There is no abdominal tenderness. There is no right CVA tenderness or left CVA tenderness.   Musculoskeletal:         General: Tenderness (Bilateral knees) present. No swelling. Normal range of motion.      Cervical back: Normal range of motion. No tenderness.      Right lower leg: No edema.      Left lower leg: No edema.   Lymphadenopathy:      Cervical: No cervical adenopathy.   Skin:     General: Skin is warm.      Capillary Refill: Capillary refill takes less " than 2 seconds.      Coloration: Skin is not jaundiced or pale.      Findings: No lesion or rash.   Neurological:      General: No focal deficit present.      Mental Status: She is alert and oriented to person, place, and time.      Cranial Nerves: No cranial nerve deficit.      Sensory: No sensory deficit.      Motor: No weakness.       Assessment and Plan:     73 yo female with PMH of Hypertension, Dyslipidemia, GERD, chronic right knee pain d/t meniscus tear + chondromalacia, Seborrheic dermatitis, and prior OH use. Recent diagnosis of multinodular goiter undergoing work-up.     Multinodular goiter  Confirmed with US on 07/17/23.   Largest: 2.2 cm, left thyroid.   07/24/23: TSH 2.4, Free T4 1.1, TPO Ab negative.   FNA not recommended if largest nodule < 2.5 cm. Will re-assess   Placed referral for Endocrinology for further assessment and recommendations.     Post-nasal drip  Patient describes scratchy throat and has evident nasal congestion.   Concerned that possible thyroid nodules could be related to throat discomfort.   Will trial Loratadine PO and re-assess in next appointment.     Chronic pain of right knee  No improvement.   Patient has not been able to schedule appointment.   Gave number for patient to call and re-schedule.       Orders Placed This Encounter    Referral to Endocrinology    loratadine (CLARITIN) 10 MG Tab       Return in about 6 weeks (around 9/11/2023).      Patient case was seen/ assessed/ discussed with Dr. Rodas    Signed by:    Leonard Page, PGY-2, Internal Medicine  [facility]

## 2023-07-31 NOTE — PATIENT INSTRUCTIONS
Please call and schedule appointment with Gastroenterology at:  [facility]  [address] [facility] [address]  [phone]    Please call and schedule appointment with Orthopedics at:  Orthopedic Surgery  [facility]  [address]  [facility] [address]  [phone]    Set up My Chart in your phone.   Complete lab work prior to next appointment.   Schedule appointment with Endocrinology. Remember to check My Chart.   Start Loratadine 10 mg daily for 7-10 days.   Follow-up in 6 weeks or sooner if needed.

## 2023-08-22 ENCOUNTER — TELEPHONE (OUTPATIENT)
Dept: INTERNAL MEDICINE | Facility: OTHER | Age: 73
End: 2023-08-22
Payer: OTHER GOVERNMENT

## 2023-08-22 NOTE — TELEPHONE ENCOUNTER
Pt transferred to me- she is asking about her endocrinology referral and orthopedic referral. I gave her the info for both so she will call them to see what's going on.

## 2023-09-19 ENCOUNTER — OFFICE VISIT (OUTPATIENT)
Dept: INTERNAL MEDICINE | Facility: OTHER | Age: 73
End: 2023-09-19
Payer: MEDICARE

## 2023-09-19 VITALS
HEIGHT: 63 IN | BODY MASS INDEX: 32.32 KG/M2 | SYSTOLIC BLOOD PRESSURE: 141 MMHG | OXYGEN SATURATION: 99 % | TEMPERATURE: 97.4 F | DIASTOLIC BLOOD PRESSURE: 73 MMHG | HEART RATE: 59 BPM | WEIGHT: 182.4 LBS

## 2023-09-19 DIAGNOSIS — E78.2 MIXED HYPERLIPIDEMIA: ICD-10-CM

## 2023-09-19 DIAGNOSIS — R09.82 POST-NASAL DRIP: ICD-10-CM

## 2023-09-19 DIAGNOSIS — K21.9 GASTROESOPHAGEAL REFLUX DISEASE, UNSPECIFIED WHETHER ESOPHAGITIS PRESENT: ICD-10-CM

## 2023-09-19 DIAGNOSIS — I16.0 HYPERTENSIVE URGENCY: ICD-10-CM

## 2023-09-19 DIAGNOSIS — I10 PRIMARY HYPERTENSION: ICD-10-CM

## 2023-09-19 DIAGNOSIS — E04.2 MULTINODULAR GOITER: ICD-10-CM

## 2023-09-19 DIAGNOSIS — G89.29 CHRONIC PAIN OF RIGHT KNEE: ICD-10-CM

## 2023-09-19 DIAGNOSIS — M25.561 CHRONIC PAIN OF RIGHT KNEE: ICD-10-CM

## 2023-09-19 PROBLEM — R35.1 NOCTURIA: Status: RESOLVED | Noted: 2023-07-02 | Resolved: 2023-09-19

## 2023-09-19 PROBLEM — Z00.00 ENCOUNTER FOR SCREENING AND PREVENTATIVE CARE: Status: RESOLVED | Noted: 2023-04-17 | Resolved: 2023-09-19

## 2023-09-19 PROCEDURE — 3077F SYST BP >= 140 MM HG: CPT

## 2023-09-19 PROCEDURE — 99214 OFFICE O/P EST MOD 30 MIN: CPT | Mod: GC

## 2023-09-19 PROCEDURE — 3078F DIAST BP <80 MM HG: CPT

## 2023-09-19 RX ORDER — OMEPRAZOLE 20 MG/1
20 CAPSULE, DELAYED RELEASE ORAL 2 TIMES DAILY
Qty: 30 CAPSULE | Refills: 3 | Status: SHIPPED | OUTPATIENT
Start: 2023-09-19 | End: 2024-03-05

## 2023-09-19 RX ORDER — LISINOPRIL 20 MG/1
20 TABLET ORAL DAILY
Qty: 30 TABLET | Refills: 3 | Status: SHIPPED | OUTPATIENT
Start: 2023-09-19 | End: 2024-03-05

## 2023-09-19 RX ORDER — ATORVASTATIN CALCIUM 80 MG/1
80 TABLET, FILM COATED ORAL EVERY EVENING
Qty: 30 TABLET | Refills: 3 | Status: SHIPPED | OUTPATIENT
Start: 2023-09-19 | End: 2024-03-05

## 2023-09-19 RX ORDER — OMEPRAZOLE 20 MG/1
1 CAPSULE, DELAYED RELEASE ORAL 2 TIMES DAILY
COMMUNITY
End: 2023-09-19 | Stop reason: SDUPTHER

## 2023-09-19 RX ORDER — FEXOFENADINE HCL 60 MG/1
60 TABLET, FILM COATED ORAL DAILY
Qty: 30 TABLET | Refills: 1 | Status: SHIPPED | OUTPATIENT
Start: 2023-09-19

## 2023-09-19 ASSESSMENT — ENCOUNTER SYMPTOMS
DIARRHEA: 0
FEVER: 0
ABDOMINAL PAIN: 0
NAUSEA: 0
BLOOD IN STOOL: 0
WEIGHT LOSS: 0
HEARTBURN: 0
SORE THROAT: 0
CONSTIPATION: 0
SINUS PAIN: 0
VOMITING: 0
CHILLS: 0

## 2023-09-19 ASSESSMENT — FIBROSIS 4 INDEX: FIB4 SCORE: 0.94

## 2023-09-19 NOTE — ASSESSMENT & PLAN NOTE
Results from ESR, CRP, RF and CCP antibodies from 09/09/23 have all been reported as negative.   Patient reports pain is currently more tolerable and less limiting.   Has not been able to schedule appointment with Orthopedic surgery.   AVS will have contact information for patient to call and schedule as unclear if she has the right number.

## 2023-09-19 NOTE — PATIENT INSTRUCTIONS
Call to schedule appointment with Endocrinology at:   [address]   [facility] [address]   [phone]  Call to schedule appointment with Orthopedic surgery at:    [address] [id number] [phone]  Call to schedule appointment with Gastroenterology at:    [facility]   [address] [facility] [id number] [phone]  Set up My Chart on your phone.   Follow-up in 3 months for Annual Wellness visit.

## 2023-09-19 NOTE — ASSESSMENT & PLAN NOTE
Reports feeling that right sided nodule is bigger. On examination this does not seem to be the case in comparison to prior appointment.   Patient has been unable to schedule appointment with Endocrinology.   AVS will have information for patient to call and schedule as unclear if she has the correct number.

## 2023-09-19 NOTE — PROGRESS NOTES
"OFFICE VISIT    Fe Clark is a 73 y.o. female with PMH of Hypertension, Dyslipidemia, GERD, chronic right knee pain d/t meniscus tear + chondromalacia, Seborrheic dermatitis, and prior OH use.     Reason for visit:  Follow-up on lab results.     HPI:  Lab results. D/t new onset of polyarthralgia reported back in June, 2023, ordered ESR, CRP, CCP antibodies, RF and came back negative.   Chronic right knee pain. Partial improvement, though no specific treatment received. Pending scheduling with Orthopedic surgery; gave number to call and coordinate appointment.   Multinodular goiter. Patient has subjective feeling of increased size of nodule, right side. Not evidenced on exam. Pending to schedule with Endocrinology; gave information to coordinate.     Review of Systems   Constitutional:  Negative for chills, fever, malaise/fatigue and weight loss.   HENT:  Negative for congestion, sinus pain and sore throat.    Gastrointestinal:  Negative for abdominal pain, blood in stool, constipation, diarrhea, heartburn, nausea and vomiting.   Musculoskeletal:  Positive for joint pain (Right knee, improved.).       BP (!) 141/73 (BP Location: Right arm, Patient Position: Sitting, BP Cuff Size: Adult)   Pulse (!) 59   Temp 36.3 °C (97.4 °F) (Temporal)   Ht 1.6 m (5' 3\")   Wt 82.7 kg (182 lb 6.4 oz)   SpO2 99%   BMI 32.31 kg/m²     Physical Exam  Constitutional:       General: She is not in acute distress.     Appearance: She is obese. She is not ill-appearing.   HENT:      Mouth/Throat:      Mouth: Mucous membranes are moist.      Pharynx: Oropharynx is clear.   Neck:      Comments: Small, palpable, bilateral nodules. No change from prior appointment.   Abdominal:      General: Bowel sounds are normal. There is no distension.      Tenderness: There is no abdominal tenderness. There is no right CVA tenderness or left CVA tenderness.   Musculoskeletal:         General: Tenderness (Mild, right knee) and deformity " (Right knee.) present. No swelling.      Cervical back: No tenderness.   Lymphadenopathy:      Cervical: No cervical adenopathy.       Assessment and Plan:   73 y.o. female with PMH of Hypertension, Dyslipidemia, GERD, chronic right knee pain d/t meniscus tear + chondromalacia, Seborrheic dermatitis, and prior OH use.     Multinodular goiter  Reports feeling that right sided nodule is bigger. On examination this does not seem to be the case in comparison to prior appointment.   Patient has been unable to schedule appointment with Endocrinology.   AVS will have information for patient to call and schedule as unclear if she has the correct number.     Chronic pain of right knee  Results from ESR, CRP, RF and CCP antibodies from 09/09/23 have all been reported as negative.   Patient reports pain is currently more tolerable and less limiting.   Has not been able to schedule appointment with Orthopedic surgery.   AVS will have contact information for patient to call and schedule as unclear if she has the right number.     Orders Placed This Encounter    omeprazole (PRILOSEC) 20 MG delayed-release capsule       Return in about 3 months (around 12/19/2023).      Patient case was seen/ assessed/ discussed with Dr. Rodas    Signed by:    Leonard Page, PGY-2, Internal Medicine  [facility]

## 2023-12-01 ENCOUNTER — APPOINTMENT (OUTPATIENT)
Dept: RADIOLOGY | Facility: MEDICAL CENTER | Age: 73
End: 2023-12-01
Attending: EMERGENCY MEDICINE
Payer: OTHER GOVERNMENT

## 2023-12-01 ENCOUNTER — HOSPITAL ENCOUNTER (EMERGENCY)
Facility: MEDICAL CENTER | Age: 73
End: 2023-12-01
Attending: EMERGENCY MEDICINE
Payer: OTHER GOVERNMENT

## 2023-12-01 VITALS
SYSTOLIC BLOOD PRESSURE: 156 MMHG | DIASTOLIC BLOOD PRESSURE: 75 MMHG | BODY MASS INDEX: 30.49 KG/M2 | HEIGHT: 65 IN | WEIGHT: 182.98 LBS | HEART RATE: 52 BPM | RESPIRATION RATE: 16 BRPM | OXYGEN SATURATION: 95 % | TEMPERATURE: 98.4 F

## 2023-12-01 DIAGNOSIS — R51.9 ACUTE NONINTRACTABLE HEADACHE, UNSPECIFIED HEADACHE TYPE: ICD-10-CM

## 2023-12-01 DIAGNOSIS — R07.9 ACUTE CHEST PAIN: ICD-10-CM

## 2023-12-01 DIAGNOSIS — B34.9 VIRAL SYNDROME: ICD-10-CM

## 2023-12-01 LAB
ALBUMIN SERPL BCP-MCNC: 4 G/DL (ref 3.2–4.9)
ALBUMIN/GLOB SERPL: 1.7 G/DL
ALP SERPL-CCNC: 108 U/L (ref 30–99)
ALT SERPL-CCNC: 16 U/L (ref 2–50)
ANION GAP SERPL CALC-SCNC: 8 MMOL/L (ref 7–16)
AST SERPL-CCNC: 16 U/L (ref 12–45)
BASOPHILS # BLD AUTO: 0.6 % (ref 0–1.8)
BASOPHILS # BLD: 0.05 K/UL (ref 0–0.12)
BILIRUB SERPL-MCNC: 0.5 MG/DL (ref 0.1–1.5)
BUN SERPL-MCNC: 14 MG/DL (ref 8–22)
CALCIUM ALBUM COR SERPL-MCNC: 9.4 MG/DL (ref 8.5–10.5)
CALCIUM SERPL-MCNC: 9.4 MG/DL (ref 8.5–10.5)
CHLORIDE SERPL-SCNC: 107 MMOL/L (ref 96–112)
CO2 SERPL-SCNC: 25 MMOL/L (ref 20–33)
CREAT SERPL-MCNC: 0.89 MG/DL (ref 0.5–1.4)
EKG IMPRESSION: NORMAL
EOSINOPHIL # BLD AUTO: 0.19 K/UL (ref 0–0.51)
EOSINOPHIL NFR BLD: 2.2 % (ref 0–6.9)
ERYTHROCYTE [DISTWIDTH] IN BLOOD BY AUTOMATED COUNT: 43.4 FL (ref 35.9–50)
FLUAV RNA SPEC QL NAA+PROBE: NEGATIVE
FLUBV RNA SPEC QL NAA+PROBE: NEGATIVE
GFR SERPLBLD CREATININE-BSD FMLA CKD-EPI: 68 ML/MIN/1.73 M 2
GLOBULIN SER CALC-MCNC: 2.4 G/DL (ref 1.9–3.5)
GLUCOSE SERPL-MCNC: 108 MG/DL (ref 65–99)
HCT VFR BLD AUTO: 42.3 % (ref 37–47)
HGB BLD-MCNC: 14.1 G/DL (ref 12–16)
IMM GRANULOCYTES # BLD AUTO: 0.02 K/UL (ref 0–0.11)
IMM GRANULOCYTES NFR BLD AUTO: 0.2 % (ref 0–0.9)
LYMPHOCYTES # BLD AUTO: 2.07 K/UL (ref 1–4.8)
LYMPHOCYTES NFR BLD: 23.7 % (ref 22–41)
MCH RBC QN AUTO: 32.3 PG (ref 27–33)
MCHC RBC AUTO-ENTMCNC: 33.3 G/DL (ref 32.2–35.5)
MCV RBC AUTO: 96.8 FL (ref 81.4–97.8)
MONOCYTES # BLD AUTO: 0.68 K/UL (ref 0–0.85)
MONOCYTES NFR BLD AUTO: 7.8 % (ref 0–13.4)
NEUTROPHILS # BLD AUTO: 5.71 K/UL (ref 1.82–7.42)
NEUTROPHILS NFR BLD: 65.5 % (ref 44–72)
NRBC # BLD AUTO: 0 K/UL
NRBC BLD-RTO: 0 /100 WBC (ref 0–0.2)
PLATELET # BLD AUTO: 325 K/UL (ref 164–446)
PMV BLD AUTO: 9.5 FL (ref 9–12.9)
POTASSIUM SERPL-SCNC: 3.8 MMOL/L (ref 3.6–5.5)
PROT SERPL-MCNC: 6.4 G/DL (ref 6–8.2)
RBC # BLD AUTO: 4.37 M/UL (ref 4.2–5.4)
RSV RNA SPEC QL NAA+PROBE: NEGATIVE
SARS-COV-2 RNA RESP QL NAA+PROBE: NOTDETECTED
SODIUM SERPL-SCNC: 140 MMOL/L (ref 135–145)
TROPONIN T SERPL-MCNC: 9 NG/L (ref 6–19)
WBC # BLD AUTO: 8.7 K/UL (ref 4.8–10.8)

## 2023-12-01 PROCEDURE — 71045 X-RAY EXAM CHEST 1 VIEW: CPT

## 2023-12-01 PROCEDURE — 36415 COLL VENOUS BLD VENIPUNCTURE: CPT

## 2023-12-01 PROCEDURE — C9803 HOPD COVID-19 SPEC COLLECT: HCPCS

## 2023-12-01 PROCEDURE — 80053 COMPREHEN METABOLIC PANEL: CPT

## 2023-12-01 PROCEDURE — 93005 ELECTROCARDIOGRAM TRACING: CPT | Performed by: EMERGENCY MEDICINE

## 2023-12-01 PROCEDURE — 0241U HCHG SARS-COV-2 COVID-19 NFCT DS RESP RNA 4 TRGT ED POC: CPT

## 2023-12-01 PROCEDURE — 85025 COMPLETE CBC W/AUTO DIFF WBC: CPT

## 2023-12-01 PROCEDURE — 84484 ASSAY OF TROPONIN QUANT: CPT

## 2023-12-01 PROCEDURE — 93005 ELECTROCARDIOGRAM TRACING: CPT

## 2023-12-01 PROCEDURE — 99284 EMERGENCY DEPT VISIT MOD MDM: CPT

## 2023-12-01 RX ORDER — NAPROXEN 500 MG/1
500 TABLET ORAL 2 TIMES DAILY WITH MEALS
Qty: 20 TABLET | Refills: 0 | Status: SHIPPED | OUTPATIENT
Start: 2023-12-01 | End: 2024-08-26

## 2023-12-01 ASSESSMENT — HEART SCORE
RISK FACTORS: 1-2 RISK FACTORS
ECG: NORMAL
TROPONIN: LESS THAN OR EQUAL TO NORMAL LIMIT
HISTORY: SLIGHTLY SUSPICIOUS
HEART SCORE: 3
AGE: 65+

## 2023-12-01 ASSESSMENT — FIBROSIS 4 INDEX: FIB4 SCORE: 0.94

## 2023-12-01 NOTE — ED PROVIDER NOTES
ED Provider Note    CHIEF COMPLAINT  Chief Complaint   Patient presents with    Flu Like Symptoms     Patient report body aches, fevers, and congestion x 1 week. Patient reports home test 4 days ago positive for Covid.     Chest Pain     Center chest pain since this morning.     Shortness of Breath     SOB x 1 week.        EXTERNAL RECORDS REVIEWED  Outpatient office visit 9/19/2023, goiter, polyarthralgia rheumatologic workup negative    HPI/ROS    Fe Clark is a 73 y.o. female who presents for evaluation of 2 weeks of intermittent chest pain headaches and bodyaches with fever and coughing and generalized weakness with a home positive COVID test.  She reports that her chest pain is a pressure-like sensation present for the past 3 days consistently.  She has had intermittent headache but no focal weakness numbness or tingling.  No recent fevers.  She has had some intermittent abdominal cramping and nausea as well.  History of COPD as well as GERD, hypercholesterolemia and hypertension.    PAST MEDICAL HISTORY   has a past medical history of ASTHMA, COPD (chronic obstructive pulmonary disease) (HCC), Diverticulosis, Duodenal ulcer, unspecified as acute or chronic, without hemorrhage, perforation, or obstruction, GERD (gastroesophageal reflux disease), Hiatal hernia, High cholesterol, Hypertension, and Psychiatric problem.    SURGICAL HISTORY   has a past surgical history that includes bladder suspension; bowel resection; colonoscopy; hysterectomy radical; other orthopedic surgery; ankle arthroscopy (7/31/2013); hardware removal ortho (7/31/2013); lacrimal duct probe (3/11/2015); submandible abscess incision and drainage (11/19/2016); and dental extraction(s) (11/19/2016).    FAMILY HISTORY  Family History   Problem Relation Age of Onset    No Known Problems Mother         2017 is age 91    Other Father 57        unknown issue       SOCIAL HISTORY  Social History     Tobacco Use    Smoking status:  "Never    Smokeless tobacco: Never   Vaping Use    Vaping Use: Never used   Substance and Sexual Activity    Alcohol use: Yes     Comment: occasionally    Drug use: Not Currently    Sexual activity: Not on file       CURRENT MEDICATIONS  Home Medications       Reviewed by Zulema Palencia R.N. (Registered Nurse) on 12/01/23 at 1143  Med List Status: Partial     Medication Last Dose Status   acetaminophen (TYLENOL) 325 MG Tab  Active   albuterol 108 (90 Base) MCG/ACT Aero Soln inhalation aerosol  Active   amLODIPine (NORVASC) 10 MG Tab  Active   aspirin EC (ECOTRIN) 81 MG Tablet Delayed Response  Active   atorvastatin (LIPITOR) 80 MG tablet  Active   fexofenadine (ALLEGRA) 60 MG Tab  Active   lisinopril (PRINIVIL) 20 MG Tab  Active   Multiple Vitamins-Minerals (CENTRUM SILVER 50+WOMEN) Tab  Active   omeprazole (PRILOSEC) 20 MG delayed-release capsule  Active                    ALLERGIES  Allergies   Allergen Reactions    Nkda [No Known Drug Allergy]        PHYSICAL EXAM  VITAL SIGNS: BP (!) 161/90   Pulse 85   Temp 36.8 °C (98.3 °F) (Temporal)   Resp 18   Ht 1.651 m (5' 5\")   Wt 83 kg (182 lb 15.7 oz)   SpO2 97%   BMI 30.45 kg/m²    Constitutional: Alert in no apparent distress.  HENT: No signs of significant acute trauma.   Eyes: Conjunctiva normal, non-icteric.   Chest: Normal nonlabored respirations  Skin: No appreciable rash on the exposed skin  Musculoskeletal: No obvious acute trauma appreciated  Neurologic: Alert, no obvious focal deficits noted.        DIAGNOSTIC STUDIES / PROCEDURES    LABS  Results for orders placed or performed during the hospital encounter of 12/01/23   CBC with Differential   Result Value Ref Range    WBC 8.7 4.8 - 10.8 K/uL    RBC 4.37 4.20 - 5.40 M/uL    Hemoglobin 14.1 12.0 - 16.0 g/dL    Hematocrit 42.3 37.0 - 47.0 %    MCV 96.8 81.4 - 97.8 fL    MCH 32.3 27.0 - 33.0 pg    MCHC 33.3 32.2 - 35.5 g/dL    RDW 43.4 35.9 - 50.0 fL    Platelet Count 325 164 - 446 K/uL    MPV 9.5 9.0 - " 12.9 fL    Neutrophils-Polys 65.50 44.00 - 72.00 %    Lymphocytes 23.70 22.00 - 41.00 %    Monocytes 7.80 0.00 - 13.40 %    Eosinophils 2.20 0.00 - 6.90 %    Basophils 0.60 0.00 - 1.80 %    Immature Granulocytes 0.20 0.00 - 0.90 %    Nucleated RBC 0.00 0.00 - 0.20 /100 WBC    Neutrophils (Absolute) 5.71 1.82 - 7.42 K/uL    Lymphs (Absolute) 2.07 1.00 - 4.80 K/uL    Monos (Absolute) 0.68 0.00 - 0.85 K/uL    Eos (Absolute) 0.19 0.00 - 0.51 K/uL    Baso (Absolute) 0.05 0.00 - 0.12 K/uL    Immature Granulocytes (abs) 0.02 0.00 - 0.11 K/uL    NRBC (Absolute) 0.00 K/uL   Complete Metabolic Panel (CMP)   Result Value Ref Range    Sodium 140 135 - 145 mmol/L    Potassium 3.8 3.6 - 5.5 mmol/L    Chloride 107 96 - 112 mmol/L    Co2 25 20 - 33 mmol/L    Anion Gap 8.0 7.0 - 16.0    Glucose 108 (H) 65 - 99 mg/dL    Bun 14 8 - 22 mg/dL    Creatinine 0.89 0.50 - 1.40 mg/dL    Calcium 9.4 8.5 - 10.5 mg/dL    Correct Calcium 9.4 8.5 - 10.5 mg/dL    AST(SGOT) 16 12 - 45 U/L    ALT(SGPT) 16 2 - 50 U/L    Alkaline Phosphatase 108 (H) 30 - 99 U/L    Total Bilirubin 0.5 0.1 - 1.5 mg/dL    Albumin 4.0 3.2 - 4.9 g/dL    Total Protein 6.4 6.0 - 8.2 g/dL    Globulin 2.4 1.9 - 3.5 g/dL    A-G Ratio 1.7 g/dL   Troponins NOW   Result Value Ref Range    Troponin T 9 6 - 19 ng/L   ESTIMATED GFR   Result Value Ref Range    GFR (CKD-EPI) 68 >60 mL/min/1.73 m 2   EKG   Result Value Ref Range    Report       [facility] Emergency Dept.    Test Date:  2023  Pt Name:    DOROTA AGUIRRE             Department: ER  MRN:        [id number]                      Room:  Gender:     Female                       Technician: [id number]  :        [id number]                   Requested By:ER TRIAGE PROTOCOL  Order #:    [id number]                    Reading MD: SANTI ATKINSON MD    Measurements  Intervals                                Axis  Rate:       61                           P:          -30  OR:         153                           QRS:        -26  QRSD:       102                          T:          52  QT:         454  QTc:        458    Interpretive Statements  12 Lead EKG interpreted by me to show: -- Rate 61 -- Rhythm: Normal sinus  rhythm -- Axis: Normal -- MA and QRS Intervals: Normal -- T waves: No acute  changes -- ST segments: No acute changes -- Ectopy: None. My impression of  this EKG: Does not indicate acute ischemia at this time.  Electronically Signed On 12-0 1-2023 14:41:09 PST by SANTI ATKINSON MD     POC CoV-2, FLU A/B, RSV by PCR   Result Value Ref Range    POC Influenza A RNA, PCR Negative Negative    POC Influenza B RNA, PCR Negative Negative    POC RSV, by PCR Negative Negative    POC SARS-CoV-2, PCR NotDetected         RADIOLOGY  I have independently interpreted the diagnostic imaging associated with this visit and am waiting the final reading from the radiologist.   My preliminary interpretation is as follows: No infiltrate or pneumothorax  Radiologist interpretation:   DX-CHEST-PORTABLE (1 VIEW)   Final Result      No acute cardiac or pulmonary abnormalities are identified.            COURSE & MEDICAL DECISION MAKING    ED Observation Status? No; Patient does not meet criteria for ED Observation.     INITIAL ASSESSMENT, COURSE AND PLAN  Care Narrative: This is a 73-year-old female with a presentation representing acute viral syndrome from a COVID-19 infection.  She has been sick for 2 weeks, body aches and chest pain and headache and some intermittent abdominal cramping.  She presents without hypoxia, no significant tachycardia, she appears well overall on exam.  She did take a home COVID test which was positive.  Here in the emergency department her chest x-ray is unremarkable, her blood work is within normal limits with normal blood counts and GFR, normal electrolytes and LFTs.  She has a negative troponin which is of value and excluding cardiac ischemia given the duration of symptoms.  Additionally, her COVID  test today is negative, contrast to the + one she is already had.  At this point, I think she is appropriate for discharge.  I will prescribe her naproxen for her symptoms.  She will be discharged home in stable condition with strict return instructions provided.  She reports she has an appointment already scheduled with PCP, rescheduled from today as she did not want to go to her doctor with COVID symptoms  Prescription for naproxen    DISPOSITION AND DISCUSSIONS    Decision tools and prescription drugs considered including, but not limited to: HEART Score: 3     FINAL DIAGNOSIS  1. Viral syndrome    2. Acute chest pain    3. Acute nonintractable headache, unspecified headache type           Electronically signed by: Brandin Michaud M.D., 12/1/2023 1:30 PM

## 2023-12-15 ENCOUNTER — APPOINTMENT (OUTPATIENT)
Dept: INTERNAL MEDICINE | Facility: OTHER | Age: 73
End: 2023-12-15
Payer: MEDICARE

## 2023-12-27 ENCOUNTER — APPOINTMENT (OUTPATIENT)
Dept: RADIOLOGY | Facility: MEDICAL CENTER | Age: 73
End: 2023-12-27
Attending: EMERGENCY MEDICINE
Payer: MEDICARE

## 2023-12-27 ENCOUNTER — HOSPITAL ENCOUNTER (EMERGENCY)
Facility: MEDICAL CENTER | Age: 73
End: 2023-12-27
Attending: EMERGENCY MEDICINE
Payer: MEDICARE

## 2023-12-27 VITALS
TEMPERATURE: 98 F | SYSTOLIC BLOOD PRESSURE: 136 MMHG | DIASTOLIC BLOOD PRESSURE: 89 MMHG | HEART RATE: 64 BPM | RESPIRATION RATE: 31 BRPM | OXYGEN SATURATION: 96 %

## 2023-12-27 DIAGNOSIS — S52.502A CLOSED FRACTURE OF DISTAL END OF LEFT RADIUS, UNSPECIFIED FRACTURE MORPHOLOGY, INITIAL ENCOUNTER: ICD-10-CM

## 2023-12-27 DIAGNOSIS — S80.211A ABRASION OF RIGHT KNEE, INITIAL ENCOUNTER: ICD-10-CM

## 2023-12-27 PROCEDURE — 700102 HCHG RX REV CODE 250 W/ 637 OVERRIDE(OP): Performed by: EMERGENCY MEDICINE

## 2023-12-27 PROCEDURE — 96374 THER/PROPH/DIAG INJ IV PUSH: CPT | Mod: XU

## 2023-12-27 PROCEDURE — 700111 HCHG RX REV CODE 636 W/ 250 OVERRIDE (IP): Mod: JZ | Performed by: EMERGENCY MEDICINE

## 2023-12-27 PROCEDURE — 25605 CLTX DST RDL FX/EPHYS SEP W/: CPT

## 2023-12-27 PROCEDURE — 29125 APPL SHORT ARM SPLINT STATIC: CPT

## 2023-12-27 PROCEDURE — 36415 COLL VENOUS BLD VENIPUNCTURE: CPT

## 2023-12-27 PROCEDURE — A9270 NON-COVERED ITEM OR SERVICE: HCPCS | Performed by: EMERGENCY MEDICINE

## 2023-12-27 PROCEDURE — 302874 HCHG BANDAGE ACE 2 OR 3""

## 2023-12-27 PROCEDURE — 73562 X-RAY EXAM OF KNEE 3: CPT | Mod: RT

## 2023-12-27 PROCEDURE — 73100 X-RAY EXAM OF WRIST: CPT | Mod: LT

## 2023-12-27 PROCEDURE — 73110 X-RAY EXAM OF WRIST: CPT | Mod: LT

## 2023-12-27 PROCEDURE — 99285 EMERGENCY DEPT VISIT HI MDM: CPT

## 2023-12-27 RX ORDER — HYDROCODONE BITARTRATE AND ACETAMINOPHEN 5; 325 MG/1; MG/1
1 TABLET ORAL ONCE
Status: COMPLETED | OUTPATIENT
Start: 2023-12-27 | End: 2023-12-27

## 2023-12-27 RX ORDER — HYDROCODONE BITARTRATE AND ACETAMINOPHEN 5; 325 MG/1; MG/1
1 TABLET ORAL EVERY 6 HOURS PRN
Qty: 12 TABLET | Refills: 0 | Status: SHIPPED | OUTPATIENT
Start: 2023-12-27 | End: 2023-12-30

## 2023-12-27 RX ADMIN — HYDROCODONE BITARTRATE AND ACETAMINOPHEN 1 TABLET: 5; 325 TABLET ORAL at 19:53

## 2023-12-27 RX ADMIN — FENTANYL CITRATE 50 MCG: 50 INJECTION, SOLUTION INTRAMUSCULAR; INTRAVENOUS at 17:22

## 2023-12-27 RX ADMIN — LIDOCAINE HYDROCHLORIDE 20 ML: 10 INJECTION, SOLUTION EPIDURAL; INFILTRATION; INTRACAUDAL; PERINEURAL at 17:45

## 2023-12-27 NOTE — ED TRIAGE NOTES
Pt comes in complaining of left wrist pain and R knee pain. Pt stating she tripped going up stairs. Pt with deformity noted to L wrist.

## 2023-12-28 NOTE — ED NOTES
ER tech placed UE sugar tong to L arm w/ ERP, sling applied, pt tolerated well  CMS intact, pt denies any pain/discomfort w/ sling placement

## 2023-12-28 NOTE — ED NOTES
Pt signed and given d/c papers. Discussed x1 rx sent to pref pharmacy and f/u w/ ortho-info highlighted. Pt denies further questions/concerns and has a ride home. Pt ambulated out of the dept w. Steady gait A&O x4 w/ all belongings to lobby exit to her ride

## 2023-12-28 NOTE — ED PROVIDER NOTES
ED Provider Note    CHIEF COMPLAINT  Chief Complaint   Patient presents with    GLF    Wrist Pain              EXTERNAL RECORDS REVIEWED  Outpatient Notes office visit September 2023 with noted history of hypertension dyslipidemia chronic right knee pain and chondromalacia    HPI/ROS  LIMITATION TO HISTORY   Select: : None  OUTSIDE HISTORIAN(S):  none    Fe Clark is a 73 y.o. female who presents with left wrist pain and right knee pain.  Patient reports she was walking up her steps when she tripped catching herself with her left hand.  She reports isolated pain in that wrist without any elbow pain or shoulder pain.  No focal weakness or numbness.  She did not hit her head reports no headache, no LOC, no neck or back pain.  She also reports some pain over the right knee she has had some chronic pain to that area but did hit the knee again.    PAST MEDICAL HISTORY   has a past medical history of ASTHMA, COPD (chronic obstructive pulmonary disease) (HCC), Diverticulosis, Duodenal ulcer, unspecified as acute or chronic, without hemorrhage, perforation, or obstruction, GERD (gastroesophageal reflux disease), Hiatal hernia, High cholesterol, Hypertension, and Psychiatric problem.    SURGICAL HISTORY   has a past surgical history that includes bladder suspension; bowel resection; colonoscopy; hysterectomy radical; other orthopedic surgery; ankle arthroscopy (7/31/2013); hardware removal ortho (7/31/2013); lacrimal duct probe (3/11/2015); submandible abscess incision and drainage (11/19/2016); and dental extraction(s) (11/19/2016).    FAMILY HISTORY  Family History   Problem Relation Age of Onset    No Known Problems Mother         2017 is age 91    Other Father 57        unknown issue       SOCIAL HISTORY  Social History     Tobacco Use    Smoking status: Never    Smokeless tobacco: Never   Vaping Use    Vaping Use: Never used   Substance and Sexual Activity    Alcohol use: Yes     Comment: occasionally     Drug use: Not Currently    Sexual activity: Not on file       CURRENT MEDICATIONS  Home Medications       Reviewed by Mariam Mandel R.N. (Registered Nurse) on 12/27/23 at 1553  Med List Status: Partial     Medication Last Dose Status   acetaminophen (TYLENOL) 325 MG Tab  Active   albuterol 108 (90 Base) MCG/ACT Aero Soln inhalation aerosol  Active   amLODIPine (NORVASC) 10 MG Tab  Active   aspirin EC (ECOTRIN) 81 MG Tablet Delayed Response  Active   atorvastatin (LIPITOR) 80 MG tablet  Active   fexofenadine (ALLEGRA) 60 MG Tab  Active   lisinopril (PRINIVIL) 20 MG Tab  Active   Multiple Vitamins-Minerals (CENTRUM SILVER 50+WOMEN) Tab  Active   naproxen (NAPROSYN) 500 MG Tab  Active   omeprazole (PRILOSEC) 20 MG delayed-release capsule  Active                    ALLERGIES  Allergies   Allergen Reactions    Nkda [No Known Drug Allergy]        PHYSICAL EXAM  VITAL SIGNS: /71   Pulse 63   Temp 36.5 °C (97.7 °F) (Temporal)   Resp (!) 31   SpO2 97%      Pulse ox interpretation: I interpret this pulse ox as normal.  Constitutional: Alert uncomfortable  HENT: Normocephalic, Atraumatic, Bilateral external ears normal. Nose normal.   Eyes: Pupils are equal and reactive. Conjunctiva normal, non-icteric.   Heart: Regular rate and rythm, no murmurs.    Lungs: Clear to auscultation bilaterally.  Abd: soft, NTTP, no mass, no pulsatile mass, non-distended, no rebound or guarding  MSK: There is deformity with tenderness and swelling over the dorsum of the left wrist, nontender through the elbow and more proximal forearm, nontender of the shoulder, no tenderness through the phalanges.  Distal capillary refills less than 2 seconds, distal sensation is intact all 4 quadrants of the hand.  M/R/U motor function intact, there is also an abrasion over the anterior right knee with some associated tenderness, no obvious deformity or effusion.  Nontender throughout the rest of the lower extremity.  Distal capillary refill less  than 2 seconds, distal sensation intact to light touch  Skin: Warm, Dry, No erythema, No rash.   Neurologic: Alert, Grossly non-focal.   Psychiatric: Affect normal, Judgment normal, Mood normal, Appears appropriate                 DIAGNOSTIC STUDIES / PROCEDURES      RADIOLOGY  I have independently interpreted the diagnostic imaging associated with this visit and am waiting the final reading from the radiologist.   My preliminary interpretation is as follows: Distal radius fracture impacted  Radiologist interpretation:   DX-WRIST-LIMITED 2- LEFT   Final Result      Improved anatomic alignment of distal left radius fracture      DX-WRIST-COMPLETE 3+ LEFT   Final Result      1.  Impacted and significantly angulated acute fracture of the distal radius.      2.  Possible ulnar styloid fracture.      DX-KNEE 3 VIEWS RIGHT   Final Result      No acute fracture.            COURSE & MEDICAL DECISION MAKING        INITIAL ASSESSMENT, COURSE AND PLAN  Care Narrative: 4:15 PM  Patient presenting with wrist pain after a fall.  At this point differential includes but not limited to acute pathology such as fracture, including fracture that may require admission and emergent or urgent surgery, dislocation, strain, sprain and contusion.  Order for x-ray to evaluate.  Fentanyl for pain.    5:35 PM  Patient is reevaluated, comfortable.  Discussed results, plan for hematoma block and reduction.    5:45 PM  Sterile prep over the left wrist.  2 cc of 1% lidocaine is injected just under the skin, subsequently 22-gauge needle passed into the fracture, hematoma aspirated, 10 cc lidocaine instilled.    REDUCTION PROCEDURE NOTE:  Patient identification was confirmed, consent was obtained verbally.  This procedure was performed at [facility] by me  Site left distal radius  Anesthetic used (type and amt): Similar block with lidocaine as above  Pre-procedure N/V exam intact  # of attempts: 1  Type of splint: Sugar-tong  Pt anesthetized, using traction  countertraction reduced successfully. Patient tolerated procedure well without complications. Patient splinted. Post-procedure exam indicates patient is n/v intact distal to the injury site. Post-procedure films show good alignment. .      740 PM  Patient is reevaluated, she is comfortable, updated on results and she is agreeable to discharge      PROBLEM LIST  # Left distal radius fracture.  Reduced as above.  Will prescribe Norco, follow-up with orthopedics    # Fall resulting in above fracture, no other findings of trauma    # Knee abrasion.  No fractures,      DISPOSITION AND DISCUSSIONS  I have discussed management of the patient with the following physicians and JORGE's: Dr. Yin of orthopedics, will plan for my reduction in the emergency department and clinic follow-up        Barriers to care at this time, including but not limited to:  none .     Decision tools and prescription drugs considered including, but not limited to: Pain Medications prescription for Norco .    I reviewed prescription monitoring program for patient's narcotic use before prescribing a scheduled drug.The patient will not drink alcohol nor drive with prescribed medications. The patient will return for new or worsening symptoms and is stable at the time of discharge.    The patient is referred to a primary physician for blood pressure management, diabetic screening, and for all other preventative health concerns.    In prescribing controlled substances to this patient, I certify that I have obtained and reviewed the medical history of Richromirobert Clark. I have also made a good peter effort to obtain applicable records from other providers who have treated the patient and records did not demonstrate any increased risk of substance abuse that would prevent me from prescribing controlled substances.     I have conducted a physical exam and documented it. I have reviewed Ms. Clark’s prescription history as maintained by the  [place] Prescription Monitoring Program.     I have assessed the patient’s risk for abuse, dependency, and addiction using the validated Opioid Risk Tool available at https://www.mdcalc.com/mpwlvi-xcuj-llku-ort-narcotic-abuse.     Given the above, I believe the benefits of controlled substance therapy outweigh the risks. The reasons for prescribing controlled substances include non-narcotic, oral analgesic alternatives have been inadequate for pain control. Accordingly, I have discussed the risk and benefits, treatment plan, and alternative therapies with the patient.     DISPOSITION:  Patient will be discharged home in stable condition.    FOLLOW UP:  Demond Rapp M.D.  [address] [facility] [id number] [phone]    Schedule an appointment as soon as possible for a visit         OUTPATIENT MEDICATIONS:  New Prescriptions    HYDROCODONE-ACETAMINOPHEN (NORCO) 5-325 MG TAB PER TABLET    Take 1 Tablet by mouth every 6 hours as needed (pain) for up to 3 days.         FINAL DIAGNOSIS  1. Closed fracture of distal end of left radius, unspecified fracture morphology, initial encounter    2. Abrasion of right knee, initial encounter           Electronically signed by: Michael Kennedy M.D., 12/27/2023 4:15 PM

## 2024-01-23 ENCOUNTER — APPOINTMENT (OUTPATIENT)
Dept: INTERNAL MEDICINE | Facility: OTHER | Age: 74
End: 2024-01-23
Payer: MEDICARE

## 2024-03-04 DIAGNOSIS — I10 PRIMARY HYPERTENSION: ICD-10-CM

## 2024-03-04 DIAGNOSIS — E78.2 MIXED HYPERLIPIDEMIA: ICD-10-CM

## 2024-03-04 DIAGNOSIS — I16.0 HYPERTENSIVE URGENCY: ICD-10-CM

## 2024-03-04 DIAGNOSIS — K21.9 GASTROESOPHAGEAL REFLUX DISEASE, UNSPECIFIED WHETHER ESOPHAGITIS PRESENT: ICD-10-CM

## 2024-03-04 NOTE — TELEPHONE ENCOUNTER
Received request via: Pharmacy    Was the patient seen in the last year in this department? Yes    Does the patient have an active prescription (recently filled or refills available) for medication(s) requested? No    Pharmacy Name: [facility] [address] [place] [address]     Does the patient have longterm Plus and need 100 day supply (blood pressure, diabetes and cholesterol meds only)? Patient does not have SCP

## 2024-03-04 NOTE — TELEPHONE ENCOUNTER
Received request via: Pharmacy    Was the patient seen in the last year in this department? Yes    Does the patient have an active prescription (recently filled or refills available) for medication(s) requested? No    Pharmacy Name: [facility] [address] [place] [address]     Does the patient have FDC Plus and need 100 day supply (blood pressure, diabetes and cholesterol meds only)? Patient does not have SCP

## 2024-03-05 RX ORDER — AMLODIPINE BESYLATE 10 MG/1
10 TABLET ORAL DAILY
Qty: 30 TABLET | Refills: 3 | Status: SHIPPED | OUTPATIENT
Start: 2024-03-05

## 2024-03-05 RX ORDER — OMEPRAZOLE 20 MG/1
20 CAPSULE, DELAYED RELEASE ORAL 2 TIMES DAILY
Qty: 30 CAPSULE | Refills: 3 | Status: SHIPPED | OUTPATIENT
Start: 2024-03-05

## 2024-03-05 RX ORDER — LISINOPRIL 20 MG/1
20 TABLET ORAL DAILY
Qty: 30 TABLET | Refills: 3 | Status: SHIPPED | OUTPATIENT
Start: 2024-03-05

## 2024-03-05 RX ORDER — ATORVASTATIN CALCIUM 80 MG/1
80 TABLET, FILM COATED ORAL EVERY EVENING
Qty: 30 TABLET | Refills: 3 | Status: SHIPPED | OUTPATIENT
Start: 2024-03-05

## 2024-03-23 ENCOUNTER — APPOINTMENT (OUTPATIENT)
Dept: RADIOLOGY | Facility: MEDICAL CENTER | Age: 74
End: 2024-03-23
Attending: EMERGENCY MEDICINE
Payer: MEDICARE

## 2024-03-23 ENCOUNTER — HOSPITAL ENCOUNTER (EMERGENCY)
Facility: MEDICAL CENTER | Age: 74
End: 2024-03-23
Attending: EMERGENCY MEDICINE
Payer: MEDICARE

## 2024-03-23 VITALS
SYSTOLIC BLOOD PRESSURE: 170 MMHG | HEIGHT: 65 IN | OXYGEN SATURATION: 92 % | WEIGHT: 179.68 LBS | TEMPERATURE: 97.5 F | RESPIRATION RATE: 20 BRPM | HEART RATE: 50 BPM | BODY MASS INDEX: 29.94 KG/M2 | DIASTOLIC BLOOD PRESSURE: 79 MMHG

## 2024-03-23 DIAGNOSIS — K21.9 GASTROESOPHAGEAL REFLUX DISEASE, UNSPECIFIED WHETHER ESOPHAGITIS PRESENT: ICD-10-CM

## 2024-03-23 DIAGNOSIS — I10 PRIMARY HYPERTENSION: ICD-10-CM

## 2024-03-23 DIAGNOSIS — R07.9 CHEST PAIN, UNSPECIFIED TYPE: ICD-10-CM

## 2024-03-23 DIAGNOSIS — R53.83 FATIGUE, UNSPECIFIED TYPE: ICD-10-CM

## 2024-03-23 LAB
ALBUMIN SERPL BCP-MCNC: 4.3 G/DL (ref 3.2–4.9)
ALBUMIN/GLOB SERPL: 1.7 G/DL
ALP SERPL-CCNC: 120 U/L (ref 30–99)
ALT SERPL-CCNC: 16 U/L (ref 2–50)
ANION GAP SERPL CALC-SCNC: 16 MMOL/L (ref 7–16)
AST SERPL-CCNC: 24 U/L (ref 12–45)
BASOPHILS # BLD AUTO: 0.6 % (ref 0–1.8)
BASOPHILS # BLD: 0.04 K/UL (ref 0–0.12)
BILIRUB SERPL-MCNC: 0.6 MG/DL (ref 0.1–1.5)
BUN SERPL-MCNC: 12 MG/DL (ref 8–22)
CALCIUM ALBUM COR SERPL-MCNC: 9.6 MG/DL (ref 8.5–10.5)
CALCIUM SERPL-MCNC: 9.8 MG/DL (ref 8.5–10.5)
CHLORIDE SERPL-SCNC: 108 MMOL/L (ref 96–112)
CO2 SERPL-SCNC: 19 MMOL/L (ref 20–33)
CREAT SERPL-MCNC: 0.84 MG/DL (ref 0.5–1.4)
EKG IMPRESSION: NORMAL
EOSINOPHIL # BLD AUTO: 0.26 K/UL (ref 0–0.51)
EOSINOPHIL NFR BLD: 3.6 % (ref 0–6.9)
ERYTHROCYTE [DISTWIDTH] IN BLOOD BY AUTOMATED COUNT: 43.9 FL (ref 35.9–50)
FLUAV RNA SPEC QL NAA+PROBE: NEGATIVE
FLUBV RNA SPEC QL NAA+PROBE: NEGATIVE
GFR SERPLBLD CREATININE-BSD FMLA CKD-EPI: 73 ML/MIN/1.73 M 2
GLOBULIN SER CALC-MCNC: 2.6 G/DL (ref 1.9–3.5)
GLUCOSE SERPL-MCNC: 114 MG/DL (ref 65–99)
HCT VFR BLD AUTO: 48.2 % (ref 37–47)
HGB BLD-MCNC: 16.2 G/DL (ref 12–16)
IMM GRANULOCYTES # BLD AUTO: 0.02 K/UL (ref 0–0.11)
IMM GRANULOCYTES NFR BLD AUTO: 0.3 % (ref 0–0.9)
LYMPHOCYTES # BLD AUTO: 2.83 K/UL (ref 1–4.8)
LYMPHOCYTES NFR BLD: 39.4 % (ref 22–41)
MCH RBC QN AUTO: 32.5 PG (ref 27–33)
MCHC RBC AUTO-ENTMCNC: 33.6 G/DL (ref 32.2–35.5)
MCV RBC AUTO: 96.6 FL (ref 81.4–97.8)
MONOCYTES # BLD AUTO: 0.51 K/UL (ref 0–0.85)
MONOCYTES NFR BLD AUTO: 7.1 % (ref 0–13.4)
NEUTROPHILS # BLD AUTO: 3.52 K/UL (ref 1.82–7.42)
NEUTROPHILS NFR BLD: 49 % (ref 44–72)
NRBC # BLD AUTO: 0 K/UL
NRBC BLD-RTO: 0 /100 WBC (ref 0–0.2)
PLATELET # BLD AUTO: 355 K/UL (ref 164–446)
PMV BLD AUTO: 9.4 FL (ref 9–12.9)
POTASSIUM SERPL-SCNC: 4.2 MMOL/L (ref 3.6–5.5)
PROT SERPL-MCNC: 6.9 G/DL (ref 6–8.2)
RBC # BLD AUTO: 4.99 M/UL (ref 4.2–5.4)
RSV RNA SPEC QL NAA+PROBE: NEGATIVE
SARS-COV-2 RNA RESP QL NAA+PROBE: NOTDETECTED
SODIUM SERPL-SCNC: 143 MMOL/L (ref 135–145)
T4 FREE SERPL-MCNC: 1.11 NG/DL (ref 0.93–1.7)
TROPONIN T SERPL-MCNC: 16 NG/L (ref 6–19)
TROPONIN T SERPL-MCNC: 18 NG/L (ref 6–19)
TSH SERPL DL<=0.005 MIU/L-ACNC: 3.29 UIU/ML (ref 0.38–5.33)
WBC # BLD AUTO: 7.2 K/UL (ref 4.8–10.8)

## 2024-03-23 PROCEDURE — 93005 ELECTROCARDIOGRAM TRACING: CPT | Performed by: EMERGENCY MEDICINE

## 2024-03-23 PROCEDURE — 84484 ASSAY OF TROPONIN QUANT: CPT

## 2024-03-23 PROCEDURE — 71045 X-RAY EXAM CHEST 1 VIEW: CPT

## 2024-03-23 PROCEDURE — 36415 COLL VENOUS BLD VENIPUNCTURE: CPT

## 2024-03-23 PROCEDURE — 99285 EMERGENCY DEPT VISIT HI MDM: CPT

## 2024-03-23 PROCEDURE — 0241U HCHG SARS-COV-2 COVID-19 NFCT DS RESP RNA 4 TRGT ED POC: CPT

## 2024-03-23 PROCEDURE — 80053 COMPREHEN METABOLIC PANEL: CPT

## 2024-03-23 PROCEDURE — 700102 HCHG RX REV CODE 250 W/ 637 OVERRIDE(OP): Performed by: EMERGENCY MEDICINE

## 2024-03-23 PROCEDURE — 84439 ASSAY OF FREE THYROXINE: CPT

## 2024-03-23 PROCEDURE — 84443 ASSAY THYROID STIM HORMONE: CPT

## 2024-03-23 PROCEDURE — A9270 NON-COVERED ITEM OR SERVICE: HCPCS | Performed by: EMERGENCY MEDICINE

## 2024-03-23 PROCEDURE — 71275 CT ANGIOGRAPHY CHEST: CPT

## 2024-03-23 PROCEDURE — 85025 COMPLETE CBC W/AUTO DIFF WBC: CPT

## 2024-03-23 PROCEDURE — 93005 ELECTROCARDIOGRAM TRACING: CPT

## 2024-03-23 PROCEDURE — 700117 HCHG RX CONTRAST REV CODE 255: Performed by: EMERGENCY MEDICINE

## 2024-03-23 RX ORDER — OMEPRAZOLE 20 MG/1
20 CAPSULE, DELAYED RELEASE ORAL DAILY
Qty: 10 CAPSULE | Refills: 0 | Status: SHIPPED | OUTPATIENT
Start: 2024-03-23 | End: 2024-04-02

## 2024-03-23 RX ADMIN — LIDOCAINE HYDROCHLORIDE 30 ML: 20 SOLUTION ORAL; TOPICAL at 13:34

## 2024-03-23 RX ADMIN — IOHEXOL 52 ML: 350 INJECTION, SOLUTION INTRAVENOUS at 13:59

## 2024-03-23 ASSESSMENT — LIFESTYLE VARIABLES: DO YOU DRINK ALCOHOL: NO

## 2024-03-23 ASSESSMENT — HEART SCORE
ECG: NORMAL
TROPONIN: LESS THAN OR EQUAL TO NORMAL LIMIT
HEART SCORE: 3
HISTORY: SLIGHTLY SUSPICIOUS
AGE: 65+
RISK FACTORS: 1-2 RISK FACTORS

## 2024-03-23 ASSESSMENT — FIBROSIS 4 INDEX: FIB4 SCORE: 0.91

## 2024-03-23 ASSESSMENT — PAIN DESCRIPTION - PAIN TYPE: TYPE: ACUTE PAIN

## 2024-03-23 NOTE — DISCHARGE INSTRUCTIONS
Obtain primary doctor    Follow up with cardiology    Take prilosec daily for the next week    Return if worse or for any concerns

## 2024-03-23 NOTE — ED NOTES
Pt stable for discharge. Pt educated and reviewed discharge instructions with RN. Pt verbalized understanding & all questions were answered. Pt AoX 4  . Pt ambulated independently with balanced and steady gait out of the ED with all belongings. Pt encouraged to come back if symptoms worsen.

## 2024-03-23 NOTE — ED TRIAGE NOTES
Vitals:    03/23/24 1126   BP: (!) 145/83   Pulse: 88   Resp: 18   Temp: 36.3 °C (97.4 °F)   SpO2: 99%     Chief Complaint   Patient presents with    Chest Pain     Pt reports last night she began feeling short of breath with some chest pain, like she had been running. She has hx of COPD, not on oxygen, hasn't been able to see a doctor for about 7 months because of insurance issues.     She reports the 6 out of 10 chest pain that radiates to her left arm and shoulder blade, described as a deep ache.    Pt is ambulatory to and from triage and is alert and oriented x 4.

## 2024-03-23 NOTE — ED NOTES
"Pt woke up to go to restroom, 6/10 pain in chest that radiated to back. CP currently a 6/10. Improves when taking deep breath. Pt states she has been having a productive cough. Pt states \"it feels like I'm running when I'm not\" .Oxygen sat 96 % on RA.     Pt states they have been pt recently exposed to family member with upper respiratory infection.     Pt states over 6 month she was told she has \"lumps on my thyroid\".  "

## 2024-03-23 NOTE — ED PROVIDER NOTES
"ED Provider Note    CHIEF COMPLAINT  Chief Complaint   Patient presents with    Chest Pain       EXTERNAL RECORDS REVIEWED  Outpatient Notes cardiac stress test November 2023, no ischemic change    HPI/ROS  LIMITATION TO HISTORY   Select: : None  OUTSIDE HISTORIAN(S):  None    Fe Clark is a 73 y.o. female who presents with onset of chest \"achiness\", with associated shortness of breath, worse with movement.  She got up to go to the bathroom at 1 AM and began to notice the symptoms.  Feeling slightly better after going to bed, awakening several hours later with ongoing ache in her chest.  When she got up to go to the bathroom a second time, again began to feel short of breath.  She has the sensation of heart palpitation, irregular pulse.  Currently in the ER in sinus rhythm.  She states 2 decades ago \"a virus gave me A-fib, I was treated for 6 months, then never again\".  No leg swelling.  No abdominal pain.  She denies nausea or vomiting.  No syncope.  No trauma.  No change in medicines.  She denies new cough, citing a chronic cough.  No history of DVT or pulmonary embolism.  At times pain radiates from left chest to left shoulder blade.  She denies history of heart attack    PAST MEDICAL HISTORY   has a past medical history of ASTHMA, COPD (chronic obstructive pulmonary disease) (HCC), Diverticulosis, Duodenal ulcer, unspecified as acute or chronic, without hemorrhage, perforation, or obstruction, GERD (gastroesophageal reflux disease), Hiatal hernia, High cholesterol, Hypertension, and Psychiatric problem.    SURGICAL HISTORY   has a past surgical history that includes bladder suspension; bowel resection; colonoscopy; hysterectomy radical; other orthopedic surgery; ankle arthroscopy (7/31/2013); hardware removal ortho (7/31/2013); lacrimal duct probe (3/11/2015); submandible abscess incision and drainage (11/19/2016); and dental extraction(s) (11/19/2016).    FAMILY HISTORY  Family History   Problem " "Relation Age of Onset    No Known Problems Mother         2017 is age 91    Other Father 57        unknown issue       SOCIAL HISTORY  Social History     Tobacco Use    Smoking status: Never    Smokeless tobacco: Never   Vaping Use    Vaping Use: Never used   Substance and Sexual Activity    Alcohol use: Yes     Comment: occasionally    Drug use: Not Currently    Sexual activity: Not on file       CURRENT MEDICATIONS  Home Medications       Reviewed by Maria Fernanda Mendez R.N. (Registered Nurse) on 03/23/24 at 1135  Med List Status: Partial     Medication Last Dose Status   acetaminophen (TYLENOL) 325 MG Tab  Active   albuterol 108 (90 Base) MCG/ACT Aero Soln inhalation aerosol  Active   amLODIPine (NORVASC) 10 MG Tab  Active   aspirin EC (ECOTRIN) 81 MG Tablet Delayed Response  Active   atorvastatin (LIPITOR) 80 MG tablet  Active   busPIRone (BUSPAR) 10 MG Tab tablet  Active   busPIRone (BUSPAR) 15 MG tablet  Active   doxycycline (VIBRAMYCIN) 100 MG Tab  Active   fexofenadine (ALLEGRA) 60 MG Tab  Active   furosemide (LASIX) 40 MG Tab  Active   gabapentin (NEURONTIN) 300 MG Cap  Active   HYDROcodone-acetaminophen (NORCO) 5-325 MG Tab per tablet  Active   lisinopril (PRINIVIL) 20 MG Tab  Active   Multiple Vitamins-Minerals (CENTRUM SILVER 50+WOMEN) Tab  Active   naproxen (NAPROSYN) 500 MG Tab  Active   OLANZapine (ZYPREXA) 5 MG Tab  Active   omeprazole (PRILOSEC) 20 MG delayed-release capsule  Active   predniSONE (DELTASONE) 20 MG Tab  Active   sertraline (ZOLOFT) 100 MG Tab  Active   sucralfate (CARAFATE) 1 GM Tab  Active   triamcinolone (KENALOG) 0.1 % lotion  Active                    ALLERGIES  Allergies   Allergen Reactions    Nkda [No Known Drug Allergy]        PHYSICAL EXAM  VITAL SIGNS: BP (!) 173/78   Pulse 63   Temp 36.3 °C (97.4 °F) (Temporal)   Resp 20   Ht 1.651 m (5' 5\")   Wt 81.5 kg (179 lb 10.8 oz)   SpO2 96%   BMI 29.90 kg/m²    Constitutional: Well-nourished  Cardiac: Regular rate, regular " rhythm  Respiratory: Clear lung sounds  GI: No tenderness.  No distention.  Musculoskeletal: No chest wall tenderness  Neurologic: Sensation and strength normal, speech clear  Psychiatric: Normal mood, cooperative    DIAGNOSTIC STUDIES / PROCEDURES  EKG  I have independently interpreted this EKG  See below    LABS  Results for orders placed or performed during the hospital encounter of 03/23/24   CBC with Differential   Result Value Ref Range    WBC 7.2 4.8 - 10.8 K/uL    RBC 4.99 4.20 - 5.40 M/uL    Hemoglobin 16.2 (H) 12.0 - 16.0 g/dL    Hematocrit 48.2 (H) 37.0 - 47.0 %    MCV 96.6 81.4 - 97.8 fL    MCH 32.5 27.0 - 33.0 pg    MCHC 33.6 32.2 - 35.5 g/dL    RDW 43.9 35.9 - 50.0 fL    Platelet Count 355 164 - 446 K/uL    MPV 9.4 9.0 - 12.9 fL    Neutrophils-Polys 49.00 44.00 - 72.00 %    Lymphocytes 39.40 22.00 - 41.00 %    Monocytes 7.10 0.00 - 13.40 %    Eosinophils 3.60 0.00 - 6.90 %    Basophils 0.60 0.00 - 1.80 %    Immature Granulocytes 0.30 0.00 - 0.90 %    Nucleated RBC 0.00 0.00 - 0.20 /100 WBC    Neutrophils (Absolute) 3.52 1.82 - 7.42 K/uL    Lymphs (Absolute) 2.83 1.00 - 4.80 K/uL    Monos (Absolute) 0.51 0.00 - 0.85 K/uL    Eos (Absolute) 0.26 0.00 - 0.51 K/uL    Baso (Absolute) 0.04 0.00 - 0.12 K/uL    Immature Granulocytes (abs) 0.02 0.00 - 0.11 K/uL    NRBC (Absolute) 0.00 K/uL   Complete Metabolic Panel (CMP)   Result Value Ref Range    Sodium 143 135 - 145 mmol/L    Potassium 4.2 3.6 - 5.5 mmol/L    Chloride 108 96 - 112 mmol/L    Co2 19 (L) 20 - 33 mmol/L    Anion Gap 16.0 7.0 - 16.0    Glucose 114 (H) 65 - 99 mg/dL    Bun 12 8 - 22 mg/dL    Creatinine 0.84 0.50 - 1.40 mg/dL    Calcium 9.8 8.5 - 10.5 mg/dL    Correct Calcium 9.6 8.5 - 10.5 mg/dL    AST(SGOT) 24 12 - 45 U/L    ALT(SGPT) 16 2 - 50 U/L    Alkaline Phosphatase 120 (H) 30 - 99 U/L    Total Bilirubin 0.6 0.1 - 1.5 mg/dL    Albumin 4.3 3.2 - 4.9 g/dL    Total Protein 6.9 6.0 - 8.2 g/dL    Globulin 2.6 1.9 - 3.5 g/dL    A-G Ratio 1.7 g/dL    Troponins NOW   Result Value Ref Range    Troponin T 16 6 - 19 ng/L   Troponins in two (2) hours   Result Value Ref Range    Troponin T 18 6 - 19 ng/L   ESTIMATED GFR   Result Value Ref Range    GFR (CKD-EPI) 73 >60 mL/min/1.73 m 2   FREE THYROXINE   Result Value Ref Range    Free T-4 1.11 0.93 - 1.70 ng/dL   TSH   Result Value Ref Range    TSH 3.290 0.380 - 5.330 uIU/mL   EKG   Result Value Ref Range    Report       [facility] Emergency Dept.    Test Date:  2024  Pt Name:    DOROTA AGUIRRE             Department: ER  MRN:        [id number]                      Room:  Gender:     Female                       Technician: [id number]  :        [id number]                   Requested By:ER TRIAGE PROTOCOL  Order #:    [id number]                    Reading MD: JOAN VINES MD    Measurements  Intervals                                Axis  Rate:       66                           P:          -24  AK:         132                          QRS:        -36  QRSD:       96                           T:          47  QT:         406  QTc:        426    Interpretive Statements  Sinus rhythm  Left ventricular hypertrophy  Anterior Q waves, possibly due to LVH  Compared to ECG 2023 11:50:03  no change  Electronically Signed On 2024 13:01:35 PDT by JOAN VINES MD     POC CoV-2, FLU A/B, RSV by PCR   Result Value Ref Range    POC Influenza A RNA, PCR Negative Negative    POC Influenza B RNA, PCR Negative Negative    POC RSV, by PCR Negative Negative    POC SARS-CoV-2, PCR NotDetected          RADIOLOGY  I have independently interpreted the diagnostic imaging associated with this visit and am waiting the final reading from the radiologist.   My preliminary interpretation is as follows: Chest x-ray negative for pneumonia  Radiologist interpretation:   CT-CTA CHEST PULMONARY ARTERY W/ RECONS   Final Result      1.  No pulmonary embolism.   2.  Bilateral thyroid nodules for which follow-up  with thyroid ultrasound is recommended.            DX-CHEST-PORTABLE (1 VIEW)   Final Result         1. No acute cardiopulmonary abnormalities are identified.            COURSE & MEDICAL DECISION MAKING    ED Observation Status? No; Patient does not meet criteria for ED Observation.     INITIAL ASSESSMENT, COURSE AND PLAN  Care Narrative: Patient presents with chest discomfort, palpitations today.  Her EKG is negative for ischemic change, serial troponins are negative, does not appear to be myocardial infarction.  With change of discomfort with breathing, CT scan of the chest obtained to rule out pulmonary embolism or thoracic aneurysm, this was a negative study.  Patient states that thyroid nodules are known, she is currently trying to establish primary care for follow-up.  Thyroid function tests do not show evidence of hyper or hypothyroid.  Patient felt better after GI cocktail.  Patient currently chest pain-free.  She does state history of GERD but is currently out of Prilosec, this has been prescribed.  Her heart score is 3, low risk.  Patient has been referred to cardiology as an outpatient.  Patient referred to establish primary care.  She is advised to return the emergency department if worse or for any concerns.  The patient is well-appearing upon discharge  HTN/IDDM FOLLOW UP:  The patient is referred to a primary physician for blood pressure management, diabetic screening, and for all other preventive health concerns      ADDITIONAL PROBLEM LIST  Hypertension: Patient missed her medications this morning, she states she has full prescriptions at home does not require refill at this time.  She is encouraged to continue her medication as prescribed    Thyroid nodules: Follow-up ultrasound recommended by radiology, this was discussed with the patient.  She is referred to primary care for follow-up    DISPOSITION AND DISCUSSIONS    Escalation of care considered, and ultimately not performed:acute inpatient  care management, however at this time, the patient is most appropriate for outpatient management    Barriers to care at this time, including but not limited to: Patient does not have established PCP.     Decision tools and prescription drugs considered including, but not limited to: HEART Score 3 .    FINAL DIAGNOSIS  1. Gastroesophageal reflux disease, unspecified whether esophagitis present    2. Primary hypertension    3. Chest pain, unspecified type    4. Fatigue, unspecified type           Electronically signed by: Mikael Aguero M.D., 3/23/2024 12:59 PM

## 2024-03-23 NOTE — ED NOTES
Pt back in room from CT. Pt connected to continuous monitor with call light and personal belongings within reach

## 2024-04-09 ENCOUNTER — OFFICE VISIT (OUTPATIENT)
Dept: MEDICAL GROUP | Facility: MEDICAL CENTER | Age: 74
End: 2024-04-09
Payer: MEDICARE

## 2024-04-09 VITALS
TEMPERATURE: 98.7 F | HEIGHT: 65 IN | OXYGEN SATURATION: 96 % | WEIGHT: 184.08 LBS | DIASTOLIC BLOOD PRESSURE: 60 MMHG | BODY MASS INDEX: 30.67 KG/M2 | SYSTOLIC BLOOD PRESSURE: 130 MMHG | RESPIRATION RATE: 16 BRPM | HEART RATE: 67 BPM

## 2024-04-09 DIAGNOSIS — M81.0 AGE-RELATED OSTEOPOROSIS WITHOUT CURRENT PATHOLOGICAL FRACTURE: ICD-10-CM

## 2024-04-09 DIAGNOSIS — E04.2 MULTINODULAR GOITER: ICD-10-CM

## 2024-04-09 DIAGNOSIS — J45.20 MILD INTERMITTENT ASTHMA WITHOUT COMPLICATION: ICD-10-CM

## 2024-04-09 DIAGNOSIS — E78.2 MIXED HYPERLIPIDEMIA: ICD-10-CM

## 2024-04-09 DIAGNOSIS — R73.02 IGT (IMPAIRED GLUCOSE TOLERANCE): ICD-10-CM

## 2024-04-09 DIAGNOSIS — I10 PRIMARY HYPERTENSION: ICD-10-CM

## 2024-04-09 DIAGNOSIS — K21.9 GASTROESOPHAGEAL REFLUX DISEASE WITHOUT ESOPHAGITIS: ICD-10-CM

## 2024-04-09 PROCEDURE — 99204 OFFICE O/P NEW MOD 45 MIN: CPT | Performed by: FAMILY MEDICINE

## 2024-04-09 PROCEDURE — 3078F DIAST BP <80 MM HG: CPT | Performed by: FAMILY MEDICINE

## 2024-04-09 PROCEDURE — 3075F SYST BP GE 130 - 139MM HG: CPT | Performed by: FAMILY MEDICINE

## 2024-04-09 RX ORDER — POTASSIUM CHLORIDE 750 MG/1
1 CAPSULE, EXTENDED RELEASE ORAL 2 TIMES DAILY
COMMUNITY
End: 2024-04-09

## 2024-04-09 RX ORDER — ALBUTEROL SULFATE 90 UG/1
2 AEROSOL, METERED RESPIRATORY (INHALATION) EVERY 6 HOURS PRN
COMMUNITY

## 2024-04-09 RX ORDER — ATORVASTATIN CALCIUM 40 MG/1
1 TABLET, FILM COATED ORAL DAILY
COMMUNITY
End: 2024-04-09

## 2024-04-09 RX ORDER — AMLODIPINE BESYLATE 5 MG/1
1 TABLET ORAL DAILY
COMMUNITY
End: 2024-04-09

## 2024-04-09 RX ORDER — SERTRALINE HYDROCHLORIDE 25 MG/1
TABLET, FILM COATED ORAL
COMMUNITY
End: 2024-04-09

## 2024-04-09 ASSESSMENT — PATIENT HEALTH QUESTIONNAIRE - PHQ9
5. POOR APPETITE OR OVEREATING: NOT AT ALL
2. FEELING DOWN, DEPRESSED, IRRITABLE, OR HOPELESS: NOT AT ALL
8. MOVING OR SPEAKING SO SLOWLY THAT OTHER PEOPLE COULD HAVE NOTICED. OR THE OPPOSITE, BEING SO FIGETY OR RESTLESS THAT YOU HAVE BEEN MOVING AROUND A LOT MORE THAN USUAL: NOT AT ALL
SUM OF ALL RESPONSES TO PHQ QUESTIONS 1-9: 0
9. THOUGHTS THAT YOU WOULD BE BETTER OFF DEAD, OR OF HURTING YOURSELF: NOT AT ALL
SUM OF ALL RESPONSES TO PHQ9 QUESTIONS 1 AND 2: 0
7. TROUBLE CONCENTRATING ON THINGS, SUCH AS READING THE NEWSPAPER OR WATCHING TELEVISION: NOT AT ALL
6. FEELING BAD ABOUT YOURSELF - OR THAT YOU ARE A FAILURE OR HAVE LET YOURSELF OR YOUR FAMILY DOWN: NOT AL ALL
1. LITTLE INTEREST OR PLEASURE IN DOING THINGS: NOT AT ALL
3. TROUBLE FALLING OR STAYING ASLEEP OR SLEEPING TOO MUCH: NOT AT ALL
4. FEELING TIRED OR HAVING LITTLE ENERGY: NOT AT ALL

## 2024-04-09 ASSESSMENT — FIBROSIS 4 INDEX: FIB4 SCORE: 1.25

## 2024-04-10 NOTE — PROGRESS NOTES
CC: Hypertension, hyperlipidemia, acid reflux, osteoporosis asthma, multinodular goiter, impaired glucose tolerance    HPI:  Fe presents today to establish a PCP.    Patient has been active and independent with all ADLs.  Has the following chronic medical issues:    Primary hypertension  Blood pressure has been adequately controlled on amlodipine 10 mg daily, and lisinopril 20 mg daily.  No side effects    Mixed hyperlipidemia  He has been tolerating the statin. Denies muscle pain LFTs has been normal.  Patient has been on atorvastatin 80 mg daily    Gastroesophageal reflux disease without esophagitis  Patient denies any epigastric pain, heartburn, nausea, or vomiting.  Patient has been doing fine on omeprazole 20 mg daily.    Age-related osteoporosis without current pathological fracture  Last bone density was done in June 2023 showed significant osteoporosis.  Discussed options of medications.  Patient wants to try Prolia injection every 6 months, medication sent to the infusion center.  Patient also advised to take calcium at 600 to 1200 mg daily and vitamin D 1000 to 2000 international unit.    Mild intermittent asthma without complication  Denies any cough or shortness of breath, she has been doing fine on albuterol as needed    Multinodular goiter  Patient's  previous ultrasound showed multinodular goiter, however thyroid function has been normal.  Patient was referred to endocrinologist in the past but she did not follow-up.  She declined referral to endocrinology at this time.    IGT (impaired glucose tolerance)  Blood glucose has been slightly above 100.  Denies polyuria polydipsia.  No history of diabetes.  Not on medication.        Patient Active Problem List    Diagnosis Date Noted    Mild intermittent asthma without complication 04/09/2024    Post-nasal drip 07/31/2023    Multinodular goiter 07/21/2023    Caregiver burden 07/02/2023    Polyarthralgia 06/19/2023    Osteoporosis 04/17/2023     Seborrheic dermatitis 11/22/2022    Alcohol abuse 10/21/2022    Recurrent major depressive disorder (HCC) 10/21/2022    Neuropathy 10/21/2022    Current use of proton pump inhibitor 10/21/2022    Chronic pain of right knee 10/21/2022    GERD (gastroesophageal reflux disease) 02/07/2016    Hyperlipidemia 02/07/2016    HTN (hypertension) 02/07/2016       Current Outpatient Medications   Medication Sig Dispense Refill    albuterol 108 (90 Base) MCG/ACT Aero Soln inhalation aerosol Inhale 2 Puffs every 6 hours as needed.      atorvastatin (LIPITOR) 80 MG tablet TAKE 1 TABLET BY MOUTH ONCE DAILY IN THE EVENING 30 Tablet 3    omeprazole (PRILOSEC) 20 MG delayed-release capsule Take 1 capsule by mouth twice daily 30 Capsule 3    amLODIPine (NORVASC) 10 MG Tab Take 1 tablet by mouth once daily 30 Tablet 3    lisinopril (PRINIVIL) 20 MG Tab Take 1 tablet by mouth once daily 30 Tablet 3    naproxen (NAPROSYN) 500 MG Tab Take 1 Tablet by mouth 2 times a day with meals. 20 Tablet 0    fexofenadine (ALLEGRA) 60 MG Tab Take 1 Tablet by mouth every day. 30 Tablet 1    aspirin EC (ECOTRIN) 81 MG Tablet Delayed Response Take 1 Tab by mouth every day. 30 Tab 0    albuterol 108 (90 Base) MCG/ACT Aero Soln inhalation aerosol Inhale 2 Puffs every four hours as needed for Shortness of Breath. 1 Each 1    acetaminophen (TYLENOL) 325 MG Tab Take 650 mg by mouth every 6 hours as needed for Moderate Pain. 30 Tablet 0     No current facility-administered medications for this visit.         Allergies as of 04/09/2024 - Reviewed 04/09/2024   Allergen Reaction Noted    Nkda [no known drug allergy]  11/18/2016        Social History     Socioeconomic History    Marital status:      Spouse name: Not on file    Number of children: Not on file    Years of education: Not on file    Highest education level: Not on file   Occupational History    Not on file   Tobacco Use    Smoking status: Never    Smokeless tobacco: Never   Vaping Use    Vaping  Use: Never used   Substance and Sexual Activity    Alcohol use: Yes     Comment: occasionally    Drug use: Not Currently    Sexual activity: Not on file   Other Topics Concern    Not on file   Social History Narrative    Not on file     Social Determinants of Health     Financial Resource Strain: Not on file   Food Insecurity: Not on file   Transportation Needs: Not on file   Physical Activity: Not on file   Stress: Not on file   Social Connections: Not on file   Intimate Partner Violence: Not on file   Housing Stability: Not on file       Family History   Problem Relation Age of Onset    No Known Problems Mother         2017 is age 91    Other Father 57        unknown issue       Past Surgical History:   Procedure Laterality Date    SUBMANDIBLE ABSCESS INCISION AND DRAINAGE  11/19/2016    Procedure: SUBMANDIBLE ABSCESS INCISION AND DRAINAGE;  Surgeon: Lior Hutchison D.D.S.;  Location: SURGERY [facility];  Service:     DENTAL EXTRACTION(S)  11/19/2016    Procedure: DENTAL EXTRACTION(S);  Surgeon: Lior Hutchison D.D.S.;  Location: SURGERY [facility];  Service:     LACRIMAL DUCT PROBE  3/11/2015    Performed by Alo Cheatham M.D. at SURGERY [facility]    ANKLE ARTHROSCOPY  7/31/2013    Performed by Paco Clifton M.D. at SURGERY [facility]    HARDWARE REMOVAL ORTHO  7/31/2013    Performed by Paco Clifton M.D. at SURGERY [facility]S    BLADDER SUSPENSION      BOWEL RESECTION      COLONOSCOPY      with removal of pollyps    HYSTERECTOMY RADICAL      OTHER ORTHOPEDIC SURGERY      L ankle       ROS:  Denies any Headache, Blurred Vision, Confusion Chest pain,  Shortness of breath,  Abdominal pain, Changes of bowel or bladder, Lower ext edema, Fevers, Nights sweats, Weight Changes, Focal weakness or numbness.  All other systems are negative.    /60 (BP Location: Right arm, Patient Position: Sitting, BP Cuff Size: Adult)   Pulse 67   Temp 37.1 °C (98.7 °F) (Temporal)   Resp 16   Ht  "1.651 m (5' 5\")   Wt 83.5 kg (184 lb 1.4 oz)   SpO2 96%   BMI 30.63 kg/m²     Physical Exam:  Gen:         Alert and oriented, No apparent distress.  HEENT:   Perrla, TM clear,  Oralpharynx no erythema or exudates.  Neck:       No Jugular venous distension, Lymphadenopathy, Thyromegaly, Bruits.  Lungs:     Clear to auscultation bilaterally  CV:          Regular rate and rhythm. No murmurs, rubs or gallops.  Abd:         Soft non tender, non distended. Normal active bowel sounds. No                                        Hepatosplenomegaly, No pulsatile masses.  Ext:          No clubbing, cyanosis, edema.      Assessment and Plan.   73 y.o. female     1. Primary hypertension  Controlled.  Continue amlodipine 10 mg daily, and lisinopril 20 mg daily.    2. Mixed hyperlipidemia  He has been tolerating the statin. Denies muscle pain LFTs has been normal  Continue atorvastatin 80 mg daily    3. Gastroesophageal reflux disease without esophagitis  Patient has been doing fine on omeprazole 20 mg daily.    4. Age-related osteoporosis without current pathological fracture  Last bone density was done in June 2023 showed significant osteoporosis  Discussed options of medications.  Patient wants to try Prolia injection every 6 months, medication sent to the infusion center    5. Mild intermittent asthma without complication  Stable.  Continue on albuterol as needed    6. Multinodular goiter  The previous ultrasound showed multinodular goiter, however thyroid function has been normal.  Patient was referred to endocrinologist in the past but she did not follow-up.  Declined referral to endocrinology at this time.    7. IGT (impaired glucose tolerance)  Blood glucose has been slightly above 100.  However patient has been asymptomatic.  Has no history of diabetes  Patient is counseled on lifestyle modification.    "

## 2024-04-13 RX ORDER — METHYLPREDNISOLONE SODIUM SUCCINATE 125 MG/2ML
125 INJECTION, POWDER, LYOPHILIZED, FOR SOLUTION INTRAMUSCULAR; INTRAVENOUS PRN
OUTPATIENT
Start: 2024-04-14

## 2024-04-13 RX ORDER — EPINEPHRINE 1 MG/ML(1)
0.5 AMPUL (ML) INJECTION PRN
OUTPATIENT
Start: 2024-04-14

## 2024-04-13 RX ORDER — DIPHENHYDRAMINE HYDROCHLORIDE 50 MG/ML
50 INJECTION INTRAMUSCULAR; INTRAVENOUS PRN
OUTPATIENT
Start: 2024-04-14

## 2024-04-16 ENCOUNTER — TELEPHONE (OUTPATIENT)
Dept: HEALTH INFORMATION MANAGEMENT | Facility: OTHER | Age: 74
End: 2024-04-16

## 2024-04-28 ENCOUNTER — TELEPHONE (OUTPATIENT)
Dept: ONCOLOGY | Facility: MEDICAL CENTER | Age: 74
End: 2024-04-28
Payer: MEDICARE

## 2024-07-09 ENCOUNTER — OFFICE VISIT (OUTPATIENT)
Dept: MEDICAL GROUP | Facility: MEDICAL CENTER | Age: 74
End: 2024-07-09
Payer: MEDICARE

## 2024-07-09 VITALS
TEMPERATURE: 98 F | SYSTOLIC BLOOD PRESSURE: 138 MMHG | HEIGHT: 65 IN | OXYGEN SATURATION: 96 % | BODY MASS INDEX: 30.74 KG/M2 | DIASTOLIC BLOOD PRESSURE: 70 MMHG | WEIGHT: 184.53 LBS | HEART RATE: 92 BPM | RESPIRATION RATE: 18 BRPM

## 2024-07-09 DIAGNOSIS — I10 PRIMARY HYPERTENSION: ICD-10-CM

## 2024-07-09 DIAGNOSIS — M81.0 AGE-RELATED OSTEOPOROSIS WITHOUT CURRENT PATHOLOGICAL FRACTURE: ICD-10-CM

## 2024-07-09 DIAGNOSIS — E78.2 MIXED HYPERLIPIDEMIA: ICD-10-CM

## 2024-07-09 DIAGNOSIS — H61.23 EXCESSIVE EAR WAX, BILATERAL: ICD-10-CM

## 2024-07-09 DIAGNOSIS — M25.532 LEFT WRIST PAIN: ICD-10-CM

## 2024-07-09 DIAGNOSIS — Z87.81 H/O FRACTURE OF WRIST: ICD-10-CM

## 2024-07-09 DIAGNOSIS — F41.9 ANXIETY: ICD-10-CM

## 2024-07-09 PROCEDURE — 3078F DIAST BP <80 MM HG: CPT | Performed by: FAMILY MEDICINE

## 2024-07-09 PROCEDURE — 99214 OFFICE O/P EST MOD 30 MIN: CPT | Performed by: FAMILY MEDICINE

## 2024-07-09 PROCEDURE — 3075F SYST BP GE 130 - 139MM HG: CPT | Performed by: FAMILY MEDICINE

## 2024-07-09 RX ORDER — ALENDRONATE SODIUM 70 MG/1
70 TABLET ORAL
Qty: 12 TABLET | Refills: 2 | Status: SHIPPED | OUTPATIENT
Start: 2024-07-09

## 2024-07-09 RX ORDER — SERTRALINE HYDROCHLORIDE 25 MG/1
25 TABLET, FILM COATED ORAL DAILY
Qty: 90 TABLET | Refills: 2 | Status: SHIPPED | OUTPATIENT
Start: 2024-07-09

## 2024-07-09 ASSESSMENT — FIBROSIS 4 INDEX: FIB4 SCORE: 1.25

## 2024-08-01 ENCOUNTER — APPOINTMENT (OUTPATIENT)
Dept: FAMILY PLANNING/WOMEN'S HEALTH CLINIC | Facility: PHYSICIAN GROUP | Age: 74
End: 2024-08-01
Payer: MEDICARE

## 2024-08-01 VITALS
DIASTOLIC BLOOD PRESSURE: 68 MMHG | BODY MASS INDEX: 29.99 KG/M2 | SYSTOLIC BLOOD PRESSURE: 124 MMHG | HEIGHT: 65 IN | WEIGHT: 180 LBS

## 2024-08-01 DIAGNOSIS — E78.2 MIXED HYPERLIPIDEMIA: ICD-10-CM

## 2024-08-01 DIAGNOSIS — Z12.12 SCREENING FOR COLORECTAL CANCER: ICD-10-CM

## 2024-08-01 DIAGNOSIS — F10.10 ALCOHOL ABUSE: ICD-10-CM

## 2024-08-01 DIAGNOSIS — M81.0 OSTEOPOROSIS, UNSPECIFIED OSTEOPOROSIS TYPE, UNSPECIFIED PATHOLOGICAL FRACTURE PRESENCE: ICD-10-CM

## 2024-08-01 DIAGNOSIS — F33.0 MILD EPISODE OF RECURRENT MAJOR DEPRESSIVE DISORDER (HCC): ICD-10-CM

## 2024-08-01 DIAGNOSIS — J45.20 MILD INTERMITTENT ASTHMA WITHOUT COMPLICATION: ICD-10-CM

## 2024-08-01 DIAGNOSIS — Z79.899 CURRENT USE OF PROTON PUMP INHIBITOR: ICD-10-CM

## 2024-08-01 DIAGNOSIS — Z59.9 FINANCIAL DIFFICULTY: ICD-10-CM

## 2024-08-01 DIAGNOSIS — I10 PRIMARY HYPERTENSION: ICD-10-CM

## 2024-08-01 DIAGNOSIS — E04.2 MULTINODULAR GOITER: ICD-10-CM

## 2024-08-01 DIAGNOSIS — Z12.11 SCREENING FOR COLORECTAL CANCER: ICD-10-CM

## 2024-08-01 DIAGNOSIS — K21.9 GASTROESOPHAGEAL REFLUX DISEASE, UNSPECIFIED WHETHER ESOPHAGITIS PRESENT: ICD-10-CM

## 2024-08-01 PROCEDURE — 3078F DIAST BP <80 MM HG: CPT

## 2024-08-01 PROCEDURE — 1125F AMNT PAIN NOTED PAIN PRSNT: CPT

## 2024-08-01 PROCEDURE — G0439 PPPS, SUBSEQ VISIT: HCPCS

## 2024-08-01 PROCEDURE — 3074F SYST BP LT 130 MM HG: CPT

## 2024-08-01 SDOH — ECONOMIC STABILITY - INCOME SECURITY: PROBLEM RELATED TO HOUSING AND ECONOMIC CIRCUMSTANCES, UNSPECIFIED: Z59.9

## 2024-08-01 ASSESSMENT — ACTIVITIES OF DAILY LIVING (ADL): BATHING_REQUIRES_ASSISTANCE: 0

## 2024-08-01 ASSESSMENT — PATIENT HEALTH QUESTIONNAIRE - PHQ9
SUM OF ALL RESPONSES TO PHQ QUESTIONS 1-9: 7
5. POOR APPETITE OR OVEREATING: 1 - SEVERAL DAYS
CLINICAL INTERPRETATION OF PHQ2 SCORE: 2

## 2024-08-01 ASSESSMENT — FIBROSIS 4 INDEX: FIB4 SCORE: 1.25

## 2024-08-01 ASSESSMENT — PAIN SCALES - GENERAL: PAINLEVEL: 4=SLIGHT-MODERATE PAIN

## 2024-08-01 ASSESSMENT — ENCOUNTER SYMPTOMS: GENERAL WELL-BEING: FAIR
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
## 2024-08-01 NOTE — ASSESSMENT & PLAN NOTE
Chronic, stable. The patient's blood pressure is well controlled with current medication. Continue with current defined treatment plan: lisinopril (PRINIVIL) 20 MG Tab, . Follow-up at least annually.

## 2024-08-01 NOTE — ASSESSMENT & PLAN NOTE
Chronic, stable. The patient reports that her symptoms are well controlled with current medication. Continue with current defined treatment plan: Omeprazole (PRILOSEC PO) . Follow-up at least annually.

## 2024-08-01 NOTE — ASSESSMENT & PLAN NOTE
Chronic, stable. The patient denies any side effects from the current medication. Continue with current defined treatment plan: atorvastatin (LIPITOR) 80 MG Tab . Follow-up at least annually.

## 2024-08-01 NOTE — ASSESSMENT & PLAN NOTE
Chronic, stable.  The patient reports that her symptoms are well managed with current medication.  Continue with current defined treatment plan: albuterol 108 (90 Base) MCG/ACT Aero Soln inhalation aerosol. Follow-up at least annually.

## 2024-08-01 NOTE — ASSESSMENT & PLAN NOTE
Chronic, stable. Discussed adding weight bearing exercise such as walking for 30 minutes most of the days of the week.  Also discussed adequate calcium and vitamin D ingestion and optimal diet.  Continue with current defined treatment plan: alendronate (FOSAMAX) 70 MG Tab. Follow-up at least annually.

## 2024-08-01 NOTE — ASSESSMENT & PLAN NOTE
The patient reports that she has a hard time paying for food and other domestic items. She also does not have reliable transportation. Referral to Care Management placed.

## 2024-08-01 NOTE — ASSESSMENT & PLAN NOTE
Chronic, stable. The patient reports that she quit drinking alcohol 3 months ago. Continue with abstinence.  Follow-up at least annually.
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
## 2024-08-02 ENCOUNTER — PATIENT MESSAGE (OUTPATIENT)
Dept: HEALTH INFORMATION MANAGEMENT | Facility: OTHER | Age: 74
End: 2024-08-02

## 2024-08-02 ENCOUNTER — PATIENT OUTREACH (OUTPATIENT)
Dept: HEALTH INFORMATION MANAGEMENT | Facility: OTHER | Age: 74
End: 2024-08-02
Payer: MEDICARE

## 2024-08-02 DIAGNOSIS — I10 PRIMARY HYPERTENSION: ICD-10-CM

## 2024-08-02 NOTE — PROGRESS NOTES
Community Health Worker Follow-Up    Reason for outreach: CHW called the pt as requested by TRISHA Parker.    CHW Interventions: CHW called the pt to introduce the PCM program. This CHW and the pt also discussed the resources needed. Pt stated any food help or transportation help would be nice. Pt stated she has not used [facility] [employer] rides yet. This CHW discussed that and RTC access and Access to Healthcare.    Specific Resources Provided:  Housing/Shelter: n/a  Transportation: [facility] Personal Asst number, [facility] Access bus application, Access to Healthcare information.  Food: Prescription Pantry and other pantry resources.  Financial: n/a  Social Supports: n/a  Other: [employer] message with all program information and dates.    Plan: Pt accepted the program and is set for enrollment on 8/13 @11am with PCM nurse Layne. CHW made sure the pt has the PCM number and encouraged the pt to reach out with questions or concerns.

## 2024-08-09 ENCOUNTER — PATIENT OUTREACH (OUTPATIENT)
Dept: HEALTH INFORMATION MANAGEMENT | Facility: OTHER | Age: 74
End: 2024-08-09

## 2024-08-09 ENCOUNTER — OFFICE VISIT (OUTPATIENT)
Dept: MEDICAL GROUP | Facility: MEDICAL CENTER | Age: 74
End: 2024-08-09
Payer: MEDICARE

## 2024-08-09 VITALS
BODY MASS INDEX: 29.69 KG/M2 | WEIGHT: 178.2 LBS | SYSTOLIC BLOOD PRESSURE: 106 MMHG | HEART RATE: 90 BPM | OXYGEN SATURATION: 97 % | RESPIRATION RATE: 14 BRPM | TEMPERATURE: 96.7 F | DIASTOLIC BLOOD PRESSURE: 60 MMHG | HEIGHT: 65 IN

## 2024-08-09 DIAGNOSIS — I10 PRIMARY HYPERTENSION: ICD-10-CM

## 2024-08-09 DIAGNOSIS — T67.9XXA HEAT EFFECTS, INITIAL ENCOUNTER: ICD-10-CM

## 2024-08-09 DIAGNOSIS — F33.0 MILD EPISODE OF RECURRENT MAJOR DEPRESSIVE DISORDER (HCC): ICD-10-CM

## 2024-08-09 DIAGNOSIS — R10.9 ABDOMINAL DISCOMFORT: ICD-10-CM

## 2024-08-09 DIAGNOSIS — F41.9 ANXIETY: ICD-10-CM

## 2024-08-09 PROCEDURE — 99214 OFFICE O/P EST MOD 30 MIN: CPT | Performed by: STUDENT IN AN ORGANIZED HEALTH CARE EDUCATION/TRAINING PROGRAM

## 2024-08-09 PROCEDURE — 3074F SYST BP LT 130 MM HG: CPT | Performed by: STUDENT IN AN ORGANIZED HEALTH CARE EDUCATION/TRAINING PROGRAM

## 2024-08-09 PROCEDURE — 3078F DIAST BP <80 MM HG: CPT | Performed by: STUDENT IN AN ORGANIZED HEALTH CARE EDUCATION/TRAINING PROGRAM

## 2024-08-09 RX ORDER — POTASSIUM CHLORIDE 750 MG/1
1 TABLET, EXTENDED RELEASE ORAL 2 TIMES DAILY
COMMUNITY
End: 2024-08-26

## 2024-08-09 ASSESSMENT — ENCOUNTER SYMPTOMS
CHILLS: 0
FEVER: 0
WEIGHT LOSS: 0
WHEEZING: 0
SHORTNESS OF BREATH: 1
PALPITATIONS: 0

## 2024-08-09 ASSESSMENT — FIBROSIS 4 INDEX: FIB4 SCORE: 1.25
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
## 2024-08-09 NOTE — PROGRESS NOTES
Community Health Worker Follow-Up    Reason for outreach: CHW was requested celestine help with Pt by the Mas and provider.    CHW Interventions: Pt presented today in the office for an appointment. Pt was confused on the appointment time and date. Pt appointment is in OCT. Pt walked here from her home because she stated she had to call [facility] uber rides 24 hours in advance. Pt felt like she was having a stroke and was check out by the provider. Provider checked her out was a stroke and the Provider got her in. Pt stated she did not have a way home. This CHW called [facility] and discussed the pt. This CHW arranged a ride through [facility] and assisted the pt with other transportation resources. Thus CHW assisted the pt to the ground level and waited until the [facility] medical ride pickerd her up. Pt is set for enrollment with PCM on 8/13 @11am  Specific Resources Provided:  Housing/Shelter: n/a  Transportation: [employer] [place], RTC access application, Access to healthcare and 2 bus passes.  Food: n/a  Financial: n/a  Social Supports: n/a  Other: n/a    Plan: CHW will follow as needed.

## 2024-08-13 ENCOUNTER — PATIENT OUTREACH (OUTPATIENT)
Dept: HEALTH INFORMATION MANAGEMENT | Facility: OTHER | Age: 74
End: 2024-08-13
Payer: MEDICARE

## 2024-08-13 DIAGNOSIS — E78.2 MIXED HYPERLIPIDEMIA: ICD-10-CM

## 2024-08-13 DIAGNOSIS — F33.0 MILD EPISODE OF RECURRENT MAJOR DEPRESSIVE DISORDER (HCC): ICD-10-CM

## 2024-08-13 DIAGNOSIS — I10 PRIMARY HYPERTENSION: ICD-10-CM

## 2024-08-13 DIAGNOSIS — Z63.6 CAREGIVER BURDEN: ICD-10-CM

## 2024-08-13 DIAGNOSIS — G89.29 CHRONIC PAIN OF RIGHT KNEE: ICD-10-CM

## 2024-08-13 DIAGNOSIS — F10.10 ALCOHOL ABUSE: ICD-10-CM

## 2024-08-13 DIAGNOSIS — Z59.9 FINANCIAL DIFFICULTY: ICD-10-CM

## 2024-08-13 DIAGNOSIS — M25.561 CHRONIC PAIN OF RIGHT KNEE: ICD-10-CM

## 2024-08-13 DIAGNOSIS — J45.20 MILD INTERMITTENT ASTHMA WITHOUT COMPLICATION: ICD-10-CM

## 2024-08-13 SDOH — ECONOMIC STABILITY: FOOD INSECURITY: WITHIN THE PAST 12 MONTHS, YOU WORRIED THAT YOUR FOOD WOULD RUN OUT BEFORE YOU GOT MONEY TO BUY MORE.: OFTEN TRUE

## 2024-08-13 SDOH — ECONOMIC STABILITY: INCOME INSECURITY: IN THE LAST 12 MONTHS, WAS THERE A TIME WHEN YOU WERE NOT ABLE TO PAY THE MORTGAGE OR RENT ON TIME?: NO

## 2024-08-13 SDOH — ECONOMIC STABILITY: TRANSPORTATION INSECURITY
IN THE PAST 12 MONTHS, HAS THE LACK OF TRANSPORTATION KEPT YOU FROM MEDICAL APPOINTMENTS OR FROM GETTING MEDICATIONS?: YES

## 2024-08-13 SDOH — ECONOMIC STABILITY - INCOME SECURITY: PROBLEM RELATED TO HOUSING AND ECONOMIC CIRCUMSTANCES, UNSPECIFIED: Z59.9

## 2024-08-13 SDOH — HEALTH STABILITY: PHYSICAL HEALTH: ON AVERAGE, HOW MANY DAYS PER WEEK DO YOU ENGAGE IN MODERATE TO STRENUOUS EXERCISE (LIKE A BRISK WALK)?: 0 DAYS

## 2024-08-13 SDOH — SOCIAL STABILITY - SOCIAL INSECURITY: DEPENDENT RELATIVE NEEDING CARE AT HOME: Z63.6

## 2024-08-13 SDOH — ECONOMIC STABILITY: FOOD INSECURITY: WITHIN THE PAST 12 MONTHS, THE FOOD YOU BOUGHT JUST DIDN'T LAST AND YOU DIDN'T HAVE MONEY TO GET MORE.: OFTEN TRUE

## 2024-08-13 SDOH — ECONOMIC STABILITY: TRANSPORTATION INSECURITY
IN THE PAST 12 MONTHS, HAS LACK OF TRANSPORTATION KEPT YOU FROM MEETINGS, WORK, OR FROM GETTING THINGS NEEDED FOR DAILY LIVING?: YES

## 2024-08-13 SDOH — HEALTH STABILITY: PHYSICAL HEALTH: ON AVERAGE, HOW MANY MINUTES DO YOU ENGAGE IN EXERCISE AT THIS LEVEL?: 0 MIN

## 2024-08-13 ASSESSMENT — SOCIAL DETERMINANTS OF HEALTH (SDOH)
HOW OFTEN DO YOU ATTEND CHURCH OR RELIGIOUS SERVICES?: 1 TO 4 TIMES PER YEAR
WITHIN THE LAST YEAR, HAVE YOU BEEN KICKED, HIT, SLAPPED, OR OTHERWISE PHYSICALLY HURT BY YOUR PARTNER OR EX-PARTNER?: NO
HOW OFTEN DO YOU ATTENT MEETINGS OF THE CLUB OR ORGANIZATION YOU BELONG TO?: NEVER
DO YOU BELONG TO ANY CLUBS OR ORGANIZATIONS SUCH AS CHURCH GROUPS UNIONS, FRATERNAL OR ATHLETIC GROUPS, OR SCHOOL GROUPS?: NO
IN THE PAST 12 MONTHS, HAS THE ELECTRIC, GAS, OIL, OR WATER COMPANY THREATENED TO SHUT OFF SERVICE IN YOUR HOME?: NO
HOW OFTEN DO YOU GET TOGETHER WITH FRIENDS OR RELATIVES?: NEVER
WITHIN THE LAST YEAR, HAVE YOU BEEN AFRAID OF YOUR PARTNER OR EX-PARTNER?: NO
HOW HARD IS IT FOR YOU TO PAY FOR THE VERY BASICS LIKE FOOD, HOUSING, MEDICAL CARE, AND HEATING?: VERY HARD
WITHIN THE LAST YEAR, HAVE YOU BEEN HUMILIATED OR EMOTIONALLY ABUSED IN OTHER WAYS BY YOUR PARTNER OR EX-PARTNER?: NO
WITHIN THE LAST YEAR, HAVE TO BEEN RAPED OR FORCED TO HAVE ANY KIND OF SEXUAL ACTIVITY BY YOUR PARTNER OR EX-PARTNER?: NO
IN A TYPICAL WEEK, HOW MANY TIMES DO YOU TALK ON THE PHONE WITH FAMILY, FRIENDS, OR NEIGHBORS?: NEVER

## 2024-08-13 ASSESSMENT — LIFESTYLE VARIABLES
HOW OFTEN DO YOU HAVE SIX OR MORE DRINKS ON ONE OCCASION: NEVER
HOW MANY STANDARD DRINKS CONTAINING ALCOHOL DO YOU HAVE ON A TYPICAL DAY: PATIENT DOES NOT DRINK
SKIP TO QUESTIONS 9-10: 1
AUDIT-C TOTAL SCORE: 0
HOW OFTEN DO YOU HAVE A DRINK CONTAINING ALCOHOL: NEVER

## 2024-08-13 ASSESSMENT — PATIENT HEALTH QUESTIONNAIRE - PHQ9
CLINICAL INTERPRETATION OF PHQ2 SCORE: 6
SUM OF ALL RESPONSES TO PHQ QUESTIONS 1-9: 22
5. POOR APPETITE OR OVEREATING: 3 - NEARLY EVERY DAY

## 2024-08-13 NOTE — PROGRESS NOTES
INITIAL CARE MANAGEMENT CARE PLAN/ASSESSMENT   Fe is a 73 year old female that meets all criteria to qualify for the PCM program at current moment. She has verbalized her full name and  as well as given verbal consent to enroll in the CCM program. Patient has a support system that consists of her children.  She lives in a private residence with her son and two daughters.Their home has two steps to the laundry room but no other stairs.  She does not currently have home services (PT/OT/SN) coming to her home. She does not have spiritual beliefs that may affect care given to patient. Patient does not have an advanced directive. She does have medical equipment at home consisting of a blood pressure cuff, pulse ox that needs new batteries, a cane, walker, and wheelchair. She does have communication barriers including vision and hear impairment. She reports her visual impairment effects he ability to read. Patient does not have reliable transportation. She currently uses [employer] rides for medical appointments. She has difficulty getting to food platt or going shopping. She does have currently have Meals on Wheels. She does not follow a diet at home. She reports taking her medications as prescribed. She states she uses a pill box to manage her medications. Her PHQ9 score was 22. She has had multiple family stressors recently. 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 She has financial resource strain as well. She has expressed goals of improving her feelings of depression. A behavioral health referral has been placed prior to enrollment to [facility]. She reports she has the information and scheduling number. She states she does not have time for an appointment until her son's care is more settled. Discussed with Fe to call for a behavioral health for an appointment as soon as possible as scheduling might take a few months.  Discussed the 878 crisis hotline. Provided RN Care Coordinator's contact  number. Discussed call schedule. Dicussed transportation resources provided by W at her last clinic visit. She states she lost the information. Offered to mail out information. She agreed.     Medication Self-Management Goals:     Reviewed medications listed below with patient.      Current Outpatient Medications:     sertraline (ZOLOFT) 50 MG Tab, Take 1 Tablet by mouth every day., Disp: 90 Tablet, Rfl: 3    omeprazole (PRILOSEC) 20 MG delayed-release capsule, Take 1 Capsule by mouth 2 times a day., Disp: 90 Capsule, Rfl: 3    lisinopril (PRINIVIL) 20 MG Tab, Take 1 Tablet by mouth every day., Disp: 90 Tablet, Rfl: 3    atorvastatin (LIPITOR) 80 MG tablet, Take 1 Tablet by mouth every evening., Disp: 90 Tablet, Rfl: 3    amLODIPine (NORVASC) 10 MG Tab, Take 1 Tablet by mouth every day., Disp: 90 Tablet, Rfl: 3    alendronate (FOSAMAX) 70 MG Tab, Take 1 Tablet by mouth every 7 days., Disp: 12 Tablet, Rfl: 2    fexofenadine (ALLEGRA) 60 MG Tab, Take 1 Tablet by mouth every day., Disp: 30 Tablet, Rfl: 1    albuterol 108 (90 Base) MCG/ACT Aero Soln inhalation aerosol, Inhale 2 Puffs every four hours as needed for Shortness of Breath., Disp: 1 Each, Rfl: 1    acetaminophen (TYLENOL) 325 MG Tab, Take 650 mg by mouth every 6 hours as needed for Moderate Pain., Disp: 30 Tablet, Rfl: 0    potassium chloride ER (KLOR-CON) 10 MEQ tablet, Take 1 Tablet by mouth 2 times a day. (Patient not taking: Reported on 8/13/2024), Disp: , Rfl:     albuterol 108 (90 Base) MCG/ACT Aero Soln inhalation aerosol, Inhale 2 Puffs every 6 hours as needed., Disp: , Rfl:     naproxen (NAPROSYN) 500 MG Tab, Take 1 Tablet by mouth 2 times a day with meals. (Patient not taking: Reported on 8/9/2024), Disp: 20 Tablet, Rfl: 0    aspirin EC (ECOTRIN) 81 MG Tablet Delayed Response, Take 1 Tab by mouth every day. (Patient not taking: Reported on 8/13/2024), Disp: 30 Tab, Rfl: 0    Goal: Continue to manage medications independently         Physical/Functional/Environmental Status:     Activities of Daily Living:  Bathing: independent   Dressing: independent  Grooming: independent  Mouth Care: independent  Toileting: independent  Climbing a Flight of Stairs: needs assistance    Independent Activities of Daily Living:  Shopping: independent  Cooking: independent  Managing Medications: independent  Using the phone and looking up numbers: independent  Driving or using public transportation: independent  Managing Finances: independent        8/13/2024    11:08 AM 8/13/2024    11:18 AM   STEADI Fall Risk   One or more falls in the last year Yes    Advised to use a cane or walker to get around safely  Yes   Feels unsteady when walking  Yes   Steadies self on furniture while walking at home  Yes   Worried about falling  Yes   Needs to push with hands when rising from a chair  Yes   Has trouble stepping up onto a curb / using stairs  Yes   Often has to rush to the toilet  Yes   Has lost some feeling in feet  No   Takes medicine that makes him/her feel lightheaded or more tired than usual  Yes   Takes medicine to sleep or improve mood  Yes   Often feels sad or depressed  Yes     Goal: Fall Prevention     Social Determinants of Health       Financial Status:      Financial Resource Strain: High Risk (8/13/2024)    Overall Financial Resource Strain (CARDIA)     Difficulty of Paying Living Expenses: Very hard         Referred to CHW/SW:  Yes       Transportation Status:      Transportation Needs: Unmet Transportation Needs (8/13/2024)    PRAPARE - Transportation     Lack of Transportation (Medical): Yes     Lack of Transportation (Non-Medical): Yes        Referred to CHW/SW:  Yes      Food Insecurity:      Food Insecurity: Food Insecurity Present (8/13/2024)    Hunger Vital Sign     Worried About Running Out of Food in the Last Year: Often true     Ran Out of Food in the Last Year: Often true        Referred to CHW/SW:  Yes       Housing Status:     Housing  Stability: Low Risk  (8/13/2024)    Housing Stability Vital Sign     Unable to Pay for Housing in the Last Year: No     Number of Places Lived in the Last Year: 1     Unstable Housing in the Last Year: No        Referred to CHW/SW:  NA      Social Connections:     Social Connections: Socially Isolated (8/13/2024)    Social Connection and Isolation Panel [NHANES]     Frequency of Communication with Friends and Family: Never     Frequency of Social Gatherings with Friends and Family: Never     Attends Yarsani Services: 1 to 4 times per year     Active Member of Clubs or Organizations: No     Attends Club or Organization Meetings: Never     Marital Status:         Referred to CHW/SW:  NA      Mental/Behavioral/Psychosocial Status:        4/9/2024     4:20 PM 8/1/2024     3:00 PM 8/13/2024    11:06 AM   Depression Screen (PHQ-2/PHQ-9)   PHQ-2 Total Score 0     PHQ-2 Total Score  2 6   PHQ-9 Total Score 0     PHQ-9 Total Score  7 22       Interpretation of PHQ-9 Total Score   Score Severity   1-4 No Depression   5-9 Mild Depression   10-14 Moderate Depression   15-19 Moderately Severe Depression   20-27 Severe Depression       Goal:  See plan of care    Review of Benefits    Do you have any questions about your benefits?  No     Phone Number and Website provided for questions:    long-term Plus:  [phone]   [email or web address]      Chronic Care Management Care Plan         Care Plans       Chronic Care Management (cms)       Met: 0 of 12 Met: 0 of 12       No Outcome (12)        Reduce the Risk of Falls and Fall Related Injuries (Risk of Falls)    Disciplines:  Nurse, Interdisciplinary    Expected end:  -           Demonstrate ability to verbalize fall safety concerns (Knowledge deficit surrounding fall safety)    Disciplines:  Nurse, Interdisciplinary    Expected end:  -           Patient able to identify personalized signs and symptoms of depression and anxiety. (Knowledge deficit of signs  and symptoms of depression and anxiety)    Disciplines:  Nurse, Interdisciplinary    Expected end:  -           Patient experiences a reduction in feelings of loneliness, grief, and sadness (Patient expresses feelings of loneliness, losses associated with aging and/or depression or anxiety)    Disciplines:  Interdisciplinary    Expected end:  -           Patient will participate in social activities outside of the home (Patient is isolating at home)    Disciplines:  Interdisciplinary    Expected end:  -           Connect patient to support services for suicidality (Patient is at risk for suicidal ideation)    Disciplines:  Nurse, Interdisciplinary    Expected end:  -           Demonstrate Knowledge of Hypertension (Knowledge deficit of hypertension)    Disciplines:  Interdisciplinary    Expected end:  -           Demonstrate factors that can contribute to high blood pressure (Knowledge deficit of factors contributing to hypertension)    Disciplines:  Interdisciplinary    Expected end:  -           Identify which modifiable health habits can be modifed (Recognize current health habits that may contribute to hypertension)    Disciplines:  Interdisciplinary    Expected end:  -           Identify barriers to managing blood pressure (Patient barriers to managing high blood pressure)    Disciplines:  Interdisciplinary    Expected end:  -           Demonstrate the elements of self-monitoring blood pressure (Knowledge deficit of self-monitoring blood pressure)    Disciplines:  Interdisciplinary    Expected end:  -           Improve medication adherence (Adherence to prescribed medications)    Disciplines:  Interdisciplinary    Expected end:  -                                           Discussion of Self Management of Care:     Personal Care Management Program and Services  Management of Depression: Behavioral health referral/988 crisis hotline     Barriers to Goals:  Age  Progression of disease process  Knowledge of  Healthcare  Caregiver for disable son and daughter  Multiple family stressors  Financial Resource strain  Transportation needs     Next Scheduled patient outreach: 1 month

## 2024-08-13 NOTE — CARE PLAN
Barriers:   Age  Progression of disease process  Knowledge of Healthcare  Caregiver stressors  Multiple family stressors  Financial Resource strain  Transportation needs

## 2024-08-14 ENCOUNTER — PATIENT OUTREACH (OUTPATIENT)
Dept: HEALTH INFORMATION MANAGEMENT | Facility: OTHER | Age: 74
End: 2024-08-14
Payer: MEDICARE

## 2024-08-14 DIAGNOSIS — I10 PRIMARY HYPERTENSION: ICD-10-CM

## 2024-08-14 NOTE — PROGRESS NOTES
Community Health Worker Follow-Up    Reason for outreach: CHW called the pt as requested by the PCM nurse.     CHW Interventions: This CHW called the pt to discuss the transportation resources again and resend them because the pt can not find the resources that this CHW gave while in office. Pt stated she still has the 2 bus passes given.    Specific Resources Provided:  Housing/Shelter: n/a  Transportation: [employer], Access to healthcare, RTC Access application.  Food: n/a  Financial: n/a  Social Supports: n/a  Other: n/a    Plan: CHW will follow as needed. CHW will follow  in 2 weeks to make sure the pt has the received the resources.

## 2024-08-15 ENCOUNTER — TELEPHONE (OUTPATIENT)
Dept: HEALTH INFORMATION MANAGEMENT | Facility: OTHER | Age: 74
End: 2024-08-15
Payer: MEDICARE

## 2024-08-26 ENCOUNTER — OFFICE VISIT (OUTPATIENT)
Dept: MEDICAL GROUP | Facility: MEDICAL CENTER | Age: 74
End: 2024-08-26
Payer: MEDICARE

## 2024-08-26 ENCOUNTER — PATIENT MESSAGE (OUTPATIENT)
Dept: HEALTH INFORMATION MANAGEMENT | Facility: OTHER | Age: 74
End: 2024-08-26

## 2024-08-26 VITALS
HEIGHT: 65 IN | WEIGHT: 175.71 LBS | TEMPERATURE: 97.7 F | OXYGEN SATURATION: 98 % | HEART RATE: 60 BPM | DIASTOLIC BLOOD PRESSURE: 78 MMHG | SYSTOLIC BLOOD PRESSURE: 136 MMHG | BODY MASS INDEX: 29.27 KG/M2

## 2024-08-26 DIAGNOSIS — K06.8 PAIN IN GUMS: ICD-10-CM

## 2024-08-26 DIAGNOSIS — M25.562 CHRONIC PAIN OF BOTH KNEES: ICD-10-CM

## 2024-08-26 DIAGNOSIS — E78.2 MIXED HYPERLIPIDEMIA: ICD-10-CM

## 2024-08-26 DIAGNOSIS — E04.2 MULTINODULAR GOITER: ICD-10-CM

## 2024-08-26 DIAGNOSIS — G89.29 CHRONIC PAIN OF BOTH KNEES: ICD-10-CM

## 2024-08-26 DIAGNOSIS — M81.0 OSTEOPOROSIS, UNSPECIFIED OSTEOPOROSIS TYPE, UNSPECIFIED PATHOLOGICAL FRACTURE PRESENCE: ICD-10-CM

## 2024-08-26 DIAGNOSIS — M25.561 CHRONIC PAIN OF BOTH KNEES: ICD-10-CM

## 2024-08-26 DIAGNOSIS — Z63.6 CAREGIVER BURDEN: ICD-10-CM

## 2024-08-26 DIAGNOSIS — I70.0 ATHEROSCLEROSIS OF AORTA (HCC): ICD-10-CM

## 2024-08-26 DIAGNOSIS — I10 PRIMARY HYPERTENSION: ICD-10-CM

## 2024-08-26 DIAGNOSIS — F33.0 MILD EPISODE OF RECURRENT MAJOR DEPRESSIVE DISORDER (HCC): ICD-10-CM

## 2024-08-26 DIAGNOSIS — R94.4 DECREASED GFR: ICD-10-CM

## 2024-08-26 DIAGNOSIS — F41.9 ANXIETY: ICD-10-CM

## 2024-08-26 PROCEDURE — 3075F SYST BP GE 130 - 139MM HG: CPT | Performed by: STUDENT IN AN ORGANIZED HEALTH CARE EDUCATION/TRAINING PROGRAM

## 2024-08-26 PROCEDURE — 3078F DIAST BP <80 MM HG: CPT | Performed by: STUDENT IN AN ORGANIZED HEALTH CARE EDUCATION/TRAINING PROGRAM

## 2024-08-26 PROCEDURE — 99214 OFFICE O/P EST MOD 30 MIN: CPT | Performed by: STUDENT IN AN ORGANIZED HEALTH CARE EDUCATION/TRAINING PROGRAM

## 2024-08-26 SDOH — SOCIAL STABILITY - SOCIAL INSECURITY: DEPENDENT RELATIVE NEEDING CARE AT HOME: Z63.6

## 2024-08-26 ASSESSMENT — ENCOUNTER SYMPTOMS
CHILLS: 0
HEADACHES: 0
WHEEZING: 0
SHORTNESS OF BREATH: 0
FEVER: 0
NAUSEA: 0
VOMITING: 0
WEIGHT LOSS: 0
DIZZINESS: 0
PALPITATIONS: 0

## 2024-08-26 ASSESSMENT — FIBROSIS 4 INDEX: FIB4 SCORE: 1.25
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
## 2024-08-28 ENCOUNTER — HOSPITAL ENCOUNTER (OUTPATIENT)
Dept: LAB | Facility: MEDICAL CENTER | Age: 74
End: 2024-08-28
Attending: STUDENT IN AN ORGANIZED HEALTH CARE EDUCATION/TRAINING PROGRAM
Payer: MEDICARE

## 2024-08-28 DIAGNOSIS — I70.0 ATHEROSCLEROSIS OF AORTA (HCC): ICD-10-CM

## 2024-08-28 DIAGNOSIS — I10 PRIMARY HYPERTENSION: ICD-10-CM

## 2024-08-28 DIAGNOSIS — K06.8 PAIN IN GUMS: ICD-10-CM

## 2024-08-28 DIAGNOSIS — E78.2 MIXED HYPERLIPIDEMIA: ICD-10-CM

## 2024-08-28 LAB
ALBUMIN SERPL BCP-MCNC: 4.4 G/DL (ref 3.2–4.9)
ALBUMIN/GLOB SERPL: 1.8 G/DL
ALP SERPL-CCNC: 128 U/L (ref 30–99)
ALT SERPL-CCNC: 23 U/L (ref 2–50)
ANION GAP SERPL CALC-SCNC: 14 MMOL/L (ref 7–16)
AST SERPL-CCNC: 23 U/L (ref 12–45)
BILIRUB SERPL-MCNC: 0.8 MG/DL (ref 0.1–1.5)
BUN SERPL-MCNC: 23 MG/DL (ref 8–22)
CALCIUM ALBUM COR SERPL-MCNC: 9.8 MG/DL (ref 8.5–10.5)
CALCIUM SERPL-MCNC: 10.1 MG/DL (ref 8.5–10.5)
CHLORIDE SERPL-SCNC: 107 MMOL/L (ref 96–112)
CHOLEST SERPL-MCNC: 173 MG/DL (ref 100–199)
CO2 SERPL-SCNC: 21 MMOL/L (ref 20–33)
CREAT SERPL-MCNC: 1.3 MG/DL (ref 0.5–1.4)
GFR SERPLBLD CREATININE-BSD FMLA CKD-EPI: 43 ML/MIN/1.73 M 2
GLOBULIN SER CALC-MCNC: 2.4 G/DL (ref 1.9–3.5)
GLUCOSE SERPL-MCNC: 108 MG/DL (ref 65–99)
HDLC SERPL-MCNC: 47 MG/DL
LDLC SERPL CALC-MCNC: 99 MG/DL
POTASSIUM SERPL-SCNC: 4 MMOL/L (ref 3.6–5.5)
PROT SERPL-MCNC: 6.8 G/DL (ref 6–8.2)
SODIUM SERPL-SCNC: 142 MMOL/L (ref 135–145)
T4 FREE SERPL-MCNC: 1.24 NG/DL (ref 0.93–1.7)
TRIGL SERPL-MCNC: 135 MG/DL (ref 0–149)
TSH SERPL-ACNC: 2.41 UIU/ML (ref 0.35–5.5)

## 2024-08-28 PROCEDURE — 80061 LIPID PANEL: CPT

## 2024-08-28 PROCEDURE — 84439 ASSAY OF FREE THYROXINE: CPT

## 2024-08-28 PROCEDURE — 84443 ASSAY THYROID STIM HORMONE: CPT

## 2024-08-28 PROCEDURE — 36415 COLL VENOUS BLD VENIPUNCTURE: CPT

## 2024-08-28 PROCEDURE — 80053 COMPREHEN METABOLIC PANEL: CPT

## 2024-09-05 ENCOUNTER — PATIENT OUTREACH (OUTPATIENT)
Dept: HEALTH INFORMATION MANAGEMENT | Facility: OTHER | Age: 74
End: 2024-09-05
Payer: MEDICARE

## 2024-09-05 DIAGNOSIS — I10 PRIMARY HYPERTENSION: ICD-10-CM
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
## 2024-09-19 ENCOUNTER — APPOINTMENT (OUTPATIENT)
Dept: RADIOLOGY | Facility: MEDICAL CENTER | Age: 74
End: 2024-09-19
Attending: STUDENT IN AN ORGANIZED HEALTH CARE EDUCATION/TRAINING PROGRAM
Payer: MEDICARE

## 2024-09-24 ENCOUNTER — PATIENT OUTREACH (OUTPATIENT)
Dept: HEALTH INFORMATION MANAGEMENT | Facility: OTHER | Age: 74
End: 2024-09-24
Payer: MEDICARE

## 2024-09-24 DIAGNOSIS — F10.10 ALCOHOL ABUSE: ICD-10-CM

## 2024-09-24 DIAGNOSIS — G89.29 CHRONIC PAIN OF RIGHT KNEE: ICD-10-CM

## 2024-09-24 DIAGNOSIS — J45.20 MILD INTERMITTENT ASTHMA WITHOUT COMPLICATION: ICD-10-CM

## 2024-09-24 DIAGNOSIS — Z63.6 CAREGIVER BURDEN: ICD-10-CM

## 2024-09-24 DIAGNOSIS — E78.2 MIXED HYPERLIPIDEMIA: ICD-10-CM

## 2024-09-24 DIAGNOSIS — M25.561 CHRONIC PAIN OF RIGHT KNEE: ICD-10-CM

## 2024-09-24 DIAGNOSIS — I10 PRIMARY HYPERTENSION: ICD-10-CM

## 2024-09-24 DIAGNOSIS — F33.0 MILD EPISODE OF RECURRENT MAJOR DEPRESSIVE DISORDER (HCC): ICD-10-CM

## 2024-09-24 DIAGNOSIS — Z59.9 FINANCIAL DIFFICULTY: ICD-10-CM

## 2024-09-24 SDOH — SOCIAL STABILITY - SOCIAL INSECURITY: DEPENDENT RELATIVE NEEDING CARE AT HOME: Z63.6

## 2024-09-24 SDOH — ECONOMIC STABILITY - INCOME SECURITY: PROBLEM RELATED TO HOUSING AND ECONOMIC CIRCUMSTANCES, UNSPECIFIED: Z59.9

## 2024-09-24 NOTE — PROGRESS NOTES
Assessment  Spoke to Fe for monthly outreach follow up. Fe states she is doing okay. She has very complex family stressors. She live with two disabled adult children. She and her son have a  with the [facility]. She states this has been very helpful and they are working on many goals together. Family counseling is one of their main priorities. Encouraged Fe on their progress so far. She states she gets tried a lot while caring for her family. Encouraged Fe to take frequent rest breaks. She states the family has a car now. She cannot drive but her daughter can. They are still limited on utilizing local food platt due to her daughter's work schedule. Reviewed Behavioral Health referral and encouraged Fe to reach out for her personal mental health. She declines at this time. Fe denies any needs at this time. Encouraged to call with any questions or concerns. Establish new healthcare goals at next monthly outreach follow up.     Education and Self Management of Care  Reviewed Behavioral Health referral/Fe declines at this time  Establish healthcare goals at next monthly outreach follow up.     Plan of Care and Goals  Reviewed Behavioral Health referral/Fe declines at this time  Continue to assess healthcare goals    Barriers:  Age  Progression of disease process  Knowledge of Healthcare  Caregiver for disable son and daughter  Multiple family stressors  Financial Resource strain  Transportation needs    Progress:  Not Progressing     Next outreach:  1 month

## 2024-10-11 ENCOUNTER — APPOINTMENT (OUTPATIENT)
Dept: BEHAVIORAL HEALTH | Facility: CLINIC | Age: 74
End: 2024-10-11
Payer: MEDICARE

## 2024-10-29 ENCOUNTER — HOSPITAL ENCOUNTER (EMERGENCY)
Facility: MEDICAL CENTER | Age: 74
End: 2024-10-29
Attending: EMERGENCY MEDICINE
Payer: MEDICARE

## 2024-10-29 ENCOUNTER — APPOINTMENT (OUTPATIENT)
Dept: RADIOLOGY | Facility: MEDICAL CENTER | Age: 74
End: 2024-10-29
Attending: EMERGENCY MEDICINE
Payer: MEDICARE

## 2024-10-29 VITALS
DIASTOLIC BLOOD PRESSURE: 65 MMHG | BODY MASS INDEX: 28.28 KG/M2 | HEIGHT: 65 IN | HEART RATE: 57 BPM | TEMPERATURE: 97.6 F | WEIGHT: 169.75 LBS | RESPIRATION RATE: 18 BRPM | OXYGEN SATURATION: 95 % | SYSTOLIC BLOOD PRESSURE: 144 MMHG

## 2024-10-29 DIAGNOSIS — R51.9 ACUTE NONINTRACTABLE HEADACHE, UNSPECIFIED HEADACHE TYPE: ICD-10-CM

## 2024-10-29 DIAGNOSIS — L08.9 INFECTION OF SCALP: ICD-10-CM

## 2024-10-29 LAB
ALBUMIN SERPL BCP-MCNC: 4 G/DL (ref 3.2–4.9)
ALBUMIN/GLOB SERPL: 1.4 G/DL
ALP SERPL-CCNC: 99 U/L (ref 30–99)
ALT SERPL-CCNC: 15 U/L (ref 2–50)
ANION GAP SERPL CALC-SCNC: 14 MMOL/L (ref 7–16)
AST SERPL-CCNC: 21 U/L (ref 12–45)
BASOPHILS # BLD AUTO: 0.2 % (ref 0–1.8)
BASOPHILS # BLD: 0.02 K/UL (ref 0–0.12)
BILIRUB SERPL-MCNC: 0.7 MG/DL (ref 0.1–1.5)
BUN SERPL-MCNC: 21 MG/DL (ref 8–22)
CALCIUM ALBUM COR SERPL-MCNC: 9.5 MG/DL (ref 8.5–10.5)
CALCIUM SERPL-MCNC: 9.5 MG/DL (ref 8.5–10.5)
CHLORIDE SERPL-SCNC: 107 MMOL/L (ref 96–112)
CO2 SERPL-SCNC: 20 MMOL/L (ref 20–33)
CREAT SERPL-MCNC: 0.9 MG/DL (ref 0.5–1.4)
CRP SERPL HS-MCNC: 4.27 MG/DL (ref 0–0.75)
EOSINOPHIL # BLD AUTO: 0.11 K/UL (ref 0–0.51)
EOSINOPHIL NFR BLD: 1.3 % (ref 0–6.9)
ERYTHROCYTE [DISTWIDTH] IN BLOOD BY AUTOMATED COUNT: 45.4 FL (ref 35.9–50)
ERYTHROCYTE [SEDIMENTATION RATE] IN BLOOD BY WESTERGREN METHOD: 17 MM/HOUR (ref 0–25)
GFR SERPLBLD CREATININE-BSD FMLA CKD-EPI: 67 ML/MIN/1.73 M 2
GLOBULIN SER CALC-MCNC: 2.8 G/DL (ref 1.9–3.5)
GLUCOSE SERPL-MCNC: 112 MG/DL (ref 65–99)
HCT VFR BLD AUTO: 41.1 % (ref 37–47)
HGB BLD-MCNC: 13.7 G/DL (ref 12–16)
IMM GRANULOCYTES # BLD AUTO: 0.02 K/UL (ref 0–0.11)
IMM GRANULOCYTES NFR BLD AUTO: 0.2 % (ref 0–0.9)
LYMPHOCYTES # BLD AUTO: 1.43 K/UL (ref 1–4.8)
LYMPHOCYTES NFR BLD: 16.3 % (ref 22–41)
MCH RBC QN AUTO: 32.2 PG (ref 27–33)
MCHC RBC AUTO-ENTMCNC: 33.3 G/DL (ref 32.2–35.5)
MCV RBC AUTO: 96.5 FL (ref 81.4–97.8)
MONOCYTES # BLD AUTO: 0.92 K/UL (ref 0–0.85)
MONOCYTES NFR BLD AUTO: 10.5 % (ref 0–13.4)
NEUTROPHILS # BLD AUTO: 6.26 K/UL (ref 1.82–7.42)
NEUTROPHILS NFR BLD: 71.5 % (ref 44–72)
NRBC # BLD AUTO: 0 K/UL
NRBC BLD-RTO: 0 /100 WBC (ref 0–0.2)
PLATELET # BLD AUTO: 265 K/UL (ref 164–446)
PMV BLD AUTO: 9.1 FL (ref 9–12.9)
POTASSIUM SERPL-SCNC: 3.3 MMOL/L (ref 3.6–5.5)
PROT SERPL-MCNC: 6.8 G/DL (ref 6–8.2)
RBC # BLD AUTO: 4.26 M/UL (ref 4.2–5.4)
SODIUM SERPL-SCNC: 141 MMOL/L (ref 135–145)
WBC # BLD AUTO: 8.8 K/UL (ref 4.8–10.8)

## 2024-10-29 PROCEDURE — 99284 EMERGENCY DEPT VISIT MOD MDM: CPT

## 2024-10-29 PROCEDURE — 36415 COLL VENOUS BLD VENIPUNCTURE: CPT

## 2024-10-29 PROCEDURE — 86140 C-REACTIVE PROTEIN: CPT

## 2024-10-29 PROCEDURE — 80053 COMPREHEN METABOLIC PANEL: CPT

## 2024-10-29 PROCEDURE — 85025 COMPLETE CBC W/AUTO DIFF WBC: CPT

## 2024-10-29 PROCEDURE — 700102 HCHG RX REV CODE 250 W/ 637 OVERRIDE(OP): Performed by: EMERGENCY MEDICINE

## 2024-10-29 PROCEDURE — 85652 RBC SED RATE AUTOMATED: CPT

## 2024-10-29 PROCEDURE — 70450 CT HEAD/BRAIN W/O DYE: CPT

## 2024-10-29 PROCEDURE — A9270 NON-COVERED ITEM OR SERVICE: HCPCS | Performed by: EMERGENCY MEDICINE

## 2024-10-29 RX ORDER — CEPHALEXIN 500 MG/1
500 CAPSULE ORAL 4 TIMES DAILY
Qty: 20 CAPSULE | Refills: 0 | Status: ACTIVE | OUTPATIENT
Start: 2024-10-29 | End: 2024-11-03

## 2024-10-29 RX ORDER — CEPHALEXIN 500 MG/1
500 CAPSULE ORAL ONCE
Status: COMPLETED | OUTPATIENT
Start: 2024-10-29 | End: 2024-10-29

## 2024-10-29 RX ORDER — ACETAMINOPHEN 500 MG
1000 TABLET ORAL ONCE
Status: COMPLETED | OUTPATIENT
Start: 2024-10-29 | End: 2024-10-29

## 2024-10-29 RX ADMIN — ACETAMINOPHEN 1000 MG: 500 TABLET ORAL at 02:24

## 2024-10-29 RX ADMIN — CEPHALEXIN 500 MG: 500 CAPSULE ORAL at 03:51

## 2024-10-29 ASSESSMENT — FIBROSIS 4 INDEX: FIB4 SCORE: 1.01

## 2024-10-31 ENCOUNTER — PATIENT OUTREACH (OUTPATIENT)
Dept: HEALTH INFORMATION MANAGEMENT | Facility: OTHER | Age: 74
End: 2024-10-31
Payer: MEDICARE

## 2024-10-31 DIAGNOSIS — Z59.9 FINANCIAL DIFFICULTY: ICD-10-CM

## 2024-10-31 DIAGNOSIS — I10 PRIMARY HYPERTENSION: ICD-10-CM

## 2024-10-31 DIAGNOSIS — F33.0 MILD EPISODE OF RECURRENT MAJOR DEPRESSIVE DISORDER (HCC): ICD-10-CM

## 2024-10-31 DIAGNOSIS — Z63.6 CAREGIVER BURDEN: ICD-10-CM

## 2024-10-31 DIAGNOSIS — J45.20 MILD INTERMITTENT ASTHMA WITHOUT COMPLICATION: ICD-10-CM

## 2024-10-31 DIAGNOSIS — G89.29 CHRONIC PAIN OF RIGHT KNEE: ICD-10-CM

## 2024-10-31 DIAGNOSIS — E78.2 MIXED HYPERLIPIDEMIA: ICD-10-CM

## 2024-10-31 DIAGNOSIS — F10.10 ALCOHOL ABUSE: ICD-10-CM

## 2024-10-31 DIAGNOSIS — M25.561 CHRONIC PAIN OF RIGHT KNEE: ICD-10-CM

## 2024-10-31 SDOH — SOCIAL STABILITY - SOCIAL INSECURITY: DEPENDENT RELATIVE NEEDING CARE AT HOME: Z63.6

## 2024-10-31 SDOH — ECONOMIC STABILITY - INCOME SECURITY: PROBLEM RELATED TO HOUSING AND ECONOMIC CIRCUMSTANCES, UNSPECIFIED: Z59.9

## 2024-11-26 ENCOUNTER — OFFICE VISIT (OUTPATIENT)
Dept: MEDICAL GROUP | Facility: MEDICAL CENTER | Age: 74
End: 2024-11-26
Payer: MEDICARE

## 2024-11-26 VITALS
BODY MASS INDEX: 27.84 KG/M2 | SYSTOLIC BLOOD PRESSURE: 132 MMHG | TEMPERATURE: 97 F | HEART RATE: 59 BPM | OXYGEN SATURATION: 98 % | RESPIRATION RATE: 12 BRPM | DIASTOLIC BLOOD PRESSURE: 74 MMHG | WEIGHT: 167.11 LBS | HEIGHT: 65 IN

## 2024-11-26 DIAGNOSIS — M25.50 ARTHRALGIA, UNSPECIFIED JOINT: ICD-10-CM

## 2024-11-26 DIAGNOSIS — Z23 NEED FOR VACCINATION: ICD-10-CM

## 2024-11-26 DIAGNOSIS — G56.03 BILATERAL CARPAL TUNNEL SYNDROME: ICD-10-CM

## 2024-11-26 DIAGNOSIS — Z63.6 CAREGIVER BURDEN: ICD-10-CM

## 2024-11-26 DIAGNOSIS — Z12.12 SCREENING FOR COLORECTAL CANCER: ICD-10-CM

## 2024-11-26 DIAGNOSIS — E78.2 MIXED HYPERLIPIDEMIA: ICD-10-CM

## 2024-11-26 DIAGNOSIS — I10 PRIMARY HYPERTENSION: ICD-10-CM

## 2024-11-26 DIAGNOSIS — Z98.890 HISTORY OF CARPAL TUNNEL SURGERY: ICD-10-CM

## 2024-11-26 DIAGNOSIS — Z12.11 SCREENING FOR COLORECTAL CANCER: ICD-10-CM

## 2024-11-26 PROBLEM — G62.9 NEUROPATHY: Status: RESOLVED | Noted: 2022-10-21 | Resolved: 2024-11-26

## 2024-11-26 PROBLEM — R09.82 POST-NASAL DRIP: Status: RESOLVED | Noted: 2023-07-31 | Resolved: 2024-11-26

## 2024-11-26 SDOH — SOCIAL STABILITY - SOCIAL INSECURITY: DEPENDENT RELATIVE NEEDING CARE AT HOME: Z63.6

## 2024-11-26 ASSESSMENT — ENCOUNTER SYMPTOMS
WEIGHT LOSS: 0
FEVER: 0
CHILLS: 0
VOMITING: 0
HEADACHES: 0
NAUSEA: 0
WHEEZING: 0
DIZZINESS: 0
SHORTNESS OF BREATH: 0
PALPITATIONS: 0

## 2024-11-26 ASSESSMENT — FIBROSIS 4 INDEX: FIB4 SCORE: 1.53
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
## 2024-11-27 ENCOUNTER — PATIENT OUTREACH (OUTPATIENT)
Dept: HEALTH INFORMATION MANAGEMENT | Facility: OTHER | Age: 74
End: 2024-11-27
Payer: MEDICARE

## 2024-11-27 DIAGNOSIS — E78.2 MIXED HYPERLIPIDEMIA: ICD-10-CM

## 2024-11-27 DIAGNOSIS — F33.0 MILD EPISODE OF RECURRENT MAJOR DEPRESSIVE DISORDER (HCC): ICD-10-CM

## 2024-11-27 DIAGNOSIS — I10 PRIMARY HYPERTENSION: ICD-10-CM

## 2024-11-27 DIAGNOSIS — Z63.6 CAREGIVER BURDEN: ICD-10-CM

## 2024-11-27 DIAGNOSIS — M25.561 CHRONIC PAIN OF RIGHT KNEE: ICD-10-CM

## 2024-11-27 DIAGNOSIS — Z59.9 FINANCIAL DIFFICULTY: ICD-10-CM

## 2024-11-27 DIAGNOSIS — G89.29 CHRONIC PAIN OF RIGHT KNEE: ICD-10-CM

## 2024-11-27 DIAGNOSIS — J45.20 MILD INTERMITTENT ASTHMA WITHOUT COMPLICATION: ICD-10-CM

## 2024-11-27 DIAGNOSIS — F10.10 ALCOHOL ABUSE: ICD-10-CM

## 2024-11-27 SDOH — SOCIAL STABILITY - SOCIAL INSECURITY: DEPENDENT RELATIVE NEEDING CARE AT HOME: Z63.6

## 2024-11-27 SDOH — ECONOMIC STABILITY - INCOME SECURITY: PROBLEM RELATED TO HOUSING AND ECONOMIC CIRCUMSTANCES, UNSPECIFIED: Z59.9

## 2024-11-27 NOTE — CARE PLAN
Problem: Patient expresses feelings of loneliness, losses associated with aging and/or depression or anxiety  Goal: Patient experiences a reduction in feelings of loneliness, grief, and sadness/long term   Outcome: Progressing  Intervention: Provide support and unconditional positive regard.     Problem: Patient is at risk for suicidal ideation  Goal: Connect patient to support services for suicidality/long term   Intervention: Provide information and contact for [facility] [place] (crisis line). Call or text [phone].  Note: Currently denies suicidal ideation, reviewed 988 crisis line or 911 in the event of an emergency   Intervention: Provide information and contact for emergency services: [facility] emergency room and 911  Intervention: Patient agrees to utilize crisis support services as necessary     Problem: Adherence to prescribed medications  Goal: Improve medication adherence/short term   Outcome: Progressing  Intervention: Educate patient about the importance of blood pressure medications  Intervention: In conjunction with patient, determine why adherence is a challenge  Intervention: Develop a plan with patient to improve adherence
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
## 2024-12-11 LAB — NONINV COLON CA DNA+OCC BLD SCRN STL QL: NEGATIVE

## 2024-12-30 ENCOUNTER — PATIENT OUTREACH (OUTPATIENT)
Dept: HEALTH INFORMATION MANAGEMENT | Facility: OTHER | Age: 74
End: 2024-12-30
Payer: MEDICARE

## 2024-12-30 DIAGNOSIS — G89.29 CHRONIC PAIN OF RIGHT KNEE: ICD-10-CM

## 2024-12-30 DIAGNOSIS — M25.561 CHRONIC PAIN OF RIGHT KNEE: ICD-10-CM

## 2024-12-30 DIAGNOSIS — Z63.6 CAREGIVER BURDEN: ICD-10-CM

## 2024-12-30 DIAGNOSIS — F33.0 MILD EPISODE OF RECURRENT MAJOR DEPRESSIVE DISORDER (HCC): ICD-10-CM

## 2024-12-30 DIAGNOSIS — Z59.9 FINANCIAL DIFFICULTY: ICD-10-CM

## 2024-12-30 DIAGNOSIS — E78.2 MIXED HYPERLIPIDEMIA: ICD-10-CM

## 2024-12-30 DIAGNOSIS — I10 PRIMARY HYPERTENSION: ICD-10-CM

## 2024-12-30 DIAGNOSIS — F10.10 ALCOHOL ABUSE: ICD-10-CM

## 2024-12-30 DIAGNOSIS — J45.20 MILD INTERMITTENT ASTHMA WITHOUT COMPLICATION: ICD-10-CM

## 2024-12-30 SDOH — SOCIAL STABILITY - SOCIAL INSECURITY: DEPENDENT RELATIVE NEEDING CARE AT HOME: Z63.6

## 2024-12-30 SDOH — ECONOMIC STABILITY - INCOME SECURITY: PROBLEM RELATED TO HOUSING AND ECONOMIC CIRCUMSTANCES, UNSPECIFIED: Z59.9

## 2024-12-31 NOTE — PROGRESS NOTES
Late entry 12/30/24 1328: Fe states she is currently at the [facility] waiting for an MRI appointment today and is scheduled for cataract surgery tomorrow. She denied any needs at this time. Asked to skip December monthly outreach and follow up for January. Encouraged to call with any questions or concerns.

## 2025-01-07 ENCOUNTER — PATIENT OUTREACH (OUTPATIENT)
Dept: HEALTH INFORMATION MANAGEMENT | Facility: OTHER | Age: 75
End: 2025-01-07
Payer: MEDICARE

## 2025-01-07 DIAGNOSIS — Z63.6 CAREGIVER BURDEN: ICD-10-CM

## 2025-01-07 DIAGNOSIS — F10.10 ALCOHOL ABUSE: ICD-10-CM

## 2025-01-07 DIAGNOSIS — J45.20 MILD INTERMITTENT ASTHMA WITHOUT COMPLICATION: ICD-10-CM

## 2025-01-07 DIAGNOSIS — Z59.9 FINANCIAL DIFFICULTY: ICD-10-CM

## 2025-01-07 DIAGNOSIS — G89.29 CHRONIC PAIN OF RIGHT KNEE: ICD-10-CM

## 2025-01-07 DIAGNOSIS — I10 PRIMARY HYPERTENSION: ICD-10-CM

## 2025-01-07 DIAGNOSIS — E78.2 MIXED HYPERLIPIDEMIA: ICD-10-CM

## 2025-01-07 DIAGNOSIS — F33.0 MILD EPISODE OF RECURRENT MAJOR DEPRESSIVE DISORDER (HCC): ICD-10-CM

## 2025-01-07 DIAGNOSIS — M25.561 CHRONIC PAIN OF RIGHT KNEE: ICD-10-CM

## 2025-01-07 SDOH — ECONOMIC STABILITY - INCOME SECURITY: PROBLEM RELATED TO HOUSING AND ECONOMIC CIRCUMSTANCES, UNSPECIFIED: Z59.9

## 2025-01-07 SDOH — SOCIAL STABILITY - SOCIAL INSECURITY: DEPENDENT RELATIVE NEEDING CARE AT HOME: Z63.6

## 2025-01-07 NOTE — CARE PLAN
Problem: Knowledge deficit of factors contributing to hypertension  Goal: Demonstrate factors that can contribute to high blood pressure/long term 8/13/24-2/28/24  Outcome: Progressing  Note: Patient has verbalized understanding of factors contributing to high blood pressure   Intervention: Educate patient on role of sodium in diet  Intervention: Educate patient on role of physical activity  Description: Refer to community wellness programs
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
## 2025-01-08 ENCOUNTER — TELEPHONE (OUTPATIENT)
Dept: MEDICAL GROUP | Facility: MEDICAL CENTER | Age: 75
End: 2025-01-08
Payer: MEDICARE

## 2025-01-08 ENCOUNTER — PATIENT OUTREACH (OUTPATIENT)
Dept: HEALTH INFORMATION MANAGEMENT | Facility: OTHER | Age: 75
End: 2025-01-08
Payer: MEDICARE

## 2025-01-08 DIAGNOSIS — I10 PRIMARY HYPERTENSION: ICD-10-CM

## 2025-01-08 NOTE — PROGRESS NOTES
Community Health Worker Follow-Up    Reason for outreach: This CHW called the pt as requested by the PCM nurse    CHW Interventions: This CHW called the pt to discuss options for the pt to get beds for her grandchildren that will be moving back in with her. This CHW and the pt discussed [employer] and discussed where to go on [address].    Specific Resources Provided:  Housing/Shelter: n/a  Transportation: n/a  Food: n/a  Financial: n/a  Social Supports: n/a  Other: [facility]     Plan: This CHW and the pt discussed the [employer] and who to speak to. This CHW will follow up as needed.

## 2025-01-16 ENCOUNTER — OFFICE VISIT (OUTPATIENT)
Dept: URGENT CARE | Facility: CLINIC | Age: 75
End: 2025-01-16
Payer: MEDICARE

## 2025-01-16 ENCOUNTER — HOSPITAL ENCOUNTER (EMERGENCY)
Facility: MEDICAL CENTER | Age: 75
End: 2025-01-16
Attending: EMERGENCY MEDICINE
Payer: MEDICARE

## 2025-01-16 ENCOUNTER — APPOINTMENT (OUTPATIENT)
Dept: RADIOLOGY | Facility: MEDICAL CENTER | Age: 75
End: 2025-01-16
Attending: EMERGENCY MEDICINE
Payer: MEDICARE

## 2025-01-16 VITALS
DIASTOLIC BLOOD PRESSURE: 77 MMHG | HEIGHT: 65 IN | RESPIRATION RATE: 18 BRPM | OXYGEN SATURATION: 95 % | WEIGHT: 171.3 LBS | TEMPERATURE: 97.5 F | HEART RATE: 60 BPM | BODY MASS INDEX: 28.54 KG/M2 | SYSTOLIC BLOOD PRESSURE: 163 MMHG

## 2025-01-16 VITALS
DIASTOLIC BLOOD PRESSURE: 80 MMHG | HEIGHT: 65 IN | BODY MASS INDEX: 28.69 KG/M2 | OXYGEN SATURATION: 96 % | RESPIRATION RATE: 20 BRPM | TEMPERATURE: 97.8 F | SYSTOLIC BLOOD PRESSURE: 130 MMHG | HEART RATE: 64 BPM | WEIGHT: 172.2 LBS

## 2025-01-16 DIAGNOSIS — R10.9 FLANK PAIN: ICD-10-CM

## 2025-01-16 DIAGNOSIS — R10.9 RIGHT LATERAL ABDOMINAL PAIN: ICD-10-CM

## 2025-01-16 LAB
ALBUMIN SERPL BCP-MCNC: 4.3 G/DL (ref 3.2–4.9)
ALBUMIN/GLOB SERPL: 1.7 G/DL
ALP SERPL-CCNC: 91 U/L (ref 30–99)
ALT SERPL-CCNC: 23 U/L (ref 2–50)
ANION GAP SERPL CALC-SCNC: 12 MMOL/L (ref 7–16)
APPEARANCE UR: CLEAR
APPEARANCE UR: CLEAR
AST SERPL-CCNC: 26 U/L (ref 12–45)
BACTERIA #/AREA URNS HPF: ABNORMAL /HPF
BASOPHILS # BLD AUTO: 0.5 % (ref 0–1.8)
BASOPHILS # BLD: 0.04 K/UL (ref 0–0.12)
BILIRUB SERPL-MCNC: 0.6 MG/DL (ref 0.1–1.5)
BILIRUB UR QL STRIP.AUTO: NEGATIVE
BILIRUB UR STRIP-MCNC: NEGATIVE MG/DL
BUN SERPL-MCNC: 15 MG/DL (ref 8–22)
CALCIUM ALBUM COR SERPL-MCNC: 9.7 MG/DL (ref 8.5–10.5)
CALCIUM SERPL-MCNC: 9.9 MG/DL (ref 8.5–10.5)
CASTS URNS QL MICRO: ABNORMAL /LPF (ref 0–2)
CHLORIDE SERPL-SCNC: 107 MMOL/L (ref 96–112)
CO2 SERPL-SCNC: 22 MMOL/L (ref 20–33)
COLOR UR AUTO: NORMAL
COLOR UR: YELLOW
CREAT SERPL-MCNC: 0.9 MG/DL (ref 0.5–1.4)
EKG IMPRESSION: NORMAL
EOSINOPHIL # BLD AUTO: 0.2 K/UL (ref 0–0.51)
EOSINOPHIL NFR BLD: 2.6 % (ref 0–6.9)
EPITHELIAL CELLS 1715: ABNORMAL /HPF (ref 0–5)
ERYTHROCYTE [DISTWIDTH] IN BLOOD BY AUTOMATED COUNT: 47.1 FL (ref 35.9–50)
FLUAV RNA SPEC QL NAA+PROBE: NEGATIVE
FLUBV RNA SPEC QL NAA+PROBE: NEGATIVE
GFR SERPLBLD CREATININE-BSD FMLA CKD-EPI: 67 ML/MIN/1.73 M 2
GLOBULIN SER CALC-MCNC: 2.6 G/DL (ref 1.9–3.5)
GLUCOSE SERPL-MCNC: 109 MG/DL (ref 65–99)
GLUCOSE UR STRIP.AUTO-MCNC: NEGATIVE MG/DL
GLUCOSE UR STRIP.AUTO-MCNC: NEGATIVE MG/DL
HCT VFR BLD AUTO: 43.1 % (ref 37–47)
HGB BLD-MCNC: 14.3 G/DL (ref 12–16)
IMM GRANULOCYTES # BLD AUTO: 0.02 K/UL (ref 0–0.11)
IMM GRANULOCYTES NFR BLD AUTO: 0.3 % (ref 0–0.9)
KETONES UR STRIP.AUTO-MCNC: NEGATIVE MG/DL
KETONES UR STRIP.AUTO-MCNC: NEGATIVE MG/DL
LACTATE SERPL-SCNC: 0.8 MMOL/L (ref 0.5–2)
LEUKOCYTE ESTERASE UR QL STRIP.AUTO: ABNORMAL
LEUKOCYTE ESTERASE UR QL STRIP.AUTO: NEGATIVE
LIPASE SERPL-CCNC: 45 U/L (ref 11–82)
LYMPHOCYTES # BLD AUTO: 2.12 K/UL (ref 1–4.8)
LYMPHOCYTES NFR BLD: 28.1 % (ref 22–41)
MCH RBC QN AUTO: 32.4 PG (ref 27–33)
MCHC RBC AUTO-ENTMCNC: 33.2 G/DL (ref 32.2–35.5)
MCV RBC AUTO: 97.5 FL (ref 81.4–97.8)
MICRO URNS: ABNORMAL
MONOCYTES # BLD AUTO: 0.59 K/UL (ref 0–0.85)
MONOCYTES NFR BLD AUTO: 7.8 % (ref 0–13.4)
NEUTROPHILS # BLD AUTO: 4.58 K/UL (ref 1.82–7.42)
NEUTROPHILS NFR BLD: 60.7 % (ref 44–72)
NITRITE UR QL STRIP.AUTO: NEGATIVE
NITRITE UR QL STRIP.AUTO: NEGATIVE
NRBC # BLD AUTO: 0 K/UL
NRBC BLD-RTO: 0 /100 WBC (ref 0–0.2)
PH UR STRIP.AUTO: 6.5 [PH] (ref 5–8)
PH UR STRIP.AUTO: 7 [PH] (ref 5–8)
PLATELET # BLD AUTO: 356 K/UL (ref 164–446)
PMV BLD AUTO: 9 FL (ref 9–12.9)
POTASSIUM SERPL-SCNC: 3.9 MMOL/L (ref 3.6–5.5)
PROT SERPL-MCNC: 6.9 G/DL (ref 6–8.2)
PROT UR QL STRIP: NEGATIVE MG/DL
PROT UR QL STRIP: NEGATIVE MG/DL
RBC # BLD AUTO: 4.42 M/UL (ref 4.2–5.4)
RBC # URNS HPF: ABNORMAL /HPF (ref 0–2)
RBC UR QL AUTO: NEGATIVE
RBC UR QL AUTO: NEGATIVE
RSV RNA SPEC QL NAA+PROBE: NEGATIVE
SARS-COV-2 RNA RESP QL NAA+PROBE: NOTDETECTED
SODIUM SERPL-SCNC: 141 MMOL/L (ref 135–145)
SP GR UR STRIP.AUTO: 1.02
SP GR UR STRIP.AUTO: 1.02
TROPONIN T SERPL-MCNC: 11 NG/L (ref 6–19)
UROBILINOGEN UR STRIP-MCNC: 0.2 MG/DL
UROBILINOGEN UR STRIP.AUTO-MCNC: 1 EU/DL
WBC # BLD AUTO: 7.6 K/UL (ref 4.8–10.8)
WBC #/AREA URNS HPF: ABNORMAL /HPF

## 2025-01-16 PROCEDURE — 81001 URINALYSIS AUTO W/SCOPE: CPT

## 2025-01-16 PROCEDURE — 3075F SYST BP GE 130 - 139MM HG: CPT | Performed by: NURSE PRACTITIONER

## 2025-01-16 PROCEDURE — 3079F DIAST BP 80-89 MM HG: CPT | Performed by: NURSE PRACTITIONER

## 2025-01-16 PROCEDURE — 80053 COMPREHEN METABOLIC PANEL: CPT

## 2025-01-16 PROCEDURE — 74176 CT ABD & PELVIS W/O CONTRAST: CPT

## 2025-01-16 PROCEDURE — 0241U HCHG SARS-COV-2 COVID-19 NFCT DS RESP RNA 4 TRGT ED POC: CPT

## 2025-01-16 PROCEDURE — 99285 EMERGENCY DEPT VISIT HI MDM: CPT

## 2025-01-16 PROCEDURE — 93005 ELECTROCARDIOGRAM TRACING: CPT | Mod: TC | Performed by: EMERGENCY MEDICINE

## 2025-01-16 PROCEDURE — 99204 OFFICE O/P NEW MOD 45 MIN: CPT | Performed by: NURSE PRACTITIONER

## 2025-01-16 PROCEDURE — 96375 TX/PRO/DX INJ NEW DRUG ADDON: CPT

## 2025-01-16 PROCEDURE — 83690 ASSAY OF LIPASE: CPT

## 2025-01-16 PROCEDURE — 84484 ASSAY OF TROPONIN QUANT: CPT

## 2025-01-16 PROCEDURE — 700111 HCHG RX REV CODE 636 W/ 250 OVERRIDE (IP): Mod: JZ | Performed by: EMERGENCY MEDICINE

## 2025-01-16 PROCEDURE — 36415 COLL VENOUS BLD VENIPUNCTURE: CPT

## 2025-01-16 PROCEDURE — 96374 THER/PROPH/DIAG INJ IV PUSH: CPT

## 2025-01-16 PROCEDURE — 85025 COMPLETE CBC W/AUTO DIFF WBC: CPT

## 2025-01-16 PROCEDURE — 81002 URINALYSIS NONAUTO W/O SCOPE: CPT | Performed by: NURSE PRACTITIONER

## 2025-01-16 PROCEDURE — 71045 X-RAY EXAM CHEST 1 VIEW: CPT

## 2025-01-16 PROCEDURE — 83605 ASSAY OF LACTIC ACID: CPT

## 2025-01-16 RX ORDER — PREDNISOLONE ACETATE 10 MG/ML
SUSPENSION/ DROPS OPHTHALMIC
COMMUNITY
Start: 2024-12-13

## 2025-01-16 RX ORDER — MORPHINE SULFATE 4 MG/ML
4 INJECTION INTRAVENOUS ONCE
Status: COMPLETED | OUTPATIENT
Start: 2025-01-16 | End: 2025-01-16

## 2025-01-16 RX ORDER — ONDANSETRON 2 MG/ML
4 INJECTION INTRAMUSCULAR; INTRAVENOUS ONCE
Status: COMPLETED | OUTPATIENT
Start: 2025-01-16 | End: 2025-01-16

## 2025-01-16 RX ORDER — MOXIFLOXACIN 5 MG/ML
SOLUTION/ DROPS OPHTHALMIC
COMMUNITY
Start: 2025-01-08

## 2025-01-16 RX ADMIN — ONDANSETRON 4 MG: 2 INJECTION INTRAMUSCULAR; INTRAVENOUS at 11:28

## 2025-01-16 RX ADMIN — MORPHINE SULFATE 4 MG: 4 INJECTION, SOLUTION INTRAMUSCULAR; INTRAVENOUS at 11:29

## 2025-01-16 ASSESSMENT — FIBROSIS 4 INDEX
FIB4 SCORE: 1.53
FIB4 SCORE: 1.53

## 2025-01-16 ASSESSMENT — PAIN DESCRIPTION - PAIN TYPE: TYPE: ACUTE PAIN

## 2025-01-16 NOTE — ED NOTES
All discharge instructions given to pt.   Pt verbalized understanding of all discharge instructions.  All questions answered. All personal belongings sent with pt. Pt ambulatory to [facility].

## 2025-01-16 NOTE — PROGRESS NOTES
Chief Complaint   Patient presents with    Flank Pain     X3days right flank pain/frequent urgent urination/       HISTORY OF PRESENT ILLNESS: Patient is a pleasant 74 y.o. female who presents to urgent care today with complaints of right sided abdominal pain.  The patient notes that for the past 2 to 3 days she has had pain to her right lateral abdomen.  Pain worsens with deep inspiration and movement.  She does report recent increase in urination.  Denies changes in stools.  She does report decreased appetite, fatigue, shortness of breath.    Patient Active Problem List    Diagnosis Date Noted    Bilateral carpal tunnel syndrome 11/26/2024    Atherosclerosis of aorta (HCC) 08/26/2024    Mild episode of recurrent major depressive disorder (HCC) 08/01/2024    Screening for colorectal cancer 08/01/2024    Financial difficulty 08/01/2024    Mild intermittent asthma without complication 04/09/2024    Multinodular goiter 07/21/2023    Caregiver burden 07/02/2023    Polyarthralgia 06/19/2023    Osteoporosis 04/17/2023    Seborrheic dermatitis 11/22/2022    Alcohol abuse 10/21/2022    Current use of proton pump inhibitor 10/21/2022    Chronic pain of right knee 10/21/2022    GERD (gastroesophageal reflux disease) 02/07/2016    Hyperlipidemia 02/07/2016    HTN (hypertension) 02/07/2016       Allergies:Nkda [no known drug allergy]    Current Outpatient Medications Ordered in Epic   Medication Sig Dispense Refill    moxifloxacin (VIGAMOX) 0.5 % Solution INSTILL 1 DROP INTO RIGHT EYE 4 TIMES DAILY      prednisoLONE acetate (PRED FORTE) 1 % Suspension INSTILL 1 DROP INTO RIGHT EYE 4 TIMES DAILY      celecoxib (CELEBREX) 100 MG Cap Take 1 capsule by mouth twice daily 60 Capsule 0    sertraline (ZOLOFT) 50 MG Tab Take 1 Tablet by mouth every day. 90 Tablet 3    omeprazole (PRILOSEC) 20 MG delayed-release capsule Take 1 Capsule by mouth 2 times a day. 90 Capsule 3    lisinopril (PRINIVIL) 20 MG Tab Take 1 Tablet by mouth every  day. 90 Tablet 3    atorvastatin (LIPITOR) 80 MG tablet Take 1 Tablet by mouth every evening. 90 Tablet 3    amLODIPine (NORVASC) 10 MG Tab Take 1 Tablet by mouth every day. 90 Tablet 3    alendronate (FOSAMAX) 70 MG Tab Take 1 Tablet by mouth every 7 days. 12 Tablet 2    albuterol 108 (90 Base) MCG/ACT Aero Soln inhalation aerosol Inhale 2 Puffs every 6 hours as needed.      fexofenadine (ALLEGRA) 60 MG Tab Take 1 Tablet by mouth every day. 30 Tablet 1    albuterol 108 (90 Base) MCG/ACT Aero Soln inhalation aerosol Inhale 2 Puffs every four hours as needed for Shortness of Breath. 1 Each 1    acetaminophen (TYLENOL) 325 MG Tab Take 650 mg by mouth every 6 hours as needed for Moderate Pain. 30 Tablet 0     No current Epic-ordered facility-administered medications on file.       Past Medical History:   Diagnosis Date    ASTHMA     COPD (chronic obstructive pulmonary disease) (HCC)     Diverticulosis     Duodenal ulcer, unspecified as acute or chronic, without hemorrhage, perforation, or obstruction     GERD (gastroesophageal reflux disease)     Hiatal hernia     High cholesterol     Hypertension     Psychiatric problem        Social History     Tobacco Use    Smoking status: Never    Smokeless tobacco: Never   Vaping Use    Vaping status: Never Used   Substance Use Topics    Alcohol use: Not Currently    Drug use: Not Currently       Family Status   Relation Name Status    Mo  Alive    Fa     No partnership data on file     Family History   Problem Relation Age of Onset    No Known Problems Mother         2017 is age 91    Other Father 57        unknown issue       ROS:  Review of Systems   Constitutional: Positive for malaise, fatigue.  Negative for fever, chills, weight loss.  HENT: Negative for ear pain, nosebleeds, congestion, sore throat and neck pain.    Eyes: Negative for vision changes.   Neuro: Negative for headache, sensory changes, weakness, seizure, LOC.   Cardiovascular: Negative for chest pain,  "palpitations, orthopnea and leg swelling.   Respiratory: Positive for shortness of breath.  Negative for cough, sputum production, and wheezing.   Gastrointestinal: Positive for abdominal pain.  Negative for nausea, vomiting or diarrhea.   Genitourinary: Positive for frequency.  Negative for dysuria, urgency.  Musculoskeletal: Negative for falls, neck pain, back pain, joint pain, myalgias.   Skin: Negative for rash, diaphoresis.     Exam:  /80   Pulse 64   Temp 36.6 °C (97.8 °F) (Temporal)   Resp 20   Ht 1.651 m (5' 5\")   Wt 78.1 kg (172 lb 3.2 oz)   SpO2 96%   General: well-nourished, well-developed female in NAD  Head: normocephalic, atraumatic  Eyes: PERRLA, no conjunctival injection, acuity grossly intact, lids normal.  Ears: normal shape and symmetry, no tenderness, no discharge. External canals are without any significant edema or erythema. Tympanic membranes are without any inflammation, no effusion. Gross auditory acuity is intact.  Nose: symmetrical without tenderness, no discharge.  Mouth/Throat: reasonable hygiene, no erythema, exudates or tonsillar enlargement.  Neck: no masses, range of motion within normal limits, no tracheal deviation. No obvious thyroid enlargement.   Lymph: no cervical adenopathy. No supraclavicular adenopathy.   Neuro: alert and oriented. Cranial nerves 1-12 grossly intact. No sensory deficit.   Cardiovascular: regular rate and rhythm. No edema.  Pulmonary: no distress. Chest is symmetrical with respiration, no wheezes, crackles, or rhonchi.   Abdomen: soft, right upper quadrant and lower quadrant tenderness, no guarding, no hepatosplenomegaly.  Musculoskeletal: no clubbing, appropriate muscle tone, gait is stable.  Skin: warm, dry, intact, no clubbing, no cyanosis, no rashes.   Psych: appropriate mood, affect, judgement.       POC urine without abnormalities      Assessment/Plan:  1. Right lateral abdominal pain  POCT Urinalysis          The patient is a pleasant " 74-year-old female who presents with 2 to 3 days of right-sided abdominal pain, worsening.  Differential diagnoses include but are not limited to: Nephrolithiasis, pyelonephritis, complicated urinary tract infection, cholecystitis, cholelithiasis, small bowel obstruction, appendicitis.  Patient has correlating abdominal tenderness.  Urine negative for any abnormalities.  At this time, I feel the patient requires a higher level of care in the ED for closer monitoring, stat lab work and/or imaging for further evaluation. This has been discussed with the patient and she states agreement and understanding.  The patient is stable to leave POV at this time and will go directly to ED without delay.           Please note that this dictation was created using voice recognition software. I have made every reasonable attempt to correct obvious errors, but I expect that there are errors of grammar and possibly content that I did not discover before finalizing the note.      NATALIE Lopez.
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
## 2025-01-16 NOTE — ED PROVIDER NOTES
ED Provider Note    CHIEF COMPLAINT  Chief Complaint   Patient presents with    Flank Pain     C/o right flank pain for the last 2.5 days, seen at  today and sent here for further workup, urine sample completed at  and pt reports negative for infection.        EXTERNAL RECORDS REVIEWED  Outpatient Notes Urgent care note from earlier today    HPI/ROS  LIMITATION TO HISTORY   Select: : None  OUTSIDE HISTORIAN(S):  None    Fe Clark is a 74 y.o. female who presents to the emergency department for evaluation of right sided abdominal pain.  Patient states that over the last 2 and half days she has developed right sided abdominal and flank pain.  She states that it is sharp and intermittent.  She states that some positions make it worse and some make it better.  She denies any nausea or vomiting.  She is not had a fever.  She states that she does have a history of a total hysterectomy 20 years ago but denies any other abdominal surgeries.  She is not had recent travel or suspicious food intake.  She does admit to some shortness of breath but does have COPD at baseline.  She denies chest pain.  She denies melena or hematochezia.    PAST MEDICAL HISTORY   has a past medical history of ASTHMA, COPD (chronic obstructive pulmonary disease) (HCC), Diverticulosis, Duodenal ulcer, unspecified as acute or chronic, without hemorrhage, perforation, or obstruction, GERD (gastroesophageal reflux disease), Hiatal hernia, High cholesterol, Hypertension, and Psychiatric problem.    SURGICAL HISTORY   has a past surgical history that includes bladder suspension; bowel resection; colonoscopy; hysterectomy radical; other orthopedic surgery; ankle arthroscopy (7/31/2013); hardware removal ortho (7/31/2013); lacrimal duct probe (3/11/2015); submandible abscess incision and drainage (11/19/2016); and dental extraction(s) (11/19/2016).    FAMILY HISTORY  Family History   Problem Relation Age of Onset    No Known Problems  "Mother         2017 is age 91    Other Father 57        unknown issue       SOCIAL HISTORY  Social History     Tobacco Use    Smoking status: Never    Smokeless tobacco: Never   Vaping Use    Vaping status: Never Used   Substance and Sexual Activity    Alcohol use: Not Currently    Drug use: Not Currently    Sexual activity: Not on file       CURRENT MEDICATIONS  Home Medications       Reviewed by Cintia Davila R.N. (Registered Nurse) on 01/16/25 at 0902  Med List Status: Partial     Medication Last Dose Status   acetaminophen (TYLENOL) 325 MG Tab  Active   albuterol 108 (90 Base) MCG/ACT Aero Soln inhalation aerosol  Active   albuterol 108 (90 Base) MCG/ACT Aero Soln inhalation aerosol  Active   alendronate (FOSAMAX) 70 MG Tab  Active   amLODIPine (NORVASC) 10 MG Tab  Active   atorvastatin (LIPITOR) 80 MG tablet  Active   celecoxib (CELEBREX) 100 MG Cap  Active   fexofenadine (ALLEGRA) 60 MG Tab  Active   lisinopril (PRINIVIL) 20 MG Tab  Active   moxifloxacin (VIGAMOX) 0.5 % Solution  Active   omeprazole (PRILOSEC) 20 MG delayed-release capsule  Active   prednisoLONE acetate (PRED FORTE) 1 % Suspension  Active   sertraline (ZOLOFT) 50 MG Tab  Active                  Audit from Redirected Encounters    **Home medications have not yet been reviewed for this encounter**         ALLERGIES  Allergies   Allergen Reactions    Nkda [No Known Drug Allergy]        PHYSICAL EXAM  VITAL SIGNS: BP (!) 140/72   Pulse (!) 56   Temp 35.9 °C (96.6 °F) (Temporal)   Resp (!) 27   Ht 1.651 m (5' 5\")   Wt 77.7 kg (171 lb 4.8 oz)   SpO2 94%   BMI 28.51 kg/m²   Constitutional: Alert and in no apparent distress.  HENT: Normocephalic atraumatic. Bilateral external ears normal. Nose normal. Mucous membranes are moist.  Eyes: Pupils are equal and reactive. Conjunctiva normal. Non-icteric sclera.   Neck: Normal range of motion without tenderness. Supple. No meningeal signs.  Cardiovascular: Regular rate and rhythm. No murmurs, " gallops or rubs.  Thorax & Lungs: No increased work of breathing. Breath sounds are clear to auscultation bilaterally. No wheezing, rhonchi or rales.  Abdomen: Soft and nondistended.  There is mild tenderness palpation along the right side of the abdomen.  Skin: Warm and dry. No rashes are noted.  Back: No bony tenderness, No CVA tenderness.   Extremities: 2+ peripheral pulses. Cap refill is less than 2 seconds. No edema, cyanosis, or clubbing.  Musculoskeletal: Good range of motion in all major joints. No tenderness to palpation or major deformities noted.   Neurologic: Alert and appropriate for age. The patient moves all 4 extremities without obvious deficits.    LABS  Results for orders placed or performed during the hospital encounter of 01/16/25   CBC with Differential    Collection Time: 01/16/25  9:07 AM   Result Value Ref Range    WBC 7.6 4.8 - 10.8 K/uL    RBC 4.42 4.20 - 5.40 M/uL    Hemoglobin 14.3 12.0 - 16.0 g/dL    Hematocrit 43.1 37.0 - 47.0 %    MCV 97.5 81.4 - 97.8 fL    MCH 32.4 27.0 - 33.0 pg    MCHC 33.2 32.2 - 35.5 g/dL    RDW 47.1 35.9 - 50.0 fL    Platelet Count 356 164 - 446 K/uL    MPV 9.0 9.0 - 12.9 fL    Neutrophils-Polys 60.70 44.00 - 72.00 %    Lymphocytes 28.10 22.00 - 41.00 %    Monocytes 7.80 0.00 - 13.40 %    Eosinophils 2.60 0.00 - 6.90 %    Basophils 0.50 0.00 - 1.80 %    Immature Granulocytes 0.30 0.00 - 0.90 %    Nucleated RBC 0.00 0.00 - 0.20 /100 WBC    Neutrophils (Absolute) 4.58 1.82 - 7.42 K/uL    Lymphs (Absolute) 2.12 1.00 - 4.80 K/uL    Monos (Absolute) 0.59 0.00 - 0.85 K/uL    Eos (Absolute) 0.20 0.00 - 0.51 K/uL    Baso (Absolute) 0.04 0.00 - 0.12 K/uL    Immature Granulocytes (abs) 0.02 0.00 - 0.11 K/uL    NRBC (Absolute) 0.00 K/uL   Complete Metabolic Panel    Collection Time: 01/16/25  9:07 AM   Result Value Ref Range    Sodium 141 135 - 145 mmol/L    Potassium 3.9 3.6 - 5.5 mmol/L    Chloride 107 96 - 112 mmol/L    Co2 22 20 - 33 mmol/L    Anion Gap 12.0 7.0 - 16.0     Glucose 109 (H) 65 - 99 mg/dL    Bun 15 8 - 22 mg/dL    Creatinine 0.90 0.50 - 1.40 mg/dL    Calcium 9.9 8.5 - 10.5 mg/dL    Correct Calcium 9.7 8.5 - 10.5 mg/dL    AST(SGOT) 26 12 - 45 U/L    ALT(SGPT) 23 2 - 50 U/L    Alkaline Phosphatase 91 30 - 99 U/L    Total Bilirubin 0.6 0.1 - 1.5 mg/dL    Albumin 4.3 3.2 - 4.9 g/dL    Total Protein 6.9 6.0 - 8.2 g/dL    Globulin 2.6 1.9 - 3.5 g/dL    A-G Ratio 1.7 g/dL   Lipase    Collection Time: 25  9:07 AM   Result Value Ref Range    Lipase 45 11 - 82 U/L   ESTIMATED GFR    Collection Time: 25  9:07 AM   Result Value Ref Range    GFR (CKD-EPI) 67 >60 mL/min/1.73 m 2   TROPONIN    Collection Time: 25  9:07 AM   Result Value Ref Range    Troponin T 11 6 - 19 ng/L   Urinalysis    Collection Time: 25 11:01 AM    Specimen: Urine   Result Value Ref Range    Color Yellow     Character Clear     Specific Gravity 1.021 <1.035    Ph 6.5 5.0 - 8.0    Glucose Negative Negative mg/dL    Ketones Negative Negative mg/dL    Protein Negative Negative mg/dL    Bilirubin Negative Negative    Urobilinogen, Urine 1.0 <=1.0 EU/dL    Nitrite Negative Negative    Leukocyte Esterase Trace (A) Negative    Occult Blood Negative Negative    Micro Urine Req Microscopic    URINE MICROSCOPIC (W/UA)    Collection Time: 25 11:01 AM   Result Value Ref Range    WBC 0-2 /hpf    RBC 3-5 (A) 0 - 2 /hpf    Bacteria None Seen None /hpf    Epithelial Cells 0-2 0 - 5 /hpf    Urine Casts 0-2 0 - 2 /lpf   LACTIC ACID    Collection Time: 25 11:30 AM   Result Value Ref Range    Lactic Acid 0.8 0.5 - 2.0 mmol/L   EKG (NOW)    Collection Time: 25  1:25 PM   Result Value Ref Range    Report       [facility] Emergency Dept[facility]    Test Date:  2025  Pt Name:    DOROTA AGUIRRE             Department: ER  MRN:        [id number]                      Room:       [facility]  Gender:     Female                       Technician: [id number]  :        [id number]                    Requested By:CARLENE MYERS  Order #:    [id number]                    Reading MD: Carlene Myers    Measurements  Intervals                                Axis  Rate:       53                           P:          -32  IA:         154                          QRS:        -6  QRSD:       99                           T:          43  QT:         489  QTc:        460    Interpretive Statements  Sinus bradycardia  Compared to ECG 03/23/2024 11:31:39  Sinus rhythm no longer present  Left ventricular hypertrophy no longer present  Q waves no longer present  Electronically Signed On 01- 13:25:27 PST by Carlene Myers       RADIOLOGY/PROCEDURES   I have independently interpreted the diagnostic imaging associated with this visit and am waiting the final reading from the radiologist.   My preliminary interpretation is as follows: Lung fields are clear bilaterally.  No obstructive bowel gas pattern noted.    Radiologist interpretation:  CT-ABDOMEN-PELVIS W/O   Final Result      1.  No acute abnormality of the abdomen or pelvis within the limitations of this noncontrast exam.   2.  Diverticulosis without evidence of diverticulitis.   3.  Borderline hepatomegaly.   4.  Atherosclerosis.      DX-CHEST-PORTABLE (1 VIEW)   Final Result      No acute cardiopulmonary abnormality.          COURSE & MEDICAL DECISION MAKING    ASSESSMENT, COURSE AND PLAN  Care Narrative: This is a 74-year-old female presenting to the emergency department for evaluation of flank pain.  On initial evaluation, the patient did not appear to be in any acute distress.  She was noted to be hypertensive but she does have a history of hypertension.  Physical exam was notable for mild tenderness to palpation along the right side of the abdomen.  No CVA tenderness was noted.  No evidence of peritonitis was noted.    Patient's white count was normal and reassuring.  Lactic acid was normal.  I am less concerned for sepsis.    H&H were normal and she denied any melena  or hematochezia.  Her BUN was normal.  I have low clinical suspicion for GI bleed.    An EKG was obtained and did not show any evidence of acute ischemia.  Her troponin was 11.  Her heart score is 3.  I have low clinical suspicion for ACS.    Chest x-ray was clear with no evidence of pleural effusions or pneumothorax.  The cardiac silhouette was within normal limits.  No focal opacities concerning for bacterial pneumonia were noted.    Electrolytes were without derangement.  LFTs and lipase were normal and I am less concerned for acute pancreatitis or hepatobiliary disease.    Urinalysis was obtained and had trace leukocyte esterase.  No bacteria or white cells were seen.  I am less concerned for occult infection at this time.    CT did not reveal any evidence of acute abnormality in the abdomen or pelvis.    The patient did have an episode of hypoxia after she was given a morphine but this resolved and she was on room air afterwards with no evidence of hypoxia.  I do think she stable for discharge at this time.  I discussed supportive measures and encouraged her to follow-up with her primary care physician.  She will return to the ED with any worsening signs or symptoms.    The patient presents with abdominal pain without signs of peritonitis or other life-threatening or serious etiology. The patient appears stable for discharge and has been instructed to return immediately if the symptoms worsen in any way, or in 8-12hr if not improved for re-evaluation. The patient has been instructed to return if the symptoms worsen or change in any way.    ADDITIONAL PROBLEMS MANAGED  None    DISPOSITION AND DISCUSSIONS  I have discussed management of the patient with the following physicians and JORGE's:  None    Discussion of management with other QHP or appropriate source(s): None     Escalation of care considered, and ultimately not performed:acute inpatient care management, however at this time, the patient is most  appropriate for outpatient management    Barriers to care at this time, including but not limited to:  None .     Decision tools and prescription drugs considered including, but not limited to:  None .    FINAL IMPRESSION  1. Flank pain      PRESCRIPTIONS  New Prescriptions    No medications on file     FOLLOW UP  Luis Leos M.D.  [address] [place] [address] [address] [place] [id number]  [phone]    Call in 1 day  To schedule a follow up appointment    [facility], Emergency Dept  [address] [facility] [id number]  [phone]  Go to   As needed    -DISCHARGE-    Electronically signed by: Carlene Cooper D.O., 1/16/2025 10:27 AM

## 2025-02-03 ENCOUNTER — OFFICE VISIT (OUTPATIENT)
Dept: MEDICAL GROUP | Facility: MEDICAL CENTER | Age: 75
End: 2025-02-03
Payer: MEDICARE

## 2025-02-03 VITALS
HEART RATE: 62 BPM | OXYGEN SATURATION: 98 % | RESPIRATION RATE: 16 BRPM | WEIGHT: 175 LBS | SYSTOLIC BLOOD PRESSURE: 128 MMHG | TEMPERATURE: 98.6 F | BODY MASS INDEX: 29.88 KG/M2 | HEIGHT: 64 IN | DIASTOLIC BLOOD PRESSURE: 78 MMHG

## 2025-02-03 DIAGNOSIS — F41.9 ANXIETY: ICD-10-CM

## 2025-02-03 DIAGNOSIS — R60.0 BILATERAL LEG EDEMA: ICD-10-CM

## 2025-02-03 DIAGNOSIS — F10.10 ALCOHOL ABUSE: ICD-10-CM

## 2025-02-03 DIAGNOSIS — I16.0 HYPERTENSIVE URGENCY: ICD-10-CM

## 2025-02-03 DIAGNOSIS — F33.1 MODERATE EPISODE OF RECURRENT MAJOR DEPRESSIVE DISORDER (HCC): ICD-10-CM

## 2025-02-03 DIAGNOSIS — I10 PRIMARY HYPERTENSION: ICD-10-CM

## 2025-02-03 DIAGNOSIS — M81.0 AGE RELATED OSTEOPOROSIS, UNSPECIFIED PATHOLOGICAL FRACTURE PRESENCE: ICD-10-CM

## 2025-02-03 DIAGNOSIS — Z23 NEED FOR VACCINATION: ICD-10-CM

## 2025-02-03 DIAGNOSIS — E78.2 MIXED HYPERLIPIDEMIA: ICD-10-CM

## 2025-02-03 PROCEDURE — 99214 OFFICE O/P EST MOD 30 MIN: CPT | Performed by: STUDENT IN AN ORGANIZED HEALTH CARE EDUCATION/TRAINING PROGRAM

## 2025-02-03 PROCEDURE — 3074F SYST BP LT 130 MM HG: CPT | Performed by: STUDENT IN AN ORGANIZED HEALTH CARE EDUCATION/TRAINING PROGRAM

## 2025-02-03 PROCEDURE — 3078F DIAST BP <80 MM HG: CPT | Performed by: STUDENT IN AN ORGANIZED HEALTH CARE EDUCATION/TRAINING PROGRAM

## 2025-02-03 RX ORDER — AMLODIPINE BESYLATE 5 MG/1
5 TABLET ORAL DAILY
Qty: 90 TABLET | Refills: 3 | Status: SHIPPED | OUTPATIENT
Start: 2025-02-03

## 2025-02-03 RX ORDER — LISINOPRIL 20 MG/1
40 TABLET ORAL DAILY
Qty: 90 TABLET | Refills: 3 | Status: SHIPPED | OUTPATIENT
Start: 2025-02-03

## 2025-02-03 RX ORDER — SERTRALINE HYDROCHLORIDE 100 MG/1
100 TABLET, FILM COATED ORAL DAILY
Qty: 100 TABLET | Refills: 3 | Status: SHIPPED | OUTPATIENT
Start: 2025-02-03

## 2025-02-03 ASSESSMENT — ENCOUNTER SYMPTOMS
PALPITATIONS: 0
DIZZINESS: 0
NAUSEA: 0
FEVER: 0
WHEEZING: 0
WEIGHT LOSS: 0
HEADACHES: 0
CHILLS: 0
VOMITING: 0
SHORTNESS OF BREATH: 0

## 2025-02-03 ASSESSMENT — PATIENT HEALTH QUESTIONNAIRE - PHQ9
SUM OF ALL RESPONSES TO PHQ QUESTIONS 1-9: 23
CLINICAL INTERPRETATION OF PHQ2 SCORE: 6
5. POOR APPETITE OR OVEREATING: 3 - NEARLY EVERY DAY

## 2025-02-03 ASSESSMENT — FIBROSIS 4 INDEX: FIB4 SCORE: 1.14
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
## 2025-02-06 PROBLEM — G56.02 LEFT CARPAL TUNNEL SYNDROME: Status: ACTIVE | Noted: 2025-02-06

## 2025-02-10 ENCOUNTER — PATIENT OUTREACH (OUTPATIENT)
Dept: HEALTH INFORMATION MANAGEMENT | Facility: OTHER | Age: 75
End: 2025-02-10
Payer: MEDICARE

## 2025-02-10 DIAGNOSIS — G89.29 CHRONIC PAIN OF RIGHT KNEE: ICD-10-CM

## 2025-02-10 DIAGNOSIS — E78.2 MIXED HYPERLIPIDEMIA: ICD-10-CM

## 2025-02-10 DIAGNOSIS — F10.10 ALCOHOL ABUSE: ICD-10-CM

## 2025-02-10 DIAGNOSIS — F33.0 MILD EPISODE OF RECURRENT MAJOR DEPRESSIVE DISORDER (HCC): ICD-10-CM

## 2025-02-10 DIAGNOSIS — Z59.9 FINANCIAL DIFFICULTY: ICD-10-CM

## 2025-02-10 DIAGNOSIS — Z63.6 CAREGIVER BURDEN: ICD-10-CM

## 2025-02-10 DIAGNOSIS — M25.561 CHRONIC PAIN OF RIGHT KNEE: ICD-10-CM

## 2025-02-10 SDOH — ECONOMIC STABILITY - INCOME SECURITY: PROBLEM RELATED TO HOUSING AND ECONOMIC CIRCUMSTANCES, UNSPECIFIED: Z59.9

## 2025-02-10 SDOH — SOCIAL STABILITY - SOCIAL INSECURITY: DEPENDENT RELATIVE NEEDING CARE AT HOME: Z63.6

## 2025-02-10 NOTE — PROGRESS NOTES
"Reason for Follow-Up:  Monthly outreach     Current Health Status:  HTN, major depressive disorder, chronic pain, hyperlipidemia     Medical Updates:    PCP follow up on 2/3  Orthopedic follow 2/4-planning for carpel tunnel surgery     Medication Updates:    Sertraline increased to 100mg   Amlodipine decrease 5mg  Lisinopril increased 40mg     Symptoms:    Reports improvement in symptoms of depression     Plan of Care Goals and Progress:    Goal 1. Hypertension education      Progress:  Progressing, /78 in clinic at 2/3 PCP appointment       Barriers:  Age, progression of disease process, knowledge of Healthcare, caregiver for disable son and daughter, multiple family stressors, financial resource strain, transportation needs     Interventions:  Reviewed last PCP appointment      Education:  Discussed medication changes and monitoring blood pressure at home    Goal 2. Depression education      Progress:  Progressing, reports improvement in symptoms of depression      Barriers:  Age, progression of disease process, knowledge of Healthcare, caregiver for disable son and daughter, multiple family stressors, financial resource strain, transportation needs     Interventions:  Reviewed PCP appointment and increase in sertraline      Education:  Encouraged exercise and going to the park with her daughter       Patient's Concerns and Feedback (Self Management of Care):   Spoke with Fe for monthly outreach follow-up. She states: \"everything is a little bit better.\" Reviewed her PCP appointment on 2/3 and increase in sertraline dose to 100mg. She feels this is helping her depression. She continues to have multiple family stressors but she feel she is \"coping with the drama better than I used to.\" She reports getting some exercise and going out to the park with her daughter today. Her blood pressure in clinic on 2 3 was 128/78.  Reviewed her medication changes as well a decrease in amlodipine and an increase in " lisinopril she reports she has picked up both of these medications discussed monitoring her blood pressure at home.  Encouraged her to monitor her blood pressure and bring a log to her next appointment.  Discussed the importance in knowing if her medication is effective.  Reports she has a blood pressure cuff at home states she will attempt to monitor her blood pressure.  She discussed referrals to pain management for her back as well as plans with orthopedics for carpal tunnel surgery.  She states she is waiting for a phone call back from pain management encouraged her to advocate for herself and call if she is not her from the office.  She denies any other needs at this time.  Encouraged to call with any questions or concerns. Chart reviewed for Quarterly Assessment, patient continues to qualify and would benefit from PCM program.     Next Steps:     Follow-Up Plan: March 2025      Appointments:  3/17 PCP      Contact Information:  Call [phone] with any questions or concerns.

## 2025-02-10 NOTE — CARE PLAN
Problem: Patient expresses feelings of loneliness, losses associated with aging and/or depression or anxiety  Goal: Patient experiences a reduction in feelings of loneliness, grief, and sadness/long term   Outcome: Progressing  Intervention: Encourage establishing daily routine including hygiene tasks.  Intervention: Encourage 30 minutes of exercise     Problem: Knowledge deficit of self-monitoring blood pressure  Goal: Demonstrate the elements of self-monitoring blood pressure/long term 8/13/24-2/28/24  Outcome: Progressing  Note:   -Patient has verbalized commitment to self-monitoring blood pressure at least once a week.    -Patient will log home blood pressures and bring the log to medical provider appointments.    Intervention: Educate on importance of self-monitoring blood pressure  Intervention: Educate on proper technique of self-monitoring blood pressure  Intervention: Educate on importance of keeping a home blood pressure log  Intervention: Educate on bringing log to medical provider appointments     Problem: Knowledge deficit of self-monitoring blood pressure  Goal: Demonstrate the elements of self-monitoring blood pressure/long term 8/13/24-2/28/24  Outcome: Progressing  Note:   -Patient has verbalized commitment to self-monitoring blood pressure at least once a week.    -Patient will log home blood pressures and bring the log to medical provider appointments.    Intervention: Educate on importance of self-monitoring blood pressure  Intervention: Educate on proper technique of self-monitoring blood pressure  Intervention: Educate on importance of keeping a home blood pressure log  Intervention: Educate on bringing log to medical provider appointments

## 2025-02-17 ENCOUNTER — PATIENT MESSAGE (OUTPATIENT)
Dept: HEALTH INFORMATION MANAGEMENT | Facility: OTHER | Age: 75
End: 2025-02-17

## 2025-02-22 ENCOUNTER — HOSPITAL ENCOUNTER (EMERGENCY)
Facility: MEDICAL CENTER | Age: 75
End: 2025-02-22
Attending: EMERGENCY MEDICINE
Payer: MEDICARE

## 2025-02-22 ENCOUNTER — APPOINTMENT (OUTPATIENT)
Dept: RADIOLOGY | Facility: MEDICAL CENTER | Age: 75
End: 2025-02-22
Attending: EMERGENCY MEDICINE
Payer: MEDICARE

## 2025-02-22 VITALS
SYSTOLIC BLOOD PRESSURE: 181 MMHG | HEIGHT: 65 IN | WEIGHT: 175 LBS | OXYGEN SATURATION: 92 % | RESPIRATION RATE: 18 BRPM | BODY MASS INDEX: 29.16 KG/M2 | TEMPERATURE: 97.3 F | DIASTOLIC BLOOD PRESSURE: 78 MMHG | HEART RATE: 65 BPM

## 2025-02-22 DIAGNOSIS — J44.1 CHRONIC OBSTRUCTIVE PULMONARY DISEASE WITH ACUTE EXACERBATION (HCC): ICD-10-CM

## 2025-02-22 DIAGNOSIS — R05.1 ACUTE COUGH: ICD-10-CM

## 2025-02-22 LAB
ALBUMIN SERPL BCP-MCNC: 4.1 G/DL (ref 3.2–4.9)
ALBUMIN/GLOB SERPL: 1.6 G/DL
ALP SERPL-CCNC: 87 U/L (ref 30–99)
ALT SERPL-CCNC: 21 U/L (ref 2–50)
ANION GAP SERPL CALC-SCNC: 14 MMOL/L (ref 7–16)
AST SERPL-CCNC: 22 U/L (ref 12–45)
BASOPHILS # BLD AUTO: 0.4 % (ref 0–1.8)
BASOPHILS # BLD: 0.03 K/UL (ref 0–0.12)
BILIRUB SERPL-MCNC: 0.3 MG/DL (ref 0.1–1.5)
BUN SERPL-MCNC: 13 MG/DL (ref 8–22)
CALCIUM ALBUM COR SERPL-MCNC: 9.3 MG/DL (ref 8.5–10.5)
CALCIUM SERPL-MCNC: 9.4 MG/DL (ref 8.5–10.5)
CHLORIDE SERPL-SCNC: 105 MMOL/L (ref 96–112)
CO2 SERPL-SCNC: 22 MMOL/L (ref 20–33)
CREAT SERPL-MCNC: 0.82 MG/DL (ref 0.5–1.4)
EKG IMPRESSION: NORMAL
EOSINOPHIL # BLD AUTO: 0.29 K/UL (ref 0–0.51)
EOSINOPHIL NFR BLD: 3.5 % (ref 0–6.9)
ERYTHROCYTE [DISTWIDTH] IN BLOOD BY AUTOMATED COUNT: 47.4 FL (ref 35.9–50)
FLUAV RNA SPEC QL NAA+PROBE: NEGATIVE
FLUBV RNA SPEC QL NAA+PROBE: NEGATIVE
GFR SERPLBLD CREATININE-BSD FMLA CKD-EPI: 74 ML/MIN/1.73 M 2
GLOBULIN SER CALC-MCNC: 2.5 G/DL (ref 1.9–3.5)
GLUCOSE SERPL-MCNC: 118 MG/DL (ref 65–99)
HCT VFR BLD AUTO: 44.9 % (ref 37–47)
HGB BLD-MCNC: 14.4 G/DL (ref 12–16)
IMM GRANULOCYTES # BLD AUTO: 0.02 K/UL (ref 0–0.11)
IMM GRANULOCYTES NFR BLD AUTO: 0.2 % (ref 0–0.9)
LYMPHOCYTES # BLD AUTO: 1.57 K/UL (ref 1–4.8)
LYMPHOCYTES NFR BLD: 18.8 % (ref 22–41)
MCH RBC QN AUTO: 32.2 PG (ref 27–33)
MCHC RBC AUTO-ENTMCNC: 32.1 G/DL (ref 32.2–35.5)
MCV RBC AUTO: 100.4 FL (ref 81.4–97.8)
MONOCYTES # BLD AUTO: 0.68 K/UL (ref 0–0.85)
MONOCYTES NFR BLD AUTO: 8.1 % (ref 0–13.4)
NEUTROPHILS # BLD AUTO: 5.78 K/UL (ref 1.82–7.42)
NEUTROPHILS NFR BLD: 69 % (ref 44–72)
NRBC # BLD AUTO: 0 K/UL
NRBC BLD-RTO: 0 /100 WBC (ref 0–0.2)
NT-PROBNP SERPL IA-MCNC: 146 PG/ML (ref 0–125)
PLATELET # BLD AUTO: 311 K/UL (ref 164–446)
PMV BLD AUTO: 9.3 FL (ref 9–12.9)
POTASSIUM SERPL-SCNC: 4 MMOL/L (ref 3.6–5.5)
PROT SERPL-MCNC: 6.6 G/DL (ref 6–8.2)
RBC # BLD AUTO: 4.47 M/UL (ref 4.2–5.4)
RSV RNA SPEC QL NAA+PROBE: NEGATIVE
SARS-COV-2 RNA RESP QL NAA+PROBE: NOTDETECTED
SODIUM SERPL-SCNC: 141 MMOL/L (ref 135–145)
TROPONIN T SERPL-MCNC: 10 NG/L (ref 6–19)
WBC # BLD AUTO: 8.4 K/UL (ref 4.8–10.8)

## 2025-02-22 PROCEDURE — 84484 ASSAY OF TROPONIN QUANT: CPT

## 2025-02-22 PROCEDURE — 71045 X-RAY EXAM CHEST 1 VIEW: CPT

## 2025-02-22 PROCEDURE — 85025 COMPLETE CBC W/AUTO DIFF WBC: CPT

## 2025-02-22 PROCEDURE — 93005 ELECTROCARDIOGRAM TRACING: CPT | Mod: TC | Performed by: EMERGENCY MEDICINE

## 2025-02-22 PROCEDURE — 99284 EMERGENCY DEPT VISIT MOD MDM: CPT

## 2025-02-22 PROCEDURE — 94640 AIRWAY INHALATION TREATMENT: CPT

## 2025-02-22 PROCEDURE — 83880 ASSAY OF NATRIURETIC PEPTIDE: CPT

## 2025-02-22 PROCEDURE — 93005 ELECTROCARDIOGRAM TRACING: CPT | Mod: TC

## 2025-02-22 PROCEDURE — 80053 COMPREHEN METABOLIC PANEL: CPT

## 2025-02-22 PROCEDURE — 700101 HCHG RX REV CODE 250: Performed by: EMERGENCY MEDICINE

## 2025-02-22 PROCEDURE — 36415 COLL VENOUS BLD VENIPUNCTURE: CPT

## 2025-02-22 PROCEDURE — 0241U HCHG SARS-COV-2 COVID-19 NFCT DS RESP RNA 4 TRGT ED POC: CPT

## 2025-02-22 RX ORDER — IPRATROPIUM BROMIDE AND ALBUTEROL SULFATE 2.5; .5 MG/3ML; MG/3ML
3 SOLUTION RESPIRATORY (INHALATION)
Status: DISCONTINUED | OUTPATIENT
Start: 2025-02-22 | End: 2025-02-22 | Stop reason: HOSPADM

## 2025-02-22 RX ORDER — PREDNISONE 20 MG/1
40 TABLET ORAL DAILY
Qty: 10 TABLET | Refills: 0 | Status: SHIPPED | OUTPATIENT
Start: 2025-02-22 | End: 2025-02-27

## 2025-02-22 RX ADMIN — IPRATROPIUM BROMIDE AND ALBUTEROL SULFATE 3 ML: .5; 2.5 SOLUTION RESPIRATORY (INHALATION) at 09:23

## 2025-02-22 ASSESSMENT — FIBROSIS 4 INDEX: FIB4 SCORE: 1.14

## 2025-02-22 NOTE — ED NOTES
Bedside report received from MANDO Kraft.   Fall risk precautions in place. Call bell in reach.  Pt on room air, all lines traced.     POC discussed with pt.

## 2025-02-22 NOTE — DISCHARGE PLANNING
"TCN following. HTH/SCP chart review completed. Note that pt has dc'd from [facility] ED at time of writing this note. MD recommended follow up with PCP for \"re-check\". Note that pt already has health assessment scheduled for 2/24 as well as PCP visit for 3/17, though TCN will reach out to schedulers to inquire if pt able to have transitional care PCP visit earlier for follow up. No additional TCN needs noted at this time.       "

## 2025-02-22 NOTE — ED NOTES
Bedside report to Lynnette GILMORE. Pt GCS 15 a/o x 4. Pt on HR, Oxygen, BP, and cardiac monitor. Pt bed locked in lowest position side rail up x 2 call light within reach.

## 2025-02-22 NOTE — ED PROVIDER NOTES
ED PHYSICIAN NOTE    CHIEF COMPLAINT  Chief Complaint   Patient presents with    Chest Pain    Shortness of Breath    Cough       EXTERNAL RECORDS REVIEWED  Patient with history of COPD.    HPI/ROS      Fe Clark is a 74 y.o. female who presents with cough and breathlessness.  Patient started getting sick with sore throat 4 days ago.  Yesterday started coughing.  Dry cough nonproductive.  She has felt like she might have a fever but is not sure.  She has not had nausea vomiting diarrhea abdominal pain.  When she coughs she feels some tightness in her chest otherwise no chest pain.  She has a history of COPD she tried using her inhalers without relief.    PAST MEDICAL HISTORY  Past Medical History:   Diagnosis Date    ASTHMA     COPD (chronic obstructive pulmonary disease) (HCC)     Diverticulosis     Duodenal ulcer, unspecified as acute or chronic, without hemorrhage, perforation, or obstruction     GERD (gastroesophageal reflux disease)     Hiatal hernia     High cholesterol     Hypertension     Psychiatric problem        SOCIAL HISTORY  Social History     Tobacco Use    Smoking status: Never    Smokeless tobacco: Never   Vaping Use    Vaping status: Never Used   Substance Use Topics    Alcohol use: Not Currently    Drug use: Not Currently       CURRENT MEDICATIONS  Home Medications       Reviewed by Mariah Guo R.N. (Registered Nurse) on 02/22/25 at 0806  Med List Status: Not Addressed     Medication Last Dose Status   acetaminophen (TYLENOL) 325 MG Tab  Active   albuterol 108 (90 Base) MCG/ACT Aero Soln inhalation aerosol  Active   albuterol 108 (90 Base) MCG/ACT Aero Soln inhalation aerosol  Active   amLODIPine (NORVASC) 5 MG Tab  Active   atorvastatin (LIPITOR) 80 MG tablet  Active   celecoxib (CELEBREX) 100 MG Cap  Active   fexofenadine (ALLEGRA) 60 MG Tab  Active   lisinopril (PRINIVIL) 20 MG Tab  Active   moxifloxacin (VIGAMOX) 0.5 % Solution  Active   omeprazole (PRILOSEC) 20 MG  "delayed-release capsule  Active   prednisoLONE acetate (PRED FORTE) 1 % Suspension  Active   sertraline (ZOLOFT) 100 MG Tab  Active                  Audit from Redirected Encounters    **Home medications have not yet been reviewed for this encounter**         ALLERGIES  Allergies   Allergen Reactions    Nkda [No Known Drug Allergy]        PHYSICAL EXAM  VITAL SIGNS: BP (!) 181/78   Pulse 65   Temp 36.3 °C (97.3 °F) (Temporal)   Resp 18   Ht 1.651 m (5' 5\")   Wt 79.4 kg (175 lb)   SpO2 92%   BMI 29.12 kg/m²    Constitutional: Awake and alert  HENT: Parents normal  Eyes: Normal inspection  Neck: Grossly normal range of motion.  Cardiovascular: Normal heart rate, Normal rhythm.  Symmetric peripheral pulses.   Thorax & Lungs: Mild dry cough.  No focal crackles or rhonchi.  Decreased breath sounds throughout.  Abdomen: Bowel sounds normal, soft, non-distended, nontender, no mass  Skin: No rash.  Back: No tenderness, No CVA tenderness.   Extremities: No clubbing, cyanosis, edema, no Homans or cords.  Neurologic: Grossly normal   Psychiatric: Normal for situation     DIAGNOSTIC STUDIES / PROCEDURES  LABS/EKG  Results for orders placed or performed during the hospital encounter of 02/22/25   POC CoV-2, FLU A/B, RSV by PCR    Collection Time: 02/22/25  8:15 AM   Result Value Ref Range    POC Influenza A RNA, PCR Negative Negative    POC Influenza B RNA, PCR Negative Negative    POC RSV, by PCR Negative Negative    POC SARS-CoV-2, PCR NotDetected NotDetected   CBC with Differential    Collection Time: 02/22/25  8:26 AM   Result Value Ref Range    WBC 8.4 4.8 - 10.8 K/uL    RBC 4.47 4.20 - 5.40 M/uL    Hemoglobin 14.4 12.0 - 16.0 g/dL    Hematocrit 44.9 37.0 - 47.0 %    .4 (H) 81.4 - 97.8 fL    MCH 32.2 27.0 - 33.0 pg    MCHC 32.1 (L) 32.2 - 35.5 g/dL    RDW 47.4 35.9 - 50.0 fL    Platelet Count 311 164 - 446 K/uL    MPV 9.3 9.0 - 12.9 fL    Neutrophils-Polys 69.00 44.00 - 72.00 %    Lymphocytes 18.80 (L) 22.00 - " 41.00 %    Monocytes 8.10 0.00 - 13.40 %    Eosinophils 3.50 0.00 - 6.90 %    Basophils 0.40 0.00 - 1.80 %    Immature Granulocytes 0.20 0.00 - 0.90 %    Nucleated RBC 0.00 0.00 - 0.20 /100 WBC    Neutrophils (Absolute) 5.78 1.82 - 7.42 K/uL    Lymphs (Absolute) 1.57 1.00 - 4.80 K/uL    Monos (Absolute) 0.68 0.00 - 0.85 K/uL    Eos (Absolute) 0.29 0.00 - 0.51 K/uL    Baso (Absolute) 0.03 0.00 - 0.12 K/uL    Immature Granulocytes (abs) 0.02 0.00 - 0.11 K/uL    NRBC (Absolute) 0.00 K/uL   Complete Metabolic Panel (CMP)    Collection Time: 25  8:26 AM   Result Value Ref Range    Sodium 141 135 - 145 mmol/L    Potassium 4.0 3.6 - 5.5 mmol/L    Chloride 105 96 - 112 mmol/L    Co2 22 20 - 33 mmol/L    Anion Gap 14.0 7.0 - 16.0    Glucose 118 (H) 65 - 99 mg/dL    Bun 13 8 - 22 mg/dL    Creatinine 0.82 0.50 - 1.40 mg/dL    Calcium 9.4 8.5 - 10.5 mg/dL    Correct Calcium 9.3 8.5 - 10.5 mg/dL    AST(SGOT) 22 12 - 45 U/L    ALT(SGPT) 21 2 - 50 U/L    Alkaline Phosphatase 87 30 - 99 U/L    Total Bilirubin 0.3 0.1 - 1.5 mg/dL    Albumin 4.1 3.2 - 4.9 g/dL    Total Protein 6.6 6.0 - 8.2 g/dL    Globulin 2.5 1.9 - 3.5 g/dL    A-G Ratio 1.6 g/dL   proBrain Natriuretic Peptide, NT (BNP)    Collection Time: 25  8:26 AM   Result Value Ref Range    NT-proBNP 146 (H) 0 - 125 pg/mL   Troponins NOW    Collection Time: 25  8:26 AM   Result Value Ref Range    Troponin T 10 6 - 19 ng/L   ESTIMATED GFR    Collection Time: 25  8:26 AM   Result Value Ref Range    GFR (CKD-EPI) 74 >60 mL/min/1.73 m 2   EKG    Collection Time: 25 10:39 AM   Result Value Ref Range    Report       [facility] Emergency Dept.    Test Date:  2025  Pt Name:    DOROTA AGUIRRE             Department: ER  MRN:        [id number]                      Room:  Gender:     Female                       Technician: [id number]  :        [id number]                   Requested By:ER TRIAGE PROTOCOL  Order #:    [id number]                     Reading MD: DUANE CARY MD    Measurements  Intervals                                Axis  Rate:       67                           P:          -33  NC:         146                          QRS:        -28  QRSD:       90                           T:          41  QT:         396  QTc:        418    Interpretive Statements  Sinus rhythm  Left ventricular hypertrophy  Anterior Q waves, possibly due to LVH  Compared to ECG 01/16/2025 11:18:17  Left ventricular hypertrophy now present  Q waves now present  Sinus bradycardia no longer present  Electronically Signed On 02- 10:39:23 PST by DUANE CARY MD        I have independently interpreted this EKG as documented above    Rhythm strip interpretation-sinus rhythm    RADIOLOGY  I have independently interpreted the diagnostic imaging associated with this visit and am waiting the final reading from the radiologist.   My preliminary interpretation is as follows: Chest x-ray without infiltrate    COURSE & MEDICAL DECISION MAKING    INITIAL ASSESSMENT, COURSE AND PLAN  Case narrative: Patient presents with shortness of breath and cough.  Has viral symptoms.  Her vital signs are normal.  She is not hypoxic.  She does have a history of COPD with diminished breath sounds bilaterally.  Ordered DuoNeb.  Will obtain laboratory data.    Data returned reassuring.  Chest x-ray is without infiltrate.  No detectable viral illness although I suspect lateral process that has caused COPD exacerbation.  Patient felt improved following DuoNeb.  Patient has remained stable throughout ER encounter.  She is appropriate for outpatient management.  I have given a prescription for 5-day pulse of prednisone.  She has albuterol at home to take.  She will return to the ER for worsening, not improving, high fevers or concern.  Would like patient to follow-up with primary doctor as soon as possible for recheck.          Interventions  Medications   ipratropium-albuterol  (DUONEB) nebulizer solution (3 mL Nebulization Given 2/22/25 0923)           DISPOSITION AND DISCUSSIONS  I  Escalation of care considered, and ultimately not performed:acute inpatient care management, however at this time, the patient is most appropriate for outpatient management    Prescription drugs considered and/or prescribed:     Prescribed   New Prescriptions    PREDNISONE (DELTASONE) 20 MG TAB    Take 2 Tablets by mouth every day for 5 days.     Follow up  Luis Leos M.D.  [address] [place] [id number]  [phone]    Schedule an appointment as soon as possible for a visit       FINAL IMPRESSION  1.  COPD with acute exacerbation  2.  Viral upper respiratory infection    This dictation was created using voice recognition software. The accuracy of the dictation is limited to the abilities of the software. I expect there may be some errors of grammar and possibly content. The nursing notes were reviewed and certain aspects of this information were incorporated into this note.    Electronically signed by: Thanh Moody M.D., 2/22/2025

## 2025-02-22 NOTE — ED NOTES
Chief Complaint   Patient presents with    Chest Pain    Shortness of Breath    Cough     Pt wheeled to triage with above complaints , started cold like symptoms 3days ago but woke up this morning with worsening sob.   Swab collected,EKG completed

## 2025-02-22 NOTE — ED NOTES
Discharge paperwork and instructions handed to patient and reviewed. Pt understands material and instructions. PT acknowledges and understands when to come back to ER for worsening/persistent S/S. Pt denies having any further questions. All belongings present with patient. D/C from monitors. Pt ambulatory to exit.

## 2025-02-24 ENCOUNTER — OFFICE VISIT (OUTPATIENT)
Dept: MEDICAL GROUP | Facility: MEDICAL CENTER | Age: 75
End: 2025-02-24
Attending: FAMILY MEDICINE
Payer: MEDICARE

## 2025-02-24 VITALS
HEART RATE: 62 BPM | HEIGHT: 65 IN | DIASTOLIC BLOOD PRESSURE: 86 MMHG | SYSTOLIC BLOOD PRESSURE: 137 MMHG | OXYGEN SATURATION: 97 % | WEIGHT: 172 LBS | BODY MASS INDEX: 28.66 KG/M2

## 2025-02-24 DIAGNOSIS — J41.0 SIMPLE CHRONIC BRONCHITIS (HCC): ICD-10-CM

## 2025-02-24 DIAGNOSIS — F32.2 SEVERE MAJOR DEPRESSION (HCC): ICD-10-CM

## 2025-02-24 DIAGNOSIS — I25.10 CORONARY ARTERY DISEASE INVOLVING NATIVE CORONARY ARTERY OF NATIVE HEART WITHOUT ANGINA PECTORIS: ICD-10-CM

## 2025-02-24 DIAGNOSIS — E66.3 OVERWEIGHT (BMI 25.0-29.9): ICD-10-CM

## 2025-02-24 DIAGNOSIS — E04.2 MULTINODULAR GOITER: ICD-10-CM

## 2025-02-24 DIAGNOSIS — K21.9 GASTROESOPHAGEAL REFLUX DISEASE, UNSPECIFIED WHETHER ESOPHAGITIS PRESENT: ICD-10-CM

## 2025-02-24 DIAGNOSIS — I10 PRIMARY HYPERTENSION: ICD-10-CM

## 2025-02-24 DIAGNOSIS — F10.20 UNCOMPLICATED ALCOHOL DEPENDENCE (HCC): ICD-10-CM

## 2025-02-24 DIAGNOSIS — M81.0 AGE RELATED OSTEOPOROSIS, UNSPECIFIED PATHOLOGICAL FRACTURE PRESENCE: ICD-10-CM

## 2025-02-24 PROCEDURE — G0439 PPPS, SUBSEQ VISIT: HCPCS | Performed by: NURSE PRACTITIONER

## 2025-02-24 PROCEDURE — 3075F SYST BP GE 130 - 139MM HG: CPT | Performed by: NURSE PRACTITIONER

## 2025-02-24 PROCEDURE — 1125F AMNT PAIN NOTED PAIN PRSNT: CPT | Performed by: NURSE PRACTITIONER

## 2025-02-24 PROCEDURE — 3079F DIAST BP 80-89 MM HG: CPT | Performed by: NURSE PRACTITIONER

## 2025-02-24 SDOH — ECONOMIC STABILITY: FOOD INSECURITY: HOW HARD IS IT FOR YOU TO PAY FOR THE VERY BASICS LIKE FOOD, HOUSING, MEDICAL CARE, AND HEATING?: SOMEWHAT HARD

## 2025-02-24 SDOH — ECONOMIC STABILITY: FOOD INSECURITY: WITHIN THE PAST 12 MONTHS, THE FOOD YOU BOUGHT JUST DIDN'T LAST AND YOU DIDN'T HAVE MONEY TO GET MORE.: OFTEN TRUE

## 2025-02-24 SDOH — ECONOMIC STABILITY: FOOD INSECURITY: WITHIN THE PAST 12 MONTHS, YOU WORRIED THAT YOUR FOOD WOULD RUN OUT BEFORE YOU GOT THE MONEY TO BUY MORE.: OFTEN TRUE

## 2025-02-24 SDOH — ECONOMIC STABILITY: TRANSPORTATION INSECURITY: IN THE PAST 12 MONTHS, HAS LACK OF TRANSPORTATION KEPT YOU FROM MEDICAL APPOINTMENTS OR FROM GETTING MEDICATIONS?: NO

## 2025-02-24 ASSESSMENT — FIBROSIS 4 INDEX: FIB4 SCORE: 1.16

## 2025-02-24 ASSESSMENT — ACTIVITIES OF DAILY LIVING (ADL)
BATHING_REQUIRES_ASSISTANCE: 0
LACK_OF_TRANSPORTATION: NO

## 2025-02-24 ASSESSMENT — ENCOUNTER SYMPTOMS: GENERAL WELL-BEING: GOOD

## 2025-02-24 ASSESSMENT — PATIENT HEALTH QUESTIONNAIRE - PHQ9
CLINICAL INTERPRETATION OF PHQ2 SCORE: 6
5. POOR APPETITE OR OVEREATING: 3 - NEARLY EVERY DAY

## 2025-02-24 ASSESSMENT — PAIN SCALES - GENERAL: PAINLEVEL_OUTOF10: 6=MODERATE PAIN
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
## 2025-02-24 NOTE — ASSESSMENT & PLAN NOTE
Stable Records review CT CTA arterial / pulmonary artery  (3/23/24) reports bilateral thyroid nodules  TSH 2.410 (8/28/24) Patient reports no surgical intervention planned at this time. Observation only. Cont f/u with PCP for ongoing monitoring
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
## 2025-02-24 NOTE — ASSESSMENT & PLAN NOTE
Stable. Patient taking lisinopril/ amlodipine.   BP today 137/86.   HR RRR  BLE edema present +2 L > R  Cont f/u with PCP for ongoing monitoring and medication management

## 2025-02-24 NOTE — ASSESSMENT & PLAN NOTE
Stable. Records review BMD (6/15/23) reports T - 3.8/ Z - 1.5 ( L spine) ; T - 3.1 / Z - 1.1 ( L hip) Patient reports taking MVI. Consider weight bearing exercise. Cont f/u with PCP for ongoing monitoring and discussion

## 2025-02-24 NOTE — ASSESSMENT & PLAN NOTE
Stable. Recent ER visit (2/22/25) COPD exacerbation. Current Rx prednisone 20 mg x 5 days. / albuterol. Patient using albuterol BID. Sats today 97 % on RA. Lungs CTAB bilat. Slightly diminished. Patient exposed to second hand smoke from  x 60 years. Cont f/u with PCP for ongoing monitoring and ,management

## 2025-02-24 NOTE — ASSESSMENT & PLAN NOTE
Stable. BMI 28.62. consider heart healthy diet and regular exercise. Cont f/u with PCP for ongoing monitoring and discussion

## 2025-02-24 NOTE — ASSESSMENT & PLAN NOTE
Stable. Records review CT CTA chest pum artery (3/23/24) reports coronary artery calcifications. Cont f/u with PCP for ongoing monitoring

## 2025-02-24 NOTE — ASSESSMENT & PLAN NOTE
Stable on  omeprazole. Symptoms improved with rx. Cont f/u with PCP for ongoing monitoring and medication management

## 2025-02-24 NOTE — ASSESSMENT & PLAN NOTE
"Stable. Patient reports she is an \" alcoholic\". Has not consumed alcohol in 6 months. Reports unable to afford alcohol. Continue abstinence. Reports parents were alcoholics. Cont f/u with PCP for ongoing discussion as needed.   "

## 2025-03-07 ENCOUNTER — APPOINTMENT (OUTPATIENT)
Dept: RADIOLOGY | Facility: MEDICAL CENTER | Age: 75
End: 2025-03-07
Attending: EMERGENCY MEDICINE
Payer: MEDICARE

## 2025-03-07 ENCOUNTER — HOSPITAL ENCOUNTER (OUTPATIENT)
Facility: MEDICAL CENTER | Age: 75
End: 2025-03-08
Attending: EMERGENCY MEDICINE | Admitting: INTERNAL MEDICINE
Payer: MEDICARE

## 2025-03-07 DIAGNOSIS — R60.0 LEG EDEMA, LEFT: ICD-10-CM

## 2025-03-07 DIAGNOSIS — R07.9 ACUTE CHEST PAIN: ICD-10-CM

## 2025-03-07 DIAGNOSIS — R00.2 PALPITATIONS: ICD-10-CM

## 2025-03-07 PROBLEM — Z71.89 ACP (ADVANCE CARE PLANNING): Status: ACTIVE | Noted: 2025-03-07

## 2025-03-07 PROBLEM — F43.10 PTSD (POST-TRAUMATIC STRESS DISORDER): Status: ACTIVE | Noted: 2025-03-07

## 2025-03-07 LAB
ALBUMIN SERPL BCP-MCNC: 4 G/DL (ref 3.2–4.9)
ALBUMIN/GLOB SERPL: 1.7 G/DL
ALP SERPL-CCNC: 78 U/L (ref 30–99)
ALT SERPL-CCNC: 23 U/L (ref 2–50)
ANION GAP SERPL CALC-SCNC: 10 MMOL/L (ref 7–16)
AST SERPL-CCNC: 24 U/L (ref 12–45)
BASOPHILS # BLD AUTO: 0.4 % (ref 0–1.8)
BASOPHILS # BLD: 0.03 K/UL (ref 0–0.12)
BILIRUB SERPL-MCNC: 0.5 MG/DL (ref 0.1–1.5)
BUN SERPL-MCNC: 19 MG/DL (ref 8–22)
CALCIUM ALBUM COR SERPL-MCNC: 9.8 MG/DL (ref 8.5–10.5)
CALCIUM SERPL-MCNC: 9.8 MG/DL (ref 8.5–10.5)
CHLORIDE SERPL-SCNC: 107 MMOL/L (ref 96–112)
CO2 SERPL-SCNC: 22 MMOL/L (ref 20–33)
CREAT SERPL-MCNC: 0.95 MG/DL (ref 0.5–1.4)
EKG IMPRESSION: NORMAL
EOSINOPHIL # BLD AUTO: 0.25 K/UL (ref 0–0.51)
EOSINOPHIL NFR BLD: 3.4 % (ref 0–6.9)
ERYTHROCYTE [DISTWIDTH] IN BLOOD BY AUTOMATED COUNT: 45.5 FL (ref 35.9–50)
GFR SERPLBLD CREATININE-BSD FMLA CKD-EPI: 62 ML/MIN/1.73 M 2
GLOBULIN SER CALC-MCNC: 2.4 G/DL (ref 1.9–3.5)
GLUCOSE SERPL-MCNC: 115 MG/DL (ref 65–99)
HCT VFR BLD AUTO: 40.2 % (ref 37–47)
HGB BLD-MCNC: 13.4 G/DL (ref 12–16)
IMM GRANULOCYTES # BLD AUTO: 0.03 K/UL (ref 0–0.11)
IMM GRANULOCYTES NFR BLD AUTO: 0.4 % (ref 0–0.9)
LYMPHOCYTES # BLD AUTO: 1.54 K/UL (ref 1–4.8)
LYMPHOCYTES NFR BLD: 21.2 % (ref 22–41)
MCH RBC QN AUTO: 32.1 PG (ref 27–33)
MCHC RBC AUTO-ENTMCNC: 33.3 G/DL (ref 32.2–35.5)
MCV RBC AUTO: 96.4 FL (ref 81.4–97.8)
MONOCYTES # BLD AUTO: 0.66 K/UL (ref 0–0.85)
MONOCYTES NFR BLD AUTO: 9.1 % (ref 0–13.4)
NEUTROPHILS # BLD AUTO: 4.75 K/UL (ref 1.82–7.42)
NEUTROPHILS NFR BLD: 65.5 % (ref 44–72)
NRBC # BLD AUTO: 0 K/UL
NRBC BLD-RTO: 0 /100 WBC (ref 0–0.2)
NT-PROBNP SERPL IA-MCNC: 154 PG/ML (ref 0–125)
PLATELET # BLD AUTO: 294 K/UL (ref 164–446)
PMV BLD AUTO: 9.1 FL (ref 9–12.9)
POTASSIUM SERPL-SCNC: 4 MMOL/L (ref 3.6–5.5)
PROT SERPL-MCNC: 6.4 G/DL (ref 6–8.2)
RBC # BLD AUTO: 4.17 M/UL (ref 4.2–5.4)
SODIUM SERPL-SCNC: 139 MMOL/L (ref 135–145)
TROPONIN T SERPL-MCNC: 11 NG/L (ref 6–19)
TSH SERPL DL<=0.005 MIU/L-ACNC: 2.53 UIU/ML (ref 0.38–5.33)
WBC # BLD AUTO: 7.3 K/UL (ref 4.8–10.8)

## 2025-03-07 PROCEDURE — A9270 NON-COVERED ITEM OR SERVICE: HCPCS | Performed by: INTERNAL MEDICINE

## 2025-03-07 PROCEDURE — 700102 HCHG RX REV CODE 250 W/ 637 OVERRIDE(OP)

## 2025-03-07 PROCEDURE — 700111 HCHG RX REV CODE 636 W/ 250 OVERRIDE (IP): Mod: JZ | Performed by: INTERNAL MEDICINE

## 2025-03-07 PROCEDURE — 71045 X-RAY EXAM CHEST 1 VIEW: CPT

## 2025-03-07 PROCEDURE — 80053 COMPREHEN METABOLIC PANEL: CPT

## 2025-03-07 PROCEDURE — 94669 MECHANICAL CHEST WALL OSCILL: CPT

## 2025-03-07 PROCEDURE — 93005 ELECTROCARDIOGRAM TRACING: CPT | Mod: TC

## 2025-03-07 PROCEDURE — 99497 ADVNCD CARE PLAN 30 MIN: CPT | Mod: 25 | Performed by: INTERNAL MEDICINE

## 2025-03-07 PROCEDURE — 96372 THER/PROPH/DIAG INJ SC/IM: CPT

## 2025-03-07 PROCEDURE — 84484 ASSAY OF TROPONIN QUANT: CPT

## 2025-03-07 PROCEDURE — 93971 EXTREMITY STUDY: CPT | Mod: LT

## 2025-03-07 PROCEDURE — 36415 COLL VENOUS BLD VENIPUNCTURE: CPT

## 2025-03-07 PROCEDURE — 83880 ASSAY OF NATRIURETIC PEPTIDE: CPT

## 2025-03-07 PROCEDURE — 94664 DEMO&/EVAL PT USE INHALER: CPT

## 2025-03-07 PROCEDURE — 93005 ELECTROCARDIOGRAM TRACING: CPT | Mod: TC | Performed by: EMERGENCY MEDICINE

## 2025-03-07 PROCEDURE — 700102 HCHG RX REV CODE 250 W/ 637 OVERRIDE(OP): Performed by: INTERNAL MEDICINE

## 2025-03-07 PROCEDURE — 85025 COMPLETE CBC W/AUTO DIFF WBC: CPT

## 2025-03-07 PROCEDURE — 99222 1ST HOSP IP/OBS MODERATE 55: CPT | Performed by: INTERNAL MEDICINE

## 2025-03-07 PROCEDURE — 93005 ELECTROCARDIOGRAM TRACING: CPT | Mod: TC | Performed by: INTERNAL MEDICINE

## 2025-03-07 PROCEDURE — 93971 EXTREMITY STUDY: CPT | Mod: 26,LT | Performed by: INTERNAL MEDICINE

## 2025-03-07 PROCEDURE — 99285 EMERGENCY DEPT VISIT HI MDM: CPT

## 2025-03-07 PROCEDURE — 84443 ASSAY THYROID STIM HORMONE: CPT

## 2025-03-07 PROCEDURE — G0378 HOSPITAL OBSERVATION PER HR: HCPCS

## 2025-03-07 PROCEDURE — A9270 NON-COVERED ITEM OR SERVICE: HCPCS

## 2025-03-07 RX ORDER — ENOXAPARIN SODIUM 100 MG/ML
40 INJECTION SUBCUTANEOUS DAILY
Status: DISCONTINUED | OUTPATIENT
Start: 2025-03-07 | End: 2025-03-08 | Stop reason: HOSPADM

## 2025-03-07 RX ORDER — PHENOL 1.4 %
1 AEROSOL, SPRAY (ML) MUCOUS MEMBRANE 2 TIMES DAILY
COMMUNITY

## 2025-03-07 RX ORDER — CELECOXIB 100 MG/1
100 CAPSULE ORAL DAILY
Status: DISCONTINUED | OUTPATIENT
Start: 2025-03-08 | End: 2025-03-08 | Stop reason: HOSPADM

## 2025-03-07 RX ORDER — AMINOPHYLLINE 25 MG/ML
100 INJECTION, SOLUTION INTRAVENOUS
Status: DISCONTINUED | OUTPATIENT
Start: 2025-03-07 | End: 2025-03-08 | Stop reason: HOSPADM

## 2025-03-07 RX ORDER — PREDNISONE 20 MG/1
40 TABLET ORAL DAILY
Status: ON HOLD | COMMUNITY
Start: 2025-02-22 | End: 2025-03-08

## 2025-03-07 RX ORDER — ALBUTEROL SULFATE 90 UG/1
2 INHALANT RESPIRATORY (INHALATION) EVERY 4 HOURS PRN
Status: DISCONTINUED | OUTPATIENT
Start: 2025-03-07 | End: 2025-03-08 | Stop reason: HOSPADM

## 2025-03-07 RX ORDER — OMEPRAZOLE 20 MG/1
20 CAPSULE, DELAYED RELEASE ORAL DAILY
COMMUNITY

## 2025-03-07 RX ORDER — LISINOPRIL 20 MG/1
40 TABLET ORAL DAILY
Status: DISCONTINUED | OUTPATIENT
Start: 2025-03-08 | End: 2025-03-08 | Stop reason: HOSPADM

## 2025-03-07 RX ORDER — ASPIRIN 325 MG
325 TABLET ORAL ONCE
Status: COMPLETED | OUTPATIENT
Start: 2025-03-07 | End: 2025-03-07

## 2025-03-07 RX ORDER — ASPIRIN 81 MG/1
81 TABLET ORAL DAILY
Status: DISCONTINUED | OUTPATIENT
Start: 2025-03-08 | End: 2025-03-08 | Stop reason: HOSPADM

## 2025-03-07 RX ORDER — ATORVASTATIN CALCIUM 80 MG/1
80 TABLET, FILM COATED ORAL EVERY MORNING
Status: DISCONTINUED | OUTPATIENT
Start: 2025-03-08 | End: 2025-03-08 | Stop reason: HOSPADM

## 2025-03-07 RX ORDER — ACETAMINOPHEN 500 MG
1000 TABLET ORAL EVERY 6 HOURS PRN
Status: DISCONTINUED | OUTPATIENT
Start: 2025-03-07 | End: 2025-03-08 | Stop reason: HOSPADM

## 2025-03-07 RX ORDER — ASPIRIN 81 MG/1
324 TABLET, CHEWABLE ORAL ONCE
Status: COMPLETED | OUTPATIENT
Start: 2025-03-07 | End: 2025-03-07

## 2025-03-07 RX ORDER — REGADENOSON 0.08 MG/ML
0.4 INJECTION, SOLUTION INTRAVENOUS
Status: COMPLETED | OUTPATIENT
Start: 2025-03-07 | End: 2025-03-08

## 2025-03-07 RX ORDER — OMEPRAZOLE 20 MG/1
20 CAPSULE, DELAYED RELEASE ORAL DAILY
Status: DISCONTINUED | OUTPATIENT
Start: 2025-03-08 | End: 2025-03-08 | Stop reason: HOSPADM

## 2025-03-07 RX ORDER — AMLODIPINE BESYLATE 5 MG/1
5 TABLET ORAL DAILY
Status: DISCONTINUED | OUTPATIENT
Start: 2025-03-08 | End: 2025-03-08 | Stop reason: HOSPADM

## 2025-03-07 RX ORDER — ASPIRIN 300 MG/1
300 SUPPOSITORY RECTAL ONCE
Status: COMPLETED | OUTPATIENT
Start: 2025-03-07 | End: 2025-03-07

## 2025-03-07 RX ORDER — CELECOXIB 100 MG/1
100 CAPSULE ORAL DAILY
COMMUNITY

## 2025-03-07 RX ADMIN — ASPIRIN 324 MG: 81 TABLET, CHEWABLE ORAL at 14:18

## 2025-03-07 RX ADMIN — ENOXAPARIN SODIUM 40 MG: 100 INJECTION SUBCUTANEOUS at 17:58

## 2025-03-07 RX ADMIN — ACETAMINOPHEN 1000 MG: 500 TABLET ORAL at 22:25

## 2025-03-07 SDOH — ECONOMIC STABILITY: TRANSPORTATION INSECURITY
IN THE PAST 12 MONTHS, HAS LACK OF RELIABLE TRANSPORTATION KEPT YOU FROM MEDICAL APPOINTMENTS, MEETINGS, WORK OR FROM GETTING THINGS NEEDED FOR DAILY LIVING?: NO

## 2025-03-07 SDOH — ECONOMIC STABILITY: TRANSPORTATION INSECURITY
IN THE PAST 12 MONTHS, HAS THE LACK OF TRANSPORTATION KEPT YOU FROM MEDICAL APPOINTMENTS OR FROM GETTING MEDICATIONS?: NO

## 2025-03-07 ASSESSMENT — COGNITIVE AND FUNCTIONAL STATUS - GENERAL
MOBILITY SCORE: 24
SUGGESTED CMS G CODE MODIFIER MOBILITY: CH
DAILY ACTIVITIY SCORE: 24
SUGGESTED CMS G CODE MODIFIER DAILY ACTIVITY: CH

## 2025-03-07 ASSESSMENT — PATIENT HEALTH QUESTIONNAIRE - PHQ9
8. MOVING OR SPEAKING SO SLOWLY THAT OTHER PEOPLE COULD HAVE NOTICED. OR THE OPPOSITE, BEING SO FIGETY OR RESTLESS THAT YOU HAVE BEEN MOVING AROUND A LOT MORE THAN USUAL: SEVERAL DAYS
2. FEELING DOWN, DEPRESSED, IRRITABLE, OR HOPELESS: SEVERAL DAYS
SUM OF ALL RESPONSES TO PHQ9 QUESTIONS 1 AND 2: 4
SUM OF ALL RESPONSES TO PHQ9 QUESTIONS 1 AND 2: 2
6. FEELING BAD ABOUT YOURSELF - OR THAT YOU ARE A FAILURE OR HAVE LET YOURSELF OR YOUR FAMILY DOWN: NOT AL ALL
7. TROUBLE CONCENTRATING ON THINGS, SUCH AS READING THE NEWSPAPER OR WATCHING TELEVISION: SEVERAL DAYS
5. POOR APPETITE OR OVEREATING: SEVERAL DAYS
7. TROUBLE CONCENTRATING ON THINGS, SUCH AS READING THE NEWSPAPER OR WATCHING TELEVISION: SEVERAL DAYS
SUM OF ALL RESPONSES TO PHQ QUESTIONS 1-9: 11
5. POOR APPETITE OR OVEREATING: SEVERAL DAYS
6. FEELING BAD ABOUT YOURSELF - OR THAT YOU ARE A FAILURE OR HAVE LET YOURSELF OR YOUR FAMILY DOWN: SEVERAL DAYS
SUM OF ALL RESPONSES TO PHQ9 QUESTIONS 1 AND 2: 2
1. LITTLE INTEREST OR PLEASURE IN DOING THINGS: MORE THAN HALF THE DAYS
1. LITTLE INTEREST OR PLEASURE IN DOING THINGS: SEVERAL DAYS
9. THOUGHTS THAT YOU WOULD BE BETTER OFF DEAD, OR OF HURTING YOURSELF: NOT AT ALL
SUM OF ALL RESPONSES TO PHQ QUESTIONS 1-9: 7
2. FEELING DOWN, DEPRESSED, IRRITABLE, OR HOPELESS: MORE THAN HALF THE DAYS
4. FEELING TIRED OR HAVING LITTLE ENERGY: SEVERAL DAYS
9. THOUGHTS THAT YOU WOULD BE BETTER OFF DEAD, OR OF HURTING YOURSELF: SEVERAL DAYS
4. FEELING TIRED OR HAVING LITTLE ENERGY: SEVERAL DAYS
3. TROUBLE FALLING OR STAYING ASLEEP OR SLEEPING TOO MUCH: SEVERAL DAYS
1. LITTLE INTEREST OR PLEASURE IN DOING THINGS: SEVERAL DAYS
2. FEELING DOWN, DEPRESSED, IRRITABLE, OR HOPELESS: SEVERAL DAYS
3. TROUBLE FALLING OR STAYING ASLEEP OR SLEEPING TOO MUCH: SEVERAL DAYS
8. MOVING OR SPEAKING SO SLOWLY THAT OTHER PEOPLE COULD HAVE NOTICED. OR THE OPPOSITE, BEING SO FIGETY OR RESTLESS THAT YOU HAVE BEEN MOVING AROUND A LOT MORE THAN USUAL: SEVERAL DAYS

## 2025-03-07 ASSESSMENT — ENCOUNTER SYMPTOMS
SHORTNESS OF BREATH: 1
PALPITATIONS: 1
COUGH: 0
CHILLS: 0
FEVER: 0
NAUSEA: 0
VOMITING: 0
ABDOMINAL PAIN: 0

## 2025-03-07 ASSESSMENT — HEART SCORE
HEART SCORE: 5
RISK FACTORS: >2 RISK FACTORS OR HX OF ATHEROSCLEROTIC DISEASE
HISTORY: SLIGHTLY SUSPICIOUS
ECG: NON-SPECIFIC REPOLARIZATION DISTURBANCE
AGE: 65+
TROPONIN: LESS THAN OR EQUAL TO NORMAL LIMIT

## 2025-03-07 ASSESSMENT — SOCIAL DETERMINANTS OF HEALTH (SDOH)
WITHIN THE LAST YEAR, HAVE YOU BEEN AFRAID OF YOUR PARTNER OR EX-PARTNER?: NO
WITHIN THE PAST 12 MONTHS, YOU WORRIED THAT YOUR FOOD WOULD RUN OUT BEFORE YOU GOT THE MONEY TO BUY MORE: OFTEN TRUE
WITHIN THE LAST YEAR, HAVE YOU BEEN HUMILIATED OR EMOTIONALLY ABUSED IN OTHER WAYS BY YOUR PARTNER OR EX-PARTNER?: NO
WITHIN THE LAST YEAR, HAVE TO BEEN RAPED OR FORCED TO HAVE ANY KIND OF SEXUAL ACTIVITY BY YOUR PARTNER OR EX-PARTNER?: NO
WITHIN THE PAST 12 MONTHS, THE FOOD YOU BOUGHT JUST DIDN'T LAST AND YOU DIDN'T HAVE MONEY TO GET MORE: OFTEN TRUE
WITHIN THE LAST YEAR, HAVE YOU BEEN KICKED, HIT, SLAPPED, OR OTHERWISE PHYSICALLY HURT BY YOUR PARTNER OR EX-PARTNER?: NO
IN THE PAST 12 MONTHS, HAS THE ELECTRIC, GAS, OIL, OR WATER COMPANY THREATENED TO SHUT OFF SERVICE IN YOUR HOME?: NO

## 2025-03-07 ASSESSMENT — LIFESTYLE VARIABLES
ALCOHOL_USE: NO
DOES PATIENT WANT TO STOP DRINKING: NO
ON A TYPICAL DAY WHEN YOU DRINK ALCOHOL HOW MANY DRINKS DO YOU HAVE: 0
CONSUMPTION TOTAL: NEGATIVE
TOTAL SCORE: 0
AVERAGE NUMBER OF DAYS PER WEEK YOU HAVE A DRINK CONTAINING ALCOHOL: 0
EVER HAD A DRINK FIRST THING IN THE MORNING TO STEADY YOUR NERVES TO GET RID OF A HANGOVER: NO
TOTAL SCORE: 0
HAVE PEOPLE ANNOYED YOU BY CRITICIZING YOUR DRINKING: NO
HOW MANY TIMES IN THE PAST YEAR HAVE YOU HAD 5 OR MORE DRINKS IN A DAY: 0
EVER FELT BAD OR GUILTY ABOUT YOUR DRINKING: NO
HAVE YOU EVER FELT YOU SHOULD CUT DOWN ON YOUR DRINKING: NO
TOTAL SCORE: 0

## 2025-03-07 ASSESSMENT — PAIN DESCRIPTION - PAIN TYPE
TYPE: ACUTE PAIN

## 2025-03-07 ASSESSMENT — FIBROSIS 4 INDEX
FIB4 SCORE: 1.28
FIB4 SCORE: 1.16
FIB4 SCORE: 1.28
FIB4 SCORE: 1.28

## 2025-03-07 NOTE — ASSESSMENT & PLAN NOTE
The 10-year ASCVD risk score (Deb WHITE, et al., 2019) is: 33.1%    Values used to calculate the score:      Age: 74 years      Sex: Female      Is Non- : No      Diabetic: No      Tobacco smoker: No      Systolic Blood Pressure: 168 mmHg      Is BP treated: Yes      HDL Cholesterol: 47 mg/dL      Total Cholesterol: 173 mg/dL    HEART score is around 5, recurrent, multiple prior negative NST's and ECHO (last done in early 2022)  Multiple risk factors, most prominent palpitations during this admission with some concurrent psych issues  Admit to the CDU  Tele monitoring  ASA 81 mg daily  ECHO and NST  Check TSH, lipids and A1c  May need ziopatch upon discharge

## 2025-03-07 NOTE — ED NOTES
Bedside report received from off going RN/tech: lakesha, assumed care of patient.  POC discussed with patient. Call light within reach, all needs addressed at this time.       Fall risk interventions in place: Move the patient closer to the nurse's station, Patient's personal possessions are with in their safe reach, Place fall risk sign on patient's door, Keep floor surfaces clean and dry, and Accompanied to restroom (all applicable per [place] Fall risk assessment)   Continuous monitoring: Cardiac Leads, Pulse Ox, or Blood Pressure  IVF/IV medications: Not Applicable   Oxygen: Room Air  Bedside sitter: Not Applicable   Isolation: Not Applicable

## 2025-03-07 NOTE — ED NOTES
Med rec completed per patient at bedside.    Allergies reviewed.    Outpatient antibiotics within the last 30 days: NONE.    ANTICOAGULANTS: NONE.    Preferred pharmacy: [facility] on [address].
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
## 2025-03-07 NOTE — ASSESSMENT & PLAN NOTE
I discussed advance care planning with the patient for at least 17 minutes, including diagnosis, prognosis, plan of care, risks and benefits of any therapies that could be offered, as well as alternatives including palliation and hospice, as appropriate.    Patient is confirmed FC/FC  There are no ACP documents on file  She would want her daughter or 2 granddaughters to make medical decisions if she is incapacitated.

## 2025-03-07 NOTE — ED TRIAGE NOTES
"Chief Complaint   Patient presents with    Chest Pain     Since this morning that is centralized chest pain with associated SOB. Pt states HA as well. Pt had palpitations a couple of days leading up to the CP     Pt ambulatory into triage for above. Pt states she will get palpitations and then become SOB. Pt states she does not have hx of afib. Pt aox4 gcs 15      BP (!) 149/76   Pulse 72   Temp 36.6 °C (97.9 °F) (Temporal)   Resp 16   Ht 1.651 m (5' 5\")   Wt 81 kg (178 lb 9.2 oz)   SpO2 99%   BMI 29.72 kg/m²     "

## 2025-03-07 NOTE — ED PROVIDER NOTES
ER Provider Note    Scribed for Ricardo Morrissey M.D. by Romaine Gerber. 3/7/2025   11:51 AM    Primary Care Provider: Luis Leos M.D.    CHIEF COMPLAINT  Chief Complaint   Patient presents with    Chest Pain     Since this morning that is centralized chest pain with associated SOB. Pt states HA as well. Pt had palpitations a couple of days leading up to the CP     EXTERNAL RECORDS REVIEWED  Inpatient Notes Patient was last seen here on 2/2/25 for chest pain. Thoughts of her chest pain related to COPD exacerbation. Patient had a stress test last in November 2022 that was negative for ischemia.     HPI/ROS  LIMITATION TO HISTORY   Select: : None  OUTSIDE HISTORIAN(S):  None    Fe Clark is a 74 y.o. female who presents to the ED for evaluation of chest pain onset this morning. She describes the chest pain as a palpitation with extra beats. She clarifies her chest pain is on her right side while the palpitations are on her left side. During the palpitations, the patient noticed shortness of breath. She mentions shortness of breath upon exertion while attempting her walk this morning. She also reports associated headache and bilateral leg swelling worse on the left side. The lower extremity edema has been present for the last three weeks per patient. She also mentions that imaging revealed her left ventricle was hardening.  She denies any history of heart attack. She reports a history of pleurisy, which gave her similar symptoms.     PAST MEDICAL HISTORY  Past Medical History:   Diagnosis Date    ASTHMA     COPD (chronic obstructive pulmonary disease) (HCC)     Diverticulosis     Duodenal ulcer, unspecified as acute or chronic, without hemorrhage, perforation, or obstruction     GERD (gastroesophageal reflux disease)     Hiatal hernia     High cholesterol     Hypertension     Psychiatric problem        SURGICAL HISTORY  Past Surgical History:   Procedure Laterality Date    SUBMANDIBLE ABSCESS  INCISION AND DRAINAGE  11/19/2016    Procedure: SUBMANDIBLE ABSCESS INCISION AND DRAINAGE;  Surgeon: Lior Hutchison D.D.S.;  Location: SURGERY [facility];  Service:     DENTAL EXTRACTION(S)  11/19/2016    Procedure: DENTAL EXTRACTION(S);  Surgeon: Lior Hutchison D.D.S.;  Location: SURGERY [facility];  Service:     LACRIMAL DUCT PROBE  3/11/2015    Performed by Alo Cheatham M.D. at SURGERY [facility]    ANKLE ARTHROSCOPY  7/31/2013    Performed by Paco Clifton M.D. at SURGERY [facility]    HARDWARE REMOVAL ORTHO  7/31/2013    Performed by Paco Clifton M.D. at SURGERY [facility]    BLADDER SUSPENSION      BOWEL RESECTION      COLONOSCOPY      with removal of pollyps    HYSTERECTOMY RADICAL      OTHER ORTHOPEDIC SURGERY      L ankle       FAMILY HISTORY  Family History   Problem Relation Age of Onset    No Known Problems Mother         2017 is age 91    Other Father 57        unknown issue       SOCIAL HISTORY   reports that she has never smoked. She has never used smokeless tobacco. She reports that she does not currently use alcohol. She reports that she does not currently use drugs.    CURRENT MEDICATIONS  Previous Medications    ACETAMINOPHEN (TYLENOL) 325 MG TAB    Take 650 mg by mouth every 6 hours as needed for Moderate Pain.    ALBUTEROL 108 (90 BASE) MCG/ACT AERO SOLN INHALATION AEROSOL    Inhale 2 Puffs every four hours as needed for Shortness of Breath.    ALBUTEROL 108 (90 BASE) MCG/ACT AERO SOLN INHALATION AEROSOL    Inhale 2 Puffs every 6 hours as needed.    AMLODIPINE (NORVASC) 5 MG TAB    Take 1 Tablet by mouth every day.    ATORVASTATIN (LIPITOR) 80 MG TABLET    Take 1 Tablet by mouth every evening.    CELECOXIB (CELEBREX) 100 MG CAP    Take 1 capsule by mouth twice daily    FEXOFENADINE (ALLEGRA) 60 MG TAB    Take 1 Tablet by mouth every day.    LISINOPRIL (PRINIVIL) 20 MG TAB    Take 2 Tablets by mouth every day.    MOXIFLOXACIN (VIGAMOX) 0.5 % SOLUTION    INSTILL  "1 DROP INTO RIGHT EYE 4 TIMES DAILY    OMEPRAZOLE (PRILOSEC) 20 MG DELAYED-RELEASE CAPSULE    Take 1 Capsule by mouth 2 times a day.    PREDNISOLONE ACETATE (PRED FORTE) 1 % SUSPENSION    INSTILL 1 DROP INTO RIGHT EYE 4 TIMES DAILY    SERTRALINE (ZOLOFT) 100 MG TAB    Take 1 Tablet by mouth every day.       ALLERGIES  Allergies   Allergen Reactions    Nkda [No Known Drug Allergy]         PHYSICAL EXAM  BP (!) 149/76   Pulse 72   Temp 36.6 °C (97.9 °F) (Temporal)   Resp 16   Ht 1.651 m (5' 5\")   Wt 81 kg (178 lb 9.2 oz)   SpO2 99%   BMI 29.72 kg/m²    Nursing note and vitals reviewed.  Constitutional: Well-developed and well-nourished. No distress.   HENT: Head is normocephalic and atraumatic. Oropharynx is clear and moist without exudate or erythema.   Eyes: Pupils are equal, round, and reactive to light. Conjunctiva are normal.   Cardiovascular: Normal rate and regular rhythm. No murmur heard. Normal radial pulses.  Pulmonary/Chest: Breath sounds normal. No wheezes or rales.   Abdominal: Soft and non-tender. No distention    Musculoskeletal: 2+ edema of left lower extremity, Extremities exhibit normal range of motion without edema or tenderness.   Neurological: Awake, alert and oriented to person, place, and time. No focal deficits noted.  Skin: Skin is warm and dry. No rash.   Psychiatric: Normal mood and affect. Appropriate for clinical situation    DIAGNOSTIC STUDIES    Labs:   Results for orders placed or performed during the hospital encounter of 03/07/25   CBC with Differential    Collection Time: 03/07/25 11:02 AM   Result Value Ref Range    WBC 7.3 4.8 - 10.8 K/uL    RBC 4.17 (L) 4.20 - 5.40 M/uL    Hemoglobin 13.4 12.0 - 16.0 g/dL    Hematocrit 40.2 37.0 - 47.0 %    MCV 96.4 81.4 - 97.8 fL    MCH 32.1 27.0 - 33.0 pg    MCHC 33.3 32.2 - 35.5 g/dL    RDW 45.5 35.9 - 50.0 fL    Platelet Count 294 164 - 446 K/uL    MPV 9.1 9.0 - 12.9 fL    Neutrophils-Polys 65.50 44.00 - 72.00 %    Lymphocytes 21.20 (L) " 22.00 - 41.00 %    Monocytes 9.10 0.00 - 13.40 %    Eosinophils 3.40 0.00 - 6.90 %    Basophils 0.40 0.00 - 1.80 %    Immature Granulocytes 0.40 0.00 - 0.90 %    Nucleated RBC 0.00 0.00 - 0.20 /100 WBC    Neutrophils (Absolute) 4.75 1.82 - 7.42 K/uL    Lymphs (Absolute) 1.54 1.00 - 4.80 K/uL    Monos (Absolute) 0.66 0.00 - 0.85 K/uL    Eos (Absolute) 0.25 0.00 - 0.51 K/uL    Baso (Absolute) 0.03 0.00 - 0.12 K/uL    Immature Granulocytes (abs) 0.03 0.00 - 0.11 K/uL    NRBC (Absolute) 0.00 K/uL   Complete Metabolic Panel (CMP)    Collection Time: 25 11:02 AM   Result Value Ref Range    Sodium 139 135 - 145 mmol/L    Potassium 4.0 3.6 - 5.5 mmol/L    Chloride 107 96 - 112 mmol/L    Co2 22 20 - 33 mmol/L    Anion Gap 10.0 7.0 - 16.0    Glucose 115 (H) 65 - 99 mg/dL    Bun 19 8 - 22 mg/dL    Creatinine 0.95 0.50 - 1.40 mg/dL    Calcium 9.8 8.5 - 10.5 mg/dL    Correct Calcium 9.8 8.5 - 10.5 mg/dL    AST(SGOT) 24 12 - 45 U/L    ALT(SGPT) 23 2 - 50 U/L    Alkaline Phosphatase 78 30 - 99 U/L    Total Bilirubin 0.5 0.1 - 1.5 mg/dL    Albumin 4.0 3.2 - 4.9 g/dL    Total Protein 6.4 6.0 - 8.2 g/dL    Globulin 2.4 1.9 - 3.5 g/dL    A-G Ratio 1.7 g/dL   proBrain Natriuretic Peptide, NT (BNP)    Collection Time: 25 11:02 AM   Result Value Ref Range    NT-proBNP 154 (H) 0 - 125 pg/mL   Troponins NOW    Collection Time: 25 11:02 AM   Result Value Ref Range    Troponin T 11 6 - 19 ng/L   ESTIMATED GFR    Collection Time: 25 11:02 AM   Result Value Ref Range    GFR (CKD-EPI) 62 >60 mL/min/1.73 m 2   EKG    Collection Time: 25 11:51 AM   Result Value Ref Range    Report       [facility]    Test Date:  2025  Pt Name:    DOROTA AGUIRRE             Department: ER  MRN:        [id number]                      Room:  Gender:     Female                       Technician: [id number]  :        [id number]                   Requested By:ER TRIAGE PROTOCOL  Order #:    [id number]                     Reading MD: OBDULIA BOTELLO MD    Measurements  Intervals                                Axis  Rate:       65                           P:          -32  VA:         133                          QRS:        -20  QRSD:       98                           T:          36  QT:         432  QTc:        450    Interpretive Statements  Sinus rhythm  Abnormal R-wave progression, early transition  Probable left ventricular hypertrophy  Compared to ECG 02/22/2025 08:04:20  Q waves no longer present  Electronically Signed On 03- 11:51:05 PST by OBDULIA BOTELLO MD         EKG:   I have independently interpreted this EKG as detailed above.     Radiology:   This attending emergency physician has independently interpreted the diagnostic imaging associated with this visit and is awaiting the final reading from the radiologist.   Preliminary interpretation is a follows: Chest x-ray shows no evidence of pneumonia or pulmonary edema.  Ultrasound shows no evidence of deep venous thrombosis.    Radiologist interpretation:   US-EXTREMITY VENOUS LOWER UNILAT LEFT   Final Result      DX-CHEST-PORTABLE (1 VIEW)   Final Result      No acute cardiac or pulmonary abnormalities are identified.        ASSESSMENT AND PLAN    11:51 AM - Patient was evaluated at bedside. I explained plan of care, including labs and imaging. Ordered for EKG, Troponins NOW, pBNP, CMP, CBC w/ Diff and DX-Chest to evaluate. Patient verbalizes understanding and support with my plan of care.  Differential diagnoses include but not limited to: ACS, Arrhythmia or PE.      1:19 PM  - Patient was reevaluated at bedside. Discussed lab and radiology results with the patient and informed them about hospitalization.  Troponin is not elevated.  Ultrasound shows no evidence of DVT.  Patient verbalizes understanding and agreement to this plan of care.    Patient has a moderate heart score of 5.  She will be admitted for further evaluation.    1:49 PM  -  Spoke with Dr. Albrecht, (Hospitalist), about the patient's condition who agreed to hospitalize the patient.      DISPOSITION AND DISCUSSIONS    I have discussed management of the patient with the following physicians and JORGE's:  Dr. Albrecht (Hospitalist)    Discussion of management with other \[facility]\"" or appropriate source(s): None     Barriers to care at this time, including but not limited to:  None known at this time .     DISPOSITION:  Patient will be hospitalized by Dr. Albrecht in guarded condition.     FINAL DIAGNOSIS  1. Acute chest pain    2. Leg edema, left    3. Palpitations         Romaine LINN (Scribe), am scribing for, and in the presence of, Ricardo Morrissey M.D..    Electronically signed by: Romaine Gerber (Marcial), 3/7/2025    IRicardo M.D. personally performed the services described in this documentation, as scribed by Romaine Gerber in my presence, and it is both accurate and complete.      The note accurately reflects work and decisions made by me.  Ricardo Morrissey M.D.  3/7/2025  1:50 PM

## 2025-03-07 NOTE — DISCHARGE PLANNING
TCN following. HTH/SCP chart review completed. Note pt currently in ED 2' to CP. Per review, note floor request and anticipating pt will be admitted at this time. 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 Anticipating pt will dc to home with outpatient follow ups if indicated*. Note that pt has upcoming PCP appt scheduled for 3/17 as well. TCN will monitor and assist with transitional dc planning as indicated. Thank you.      *noted pt was admitted for additional cardiac workup 2' to CP, etc; note per additional review, pt continues to ambulate increased distances with no AD; anticipating as above, dc to home with appropriate outpatient follow ups as indicated; TCN will not actively follow given anticipated dc plan as above; however if dc planning should indicate transitional dc planning need, please reach out to TCN via [facility]

## 2025-03-08 ENCOUNTER — APPOINTMENT (OUTPATIENT)
Dept: CARDIOLOGY | Facility: MEDICAL CENTER | Age: 75
End: 2025-03-08
Attending: INTERNAL MEDICINE
Payer: MEDICARE

## 2025-03-08 ENCOUNTER — APPOINTMENT (OUTPATIENT)
Dept: RADIOLOGY | Facility: MEDICAL CENTER | Age: 75
End: 2025-03-08
Attending: INTERNAL MEDICINE
Payer: MEDICARE

## 2025-03-08 VITALS
SYSTOLIC BLOOD PRESSURE: 152 MMHG | WEIGHT: 178.79 LBS | RESPIRATION RATE: 16 BRPM | TEMPERATURE: 97.6 F | DIASTOLIC BLOOD PRESSURE: 70 MMHG | BODY MASS INDEX: 30.52 KG/M2 | HEART RATE: 54 BPM | HEIGHT: 64 IN | OXYGEN SATURATION: 94 %

## 2025-03-08 LAB
ANION GAP SERPL CALC-SCNC: 9 MMOL/L (ref 7–16)
BUN SERPL-MCNC: 17 MG/DL (ref 8–22)
CALCIUM SERPL-MCNC: 9.5 MG/DL (ref 8.5–10.5)
CHLORIDE SERPL-SCNC: 107 MMOL/L (ref 96–112)
CHOLEST SERPL-MCNC: 182 MG/DL (ref 100–199)
CO2 SERPL-SCNC: 21 MMOL/L (ref 20–33)
CREAT SERPL-MCNC: 0.77 MG/DL (ref 0.5–1.4)
ERYTHROCYTE [DISTWIDTH] IN BLOOD BY AUTOMATED COUNT: 45 FL (ref 35.9–50)
GFR SERPLBLD CREATININE-BSD FMLA CKD-EPI: 80 ML/MIN/1.73 M 2
GLUCOSE SERPL-MCNC: 98 MG/DL (ref 65–99)
HCT VFR BLD AUTO: 39 % (ref 37–47)
HDLC SERPL-MCNC: 57 MG/DL
HGB BLD-MCNC: 12.8 G/DL (ref 12–16)
LDLC SERPL CALC-MCNC: 100 MG/DL
LV EJECT FRACT  99904: 60
LV EJECT FRACT MOD 2C 99903: 67.31
LV EJECT FRACT MOD 4C 99902: 60.18
LV EJECT FRACT MOD BP 99901: 61.8
MCH RBC QN AUTO: 31.7 PG (ref 27–33)
MCHC RBC AUTO-ENTMCNC: 32.8 G/DL (ref 32.2–35.5)
MCV RBC AUTO: 96.5 FL (ref 81.4–97.8)
PLATELET # BLD AUTO: 261 K/UL (ref 164–446)
PMV BLD AUTO: 9.3 FL (ref 9–12.9)
POTASSIUM SERPL-SCNC: 4.3 MMOL/L (ref 3.6–5.5)
RBC # BLD AUTO: 4.04 M/UL (ref 4.2–5.4)
SODIUM SERPL-SCNC: 137 MMOL/L (ref 135–145)
TRIGL SERPL-MCNC: 125 MG/DL (ref 0–149)
WBC # BLD AUTO: 8.8 K/UL (ref 4.8–10.8)

## 2025-03-08 PROCEDURE — 700102 HCHG RX REV CODE 250 W/ 637 OVERRIDE(OP): Performed by: INTERNAL MEDICINE

## 2025-03-08 PROCEDURE — 36415 COLL VENOUS BLD VENIPUNCTURE: CPT

## 2025-03-08 PROCEDURE — 700111 HCHG RX REV CODE 636 W/ 250 OVERRIDE (IP)

## 2025-03-08 PROCEDURE — 85027 COMPLETE CBC AUTOMATED: CPT

## 2025-03-08 PROCEDURE — 96374 THER/PROPH/DIAG INJ IV PUSH: CPT | Mod: XU

## 2025-03-08 PROCEDURE — G0378 HOSPITAL OBSERVATION PER HR: HCPCS

## 2025-03-08 PROCEDURE — 80061 LIPID PANEL: CPT

## 2025-03-08 PROCEDURE — 80048 BASIC METABOLIC PNL TOTAL CA: CPT

## 2025-03-08 PROCEDURE — 93306 TTE W/DOPPLER COMPLETE: CPT

## 2025-03-08 PROCEDURE — A9270 NON-COVERED ITEM OR SERVICE: HCPCS

## 2025-03-08 PROCEDURE — A9270 NON-COVERED ITEM OR SERVICE: HCPCS | Performed by: INTERNAL MEDICINE

## 2025-03-08 PROCEDURE — 700111 HCHG RX REV CODE 636 W/ 250 OVERRIDE (IP): Mod: JZ

## 2025-03-08 PROCEDURE — A9502 TC99M TETROFOSMIN: HCPCS

## 2025-03-08 PROCEDURE — 700102 HCHG RX REV CODE 250 W/ 637 OVERRIDE(OP)

## 2025-03-08 PROCEDURE — 93306 TTE W/DOPPLER COMPLETE: CPT | Mod: 26 | Performed by: INTERNAL MEDICINE

## 2025-03-08 PROCEDURE — 99239 HOSP IP/OBS DSCHRG MGMT >30: CPT | Performed by: INTERNAL MEDICINE

## 2025-03-08 RX ORDER — AMINOPHYLLINE 25 MG/ML
INJECTION, SOLUTION INTRAVENOUS
Status: COMPLETED
Start: 2025-03-08 | End: 2025-03-08

## 2025-03-08 RX ORDER — REGADENOSON 0.08 MG/ML
INJECTION, SOLUTION INTRAVENOUS
Status: COMPLETED
Start: 2025-03-08 | End: 2025-03-08

## 2025-03-08 RX ORDER — KETOROLAC TROMETHAMINE 15 MG/ML
15 INJECTION, SOLUTION INTRAMUSCULAR; INTRAVENOUS ONCE
Status: COMPLETED | OUTPATIENT
Start: 2025-03-08 | End: 2025-03-08

## 2025-03-08 RX ADMIN — AMLODIPINE BESYLATE 5 MG: 5 TABLET ORAL at 05:31

## 2025-03-08 RX ADMIN — AMINOPHYLLINE 100 MG: 25 INJECTION, SOLUTION INTRAVENOUS at 08:22

## 2025-03-08 RX ADMIN — ASPIRIN 81 MG: 81 TABLET, COATED ORAL at 05:31

## 2025-03-08 RX ADMIN — OMEPRAZOLE 20 MG: 20 CAPSULE, DELAYED RELEASE ORAL at 05:32

## 2025-03-08 RX ADMIN — REGADENOSON 0.4 MG: 0.08 INJECTION, SOLUTION INTRAVENOUS at 08:12

## 2025-03-08 RX ADMIN — ATORVASTATIN CALCIUM 80 MG: 80 TABLET, FILM COATED ORAL at 05:31

## 2025-03-08 RX ADMIN — KETOROLAC TROMETHAMINE 15 MG: 15 INJECTION, SOLUTION INTRAMUSCULAR; INTRAVENOUS at 00:25

## 2025-03-08 RX ADMIN — CELECOXIB 100 MG: 100 CAPSULE ORAL at 10:12

## 2025-03-08 RX ADMIN — LISINOPRIL 40 MG: 20 TABLET ORAL at 05:32

## 2025-03-08 RX ADMIN — SERTRALINE 100 MG: 50 TABLET, FILM COATED ORAL at 05:31

## 2025-03-08 RX ADMIN — ACETAMINOPHEN 1000 MG: 500 TABLET ORAL at 05:30

## 2025-03-08 ASSESSMENT — PAIN DESCRIPTION - PAIN TYPE
TYPE: ACUTE PAIN
TYPE: ACUTE PAIN

## 2025-03-08 NOTE — RESPIRATORY CARE
"  COPD EDUCATION by COPD CLINICAL EDUCATOR  3/7/2025  at  4:40 PM by Monserrat Storey, RRT     Patient interviewed by education team.  Patient declined or is unable to participate in the full program.  Therefore, a short intervention has been conducted.  A comprehensive packet including information about COPD, types of treatments to manage their disease and safe home Oxygen usage was provided and reviewed with patient at the bedside.  Patient has history of COPD/ASTHMA overlap. Patient uses rescue inhaler frequently but not with spacer. Patient given spacer and flutter valve instruction. Patient mentions she had a sleep study but never received a CPAP. Patient's primary care messaged about sleep study follow up and medication management.     COPD Assessment  COPD Clinical Specialists ONLY  COPD Education Initiated: Yes--Short Intervention (COPD/Asthma given pamphlet and spacer. Messaged PCP about adding maintenace using rescue frequently and needs sleep study follow up)  Is this a COPD exacerbation patient?: No  DME Company: none prior  DME Equipment Type: none prior  Physician Follow Up Appointment: 03/17/25  Appt Time: 1140  Physician Name: Luis Leos M.D.  Pulmonologist Name: none  $ Demo/Eval of SVN's, MDI's and Aerosols: Yes  Interdisciplinary Rounds: Attendance at Rounds (30 Min)    PFT Results    No results found for: \"PFT\"    Meds to Beds        MY COPD ACTION PLAN     It is recommended that patients and physicians/healthcare providers complete this action plan together. This plan should be discussed at each physician visit and updated as needed.    The green, yellow and red zones show groups of symptoms of COPD. This list of symptoms is not comprehensive, and you may experience other symptoms. In the \"Actions\" column, your healthcare provider has recommended actions for you to take based on your symptoms.    Patient Name: Fe Clark   YOB: 1950   Last Updated on: 3/7/2025  " "4:39 PM   Green Zone:  I am doing well today Actions     Usual activitiy and exercise level   Take daily medications     Usual amounts of cough and phlegm/mucus   Use oxygen as prescribed     Sleep well at night   Continue regular exercise/diet plan     Appetite is good   At all times avoid cigarette smoke, inhaled irritants     Daily Medications (these medications are taken every day):                Yellow Zone:  I am having a bad day or a COPD flare Actions     More breathless than usual   Continue daily medications     I have less energy for my daily activities   Use quick relief inhaler as ordered     Increased or thicker phlegm/mucus   Use oxygen as prescribed     Using quick relief inhaler/nebulizer more often   Get plenty of rest     Swelling of ankles more than usual   Use pursed lip breathing     More coughing than usual   At all times avoid cigarette smoke, inhaled irritants     I feel like I have a \"chest cold\"     Poor sleep and my symptoms woke me up     My appetite is not good     My medicine is not helping      Call provider immediately if symptoms don’t improve     Continue daily medications, add rescue medications:   Albuterol 2 Puffs Every 4 hours PRN       Medications to be used during a flare up, (as Discussed with Provider):           Additional Information:  Use a spacer if you are using your rescue more than usual seek medical care    Red Zone:  I need urgent medical care Actions     Severe shortness of breath even at rest   Call 911 or seek medical care immediately     Not able to do any activity because of breathing      Fever or shaking chills      Feeling confused or very drowsy       Chest pains      Coughing up blood                  "

## 2025-03-08 NOTE — CARE PLAN
The patient is Stable - Low risk of patient condition declining or worsening    Shift Goals  Clinical Goals: Tele monitoring, echo, NPO at midnight for stress test  Patient Goals: get rid of headache. rest  Family Goals: KAEL    Progress made toward(s) clinical / shift goals:    Problem: Pain - Standard  Goal: Alleviation of pain or a reduction in pain to the patient’s comfort goal  Description: Target End Date:  Prior to discharge or change in level of care    Document on Vitals flowsheet    1.  Document pain using the appropriate pain scale per order or unit policy  2.  Educate and implement non-pharmacologic comfort measures (i.e. relaxation, distraction, massage, cold/heat therapy, etc.)  3.  Pain management medications as ordered  4.  Reassess pain after pain med administration per policy  5.  If opiods administered assess patient's response to pain medication is appropriate per POSS sedation scale  6.  Follow pain management plan developed in collaboration with patient and interdisciplinary team (including palliative care or pain specialists if applicable)  Outcome: Progressing     Problem: Knowledge Deficit - Standard  Goal: Patient and family/care givers will demonstrate understanding of plan of care, disease process/condition, diagnostic tests and medications  Description: Target End Date:  1-3 days or as soon as patient condition allows    Document in Patient Education    1.  Patient and family/caregiver oriented to unit, equipment, visitation policy and means for communicating concern  2.  Complete/review Learning Assessment  3.  Assess knowledge level of disease process/condition, treatment plan, diagnostic tests and medications  4.  Explain disease process/condition, treatment plan, diagnostic tests and medications  Outcome: Progressing     Problem: Fall Risk  Goal: Patient will remain free from falls  Description: Target End Date:  Prior to discharge or change in level of care    Document interventions  on the Vivar Giovanni Fall Risk Assessment    1.  Assess for fall risk factors  2.  Implement fall precautions  Outcome: Progressing       Patient is not progressing towards the following goals:

## 2025-03-08 NOTE — PROGRESS NOTES
4 Eyes Skin Assessment Completed by MANDO Aden and MANDO Jim.    Head WDL  Ears WDL  Nose WDL  Mouth WDL  Neck WDL  Breast/Chest WDL  Shoulder Blades WDL  Spine WDL  (R) Arm/Elbow/Hand WDL  (L) Arm/Elbow/Hand WDL  Abdomen WDL  Groin WDL  Scrotum/Coccyx/Buttocks WDL  (R) Leg WDL  (L) Leg Redness and Edema  (R) Heel/Foot/Toe WDL  (L) Heel/Foot/Toe WDL          Devices In Places Tele Box and Pulse Ox      Interventions In Place Pillows    Possible Skin Injury No    Pictures Uploaded Into Epic N/A  Wound Consult Placed N/A  RN Wound Prevention Protocol Ordered No

## 2025-03-08 NOTE — DISCHARGE SUMMARY
Discharge Summary    CHIEF COMPLAINT ON ADMISSION  Chief Complaint   Patient presents with    Chest Pain     Since this morning that is centralized chest pain with associated SOB. Pt states HA as well. Pt had palpitations a couple of days leading up to the CP       Reason for Admission  cp     Admission Date  3/7/2025    CODE STATUS  Full Code    HPI & HOSPITAL COURSE  This is a 74 y.o. female here with multiple psychosocial stressors, palpitations, PTSD, depression, hypertension and hyperlipidemia scented to hospital with chest pain.  She was admitted to the CDU and there was no issues on telemetry.  Her initial troponins were essentially relatively unremarkable.  Her echocardiogram and NST were unremarkable.  I suspect a large element of this is due to severe psychosocial stressors at home.  Her blood pressure and heart rate are well-controlled.  She had no recurrence of her chest pain and she was deemed stable for discharge home.    Therefore, she is discharged in good and stable condition to home with close outpatient follow-up.    Discharge Date  3/8/2025    FOLLOW UP ITEMS POST DISCHARGE  Follow-up with your primary care physician 1 to 2 weeks.    DISCHARGE DIAGNOSES  Principal Problem:    Chest pain (POA: Yes)  Active Problems:    GERD (gastroesophageal reflux disease) (POA: Yes)    Hyperlipidemia (POA: Yes)    HTN (hypertension) (POA: Yes)    Severe major depression (HCC) (POA: Yes)    Atherosclerosis of aorta (HCC) (POA: Yes)    Coronary artery disease involving native coronary artery of native heart without angina pectoris (POA: Yes)    PTSD (post-traumatic stress disorder) (POA: Yes)    Palpitations (POA: Yes)    ACP (advance care planning) (POA: Yes)  Resolved Problems:    * No resolved hospital problems. *      FOLLOW UP  Future Appointments   Date Time Provider Department Center   3/17/2025 11:40 AM Luis Leos M.D. [facility] Leos M.D.  [address]  [facility] [address]  [place]  [address]  [phone]    Follow up in 1 week(s)        MEDICATIONS ON DISCHARGE     Medication List        CHANGE how you take these medications        Instructions   atorvastatin 80 MG tablet  What changed: when to take this  Commonly known as: Lipitor   Take 1 Tablet by mouth every evening.  Dose: 80 mg            CONTINUE taking these medications        Instructions   albuterol 108 (90 Base) MCG/ACT Aers inhalation aerosol   Doctor's comments: Give albuterol that is patient or insurance preference please  Inhale 2 Puffs every four hours as needed for Shortness of Breath.  Dose: 2 Puff     amLODIPine 5 MG Tabs  Commonly known as: Norvasc   Take 1 Tablet by mouth every day.  Dose: 5 mg     celecoxib 100 MG Caps  Commonly known as: CeleBREX   Take 100 mg by mouth every day.  Dose: 100 mg     lisinopril 20 MG Tabs  Commonly known as: Prinivil   Take 2 Tablets by mouth every day.  Dose: 40 mg     Multivitamin Adults 50+ Tabs   Take 1 Tablet by mouth 2 times a day.  Dose: 1 Tablet     omeprazole 20 MG delayed-release capsule  Commonly known as: PriLOSEC   Take 20 mg by mouth every day.  Dose: 20 mg     prednisoLONE acetate 1 % Susp  Commonly known as: Pred Forte   Administer 1 Drop into the left eye 4 times a day.  Dose: 1 Drop     sertraline 100 MG Tabs  Commonly known as: Zoloft   Take 1 Tablet by mouth every day.  Dose: 100 mg            STOP taking these medications      predniSONE 20 MG Tabs  Commonly known as: Deltasone              Allergies  No Known Allergies    DIET  Orders Placed This Encounter   Procedures    Diet Order Diet: Regular     Standing Status:   Standing     Number of Occurrences:   1     Diet::   Regular [1]       ACTIVITY  As tolerated.  Weight bearing as tolerated    CONSULTATIONS  NONE    PROCEDURES  EC-ECHOCARDIOGRAM COMPLETE W/O CONT   Final Result      NM-CARDIAC STRESS TEST   Final Result      US-EXTREMITY VENOUS LOWER UNILAT LEFT   Final Result      DX-CHEST-PORTABLE (1 VIEW)   Final  Result      No acute cardiac or pulmonary abnormalities are identified.            LABORATORY  Lab Results   Component Value Date    SODIUM 137 03/08/2025    POTASSIUM 4.3 03/08/2025    CHLORIDE 107 03/08/2025    CO2 21 03/08/2025    GLUCOSE 98 03/08/2025    BUN 17 03/08/2025    CREATININE 0.77 03/08/2025    CREATININE 1.0 03/27/2009        Lab Results   Component Value Date    WBC 8.8 03/08/2025    HEMOGLOBIN 12.8 03/08/2025    HEMATOCRIT 39.0 03/08/2025    PLATELETCT 261 03/08/2025        Total time of the discharge process exceeds 42 minutes.

## 2025-03-09 LAB — EKG IMPRESSION: NORMAL

## 2025-03-10 ENCOUNTER — PATIENT OUTREACH (OUTPATIENT)
Dept: MEDICAL GROUP | Facility: MEDICAL CENTER | Age: 75
End: 2025-03-10
Payer: MEDICARE

## 2025-03-10 ENCOUNTER — PATIENT OUTREACH (OUTPATIENT)
Dept: HEALTH INFORMATION MANAGEMENT | Facility: OTHER | Age: 75
End: 2025-03-10
Payer: MEDICARE

## 2025-03-10 DIAGNOSIS — I10 HYPERTENSION COMPLICATIONS: ICD-10-CM

## 2025-03-10 DIAGNOSIS — I10 PRIMARY HYPERTENSION: ICD-10-CM

## 2025-03-10 DIAGNOSIS — M25.561 CHRONIC PAIN OF RIGHT KNEE: ICD-10-CM

## 2025-03-10 DIAGNOSIS — F33.0 MILD EPISODE OF RECURRENT MAJOR DEPRESSIVE DISORDER (HCC): ICD-10-CM

## 2025-03-10 DIAGNOSIS — E78.2 MIXED HYPERLIPIDEMIA: ICD-10-CM

## 2025-03-10 DIAGNOSIS — G89.29 CHRONIC PAIN OF RIGHT KNEE: ICD-10-CM

## 2025-03-10 DIAGNOSIS — J45.20 MILD INTERMITTENT ASTHMA WITHOUT COMPLICATION: ICD-10-CM

## 2025-03-10 NOTE — PROGRESS NOTES
TCM call placed on 3/10. Monthly outreach follow deferred.Will follow up at next monthly outreach

## 2025-03-10 NOTE — PROGRESS NOTES
Transitional Care Management  TCM Outreach Date and Time: Filed (3/10/2025 11:32 AM)    Discharge Questions  Actual Discharge Date: 03/08/25  Now that you are home, how are you feeling?: Fair  Did you receive any new prescriptions?: No  Do you have any questions about your current medications or new medications (Review Med Rec)?: No  Did you have any durable medical equipment ordered?: No  Do you have a follow up appointment scheduled with your PCP?: No  Was an appointment scheduled for the patient?: Yes  Appointment Date: 03/17/25  Appointment Time: 1700  Any issues or paperwork you wish to discuss with your PCP?: No  Are you (patient) able to get to the appointment?: Yes  If Home Health was ordered, have they contacted you (Patient): Not Applicable  Did you have enough support after your last discharge?: Yes  Does this patient qualify for the CCM program?: Yes    Transitional Care  Number of attempts made to contact patient: 1  Current or previous attempts completed within two business days of discharge? : Yes  Provided education regarding treatment plan, medications, self-management, ADLs?: Yes  Has patient completed an Advanced Directive?: No  Has the Care Manager's phone number provided?: Yes  Is there anything else I can help you with?: No    Discharge Summary  Chief Complaint: Chest pain  Admitting Diagnosis: chest pain/multiple psychoscocial stressors  Discharge Diagnosis: chest pain/multiple psychoscocial stressors

## 2025-03-17 ENCOUNTER — OFFICE VISIT (OUTPATIENT)
Dept: MEDICAL GROUP | Facility: MEDICAL CENTER | Age: 75
End: 2025-03-17
Payer: MEDICARE

## 2025-03-17 VITALS
SYSTOLIC BLOOD PRESSURE: 158 MMHG | WEIGHT: 181.66 LBS | HEIGHT: 65 IN | BODY MASS INDEX: 30.27 KG/M2 | TEMPERATURE: 97 F | OXYGEN SATURATION: 98 % | HEART RATE: 60 BPM | DIASTOLIC BLOOD PRESSURE: 84 MMHG

## 2025-03-17 DIAGNOSIS — R07.9 CHEST PAIN, UNSPECIFIED TYPE: ICD-10-CM

## 2025-03-17 DIAGNOSIS — F41.9 ANXIETY: ICD-10-CM

## 2025-03-17 DIAGNOSIS — Z09 HOSPITAL DISCHARGE FOLLOW-UP: Primary | ICD-10-CM

## 2025-03-17 DIAGNOSIS — R00.2 PALPITATIONS: ICD-10-CM

## 2025-03-17 DIAGNOSIS — F33.1 MODERATE EPISODE OF RECURRENT MAJOR DEPRESSIVE DISORDER (HCC): ICD-10-CM

## 2025-03-17 DIAGNOSIS — F32.2 SEVERE MAJOR DEPRESSION (HCC): ICD-10-CM

## 2025-03-17 DIAGNOSIS — I10 PRIMARY HYPERTENSION: ICD-10-CM

## 2025-03-17 DIAGNOSIS — E04.1 THYROID NODULE: ICD-10-CM

## 2025-03-17 RX ORDER — HYDROCHLOROTHIAZIDE 25 MG/1
25 TABLET ORAL DAILY
Qty: 100 TABLET | Refills: 3 | Status: SHIPPED | OUTPATIENT
Start: 2025-03-17 | End: 2025-03-17

## 2025-03-17 RX ORDER — HYDROCHLOROTHIAZIDE 25 MG/1
25 TABLET ORAL DAILY
Qty: 100 TABLET | Refills: 3 | Status: SHIPPED | OUTPATIENT
Start: 2025-03-17

## 2025-03-17 RX ORDER — SERTRALINE HYDROCHLORIDE 100 MG/1
100 TABLET, FILM COATED ORAL DAILY
Qty: 100 TABLET | Refills: 3 | Status: SHIPPED | OUTPATIENT
Start: 2025-03-17

## 2025-03-17 ASSESSMENT — ENCOUNTER SYMPTOMS
VOMITING: 0
HEADACHES: 0
FEVER: 0
PALPITATIONS: 0
WHEEZING: 0
NAUSEA: 0
DIZZINESS: 0
WEIGHT LOSS: 0
SHORTNESS OF BREATH: 0
CHILLS: 0

## 2025-03-17 ASSESSMENT — FIBROSIS 4 INDEX: FIB4 SCORE: 1.44
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
## 2025-03-27 ENCOUNTER — TELEPHONE (OUTPATIENT)
Dept: HEALTH INFORMATION MANAGEMENT | Facility: OTHER | Age: 75
End: 2025-03-27
Payer: MEDICARE

## 2025-04-03 ENCOUNTER — NON-PROVIDER VISIT (OUTPATIENT)
Dept: CARDIOLOGY | Facility: MEDICAL CENTER | Age: 75
End: 2025-04-03
Attending: STUDENT IN AN ORGANIZED HEALTH CARE EDUCATION/TRAINING PROGRAM
Payer: MEDICARE

## 2025-04-03 DIAGNOSIS — R00.2 PALPITATIONS: ICD-10-CM

## 2025-04-03 PROCEDURE — 93246 EXT ECG>7D<15D RECORDING: CPT

## 2025-04-03 NOTE — PROGRESS NOTES
Patient enrolled in the 14 day o Holter monitoring program per Luis Leos MD.  >Office hook-up, serial # [id number].  >Currently pending EOS.

## 2025-04-11 ENCOUNTER — APPOINTMENT (OUTPATIENT)
Dept: RADIOLOGY | Facility: MEDICAL CENTER | Age: 75
End: 2025-04-11
Attending: EMERGENCY MEDICINE
Payer: MEDICARE

## 2025-04-11 ENCOUNTER — PHARMACY VISIT (OUTPATIENT)
Dept: PHARMACY | Facility: MEDICAL CENTER | Age: 75
End: 2025-04-11
Payer: COMMERCIAL

## 2025-04-11 ENCOUNTER — HOSPITAL ENCOUNTER (EMERGENCY)
Facility: MEDICAL CENTER | Age: 75
End: 2025-04-11
Attending: EMERGENCY MEDICINE
Payer: MEDICARE

## 2025-04-11 VITALS
DIASTOLIC BLOOD PRESSURE: 74 MMHG | HEART RATE: 51 BPM | RESPIRATION RATE: 18 BRPM | TEMPERATURE: 99 F | BODY MASS INDEX: 29.66 KG/M2 | HEIGHT: 65 IN | WEIGHT: 178 LBS | OXYGEN SATURATION: 96 % | SYSTOLIC BLOOD PRESSURE: 158 MMHG

## 2025-04-11 DIAGNOSIS — I15.8 OTHER SECONDARY HYPERTENSION: ICD-10-CM

## 2025-04-11 DIAGNOSIS — M54.2 NECK PAIN: ICD-10-CM

## 2025-04-11 DIAGNOSIS — G44.86 CERVICOGENIC HEADACHE: ICD-10-CM

## 2025-04-11 LAB
ALBUMIN SERPL BCP-MCNC: 4.2 G/DL (ref 3.2–4.9)
ALBUMIN/GLOB SERPL: 1.8 G/DL
ALP SERPL-CCNC: 79 U/L (ref 30–99)
ALT SERPL-CCNC: 20 U/L (ref 2–50)
ANION GAP SERPL CALC-SCNC: 11 MMOL/L (ref 7–16)
AST SERPL-CCNC: 24 U/L (ref 12–45)
BASOPHILS # BLD AUTO: 0.8 % (ref 0–1.8)
BASOPHILS # BLD: 0.04 K/UL (ref 0–0.12)
BILIRUB SERPL-MCNC: 0.5 MG/DL (ref 0.1–1.5)
BUN SERPL-MCNC: 17 MG/DL (ref 8–22)
CALCIUM ALBUM COR SERPL-MCNC: 9.9 MG/DL (ref 8.5–10.5)
CALCIUM SERPL-MCNC: 10.1 MG/DL (ref 8.5–10.5)
CHLORIDE SERPL-SCNC: 109 MMOL/L (ref 96–112)
CO2 SERPL-SCNC: 20 MMOL/L (ref 20–33)
CREAT SERPL-MCNC: 0.83 MG/DL (ref 0.5–1.4)
EKG IMPRESSION: NORMAL
EKG IMPRESSION: NORMAL
EOSINOPHIL # BLD AUTO: 0.17 K/UL (ref 0–0.51)
EOSINOPHIL NFR BLD: 3.3 % (ref 0–6.9)
ERYTHROCYTE [DISTWIDTH] IN BLOOD BY AUTOMATED COUNT: 43 FL (ref 35.9–50)
GFR SERPLBLD CREATININE-BSD FMLA CKD-EPI: 73 ML/MIN/1.73 M 2
GLOBULIN SER CALC-MCNC: 2.4 G/DL (ref 1.9–3.5)
GLUCOSE SERPL-MCNC: 96 MG/DL (ref 65–99)
HCT VFR BLD AUTO: 42.8 % (ref 37–47)
HGB BLD-MCNC: 14.1 G/DL (ref 12–16)
IMM GRANULOCYTES # BLD AUTO: 0.01 K/UL (ref 0–0.11)
IMM GRANULOCYTES NFR BLD AUTO: 0.2 % (ref 0–0.9)
LYMPHOCYTES # BLD AUTO: 1.67 K/UL (ref 1–4.8)
LYMPHOCYTES NFR BLD: 32.2 % (ref 22–41)
MCH RBC QN AUTO: 31.1 PG (ref 27–33)
MCHC RBC AUTO-ENTMCNC: 32.9 G/DL (ref 32.2–35.5)
MCV RBC AUTO: 94.5 FL (ref 81.4–97.8)
MONOCYTES # BLD AUTO: 0.42 K/UL (ref 0–0.85)
MONOCYTES NFR BLD AUTO: 8.1 % (ref 0–13.4)
NEUTROPHILS # BLD AUTO: 2.87 K/UL (ref 1.82–7.42)
NEUTROPHILS NFR BLD: 55.4 % (ref 44–72)
NRBC # BLD AUTO: 0 K/UL
NRBC BLD-RTO: 0 /100 WBC (ref 0–0.2)
NT-PROBNP SERPL IA-MCNC: 282 PG/ML (ref 0–125)
PLATELET # BLD AUTO: 287 K/UL (ref 164–446)
PMV BLD AUTO: 9.5 FL (ref 9–12.9)
POTASSIUM SERPL-SCNC: 3.6 MMOL/L (ref 3.6–5.5)
PROT SERPL-MCNC: 6.6 G/DL (ref 6–8.2)
RBC # BLD AUTO: 4.53 M/UL (ref 4.2–5.4)
SODIUM SERPL-SCNC: 140 MMOL/L (ref 135–145)
TROPONIN T SERPL-MCNC: 12 NG/L (ref 6–19)
TROPONIN T SERPL-MCNC: 13 NG/L (ref 6–19)
WBC # BLD AUTO: 5.2 K/UL (ref 4.8–10.8)

## 2025-04-11 PROCEDURE — 700102 HCHG RX REV CODE 250 W/ 637 OVERRIDE(OP): Performed by: EMERGENCY MEDICINE

## 2025-04-11 PROCEDURE — 83880 ASSAY OF NATRIURETIC PEPTIDE: CPT

## 2025-04-11 PROCEDURE — 93005 ELECTROCARDIOGRAM TRACING: CPT | Mod: TC

## 2025-04-11 PROCEDURE — 72125 CT NECK SPINE W/O DYE: CPT

## 2025-04-11 PROCEDURE — A9270 NON-COVERED ITEM OR SERVICE: HCPCS | Performed by: EMERGENCY MEDICINE

## 2025-04-11 PROCEDURE — 70450 CT HEAD/BRAIN W/O DYE: CPT

## 2025-04-11 PROCEDURE — 84484 ASSAY OF TROPONIN QUANT: CPT

## 2025-04-11 PROCEDURE — 96375 TX/PRO/DX INJ NEW DRUG ADDON: CPT

## 2025-04-11 PROCEDURE — 85025 COMPLETE CBC W/AUTO DIFF WBC: CPT

## 2025-04-11 PROCEDURE — 99285 EMERGENCY DEPT VISIT HI MDM: CPT

## 2025-04-11 PROCEDURE — 80053 COMPREHEN METABOLIC PANEL: CPT

## 2025-04-11 PROCEDURE — 700111 HCHG RX REV CODE 636 W/ 250 OVERRIDE (IP): Performed by: EMERGENCY MEDICINE

## 2025-04-11 PROCEDURE — 36415 COLL VENOUS BLD VENIPUNCTURE: CPT

## 2025-04-11 PROCEDURE — 96374 THER/PROPH/DIAG INJ IV PUSH: CPT

## 2025-04-11 PROCEDURE — 71045 X-RAY EXAM CHEST 1 VIEW: CPT

## 2025-04-11 PROCEDURE — 93005 ELECTROCARDIOGRAM TRACING: CPT | Mod: TC | Performed by: EMERGENCY MEDICINE

## 2025-04-11 PROCEDURE — RXMED WILLOW AMBULATORY MEDICATION CHARGE: Performed by: STUDENT IN AN ORGANIZED HEALTH CARE EDUCATION/TRAINING PROGRAM

## 2025-04-11 RX ORDER — HYDROCODONE BITARTRATE AND ACETAMINOPHEN 5; 325 MG/1; MG/1
1 TABLET ORAL EVERY 8 HOURS PRN
Qty: 10 TABLET | Refills: 0 | Status: ON HOLD | OUTPATIENT
Start: 2025-04-11 | End: 2025-04-17

## 2025-04-11 RX ORDER — MORPHINE SULFATE 4 MG/ML
4 INJECTION INTRAVENOUS ONCE
Status: COMPLETED | OUTPATIENT
Start: 2025-04-11 | End: 2025-04-11

## 2025-04-11 RX ORDER — HYDRALAZINE HYDROCHLORIDE 10 MG/1
10 TABLET, FILM COATED ORAL ONCE
Status: COMPLETED | OUTPATIENT
Start: 2025-04-11 | End: 2025-04-11

## 2025-04-11 RX ORDER — ONDANSETRON 2 MG/ML
4 INJECTION INTRAMUSCULAR; INTRAVENOUS ONCE
Status: COMPLETED | OUTPATIENT
Start: 2025-04-11 | End: 2025-04-11

## 2025-04-11 RX ORDER — HYDROCODONE BITARTRATE AND ACETAMINOPHEN 5; 325 MG/1; MG/1
1 TABLET ORAL EVERY 8 HOURS PRN
Qty: 10 TABLET | Refills: 0 | Status: SHIPPED | OUTPATIENT
Start: 2025-04-11 | End: 2025-04-11

## 2025-04-11 RX ADMIN — MORPHINE SULFATE 4 MG: 4 INJECTION INTRAVENOUS at 15:21

## 2025-04-11 RX ADMIN — HYDRALAZINE HYDROCHLORIDE 10 MG: 10 TABLET ORAL at 17:39

## 2025-04-11 RX ADMIN — ONDANSETRON 4 MG: 2 INJECTION INTRAMUSCULAR; INTRAVENOUS at 15:22

## 2025-04-11 RX ADMIN — HYDRALAZINE HYDROCHLORIDE 10 MG: 10 TABLET ORAL at 19:27

## 2025-04-11 ASSESSMENT — HEART SCORE
TROPONIN: LESS THAN OR EQUAL TO NORMAL LIMIT
ECG: NORMAL
HEART SCORE: 3
AGE: 65+
HISTORY: SLIGHTLY SUSPICIOUS
RISK FACTORS: 1-2 RISK FACTORS

## 2025-04-11 ASSESSMENT — PAIN DESCRIPTION - PAIN TYPE: TYPE: ACUTE PAIN;CHRONIC PAIN

## 2025-04-11 ASSESSMENT — FIBROSIS 4 INDEX: FIB4 SCORE: 1.44

## 2025-04-11 NOTE — ED PROVIDER NOTES
CHIEF COMPLAINT  Chief Complaint   Patient presents with    Chest Pain     Pt report intermittent worsening chest pain w/ SOB, 6/10 radiates to pt back and L arm started x1wk.  Per pt, she had a zio heart monitor placed last week but took it off last night due to allergic reaction.     Migraine    Arm Pain       LIMITATION TO HISTORY   Select: none    HPI    Fe Clark is a 74 y.o. female who presents to the Emergency Department for evaluation of chest pain onset 1 week ago. Patient reports her PCP Dr. Leos recently placed a monitor due to her symptoms that she was able to wear for about a week but had an allergy to the adhesive at the area it was placed. She adds that the chest pain is intermittent. She has associated symptoms of headaches, shortness of breath, neck pain, arm pain, shoulder pain, and back pain. She describes the radiating pain as spasms. She denies fever and chills and adds that this has not happened before. Patient adds her headaches worsen when she walks and also notes that her shortness of breath worsens when she lays flat. She says that applying pressure to her neck relieves her neck pain. Patient reports she has taken Ibuprofen and tylenol with no alleviation. She adds that she did not have headaches when she saw her PCP. The patient reports a history of hypertension, and takes her medications lisinopril and chlorolythiazide as prescribed. She notes no known allergies.     OUTSIDE HISTORIAN(S):  Select: None    EXTERNAL RECORDS REVIEWED  Select: Patient admitted 3/7 for chest pain and sob. Followed up with her primary care provider on 3/17.    PAST MEDICAL HISTORY  Past Medical History:   Diagnosis Date    ASTHMA     COPD (chronic obstructive pulmonary disease) (HCC)     Diverticulosis     Duodenal ulcer, unspecified as acute or chronic, without hemorrhage, perforation, or obstruction     GERD (gastroesophageal reflux disease)     Hiatal hernia     High cholesterol      Hypertension     Psychiatric problem      .    SURGICAL HISTORY  Past Surgical History:   Procedure Laterality Date    SUBMANDIBLE ABSCESS INCISION AND DRAINAGE  11/19/2016    Procedure: SUBMANDIBLE ABSCESS INCISION AND DRAINAGE;  Surgeon: Lior Hutchison D.D.S.;  Location: SURGERY [facility];  Service:     DENTAL EXTRACTION(S)  11/19/2016    Procedure: DENTAL EXTRACTION(S);  Surgeon: Lior Hutchison D.D.S.;  Location: SURGERY [facility];  Service:     LACRIMAL DUCT PROBE  3/11/2015    Performed by Alo Cheatham M.D. at SURGERY [facility]    ANKLE ARTHROSCOPY  7/31/2013    Performed by Paco Clifton M.D. at SURGERY [facility]    HARDWARE REMOVAL ORTHO  7/31/2013    Performed by Paco Clifton M.D. at SURGERY [facility]S    BLADDER SUSPENSION      BOWEL RESECTION      COLONOSCOPY      with removal of pollyps    HYSTERECTOMY RADICAL      OTHER ORTHOPEDIC SURGERY      L ankle         FAMILY HISTORY  Family History   Problem Relation Age of Onset    No Known Problems Mother         2017 is age 91    Other Father 57        unknown issue          SOCIAL HISTORY  Social History     Tobacco Use    Smoking status: Never    Smokeless tobacco: Never   Vaping Use    Vaping status: Never Used   Substance Use Topics    Alcohol use: Not Currently    Drug use: Not Currently         CURRENT MEDICATIONS  No current facility-administered medications on file prior to encounter.     Current Outpatient Medications on File Prior to Encounter   Medication Sig Dispense Refill    sertraline (ZOLOFT) 100 MG Tab Take 1 Tablet by mouth every day. 100 Tablet 3    sertraline (ZOLOFT) 50 MG Tab Take 1 Tablet by mouth every day. ( Sertraline 150mg daily ) 90 Tablet 3    hydroCHLOROthiazide 25 MG Tab Take 1 Tablet by mouth every day. 100 Tablet 3    celecoxib (CELEBREX) 100 MG Cap Take 100 mg by mouth every day.      omeprazole (PRILOSEC) 20 MG delayed-release capsule Take 20 mg by mouth every day.      Multiple  "Vitamins-Minerals (MULTIVITAMIN ADULTS 50+) Tab Take 1 Tablet by mouth 2 times a day.      lisinopril (PRINIVIL) 20 MG Tab Take 2 Tablets by mouth every day. 90 Tablet 3    amLODIPine (NORVASC) 5 MG Tab Take 1 Tablet by mouth every day. 90 Tablet 3    prednisoLONE acetate (PRED FORTE) 1 % Suspension Administer 1 Drop into the left eye 4 times a day.      atorvastatin (LIPITOR) 80 MG tablet Take 1 Tablet by mouth every evening. (Patient taking differently: Take 80 mg by mouth every morning.) 90 Tablet 3    albuterol 108 (90 Base) MCG/ACT Aero Soln inhalation aerosol Inhale 2 Puffs every four hours as needed for Shortness of Breath. 1 Each 1         ALLERGIES  No Known Allergies    PHYSICAL EXAM  VITAL SIGNS:BP (!) 173/83   Pulse (!) 56   Temp 37.2 °C (98.9 °F) (Temporal)   Resp 15   Ht 1.651 m (5' 5\")   Wt 80.7 kg (178 lb)   SpO2 98%   BMI 29.62 kg/m²     Constitutional: Well-developed no acute distress, elderly appearing.    HENT: Normocephalic, Atraumatic, Bilateral external ears normal.  Eyes:  conjunctiva are normal.   Neck: Tenderness on the left lateral aspect of neck but good ROM, Good flexion and extention of the neck, Supple.  Nontender midline  Cardiovascular: Regular rate and rhythm without murmurs gallops or rubs.   Thorax & Lungs: No respiratory distress. Breathing comfortably. Lungs are diminished but clear to auscultation bilaterally, there are no wheezes no rales. Chest wall is nontender.  Patient does have contact dermatitis from where the Zio patch was on the anterior aspect of the chest.  Abdomen: Soft, non distended, non tender   Skin: Anterior chest rashes where adhesive was place for zio patch. Warm, Dry, No erythema,   Back: No tenderness, No CVA tenderness.  Musculoskeletal: No clubbing cyanosis or edema good range of motion   Neurologic: Alert & oriented x 3, normal sensation moving all extremities appears normal   Psychiatric: Affect normal, Judgment normal, Mood normal.    "     DIAGNOSTIC STUDIES / PROCEDURES        LABS  Results for orders placed or performed during the hospital encounter of 25   EKG    Collection Time: 25 11:46 AM   Result Value Ref Range    Report       [facility] Emergency Dept.    Test Date:  2025  Pt Name:    DOROTA AGUIRRE             Department: ER  MRN:        [id number]                      Room:  Gender:     Female                       Technician: [id number]  :        1950                   Requested By:ER TRIAGE PROTOCOL  Order #:    [id number]                    Reading MD:    Measurements  Intervals                                Axis  Rate:       60                           P:          -5  AR:         164                          QRS:        -14  QRSD:       98                           T:          25  QT:         460  QTc:        460    Interpretive Statements  Sinus rhythm  Probable left atrial enlargement  Abnormal R-wave progression, early transition  Left ventricular hypertrophy  Anterior Q waves, possibly due to LVH  Compared to ECG 2025 19:50:35  Q waves now present  Sinus bradycardia no longer present     CBC with Differential    Collection Time: 25 12:41 PM   Result Value Ref Range    WBC 5.2 4.8 - 10.8 K/uL    RBC 4.53 4.20 - 5.40 M/uL    Hemoglobin 14.1 12.0 - 16.0 g/dL    Hematocrit 42.8 37.0 - 47.0 %    MCV 94.5 81.4 - 97.8 fL    MCH 31.1 27.0 - 33.0 pg    MCHC 32.9 32.2 - 35.5 g/dL    RDW 43.0 35.9 - 50.0 fL    Platelet Count 287 164 - 446 K/uL    MPV 9.5 9.0 - 12.9 fL    Neutrophils-Polys 55.40 44.00 - 72.00 %    Lymphocytes 32.20 22.00 - 41.00 %    Monocytes 8.10 0.00 - 13.40 %    Eosinophils 3.30 0.00 - 6.90 %    Basophils 0.80 0.00 - 1.80 %    Immature Granulocytes 0.20 0.00 - 0.90 %    Nucleated RBC 0.00 0.00 - 0.20 /100 WBC    Neutrophils (Absolute) 2.87 1.82 - 7.42 K/uL    Lymphs (Absolute) 1.67 1.00 - 4.80 K/uL    Monos (Absolute) 0.42 0.00 - 0.85 K/uL    Eos (Absolute) 0.17 0.00  - 0.51 K/uL    Baso (Absolute) 0.04 0.00 - 0.12 K/uL    Immature Granulocytes (abs) 0.01 0.00 - 0.11 K/uL    NRBC (Absolute) 0.00 K/uL   Complete Metabolic Panel (CMP)    Collection Time: 25 12:41 PM   Result Value Ref Range    Sodium 140 135 - 145 mmol/L    Potassium 3.6 3.6 - 5.5 mmol/L    Chloride 109 96 - 112 mmol/L    Co2 20 20 - 33 mmol/L    Anion Gap 11.0 7.0 - 16.0    Glucose 96 65 - 99 mg/dL    Bun 17 8 - 22 mg/dL    Creatinine 0.83 0.50 - 1.40 mg/dL    Calcium 10.1 8.5 - 10.5 mg/dL    Correct Calcium 9.9 8.5 - 10.5 mg/dL    AST(SGOT) 24 12 - 45 U/L    ALT(SGPT) 20 2 - 50 U/L    Alkaline Phosphatase 79 30 - 99 U/L    Total Bilirubin 0.5 0.1 - 1.5 mg/dL    Albumin 4.2 3.2 - 4.9 g/dL    Total Protein 6.6 6.0 - 8.2 g/dL    Globulin 2.4 1.9 - 3.5 g/dL    A-G Ratio 1.8 g/dL   proBrain Natriuretic Peptide, NT (BNP)    Collection Time: 25 12:41 PM   Result Value Ref Range    NT-proBNP 282 (H) 0 - 125 pg/mL   Troponins NOW    Collection Time: 25 12:41 PM   Result Value Ref Range    Troponin T 13 6 - 19 ng/L   ESTIMATED GFR    Collection Time: 25 12:41 PM   Result Value Ref Range    GFR (CKD-EPI) 73 >60 mL/min/1.73 m 2   Troponins in two (2) hours    Collection Time: 25  2:32 PM   Result Value Ref Range    Troponin T 12 6 - 19 ng/L   EKG    Collection Time: 25  6:31 PM   Result Value Ref Range    Report       [facility] Emergency Dept.    Test Date:  2025  Pt Name:    DOROTA AGUIRRE             Department: ER  MRN:        [id number]                      Room:  Gender:     Female                       Technician: [id number]  :        1950                   Requested By:ER TRIAGE PROTOCOL  Order #:    [id number]                    Reading MD: BRAEDEN SCHREIBER MD    Measurements  Intervals                                Axis  Rate:       60                           P:          -5  NV:         164                          QRS:        -14  QRSD:       98                            T:          25  QT:         460  QTc:        460    Interpretive Statements  Sinus rhythm  Probable left atrial enlargement  Abnormal R-wave progression, early transition  Left ventricular hypertrophy  Anterior Q waves, possibly due to LVH  Compared to ECG 03/07/2025 19:50:35  Q waves now present  Sinus bradycardia no longer present  no acute changes  Electronically Signed On 04- 18: 31:20 PDT by BRAEDEN SCHREIBER MD           EKG:   I have independently interpreted this EKG as detailed above.      RADIOLOGY  I have independently interpreted the diagnostic imaging associated with this visit and am waiting the final reading from the radiologist.   My preliminary interpretation is as follows: No significant changes from x-rays from 3/7/2025.      Radiologist interpretation:   CT-CSPINE WITHOUT PLUS RECONS   Final Result         1. Multilevel degenerative disease without a definite fracture or subluxation.   2. Bilateral thyroid nodules.      CT-HEAD W/O   Final Result         NO ACUTE ABNORMALITIES ARE NOTED ON CT SCAN OF THE HEAD.                        DX-CHEST-PORTABLE (1 VIEW)   Final Result      Mild infiltrate in the right mid to lower lung field laterally.           COURSE & MEDICAL DECISION MAKING    ED COURSE:    ED Observation Status? No, The patient does not qualify for observation status     INTERVENTIONS BY ME:  Medications   morphine 4 MG/ML injection 4 mg (4 mg Intravenous Given 4/11/25 1521)   ondansetron (Zofran) syringe/vial injection 4 mg (4 mg Intravenous Given 4/11/25 1522)   hydrALAZINE (Apresoline) tablet 10 mg (10 mg Oral Given 4/11/25 1739)   hydrALAZINE (Apresoline) tablet 10 mg (10 mg Oral Given 4/11/25 1927)       2:27 PM - Patient seen and examined at bedside. Patient present to the ED for chest pain. Discussed plan of care, including lab work, imaging, and medication to provide relief.. Patient agrees to the plan of care. The patient will be medicated with  morphine 4 mg injection, zofran 4 mg injection. Ordered for EKG, troponin x2, BNP, CMP, CBC w/Diff, DX-Chest, CT-C spine without plus recons, and CT-Head without to evaluate her symptoms.       INITIAL ASSESSMENT, COURSE AND PLAN  Care Narrative: Patient has been having this head pain neck pain for quite some time.  She had a Zio patch placed by her primary care physician for palpitations.  Currently she is bradycardic and no significant changes to her EKG.  Laboratory studies x 2 troponins are negative.  BNP is minimally elevated to 82.  White blood cell count is normal at 5.2 H&H and platelet counts are normal.  Chest x-ray there is no significant changes from prior chest x-ray.  Radiologist read as an infiltrate however the patient's white blood cell count is normal the patient has no cough.  CT scans of the head and C-spine does show degenerative change of the C-spine but otherwise no significant hemorrhage to the CT of the head..  At this point I feel that she is hypertensive she does have a history of hypertension is taking her medication was just started on hydrochlorothiazide and increased on that as well as Prinivil.  She is also amlodipine 5 mg.  At this point I feel that her neck pain is muscular in nature with degenerative changes.  I will give her prescription of some mild pain medications recommend for her to follow-up with her primary care physician for recheck and for further outpatient treatment and care of this.  Patient is to return if any symptoms worsen.    CHEST PAIN:   HEART Score for Major Cardiac Events  HEART Score     History: Slightly suspicious  ECG: Normal  Age: 65+  Risk Factors: 1-2 risk factors  Troponin: Less than or equal to normal limit    Heart Score: 3    Total Score   0-3 Points = Low Score, risk of MACE 0.9-1.7%.  4-6 Points = Moderate Score, risk of MACE 12-16.6%  7-10 Points = High Score, risk of MACE 50-65%     ADDITIONAL PROBLEM LIST  Patient does have  hypertension.    DISPOSITION AND DISCUSSIONS  I have discussed management of the patient with the following physicians and JORGE's: None    Escalation of care considered, and ultimately not performed: None    Barriers to care at this time, including but not limited to: None.     Decision tools and prescription drugs considered including, but not limited to: As described above.       The patient will return for new or worsening symptoms and is stable at the time of discharge.    The patient is referred to a primary physician for blood pressure management, diabetic screening, and for all other preventative health concerns.    I reviewed prescription monitoring program for patient's narcotic use before prescribing a scheduled drug.The patient will not drink alcohol nor drive with prescribed medications      DISPOSITION:  Patient will be discharged home in stable condition.    FOLLOW UP:  Luis Leos M.D.  [address] [place] [address] [id number] [phone]    Schedule an appointment as soon as possible for a visit in 1 week  For re-check, Return if any symptoms worsen      OUTPATIENT MEDICATIONS:  Discharge Medication List as of 4/11/2025  8:09 PM        START taking these medications    Details   HYDROcodone-acetaminophen (NORCO) 5-325 MG Tab per tablet Take 1 Tablet by mouth every 8 hours as needed (pain) for up to 5 days., Disp-10 Tablet, R-0, Normal               FINAL DIAGNOSIS  1. Neck pain    2. Cervicogenic headache    3. Other secondary hypertension         Magdy LINN), am scribing for, and in the presence of, Shashi Gomez M.D..    Electronically signed by: Magdy Wright), 4/11/2025    Shashi LINN M.D. personally performed the services described in this documentation, as scribed by Magdy Mendoza in my presence, and it is both accurate and complete.     Electronically signed by: Shashi Gomez M.D.,9:09 AM 04/11/25

## 2025-04-11 NOTE — ED TRIAGE NOTES
"Chief Complaint   Patient presents with    Chest Pain     Pt report intermittent worsening chest pain w/ SOB, 6/10 radiates to pt back and L arm started x1wk.  Per pt, she had a zio heart monitor placed last week but took it off last night due to allergic reaction.     Migraine    Arm Pain       Pt wheelchair  to triage. Pt A&Ox4, for above complaint.     Pt to lobby. Pt educated on alerting staff in changes to condition. Pt verbalized understanding. Protocol initiated.     BP (!) 159/81   Pulse 68   Temp 37.1 °C (98.8 °F) (Temporal)   Resp 16   Ht 1.651 m (5' 5\")   Wt 80.7 kg (178 lb)   SpO2 97%   BMI 29.62 kg/m²    "

## 2025-04-11 NOTE — DISCHARGE PLANNING
TCN following. HTH/SCP chart review completed. Note pt currently in ED secondary to chest pain, migraines and arm pain.  Per chart review patient was admitted to [facility] on 3/7/15-3/8/15 for chest pain/multiple psychoscocial stressor.  Noted per MD note during admission patient also had associated SOB and HA. Per d/c summary patient recommended to follow up with PCP in 1-2 weeks. Patient f/u with PCP on 3/17; noted patient declined PCP referral to behavioral health.  Noted during that admission patient was ambulatory with no AD or assist.     Per review, has not ambulated in ED and is currently on RA. At this time anticipating that pt will dc to home with OP f/u (either directly from ED or after admission to [facility] if warranted). Per chart patient has [facility] P[facility] and PCP f/u scheduled at this time.     If pt admits to [facility], TCN will continue to monitor and assist with transitional care needs as indicated. Please reach out to TCN via VOALTE if post acute transitional care needs are warranted for dc planning.

## 2025-04-12 NOTE — ED NOTES
Pt given DC instructions and verbalized understanding. Pt is A&O and ambulatory. Pt daughter coming to pick pt up.

## 2025-04-12 NOTE — DISCHARGE PLANNING
SW met with Pt and provided her with a community resource guide and discussed with her Aging and Disability resources.  Pt shared that she has two disabled adult children and is looking to get resources for all of them.

## 2025-04-12 NOTE — ED NOTES
Bedside report received from off going RN/tech: malina  , assumed care of patient.  POC discussed with patient. Call light within reach, all needs addressed at this time.       Fall risk interventions in place: Patient's personal possessions are with in their safe reach, Place fall risk sign on patient's door, Keep floor surfaces clean and dry, and Accompanied to restroom (all applicable per [place] Fall risk assessment)   Continuous monitoring: Pulse Ox or Blood Pressure  IVF/IV medications: Not Applicable   Oxygen: How many liters 2L  Bedside sitter: Not Applicable   Isolation: Not Applicable

## 2025-04-15 ENCOUNTER — APPOINTMENT (OUTPATIENT)
Dept: RADIOLOGY | Facility: MEDICAL CENTER | Age: 75
End: 2025-04-15
Attending: EMERGENCY MEDICINE
Payer: MEDICARE

## 2025-04-15 ENCOUNTER — HOSPITAL ENCOUNTER (OUTPATIENT)
Facility: MEDICAL CENTER | Age: 75
End: 2025-04-17
Attending: EMERGENCY MEDICINE | Admitting: HOSPITALIST
Payer: MEDICARE

## 2025-04-15 DIAGNOSIS — M54.2 NECK PAIN: ICD-10-CM

## 2025-04-15 DIAGNOSIS — G47.33 OBSTRUCTIVE SLEEP APNEA SYNDROME: ICD-10-CM

## 2025-04-15 DIAGNOSIS — I15.9 SECONDARY HYPERTENSION: ICD-10-CM

## 2025-04-15 DIAGNOSIS — I16.0 HYPERTENSIVE URGENCY: ICD-10-CM

## 2025-04-15 DIAGNOSIS — R51.9 OCCIPITAL HEADACHE: ICD-10-CM

## 2025-04-15 PROBLEM — G44.209 TENSION TYPE HEADACHE: Status: ACTIVE | Noted: 2025-04-15

## 2025-04-15 LAB
ALBUMIN SERPL BCP-MCNC: 4.2 G/DL (ref 3.2–4.9)
ALBUMIN/GLOB SERPL: 1.7 G/DL
ALP SERPL-CCNC: 77 U/L (ref 30–99)
ALT SERPL-CCNC: 20 U/L (ref 2–50)
ANION GAP SERPL CALC-SCNC: 11 MMOL/L (ref 7–16)
AST SERPL-CCNC: 23 U/L (ref 12–45)
BASOPHILS # BLD AUTO: 0.5 % (ref 0–1.8)
BASOPHILS # BLD: 0.03 K/UL (ref 0–0.12)
BILIRUB SERPL-MCNC: 0.4 MG/DL (ref 0.1–1.5)
BUN SERPL-MCNC: 20 MG/DL (ref 8–22)
CALCIUM ALBUM COR SERPL-MCNC: 10.2 MG/DL (ref 8.5–10.5)
CALCIUM SERPL-MCNC: 10.4 MG/DL (ref 8.5–10.5)
CHLORIDE SERPL-SCNC: 106 MMOL/L (ref 96–112)
CO2 SERPL-SCNC: 25 MMOL/L (ref 20–33)
CREAT SERPL-MCNC: 0.88 MG/DL (ref 0.5–1.4)
EKG IMPRESSION: NORMAL
EOSINOPHIL # BLD AUTO: 0.2 K/UL (ref 0–0.51)
EOSINOPHIL NFR BLD: 3.6 % (ref 0–6.9)
ERYTHROCYTE [DISTWIDTH] IN BLOOD BY AUTOMATED COUNT: 45.9 FL (ref 35.9–50)
GFR SERPLBLD CREATININE-BSD FMLA CKD-EPI: 68 ML/MIN/1.73 M 2
GLOBULIN SER CALC-MCNC: 2.5 G/DL (ref 1.9–3.5)
GLUCOSE SERPL-MCNC: 109 MG/DL (ref 65–99)
HCT VFR BLD AUTO: 42.8 % (ref 37–47)
HGB BLD-MCNC: 13.8 G/DL (ref 12–16)
IMM GRANULOCYTES # BLD AUTO: 0.02 K/UL (ref 0–0.11)
IMM GRANULOCYTES NFR BLD AUTO: 0.4 % (ref 0–0.9)
LYMPHOCYTES # BLD AUTO: 1.59 K/UL (ref 1–4.8)
LYMPHOCYTES NFR BLD: 28.4 % (ref 22–41)
MCH RBC QN AUTO: 31.9 PG (ref 27–33)
MCHC RBC AUTO-ENTMCNC: 32.2 G/DL (ref 32.2–35.5)
MCV RBC AUTO: 98.8 FL (ref 81.4–97.8)
MONOCYTES # BLD AUTO: 0.52 K/UL (ref 0–0.85)
MONOCYTES NFR BLD AUTO: 9.3 % (ref 0–13.4)
NEUTROPHILS # BLD AUTO: 3.24 K/UL (ref 1.82–7.42)
NEUTROPHILS NFR BLD: 57.8 % (ref 44–72)
NRBC # BLD AUTO: 0 K/UL
NRBC BLD-RTO: 0 /100 WBC (ref 0–0.2)
PLATELET # BLD AUTO: 270 K/UL (ref 164–446)
PMV BLD AUTO: 9.6 FL (ref 9–12.9)
POTASSIUM SERPL-SCNC: 3.8 MMOL/L (ref 3.6–5.5)
PROT SERPL-MCNC: 6.7 G/DL (ref 6–8.2)
RBC # BLD AUTO: 4.33 M/UL (ref 4.2–5.4)
SODIUM SERPL-SCNC: 142 MMOL/L (ref 135–145)
WBC # BLD AUTO: 5.6 K/UL (ref 4.8–10.8)

## 2025-04-15 PROCEDURE — 700102 HCHG RX REV CODE 250 W/ 637 OVERRIDE(OP): Performed by: HOSPITALIST

## 2025-04-15 PROCEDURE — 36415 COLL VENOUS BLD VENIPUNCTURE: CPT

## 2025-04-15 PROCEDURE — 96374 THER/PROPH/DIAG INJ IV PUSH: CPT | Mod: XU

## 2025-04-15 PROCEDURE — 80053 COMPREHEN METABOLIC PANEL: CPT

## 2025-04-15 PROCEDURE — 96375 TX/PRO/DX INJ NEW DRUG ADDON: CPT | Mod: XU

## 2025-04-15 PROCEDURE — 700102 HCHG RX REV CODE 250 W/ 637 OVERRIDE(OP): Performed by: EMERGENCY MEDICINE

## 2025-04-15 PROCEDURE — 700101 HCHG RX REV CODE 250: Performed by: HOSPITALIST

## 2025-04-15 PROCEDURE — 70496 CT ANGIOGRAPHY HEAD: CPT

## 2025-04-15 PROCEDURE — 99223 1ST HOSP IP/OBS HIGH 75: CPT | Performed by: HOSPITALIST

## 2025-04-15 PROCEDURE — A9270 NON-COVERED ITEM OR SERVICE: HCPCS | Performed by: EMERGENCY MEDICINE

## 2025-04-15 PROCEDURE — 700117 HCHG RX CONTRAST REV CODE 255: Performed by: EMERGENCY MEDICINE

## 2025-04-15 PROCEDURE — 96372 THER/PROPH/DIAG INJ SC/IM: CPT | Mod: XU

## 2025-04-15 PROCEDURE — 700111 HCHG RX REV CODE 636 W/ 250 OVERRIDE (IP): Mod: JZ | Performed by: EMERGENCY MEDICINE

## 2025-04-15 PROCEDURE — 70498 CT ANGIOGRAPHY NECK: CPT

## 2025-04-15 PROCEDURE — G0378 HOSPITAL OBSERVATION PER HR: HCPCS

## 2025-04-15 PROCEDURE — 85025 COMPLETE CBC W/AUTO DIFF WBC: CPT

## 2025-04-15 PROCEDURE — A9270 NON-COVERED ITEM OR SERVICE: HCPCS | Performed by: HOSPITALIST

## 2025-04-15 PROCEDURE — 700111 HCHG RX REV CODE 636 W/ 250 OVERRIDE (IP): Mod: JZ | Performed by: HOSPITALIST

## 2025-04-15 PROCEDURE — 99285 EMERGENCY DEPT VISIT HI MDM: CPT

## 2025-04-15 PROCEDURE — 93005 ELECTROCARDIOGRAM TRACING: CPT | Mod: TC | Performed by: EMERGENCY MEDICINE

## 2025-04-15 PROCEDURE — A9270 NON-COVERED ITEM OR SERVICE: HCPCS

## 2025-04-15 PROCEDURE — 700102 HCHG RX REV CODE 250 W/ 637 OVERRIDE(OP)

## 2025-04-15 RX ORDER — CELECOXIB 100 MG/1
100 CAPSULE ORAL DAILY
Status: DISCONTINUED | OUTPATIENT
Start: 2025-04-15 | End: 2025-04-15

## 2025-04-15 RX ORDER — ENOXAPARIN SODIUM 100 MG/ML
40 INJECTION SUBCUTANEOUS DAILY
Status: DISCONTINUED | OUTPATIENT
Start: 2025-04-15 | End: 2025-04-17 | Stop reason: HOSPADM

## 2025-04-15 RX ORDER — ALBUTEROL SULFATE 90 UG/1
2 INHALANT RESPIRATORY (INHALATION) EVERY 4 HOURS PRN
Status: DISCONTINUED | OUTPATIENT
Start: 2025-04-15 | End: 2025-04-17 | Stop reason: HOSPADM

## 2025-04-15 RX ORDER — AMLODIPINE BESYLATE 10 MG/1
10 TABLET ORAL DAILY
COMMUNITY
Start: 2025-04-01

## 2025-04-15 RX ORDER — ONDANSETRON 4 MG/1
4 TABLET, ORALLY DISINTEGRATING ORAL EVERY 4 HOURS PRN
Status: DISCONTINUED | OUTPATIENT
Start: 2025-04-15 | End: 2025-04-17 | Stop reason: HOSPADM

## 2025-04-15 RX ORDER — HYDRALAZINE HYDROCHLORIDE 10 MG/1
10 TABLET, FILM COATED ORAL ONCE
Status: COMPLETED | OUTPATIENT
Start: 2025-04-15 | End: 2025-04-15

## 2025-04-15 RX ORDER — ALPRAZOLAM 0.25 MG
0.25 TABLET ORAL ONCE
Status: COMPLETED | OUTPATIENT
Start: 2025-04-15 | End: 2025-04-15

## 2025-04-15 RX ORDER — ZOLMITRIPTAN 2.5 MG/1
2.5 TABLET, ORALLY DISINTEGRATING ORAL ONCE
Status: COMPLETED | OUTPATIENT
Start: 2025-04-15 | End: 2025-04-15

## 2025-04-15 RX ORDER — SERTRALINE HYDROCHLORIDE 100 MG/1
50 TABLET, FILM COATED ORAL DAILY
Status: DISCONTINUED | OUTPATIENT
Start: 2025-04-15 | End: 2025-04-15

## 2025-04-15 RX ORDER — ACETAMINOPHEN 10 MG/ML
1000 INJECTION, SOLUTION INTRAVENOUS ONCE
Status: COMPLETED | OUTPATIENT
Start: 2025-04-15 | End: 2025-04-15

## 2025-04-15 RX ORDER — AMLODIPINE BESYLATE 10 MG/1
10 TABLET ORAL DAILY
Status: DISCONTINUED | OUTPATIENT
Start: 2025-04-16 | End: 2025-04-17 | Stop reason: HOSPADM

## 2025-04-15 RX ORDER — ASPIRIN 81 MG/1
81 TABLET ORAL DAILY
Status: DISCONTINUED | OUTPATIENT
Start: 2025-04-15 | End: 2025-04-17 | Stop reason: HOSPADM

## 2025-04-15 RX ORDER — SERTRALINE HYDROCHLORIDE 100 MG/1
100 TABLET, FILM COATED ORAL DAILY
Status: DISCONTINUED | OUTPATIENT
Start: 2025-04-15 | End: 2025-04-15

## 2025-04-15 RX ORDER — OMEPRAZOLE 20 MG/1
20 CAPSULE, DELAYED RELEASE ORAL DAILY
Status: DISCONTINUED | OUTPATIENT
Start: 2025-04-16 | End: 2025-04-17 | Stop reason: HOSPADM

## 2025-04-15 RX ORDER — METHOCARBAMOL 500 MG/1
500 TABLET, FILM COATED ORAL ONCE
Status: COMPLETED | OUTPATIENT
Start: 2025-04-15 | End: 2025-04-15

## 2025-04-15 RX ORDER — HYDROCHLOROTHIAZIDE 25 MG/1
25 TABLET ORAL DAILY
Status: DISCONTINUED | OUTPATIENT
Start: 2025-04-16 | End: 2025-04-17 | Stop reason: HOSPADM

## 2025-04-15 RX ORDER — KETOROLAC TROMETHAMINE 15 MG/ML
15 INJECTION, SOLUTION INTRAMUSCULAR; INTRAVENOUS EVERY 6 HOURS PRN
Status: DISCONTINUED | OUTPATIENT
Start: 2025-04-15 | End: 2025-04-17 | Stop reason: HOSPADM

## 2025-04-15 RX ORDER — ALBUTEROL SULFATE 90 UG/1
2 INHALANT RESPIRATORY (INHALATION)
Status: DISCONTINUED | OUTPATIENT
Start: 2025-04-15 | End: 2025-04-15

## 2025-04-15 RX ORDER — POTASSIUM CHLORIDE 1500 MG/1
20 TABLET, EXTENDED RELEASE ORAL DAILY
Status: DISCONTINUED | OUTPATIENT
Start: 2025-04-15 | End: 2025-04-17 | Stop reason: HOSPADM

## 2025-04-15 RX ORDER — ATORVASTATIN CALCIUM 40 MG/1
80 TABLET, FILM COATED ORAL DAILY
Status: DISCONTINUED | OUTPATIENT
Start: 2025-04-16 | End: 2025-04-17 | Stop reason: HOSPADM

## 2025-04-15 RX ORDER — HYDRALAZINE HYDROCHLORIDE 50 MG/1
50 TABLET, FILM COATED ORAL EVERY 8 HOURS
Status: DISCONTINUED | OUTPATIENT
Start: 2025-04-15 | End: 2025-04-17 | Stop reason: HOSPADM

## 2025-04-15 RX ORDER — HYDRALAZINE HYDROCHLORIDE 20 MG/ML
10 INJECTION INTRAMUSCULAR; INTRAVENOUS EVERY 4 HOURS PRN
Status: DISCONTINUED | OUTPATIENT
Start: 2025-04-15 | End: 2025-04-17 | Stop reason: HOSPADM

## 2025-04-15 RX ORDER — BUTALBITAL, ACETAMINOPHEN AND CAFFEINE 50; 325; 40 MG/1; MG/1; MG/1
1 TABLET ORAL EVERY 6 HOURS PRN
Status: DISCONTINUED | OUTPATIENT
Start: 2025-04-15 | End: 2025-04-17 | Stop reason: HOSPADM

## 2025-04-15 RX ORDER — ONDANSETRON 2 MG/ML
4 INJECTION INTRAMUSCULAR; INTRAVENOUS EVERY 4 HOURS PRN
Status: DISCONTINUED | OUTPATIENT
Start: 2025-04-15 | End: 2025-04-17 | Stop reason: HOSPADM

## 2025-04-15 RX ORDER — LISINOPRIL 20 MG/1
40 TABLET ORAL DAILY
Status: DISCONTINUED | OUTPATIENT
Start: 2025-04-16 | End: 2025-04-17 | Stop reason: HOSPADM

## 2025-04-15 RX ORDER — PREDNISOLONE ACETATE 10 MG/ML
1 SUSPENSION/ DROPS OPHTHALMIC 4 TIMES DAILY
Status: DISCONTINUED | OUTPATIENT
Start: 2025-04-15 | End: 2025-04-17 | Stop reason: HOSPADM

## 2025-04-15 RX ADMIN — POTASSIUM CHLORIDE 20 MEQ: 1500 TABLET, EXTENDED RELEASE ORAL at 15:12

## 2025-04-15 RX ADMIN — IOHEXOL 80 ML: 350 INJECTION, SOLUTION INTRAVENOUS at 14:00

## 2025-04-15 RX ADMIN — SERTRALINE 150 MG: 100 TABLET, FILM COATED ORAL at 16:42

## 2025-04-15 RX ADMIN — HYDRALAZINE HYDROCHLORIDE 10 MG: 10 TABLET ORAL at 12:07

## 2025-04-15 RX ADMIN — ASPIRIN 81 MG: 81 TABLET, COATED ORAL at 15:12

## 2025-04-15 RX ADMIN — ENOXAPARIN SODIUM 40 MG: 100 INJECTION SUBCUTANEOUS at 17:50

## 2025-04-15 RX ADMIN — ALPRAZOLAM 0.25 MG: 0.25 TABLET ORAL at 16:42

## 2025-04-15 RX ADMIN — ACETAMINOPHEN 1000 MG: 10 INJECTION INTRAVENOUS at 10:05

## 2025-04-15 RX ADMIN — METHOCARBAMOL 500 MG: 500 TABLET ORAL at 12:08

## 2025-04-15 RX ADMIN — HYDRALAZINE HYDROCHLORIDE 50 MG: 25 TABLET ORAL at 15:12

## 2025-04-15 RX ADMIN — KETOROLAC TROMETHAMINE 15 MG: 15 INJECTION, SOLUTION INTRAMUSCULAR; INTRAVENOUS at 15:13

## 2025-04-15 RX ADMIN — HYDRALAZINE HYDROCHLORIDE 50 MG: 25 TABLET ORAL at 22:09

## 2025-04-15 RX ADMIN — BUTALBITAL, ACETAMINOPHEN AND CAFFEINE 1 TABLET: 325; 50; 40 TABLET ORAL at 22:09

## 2025-04-15 RX ADMIN — ZOLMITRIPTAN 2.5 MG: 2.5 TABLET, ORALLY DISINTEGRATING ORAL at 20:53

## 2025-04-15 ASSESSMENT — COGNITIVE AND FUNCTIONAL STATUS - GENERAL
MOVING TO AND FROM BED TO CHAIR: A LITTLE
SUGGESTED CMS G CODE MODIFIER DAILY ACTIVITY: CI
HELP NEEDED FOR BATHING: A LITTLE
DAILY ACTIVITIY SCORE: 23
STANDING UP FROM CHAIR USING ARMS: A LITTLE
SUGGESTED CMS G CODE MODIFIER MOBILITY: CJ
MOBILITY SCORE: 21
CLIMB 3 TO 5 STEPS WITH RAILING: A LITTLE

## 2025-04-15 ASSESSMENT — SOCIAL DETERMINANTS OF HEALTH (SDOH)
WITHIN THE PAST 12 MONTHS, YOU WORRIED THAT YOUR FOOD WOULD RUN OUT BEFORE YOU GOT THE MONEY TO BUY MORE: OFTEN TRUE
WITHIN THE LAST YEAR, HAVE YOU BEEN AFRAID OF YOUR PARTNER OR EX-PARTNER?: NO
WITHIN THE LAST YEAR, HAVE YOU BEEN HUMILIATED OR EMOTIONALLY ABUSED IN OTHER WAYS BY YOUR PARTNER OR EX-PARTNER?: NO
IN THE PAST 12 MONTHS, HAS THE ELECTRIC, GAS, OIL, OR WATER COMPANY THREATENED TO SHUT OFF SERVICE IN YOUR HOME?: NO
WITHIN THE LAST YEAR, HAVE TO BEEN RAPED OR FORCED TO HAVE ANY KIND OF SEXUAL ACTIVITY BY YOUR PARTNER OR EX-PARTNER?: NO
WITHIN THE LAST YEAR, HAVE YOU BEEN KICKED, HIT, SLAPPED, OR OTHERWISE PHYSICALLY HURT BY YOUR PARTNER OR EX-PARTNER?: NO
WITHIN THE PAST 12 MONTHS, THE FOOD YOU BOUGHT JUST DIDN'T LAST AND YOU DIDN'T HAVE MONEY TO GET MORE: OFTEN TRUE

## 2025-04-15 ASSESSMENT — LIFESTYLE VARIABLES
ALCOHOL_USE: NO
DOES PATIENT WANT TO STOP DRINKING: NO
TOTAL SCORE: 0
EVER FELT BAD OR GUILTY ABOUT YOUR DRINKING: NO
TOTAL SCORE: 0
CONSUMPTION TOTAL: NEGATIVE
HAVE PEOPLE ANNOYED YOU BY CRITICIZING YOUR DRINKING: NO
AVERAGE NUMBER OF DAYS PER WEEK YOU HAVE A DRINK CONTAINING ALCOHOL: 0
TOTAL SCORE: 0
HOW MANY TIMES IN THE PAST YEAR HAVE YOU HAD 5 OR MORE DRINKS IN A DAY: 0
HAVE YOU EVER FELT YOU SHOULD CUT DOWN ON YOUR DRINKING: NO
EVER HAD A DRINK FIRST THING IN THE MORNING TO STEADY YOUR NERVES TO GET RID OF A HANGOVER: NO
ON A TYPICAL DAY WHEN YOU DRINK ALCOHOL HOW MANY DRINKS DO YOU HAVE: 0

## 2025-04-15 ASSESSMENT — ENCOUNTER SYMPTOMS
PSYCHIATRIC NEGATIVE: 1
EYES NEGATIVE: 1
RESPIRATORY NEGATIVE: 1
NECK PAIN: 1
HEADACHES: 1
CARDIOVASCULAR NEGATIVE: 1
GASTROINTESTINAL NEGATIVE: 1
CONSTITUTIONAL NEGATIVE: 1

## 2025-04-15 ASSESSMENT — PAIN DESCRIPTION - PAIN TYPE
TYPE: ACUTE PAIN

## 2025-04-15 ASSESSMENT — FIBROSIS 4 INDEX
FIB4 SCORE: 1.4
FIB4 SCORE: 1.43

## 2025-04-15 NOTE — PROGRESS NOTES
Received pt from ED,on arrival pt awake,a&ox4,c/o severe neck pain,pt placed comfortable in bed,call light at bedside,BP still high,no c/o HA,POC explained.

## 2025-04-15 NOTE — ASSESSMENT & PLAN NOTE
Not treated.      Patient will need another referral to outpatientpulmonary for an updated test and the appropriate machinery(cpap/bipap) for home

## 2025-04-15 NOTE — DISCHARGE PLANNING
"TCN following. HTH/SCP chart review completed. Note pt currently in ED 2' to headache (Pt reports she was seen here a few days ago for migraine and chest pain. Pt has hypertension and was told this could be contributing to her migraines. Pt states that Dr. Bunn adjusted her HTN meds but it's not helping and her migraine persist. Pt also reports \"lump\" where migraine is coming from).  Per RN note on 4/15/25 at 8:37 AM, \"Pt reports migraine has been going on for 2-3 weeks\".      Note pt does have a primary/follow up visit scheduled: Established Patient with Physician Luis Leos M.D. on Wednesday April 16 4:05 PM.  At this time anticipating that pt will dc to home (either directly from ED or after admission to [facility] if warranted).     If patient does not warrant admission/ inpatient status to [facility] and is unable to functionally discharge home, please reach out to TCN for assist with SCP auth to discharge directly from ED to [facility].     If patient admits to [facility], TCN will continue to monitor and assist with transitional care needs as indicated. Please reach out to TCN via VOALTE if post acute transitional care needs are warranted for dc planning.      Addendum:  1149- Per chart review of RN note on 4/15/25 at 11:25 AM, \"patient ambulated to the room independently with a steady gait\".      "

## 2025-04-15 NOTE — ED PROVIDER NOTES
"Emergency Physician Note    Chief Concern:  Chief Complaint   Patient presents with    Headache     Pt reports she was seen here a few days ago for migraine and chest pain. Pt has hypertension and was told this could be contributing to her migraines. Pt states that Dr. Bunn adjusted her HTN meds but it's not helping and her migraine persist. Pt also reports \"lump\" where migraine is coming from.          External Records Reviewed:  Outpatient records reviewed: Patient was seen in internal medicine clinic 3/17/2025, followed by Dr. Bunn.  She was seen for hospital follow-up visit.  Noted that she was admitted 3/7 to 3/8 for chest pain with shortness of breath and palpitations.  Workup was negative including reassuring troponins, negative stress test, EKG negative for ischemia.  Noted to have a history of depression as well.  Also noted to have a history of primary hypertension, and palpitations.  Zio patch prescribed.    HPI/ROS     HPI:  Fe Clark is a 74 y.o. female who presents to the emergency department today for evaluation of occipital headache.  She reports a longstanding history of headaches, states that she has migraines.  For the past 2 to 3 weeks she has had pain localized to the left occipital region, radiating towards the left neck.  Though she states she has a history of chronic migraines, she states that this is not typically how they present.  She does not report any ongoing episodes of disequilibrium, however states she feels a little off balance when she turns her head quickly.  She reports no associated fevers, no frontal headache, at times states her vision seems a little blurred.    She was seen in this emergency department 4 days ago for evaluation of chest pain, also reports history of chronic headaches.  This is currently followed by her primary care physician, and had a Zio patch placed.  CT of the head was performed, no acute changes identified.    Since returning home she " has not noticed any significant improvement in symptoms.  She has no pain localized to the right side of the head or neck, no recent history of neck injury or head injury.  She does have a history of palpitations, was previously seen in the emergency department for chest pain, states she is chest pain-free today.  No history of impaired immunity.    PAST MEDICAL HISTORY  Past Medical History:   Diagnosis Date    ASTHMA     COPD (chronic obstructive pulmonary disease) (HCC)     Diverticulosis     Duodenal ulcer, unspecified as acute or chronic, without hemorrhage, perforation, or obstruction     GERD (gastroesophageal reflux disease)     Hiatal hernia     High cholesterol     Hypertension     Psychiatric problem        SURGICAL HISTORY  Past Surgical History:   Procedure Laterality Date    SUBMANDIBLE ABSCESS INCISION AND DRAINAGE  11/19/2016    Procedure: SUBMANDIBLE ABSCESS INCISION AND DRAINAGE;  Surgeon: Lior Hutchison D.D.S.;  Location: SURGERY [facility];  Service:     DENTAL EXTRACTION(S)  11/19/2016    Procedure: DENTAL EXTRACTION(S);  Surgeon: Lior Hutchison D.D.S.;  Location: SURGERY [facility];  Service:     LACRIMAL DUCT PROBE  3/11/2015    Performed by Alo Cheatham M.D. at SURGERY [facility]    ANKLE ARTHROSCOPY  7/31/2013    Performed by Paco Clifton M.D. at SURGERY [facility]    HARDWARE REMOVAL ORTHO  7/31/2013    Performed by Paco Clifton M.D. at SURGERY [facility]    BLADDER SUSPENSION      BOWEL RESECTION      COLONOSCOPY      with removal of pollyps    HYSTERECTOMY RADICAL      OTHER ORTHOPEDIC SURGERY      L ankle       FAMILY HISTORY  Family History   Problem Relation Age of Onset    No Known Problems Mother         2017 is age 91    Other Father 57        unknown issue       SOCIAL HISTORY   reports that she has never smoked. She has never used smokeless tobacco. She reports that she does not currently use alcohol. She reports that she does not currently use  "drugs.    CURRENT MEDICATIONS  Previous Medications    ALBUTEROL 108 (90 BASE) MCG/ACT AERO SOLN INHALATION AEROSOL    Inhale 2 Puffs every four hours as needed for Shortness of Breath.    AMLODIPINE (NORVASC) 5 MG TAB    Take 1 Tablet by mouth every day.    ATORVASTATIN (LIPITOR) 80 MG TABLET    Take 1 Tablet by mouth every evening.    CELECOXIB (CELEBREX) 100 MG CAP    Take 100 mg by mouth every day.    HYDROCHLOROTHIAZIDE 25 MG TAB    Take 1 Tablet by mouth every day.    HYDROCODONE-ACETAMINOPHEN (NORCO) 5-325 MG TAB PER TABLET    Take 1 Tablet by mouth every 8 hours as needed (pain) for up to 5 days.    LISINOPRIL (PRINIVIL) 20 MG TAB    Take 2 Tablets by mouth every day.    MULTIPLE VITAMINS-MINERALS (MULTIVITAMIN ADULTS 50+) TAB    Take 1 Tablet by mouth 2 times a day.    OMEPRAZOLE (PRILOSEC) 20 MG DELAYED-RELEASE CAPSULE    Take 20 mg by mouth every day.    PREDNISOLONE ACETATE (PRED FORTE) 1 % SUSPENSION    Administer 1 Drop into the left eye 4 times a day.    SERTRALINE (ZOLOFT) 100 MG TAB    Take 1 Tablet by mouth every day.    SERTRALINE (ZOLOFT) 50 MG TAB    Take 1 Tablet by mouth every day. ( Sertraline 150mg daily )       ALLERGIES  Patient has no known allergies.    PHYSICAL EXAM  Vital Signs: BP (!) 168/73   Pulse 60   Temp 36.9 °C (98.5 °F) (Temporal)   Resp 18   Ht 1.651 m (5' 5\")   Wt 81.6 kg (180 lb)   SpO2 96%   BMI 29.95 kg/m²   Constitutional: Alert, no acute distress, afebrile  HENT: Normocephalic, atraumatic.  Neck: She does have some discomfort on palpation of the left lateral neck  Cardiovascular: No tachycardia, regular rate and rhythm  Pulmonary: No respiratory distress, normal work of breathing, breath sounds clear and equal bilaterally  Abdomen: Soft, nondistended  Skin: Warm, dry, no rashes or lesions  Musculoskeletal: Normal range of motion in all extremities, no swelling or deformity noted  Neurologic: CN II-XII intact, speech normal, muscle strength 5/5 in all four " extremities, sensation grossly intact, normal finger-to-nose on the right, with left-sided finger-to-nose testing she initially did not touch her nose at all, was able to perform finger-nose testing on the left on the second attempt.    Diagnostic Studies & Procedures    Labs:  All labs reviewed by me as noted below.    EK Lead EKG interpreted by me as noted below  Results for orders placed or performed during the hospital encounter of 04/15/25   EKG   Result Value Ref Range    Report       [facility] Emergency Dept.    Test Date:  2025-04-15  Pt Name:    DOROTA AGUIRRE             Department: ER  MRN:        [id number]                      Room:       [facility]  Gender:     Female                       Technician: [id number]  :        [id number]                   Requested By:JASE VELIZ  Order #:    [id number]                    Reading MD: JASE VELIZ MD    Measurements  Intervals                                Axis  Rate:       55                           P:          12  AL:         165                          QRS:        -4  QRSD:       99                           T:          41  QT:         458  QTc:        438    Interpretive Statements  Rate 55, sinus bradycardia, no ST elevation or depression, no pathologic T  wave inversions, diffuse T wave flattening not significantly changed from  prior EKG.  Electronically Signed On 04- 13:12:03 PDT by JASE VELIZ MD         Radiology:  The attending Emergency Physician has independently interpreted the following imaging:  I independently reviewed the CT head, no intracranial bleed or mass identified.    CT-CTA HEAD WITH & W/O-POST PROCESS   Final Result      No intracranial aneurysm, focal high-grade stenosis, or abrupt large vessel cut off.      CT-CTA NECK WITH & W/O-POST PROCESSING   Final Result      No focal high-grade stenosis, dissection or occlusion of the cervical carotid or vertebral arteries.          Course and Medical  Decision Making    Initial Assessment and Plan:  Ms. Clark presents to the emergency department today for evaluation of left-sided occipital headache, and left-sided neck pain.  She was seen in this emergency department 4 days ago, CT imaging of the head and neck done at that time was reassuring.  These were noncontrasted studies.  Given worsening of symptoms with turning her head quickly, and pain localized over the vasculature, carotid dissection is a consideration, as is cerebellar infarct.    On laboratory evaluation CBC shows no significant abnormalities.  She has a normal white blood count, normal hemoglobin.  Creatinine is within normal limits, no evidence of endorgan dysfunction.  There are no electrolyte abnormalities, no liver enzyme abnormalities.    CTA of the head and neck demonstrate no high-grade stenosis, dissection, nor aneurysm.    She received a dose of IV Tylenol for left-sided headache.  On my reassessment, blood pressure had been steadily improving with systolic of 162.  However after getting up to use the restroom, she again became hypertensive with blood pressure of 198/128.  She continues to have ongoing left occipital pain, she was treated with methocarbamol, she is requesting a muscle relaxer.  Additionally she was treated with a dose of hydralazine for hypertension.  She states that she did take all of her home medications this morning.    On my reassessment, she continues to have uncontrolled hypertension.  Systolic blood pressure initially dropped to 160s, and is now increasing again to the 180s.  She continues to have ongoing occipital headache and neck pain after receiving Tylenol and Robaxin.  At this time, plan is for admission to hospitalist service for blood pressure control, and pain control.  Given occipital pain, and difficulty visualizing this area on CT imaging, consideration may be given to MRI on an inpatient basis as well.    Additional Problems and Disposition    I  have discussed management of the patient with the following physicians:   Dr. Esposito, Hospitalist    Disposition:  Admit in stable condition    FINAL IMPRESSION   1. Occipital headache    2. Secondary hypertension

## 2025-04-15 NOTE — ASSESSMENT & PLAN NOTE
Patient has baseline bradycardia and so beta-blocker relatively contraindicated.  Will increase Norvasc from 5 mg to 10 mg p.o. daily    Continue lisinopril at 40 mg p.o. daily    Hydro chlorothiazide 25 mg p.o. daily    p.o. hydralazine at 50 mg p.o. TID  started 4/15  Po coreg 3 mg po bid starrted on 4/16    Address Sleep apnea

## 2025-04-15 NOTE — ASSESSMENT & PLAN NOTE
Does not appear to be a classic migraine headache most likely related to her elevated blood pressure.      Patient will have IV Toradol for pain.      Control blood pressure

## 2025-04-15 NOTE — ED NOTES
Med Rec complete per patient   Allergies reviewed  Antibiotics in the past 30 days:no  Anticoagulant in past 14 days:no  Pharmacy patient utilizes:[facility] on [address]    Patient unable to verify strength of Amlodipine and Zoloft    Per pharmacy Amlodipine 10 mg dispensed on 4/1/25 (updated on med rec)    Per pharmacy patient recently picked up Zoloft 100 mg QD and has prescription for Zoloft 100 mg and 50 mg to be taking together for a total daily dose of 150 mg    Patient unable to verify which Zoloft strength she took this morning and states she has only been taking one Zoloft pill every morning

## 2025-04-15 NOTE — ED TRIAGE NOTES
"Chief Complaint   Patient presents with    Headache     Pt reports she was seen here a few days ago for migraine and chest pain. Pt has hypertension and was told this could be contributing to her migraines. Pt states that Dr. Bunn adjusted her HTN meds but it's not helping and her migraine persist. Pt also reports \"lump\" where migraine is coming from.          Pt wheeled to triage for above complaint.  Pt reports migraine has been going on for 2-3 weeks. Pt had minimal relief with pain medications prescribed which she ran out of yesterday.     Pt is AO x 4, follows commands, and responds appropriately to questions. Patient's breathing is unlabored and pain is currently 10/10 on the 0-10 pain scale.  Pt placed in lobby. Patient educated on triage process and encouraged to alert staff for any changes.      BP (!) 170/78   Pulse 65   Temp 36.9 °C (98.5 °F) (Temporal)   Resp 16   Ht 1.651 m (5' 5\")   Wt 81.6 kg (180 lb)   SpO2 97%     "

## 2025-04-15 NOTE — H&P
"Hospital Medicine History & Physical Note    Date of Service  4/15/2025    Primary Care Physician  Luis Leos M.D.    Consultants      Code Status  Full Code    Chief Complaint  Chief Complaint   Patient presents with    Headache     Pt reports she was seen here a few days ago for migraine and chest pain. Pt has hypertension and was told this could be contributing to her migraines. Pt states that Dr. Bunn adjusted her HTN meds but it's not helping and her migraine persist. Pt also reports \"lump\" where migraine is coming from.        History of Presenting Illness  Fe Clark is a 74 y.o. female who presented 4/15/2025 with past medical history of obstructive sleep apnea, hypertension, hyperlipidemia, migraines, asthma who presents emergency department with a 2-1/2 history of complaints of elevated blood pressure, occipital headache and neck discomfort.  The patient was seen in our emergency department recently with his elevated blood pressure and his complaints.  Her workup at that time was unremarkable.  Patient was referred back to her PCP for blood pressure management.  Her PCP did make some adjustments to her blood pressure medications but despite this ,her blood pressure still remains elevated in the 180s to 200s systolic.  Patient does deal with a lot of stress daily--> she has 2 adult disabled children that she is the primary caregiver for.     she also had a known diagnosis of obstructive sleep apnea and has not been able to get herself set up with a CPAP machine.    Patient had a CAT scan of the head did not show any acute abnormality      Patient complaining of neck pain and headache in the back of her head.  Constant    I discussed the plan of care with patient.    Review of Systems  Review of Systems   Constitutional: Negative.    HENT: Negative.     Eyes: Negative.    Respiratory: Negative.     Cardiovascular: Negative.    Gastrointestinal: Negative.    Genitourinary: Negative.  "   Musculoskeletal:  Positive for neck pain.   Skin: Negative.    Neurological:  Positive for headaches.   Psychiatric/Behavioral: Negative.         Past Medical History   has a past medical history of ASTHMA, COPD (chronic obstructive pulmonary disease) (HCC), Diverticulosis, Duodenal ulcer, unspecified as acute or chronic, without hemorrhage, perforation, or obstruction, GERD (gastroesophageal reflux disease), Hiatal hernia, High cholesterol, Hypertension, and Psychiatric problem.    Surgical History   has a past surgical history that includes bladder suspension; bowel resection; colonoscopy; hysterectomy radical; other orthopedic surgery; ankle arthroscopy (7/31/2013); hardware removal ortho (7/31/2013); lacrimal duct probe (3/11/2015); submandible abscess incision and drainage (11/19/2016); and dental extraction(s) (11/19/2016).     Family History  family history includes No Known Problems in her mother; Other (age of onset: 57) in her father.   Family history reviewed with patient. There is no family history that is pertinent to the chief complaint.     Social History   reports that she has never smoked. She has never used smokeless tobacco. She reports that she does not currently use alcohol. She reports that she does not currently use drugs.    Allergies  No Known Allergies    Medications  Prior to Admission Medications   Prescriptions Last Dose Informant Patient Reported? Taking?   Camphor-Eucalyptus-Menthol (ICY HOT NO-MESS VAPOR GEL EX) 4/14/2025 Evening Patient Yes Yes   Sig: Apply 1 Application topically 1 time a day as needed (neck pain).   HYDROcodone-acetaminophen (NORCO) 5-325 MG Tab per tablet 4/13/2025 Patient No No   Sig: Take 1 Tablet by mouth every 8 hours as needed (pain) for up to 5 days.   Patient taking differently: Take 1 Tablet by mouth every 8 hours as needed (pain). RAN OUT   Multiple Vitamins-Minerals (MULTIVITAMIN ADULTS 50+) Tab 4/15/2025 Morning Patient Yes Yes   Sig: Take 1 Tablet by  mouth 2 times a day.   albuterol 108 (90 Base) MCG/ACT Aero Soln inhalation aerosol 3/15/2025 Patient No No   Sig: Inhale 2 Puffs every four hours as needed for Shortness of Breath.   amLODIPine (NORVASC) 10 MG Tab 4/15/2025 Morning Patient's Home Pharmacy Yes Yes   Sig: Take 10 mg by mouth every day.   atorvastatin (LIPITOR) 80 MG tablet 4/15/2025 Morning Patient No Yes   Sig: Take 1 Tablet by mouth every evening.   Patient taking differently: Take 80 mg by mouth every morning.   celecoxib (CELEBREX) 100 MG Cap 4/15/2025 Morning Patient Yes Yes   Sig: Take 100 mg by mouth every day.   hydroCHLOROthiazide 25 MG Tab 4/15/2025 Morning Patient No Yes   Sig: Take 1 Tablet by mouth every day.   lisinopril (PRINIVIL) 20 MG Tab 4/15/2025 Morning Patient No Yes   Sig: Take 2 Tablets by mouth every day.   omeprazole (PRILOSEC) 20 MG delayed-release capsule 4/15/2025 Morning Patient Yes Yes   Sig: Take 20 mg by mouth every day.   sertraline (ZOLOFT) 100 MG Tab Unknown Patient's Home Pharmacy No No   Sig: Take 1 Tablet by mouth every day.   sertraline (ZOLOFT) 50 MG Tab Unknown Patient's Home Pharmacy No No   Sig: Take 1 Tablet by mouth every day. ( Sertraline 150mg daily )      Facility-Administered Medications: None       Physical Exam  Temp:  [36.9 °C (98.5 °F)] 36.9 °C (98.5 °F)  Pulse:  [54-65] 54  Resp:  [16-18] 18  BP: (151-192)/() 168/77  SpO2:  [90 %-97 %] 94 %  Blood Pressure : (!) 168/77   Temperature: 36.9 °C (98.5 °F)   Pulse: (!) 54   Respiration: 18   Pulse Oximetry: 94 %       Physical Exam  Vitals reviewed.   Constitutional:       General: She is in acute distress.      Appearance: She is not ill-appearing or diaphoretic.   HENT:      Head: Atraumatic.   Eyes:      General: No scleral icterus.  Cardiovascular:      Rate and Rhythm: Normal rate and regular rhythm.      Heart sounds: No murmur heard.     No friction rub. No gallop.   Pulmonary:      Effort: No respiratory distress.      Breath sounds: No  "stridor. No rhonchi.   Abdominal:      General: There is no distension.      Palpations: There is no mass.      Tenderness: There is no abdominal tenderness. There is no rebound.   Musculoskeletal:      Cervical back: Normal range of motion and neck supple.      Right lower leg: No edema.      Left lower leg: No edema.   Skin:     Capillary Refill: Capillary refill takes 2 to 3 seconds.   Neurological:      General: No focal deficit present.      Mental Status: She is oriented to person, place, and time.      Cranial Nerves: No cranial nerve deficit.      Motor: No weakness.   Psychiatric:         Mood and Affect: Mood normal.         Behavior: Behavior normal.         Laboratory:  Recent Labs     04/15/25  0955   WBC 5.6   RBC 4.33   HEMOGLOBIN 13.8   HEMATOCRIT 42.8   MCV 98.8*   MCH 31.9   MCHC 32.2   RDW 45.9   PLATELETCT 270   MPV 9.6     Recent Labs     04/15/25  0955   SODIUM 142   POTASSIUM 3.8   CHLORIDE 106   CO2 25   GLUCOSE 109*   BUN 20   CREATININE 0.88   CALCIUM 10.4     Recent Labs     04/15/25  0955   ALTSGPT 20   ASTSGOT 23   ALKPHOSPHAT 77   TBILIRUBIN 0.4   GLUCOSE 109*         No results for input(s): \"NTPROBNP\" in the last 72 hours.      No results for input(s): \"TROPONINT\" in the last 72 hours.    Imaging:  CT-CTA HEAD WITH & W/O-POST PROCESS   Final Result      No intracranial aneurysm, focal high-grade stenosis, or abrupt large vessel cut off.      CT-CTA NECK WITH & W/O-POST PROCESSING   Final Result      No focal high-grade stenosis, dissection or occlusion of the cervical carotid or vertebral arteries.      EKG reviewed by me-sinus bradycardia rate of 55.    X-Ray:  I have personally reviewed the images and compared with prior images.    Assessment/Plan:  Justification for Admission Status  I anticipate this patient is appropriate for observation status at this time           * Hypertensive urgency- (present on admission)  Assessment & Plan  Patient has baseline bradycardia and so " beta-blocker relatively contraindicated.  Will increase Norvasc from 5 mg to 10 mg p.o. daily    Continue lisinopril at 40 m p.o. daily    Hydro chlorothiazide 25 mg p.o. daily    Started on p.o. hydralazine at 50 mg p.o. daily    Address Sleep apnea    Obstructive sleep apnea syndrome- (present on admission)  Assessment & Plan  Not treated.      Patient will need another referral to outpatientpulmonary for an updated test and the appropriate machinery(cpap/bipap) for home    Tension type headache- (present on admission)  Assessment & Plan  Does not appear to be a classic migraine headache most likely related to her elevated blood pressure.      Patient will have IV Toradol for pain.      Control blood pressure    Hyperlipidemia- (present on admission)  Assessment & Plan  Continue Lipitor        VTE prophylaxis: SCDs/TEDs

## 2025-04-15 NOTE — ED NOTES
Patient is alert and oriented, calm and cooperative. Vital signs are stable. No further needs at this time.

## 2025-04-16 ENCOUNTER — APPOINTMENT (OUTPATIENT)
Dept: RADIOLOGY | Facility: MEDICAL CENTER | Age: 75
End: 2025-04-16
Attending: HOSPITALIST
Payer: MEDICARE

## 2025-04-16 ENCOUNTER — APPOINTMENT (OUTPATIENT)
Dept: MEDICAL GROUP | Facility: MEDICAL CENTER | Age: 75
End: 2025-04-16
Payer: MEDICARE

## 2025-04-16 PROBLEM — M54.2 NECK PAIN: Status: ACTIVE | Noted: 2025-04-16

## 2025-04-16 PROCEDURE — A9270 NON-COVERED ITEM OR SERVICE: HCPCS | Performed by: HOSPITALIST

## 2025-04-16 PROCEDURE — 96372 THER/PROPH/DIAG INJ SC/IM: CPT | Mod: XU

## 2025-04-16 PROCEDURE — 96376 TX/PRO/DX INJ SAME DRUG ADON: CPT

## 2025-04-16 PROCEDURE — 99233 SBSQ HOSP IP/OBS HIGH 50: CPT | Performed by: HOSPITALIST

## 2025-04-16 PROCEDURE — 700102 HCHG RX REV CODE 250 W/ 637 OVERRIDE(OP): Performed by: HOSPITALIST

## 2025-04-16 PROCEDURE — 700101 HCHG RX REV CODE 250: Performed by: HOSPITALIST

## 2025-04-16 PROCEDURE — G0378 HOSPITAL OBSERVATION PER HR: HCPCS

## 2025-04-16 PROCEDURE — 700102 HCHG RX REV CODE 250 W/ 637 OVERRIDE(OP)

## 2025-04-16 PROCEDURE — 700111 HCHG RX REV CODE 636 W/ 250 OVERRIDE (IP): Mod: JZ | Performed by: HOSPITALIST

## 2025-04-16 PROCEDURE — 96375 TX/PRO/DX INJ NEW DRUG ADDON: CPT | Mod: XU

## 2025-04-16 PROCEDURE — A9270 NON-COVERED ITEM OR SERVICE: HCPCS

## 2025-04-16 RX ORDER — CYCLOBENZAPRINE HCL 10 MG
10 TABLET ORAL 3 TIMES DAILY PRN
Status: DISCONTINUED | OUTPATIENT
Start: 2025-04-16 | End: 2025-04-17 | Stop reason: HOSPADM

## 2025-04-16 RX ORDER — METHYLPREDNISOLONE 4 MG/1
4 TABLET ORAL
Status: DISCONTINUED | OUTPATIENT
Start: 2025-04-19 | End: 2025-04-17 | Stop reason: HOSPADM

## 2025-04-16 RX ORDER — METHYLPREDNISOLONE 4 MG/1
4 TABLET ORAL 2 TIMES DAILY WITH MEALS
Status: DISCONTINUED | OUTPATIENT
Start: 2025-04-20 | End: 2025-04-17 | Stop reason: HOSPADM

## 2025-04-16 RX ORDER — ACETAMINOPHEN 325 MG/1
650 TABLET ORAL EVERY 6 HOURS PRN
Status: DISCONTINUED | OUTPATIENT
Start: 2025-04-16 | End: 2025-04-17 | Stop reason: HOSPADM

## 2025-04-16 RX ORDER — METHYLPREDNISOLONE 4 MG/1
4 TABLET ORAL
Status: DISCONTINUED | OUTPATIENT
Start: 2025-04-18 | End: 2025-04-17 | Stop reason: HOSPADM

## 2025-04-16 RX ORDER — METHYLPREDNISOLONE 4 MG/1
4 TABLET ORAL
Status: DISCONTINUED | OUTPATIENT
Start: 2025-04-17 | End: 2025-04-17 | Stop reason: HOSPADM

## 2025-04-16 RX ORDER — CARVEDILOL 3.12 MG/1
3.12 TABLET ORAL 2 TIMES DAILY WITH MEALS
Status: DISCONTINUED | OUTPATIENT
Start: 2025-04-16 | End: 2025-04-17

## 2025-04-16 RX ORDER — METHYLPREDNISOLONE 4 MG/1
8 TABLET ORAL
Status: DISCONTINUED | OUTPATIENT
Start: 2025-04-17 | End: 2025-04-17 | Stop reason: HOSPADM

## 2025-04-16 RX ORDER — LIDOCAINE 4 G/G
1 PATCH TOPICAL EVERY 24 HOURS
Status: DISCONTINUED | OUTPATIENT
Start: 2025-04-16 | End: 2025-04-17 | Stop reason: HOSPADM

## 2025-04-16 RX ADMIN — CYCLOBENZAPRINE HYDROCHLORIDE 10 MG: 5 TABLET, FILM COATED ORAL at 13:59

## 2025-04-16 RX ADMIN — BUTALBITAL, ACETAMINOPHEN AND CAFFEINE 1 TABLET: 325; 50; 40 TABLET ORAL at 11:23

## 2025-04-16 RX ADMIN — KETOROLAC TROMETHAMINE 15 MG: 15 INJECTION, SOLUTION INTRAMUSCULAR; INTRAVENOUS at 12:36

## 2025-04-16 RX ADMIN — KETOROLAC TROMETHAMINE 15 MG: 15 INJECTION, SOLUTION INTRAMUSCULAR; INTRAVENOUS at 02:44

## 2025-04-16 RX ADMIN — OMEPRAZOLE 20 MG: 20 CAPSULE, DELAYED RELEASE ORAL at 05:15

## 2025-04-16 RX ADMIN — ACETAMINOPHEN 650 MG: 325 TABLET, FILM COATED ORAL at 13:59

## 2025-04-16 RX ADMIN — AMLODIPINE BESYLATE 10 MG: 10 TABLET ORAL at 05:15

## 2025-04-16 RX ADMIN — CYCLOBENZAPRINE HYDROCHLORIDE 10 MG: 5 TABLET, FILM COATED ORAL at 09:12

## 2025-04-16 RX ADMIN — HYDRALAZINE HYDROCHLORIDE 50 MG: 25 TABLET ORAL at 05:15

## 2025-04-16 RX ADMIN — SERTRALINE 150 MG: 100 TABLET, FILM COATED ORAL at 05:15

## 2025-04-16 RX ADMIN — HYDROCHLOROTHIAZIDE 25 MG: 25 TABLET ORAL at 05:15

## 2025-04-16 RX ADMIN — ATORVASTATIN CALCIUM 80 MG: 80 TABLET, FILM COATED ORAL at 05:14

## 2025-04-16 RX ADMIN — LIDOCAINE PAIN RELIEF 1 PATCH: 560 PATCH TOPICAL at 19:15

## 2025-04-16 RX ADMIN — ACETAMINOPHEN 650 MG: 325 TABLET, FILM COATED ORAL at 02:44

## 2025-04-16 RX ADMIN — BUTALBITAL, ACETAMINOPHEN AND CAFFEINE 1 TABLET: 325; 50; 40 TABLET ORAL at 20:07

## 2025-04-16 RX ADMIN — CARVEDILOL 3.12 MG: 3.12 TABLET, FILM COATED ORAL at 17:20

## 2025-04-16 RX ADMIN — KETOROLAC TROMETHAMINE 15 MG: 15 INJECTION, SOLUTION INTRAMUSCULAR; INTRAVENOUS at 23:18

## 2025-04-16 RX ADMIN — LISINOPRIL 40 MG: 20 TABLET ORAL at 05:15

## 2025-04-16 RX ADMIN — METHYLPREDNISOLONE 24 MG: 4 TABLET ORAL at 09:13

## 2025-04-16 RX ADMIN — HYDRALAZINE HYDROCHLORIDE 50 MG: 25 TABLET ORAL at 13:59

## 2025-04-16 RX ADMIN — ASPIRIN 81 MG: 81 TABLET, COATED ORAL at 05:15

## 2025-04-16 RX ADMIN — ENOXAPARIN SODIUM 40 MG: 100 INJECTION SUBCUTANEOUS at 17:20

## 2025-04-16 RX ADMIN — HYDRALAZINE HYDROCHLORIDE 50 MG: 25 TABLET ORAL at 21:42

## 2025-04-16 RX ADMIN — HYDRALAZINE HYDROCHLORIDE 10 MG: 20 INJECTION, SOLUTION INTRAMUSCULAR; INTRAVENOUS at 00:00

## 2025-04-16 ASSESSMENT — ENCOUNTER SYMPTOMS
GASTROINTESTINAL NEGATIVE: 1
EYES NEGATIVE: 1
NECK PAIN: 1
PSYCHIATRIC NEGATIVE: 1
CARDIOVASCULAR NEGATIVE: 1
NEUROLOGICAL NEGATIVE: 1
CONSTITUTIONAL NEGATIVE: 1
RESPIRATORY NEGATIVE: 1

## 2025-04-16 ASSESSMENT — PAIN DESCRIPTION - PAIN TYPE
TYPE: ACUTE PAIN

## 2025-04-16 NOTE — PROGRESS NOTES
[facility] Medicine Daily Progress Note    Date of Service  4/16/2025    Chief Complaint  Fe Clark is a 74 y.o. female admitted 4/15/2025 with neck pain  and elevated BP    Hospital Course  Fe Clark is a 74 y.o. female who presented 4/15/2025 with past medical history of obstructive sleep apnea, hypertension, hyperlipidemia, migraines, asthma who presents emergency department with a 2-1/2 history of complaints of elevated blood pressure, occipital headache and neck discomfort.  The patient was seen in our emergency department recently with his elevated blood pressure and his complaints.  Her workup at that time was unremarkable.  Patient was referred back to her PCP for blood pressure management.  Her PCP did make some adjustments to her blood pressure medications but despite this ,her blood pressure still remains elevated in the 180s to 200s systolic.  Patient does deal with a lot of stress daily--> she has 2 adult disabled children that she is the primary caregiver for.      she also had a known diagnosis of obstructive sleep apnea and has not been able to get herself set up with a CPAP machine.     Patient had a CAT scan of the head did not show any acute abnormality       Patient complaining of neck pain and headache in the back of her head.  Constant       Interval Problem Update  Patient still complaining of neck pain despite multimodal pain management.    Intermittent throbbing in the neck without radiation.  Intensity 7 out of 10    CT of the neck shows arthritic issues and ordered an MRI of the neck for further evaluation.    Added Flexeril    BP still elevated have added low-dose Coreg    I have discussed this patient's plan of care and discharge plan at IDT rounds today with Case Management, Nursing, Nursing leadership, and other members of the IDT team.    Consultants/Specialty      Code Status  Full Code    Disposition  The patient is not medically cleared for discharge  to home or a post-acute facility.  Anticipate discharge to: home with close outpatient follow-up    I have placed the appropriate orders for post-discharge needs.    Review of Systems  Review of Systems   Constitutional: Negative.    HENT: Negative.     Eyes: Negative.    Respiratory: Negative.     Cardiovascular: Negative.    Gastrointestinal: Negative.    Genitourinary: Negative.    Musculoskeletal:  Positive for neck pain.   Neurological: Negative.    Psychiatric/Behavioral: Negative.          Physical Exam  Temp:  [36.2 °C (97.2 °F)-36.9 °C (98.4 °F)] 36.2 °C (97.2 °F)  Pulse:  [55-61] 60  Resp:  [12-18] 18  BP: (143-188)/(68-84) 143/69  SpO2:  [90 %-93 %] 90 %    Physical Exam  Constitutional:       General: She is not in acute distress.     Appearance: She is not ill-appearing.   HENT:      Head: Normocephalic and atraumatic.   Eyes:      General: No scleral icterus.  Cardiovascular:      Rate and Rhythm: Bradycardia present.   Pulmonary:      Effort: No respiratory distress.      Breath sounds: No stridor. No wheezing or rhonchi.   Abdominal:      General: There is no distension.      Palpations: There is no mass.      Tenderness: There is no abdominal tenderness.      Hernia: No hernia is present.   Musculoskeletal:      Cervical back: Normal range of motion and neck supple.      Right lower leg: No edema.      Left lower leg: No edema.   Skin:     General: Skin is warm.      Capillary Refill: Capillary refill takes 2 to 3 seconds.   Neurological:      General: No focal deficit present.      Cranial Nerves: No cranial nerve deficit.      Motor: No weakness.   Psychiatric:         Mood and Affect: Mood normal.         Behavior: Behavior normal.         Fluids  No intake or output data in the 24 hours ending 04/16/25 1603     Laboratory  Recent Labs     04/15/25  0955   WBC 5.6   RBC 4.33   HEMOGLOBIN 13.8   HEMATOCRIT 42.8   MCV 98.8*   MCH 31.9   MCHC 32.2   RDW 45.9   PLATELETCT 270   MPV 9.6     Recent  Labs     04/15/25  0955   SODIUM 142   POTASSIUM 3.8   CHLORIDE 106   CO2 25   GLUCOSE 109*   BUN 20   CREATININE 0.88   CALCIUM 10.4                   Imaging  CT-CTA HEAD WITH & W/O-POST PROCESS   Final Result      No intracranial aneurysm, focal high-grade stenosis, or abrupt large vessel cut off.      CT-CTA NECK WITH & W/O-POST PROCESSING   Final Result      No focal high-grade stenosis, dissection or occlusion of the cervical carotid or vertebral arteries.      MR-CERVICAL SPINE-W/O    (Results Pending)        Assessment/Plan  * Hypertensive urgency- (present on admission)  Assessment & Plan  Patient has baseline bradycardia and so beta-blocker relatively contraindicated.  Will increase Norvasc from 5 mg to 10 mg p.o. daily    Continue lisinopril at 40 mg p.o. daily    Hydro chlorothiazide 25 mg p.o. daily    p.o. hydralazine at 50 mg p.o. TID  started 4/15  Po coreg 3 mg po bid starrted on 4/16    Address Sleep apnea    Neck pain- (present on admission)  Assessment & Plan  Medrol dose pack started in 4/16    Check mri neck    Obstructive sleep apnea syndrome- (present on admission)  Assessment & Plan  Not treated.      Patient will need another referral to outpatientpulmonary for an updated test and the appropriate machinery(cpap/bipap) for home    Tension type headache- (present on admission)  Assessment & Plan  Does not appear to be a classic migraine headache most likely related to her elevated blood pressure.      Patient will have IV Toradol for pain.      Control blood pressure    Hyperlipidemia- (present on admission)  Assessment & Plan  Continue Lipitor         VTE prophylaxis: VTE Selection    I have performed a physical exam and reviewed and updated ROS and Plan today (4/16/2025). In review of yesterday's note (4/15/2025), there are no changes except as documented above.      Greater than 52 minutes spent prepping to see patient (e.g. review of tests) obtaining and/or reviewing separately obtained  history. Performing a medically appropriate examination and/ evaluation.  Counseling and educating the patient/family/caregiver.  Ordering medications, tests, or procedures.  Referring and communicating with other health care professionals.  Documenting clinical information in EPIC.  Independently interpreting results and communicating results to patient/family/caregiver.  Care coordination.

## 2025-04-16 NOTE — DISCHARGE PLANNING
HTH/SCP TCN chart review completed. The most current review of medical record, knowledge of pt's PLOF and social support, LACE+ score of 78, 6 clicks scores of 21 mobility were considered.  Note pt currently admitted to hospital for hypertensive urgency.      Per kardex, pt ambulated with 2x10 feet with no AD and per flowsheet pt is currently on RA.. At this time anticipating that pt will dc to home with OP f/u.       TCN will continue to monitor and assist with transitional care needs as indicated. Please reach out to TCN via VOALTE if post acute transitional care needs are warranted for dc planning.    Completed:  Choice obtained:none  SCP with [facility] PCP. PCP f/u scheduled for today; may need to be rescheduled..

## 2025-04-16 NOTE — PROGRESS NOTES
4 Eyes Skin Assessment Completed by 2 RN     Head WDL  Ears WDL  Nose WDL  Mouth WDL  Neck WDL  Breast/Chest -redness,pt says healing burns  Shoulder Blades WDL  Spine WDL  (R) Arm/Elbow/Hand  WNL  (L) Arm/Elbow-  Abdomen WDL  Groin WDL  Scrotum/Coccyx/Buttocks WDL  (R) Leg WDL  (L) Leg WDL  (R) Heel/Foot/Toe WDL  (L) Heel/Foot/Toe WDL   Devices In Place : Pulse Ox  Interventions In Place Pillows  Possible Skin Injury No   Pictures Uploaded Into Epic -yes  Wound Consult Placed N/A  RN Wound Prevention Protocol Ordered No

## 2025-04-16 NOTE — HOSPITAL COURSE
Fe Clark is a 74 y.o. female who presented 4/15/2025 with past medical history of obstructive sleep apnea, hypertension, hyperlipidemia, migraines, asthma who presents emergency department with a 2-1/2 history of complaints of elevated blood pressure, occipital headache and neck discomfort.  The patient was seen in our emergency department recently with his elevated blood pressure and his complaints.  Her workup at that time was unremarkable.  Patient was referred back to her PCP for blood pressure management.  Her PCP did make some adjustments to her blood pressure medications but despite this ,her blood pressure still remains elevated in the 180s to 200s systolic.  Patient does deal with a lot of stress daily--> she has 2 adult disabled children that she is the primary caregiver for.      she also had a known diagnosis of obstructive sleep apnea and has not been able to get herself set up with a CPAP machine.     Patient had a CAT scan of the head did not show any acute abnormality       Patient complaining of neck pain and headache in the back of her head.  Constant

## 2025-04-16 NOTE — PROGRESS NOTES
Pt transferred via WC to new assigned room. Sent with all personal belongings, paper chart and pharmacy meds.

## 2025-04-16 NOTE — CARE PLAN
The patient is Watcher - Medium risk of patient condition declining or worsening    Shift Goals  Clinical Goals: Manage BP, HA  Patient Goals: Improved symptoms, BP management in home  Family Goals: NA    Progress made toward(s) clinical / shift goals:    Problem: Knowledge Deficit - Standard  Goal: Patient and family/care givers will demonstrate understanding of plan of care, disease process/condition, diagnostic tests and medications  Description: Target End Date:  1-3 days or as soon as patient condition allowsDocument in Patient Education1.  Patient and family/caregiver oriented to unit, equipment, visitation policy and means for communicating concern2.  Complete/review Learning Assessment3.  Assess knowledge level of disease process/condition, treatment plan, diagnostic tests and medications4.  Explain disease process/condition, treatment plan, diagnostic tests and medications  Outcome: Progressing       Patient is not progressing towards the following goals:

## 2025-04-16 NOTE — CARE PLAN
The patient is Watcher - Medium risk of patient condition declining or worsening         Progress made toward(s) clinical / shift goals: PAIN CONTROL    Patient is not progressing towards the following goals:      Problem: Pain - Standard  Goal: Alleviation of pain or a reduction in pain to the patient’s comfort goal  Outcome: Progressing     Problem: Knowledge Deficit - Standard  Goal: Patient and family/care givers will demonstrate understanding of plan of care, disease process/condition, diagnostic tests and medications  Outcome: Progressing

## 2025-04-16 NOTE — DISCHARGE PLANNING
Post Acute Navigator Team    Per chart review, patient has been identified as a candidate for a goals of care discussion from following data:   End of Life Care Index score 15  High LACE + score 78  6 click Mobility score 23   6 click ADL score 21   Readmission: Yes     Code Status:Full   Advanced Directive present in EMR: NA   POLST present in EMR: NA     Per chart review, patient is not a candidate for home-based post acute programs at this time.     Please reach out to me directly should care team feel patient will benefit from a discharge goals of care conversation regarding outpatient/home palliative care or hospice.   actual

## 2025-04-16 NOTE — CARE PLAN
The patient is Stable - Low risk of patient condition declining or worsening    Shift Goals  Clinical Goals: pain control, bp control  Patient Goals: feel better  Family Goals: KAEL    Progress made toward(s) clinical / shift goals:    Problem: Pain - Standard  Goal: Alleviation of pain or a reduction in pain to the patient’s comfort goal  Description: Target End Date:  Prior to discharge or change in level of careDocument on Vitals flowsheet1.  Document pain using the appropriate pain scale per order or unit policy2.  Educate and implement non-pharmacologic comfort measures (i.e. relaxation, distraction, massage, cold/heat therapy, etc.)3.  Pain management medications as ordered4.  Reassess pain after pain med administration per policy5.  If opiods administered assess patient's response to pain medication is appropriate per POSS sedation scale6.  Follow pain management plan developed in collaboration with patient and interdisciplinary team (including palliative care or pain specialists if applicable)  Outcome: Progressing     Problem: Knowledge Deficit - Standard  Goal: Patient and family/care givers will demonstrate understanding of plan of care, disease process/condition, diagnostic tests and medications  Description: Target End Date:  1-3 days or as soon as patient condition allowsDocument in Patient Education1.  Patient and family/caregiver oriented to unit, equipment, visitation policy and means for communicating concern2.  Complete/review Learning Assessment3.  Assess knowledge level of disease process/condition, treatment plan, diagnostic tests and medications4.  Explain disease process/condition, treatment plan, diagnostic tests and medications  Outcome: Progressing       Patient is not progressing towards the following goals:

## 2025-04-16 NOTE — PROGRESS NOTES
Bedside report received. POC discussed with pt;Pt c/o continued MD GRISEL to place orders, BP improved,no overnight cardiac events; all questions answered at this time.

## 2025-04-16 NOTE — PROGRESS NOTES
Monitor summary: SR/SB 53-66, IA 0.17, QRS 0.11, QT 0.49 with frequent and occasional PVCs and bundle branch block per strip from monitor room.

## 2025-04-17 ENCOUNTER — APPOINTMENT (OUTPATIENT)
Dept: RADIOLOGY | Facility: MEDICAL CENTER | Age: 75
End: 2025-04-17
Attending: HOSPITALIST
Payer: MEDICARE

## 2025-04-17 ENCOUNTER — PHARMACY VISIT (OUTPATIENT)
Dept: PHARMACY | Facility: MEDICAL CENTER | Age: 75
End: 2025-04-17
Payer: COMMERCIAL

## 2025-04-17 VITALS
DIASTOLIC BLOOD PRESSURE: 70 MMHG | HEART RATE: 62 BPM | WEIGHT: 176.37 LBS | BODY MASS INDEX: 29.38 KG/M2 | HEIGHT: 65 IN | SYSTOLIC BLOOD PRESSURE: 160 MMHG | OXYGEN SATURATION: 94 % | RESPIRATION RATE: 18 BRPM | TEMPERATURE: 97.1 F

## 2025-04-17 PROCEDURE — 94664 DEMO&/EVAL PT USE INHALER: CPT

## 2025-04-17 PROCEDURE — 72141 MRI NECK SPINE W/O DYE: CPT

## 2025-04-17 PROCEDURE — A9270 NON-COVERED ITEM OR SERVICE: HCPCS

## 2025-04-17 PROCEDURE — 700102 HCHG RX REV CODE 250 W/ 637 OVERRIDE(OP): Performed by: HOSPITALIST

## 2025-04-17 PROCEDURE — 99239 HOSP IP/OBS DSCHRG MGMT >30: CPT | Performed by: STUDENT IN AN ORGANIZED HEALTH CARE EDUCATION/TRAINING PROGRAM

## 2025-04-17 PROCEDURE — 700102 HCHG RX REV CODE 250 W/ 637 OVERRIDE(OP)

## 2025-04-17 PROCEDURE — A9270 NON-COVERED ITEM OR SERVICE: HCPCS | Performed by: HOSPITALIST

## 2025-04-17 PROCEDURE — RXMED WILLOW AMBULATORY MEDICATION CHARGE: Performed by: STUDENT IN AN ORGANIZED HEALTH CARE EDUCATION/TRAINING PROGRAM

## 2025-04-17 PROCEDURE — 700111 HCHG RX REV CODE 636 W/ 250 OVERRIDE (IP): Mod: JZ | Performed by: HOSPITALIST

## 2025-04-17 PROCEDURE — G0378 HOSPITAL OBSERVATION PER HR: HCPCS

## 2025-04-17 PROCEDURE — 96376 TX/PRO/DX INJ SAME DRUG ADON: CPT

## 2025-04-17 RX ORDER — METHOCARBAMOL 500 MG/1
500 TABLET, FILM COATED ORAL 3 TIMES DAILY PRN
Qty: 30 TABLET | Refills: 0 | Status: SHIPPED | OUTPATIENT
Start: 2025-04-17 | End: 2025-05-01

## 2025-04-17 RX ORDER — HYDRALAZINE HYDROCHLORIDE 50 MG/1
50 TABLET, FILM COATED ORAL EVERY 8 HOURS
Qty: 90 TABLET | Refills: 0 | Status: SHIPPED | OUTPATIENT
Start: 2025-04-17 | End: 2025-04-21 | Stop reason: SDUPTHER

## 2025-04-17 RX ADMIN — HYDRALAZINE HYDROCHLORIDE 50 MG: 25 TABLET ORAL at 06:07

## 2025-04-17 RX ADMIN — OMEPRAZOLE 20 MG: 20 CAPSULE, DELAYED RELEASE ORAL at 06:07

## 2025-04-17 RX ADMIN — ATORVASTATIN CALCIUM 80 MG: 80 TABLET, FILM COATED ORAL at 06:07

## 2025-04-17 RX ADMIN — CARVEDILOL 3.12 MG: 3.12 TABLET, FILM COATED ORAL at 09:16

## 2025-04-17 RX ADMIN — METHYLPREDNISOLONE 4 MG: 4 TABLET ORAL at 09:16

## 2025-04-17 RX ADMIN — KETOROLAC TROMETHAMINE 15 MG: 15 INJECTION, SOLUTION INTRAMUSCULAR; INTRAVENOUS at 09:15

## 2025-04-17 RX ADMIN — AMLODIPINE BESYLATE 10 MG: 10 TABLET ORAL at 06:08

## 2025-04-17 RX ADMIN — METHYLPREDNISOLONE 4 MG: 4 TABLET ORAL at 12:45

## 2025-04-17 RX ADMIN — HYDROCHLOROTHIAZIDE 25 MG: 25 TABLET ORAL at 06:08

## 2025-04-17 RX ADMIN — BUTALBITAL, ACETAMINOPHEN AND CAFFEINE 1 TABLET: 325; 50; 40 TABLET ORAL at 12:45

## 2025-04-17 RX ADMIN — POTASSIUM CHLORIDE 20 MEQ: 1500 TABLET, EXTENDED RELEASE ORAL at 06:08

## 2025-04-17 RX ADMIN — ASPIRIN 81 MG: 81 TABLET, COATED ORAL at 06:07

## 2025-04-17 RX ADMIN — SERTRALINE 150 MG: 100 TABLET, FILM COATED ORAL at 06:08

## 2025-04-17 RX ADMIN — LISINOPRIL 40 MG: 20 TABLET ORAL at 06:08

## 2025-04-17 ASSESSMENT — PAIN DESCRIPTION - PAIN TYPE
TYPE: ACUTE PAIN
TYPE: ACUTE PAIN

## 2025-04-17 ASSESSMENT — COGNITIVE AND FUNCTIONAL STATUS - GENERAL
STANDING UP FROM CHAIR USING ARMS: A LITTLE
DAILY ACTIVITIY SCORE: 23
MOVING TO AND FROM BED TO CHAIR: A LITTLE
SUGGESTED CMS G CODE MODIFIER MOBILITY: CJ
MOBILITY SCORE: 21
CLIMB 3 TO 5 STEPS WITH RAILING: A LITTLE
HELP NEEDED FOR BATHING: A LITTLE
SUGGESTED CMS G CODE MODIFIER DAILY ACTIVITY: CI

## 2025-04-17 ASSESSMENT — PATIENT HEALTH QUESTIONNAIRE - PHQ9
2. FEELING DOWN, DEPRESSED, IRRITABLE, OR HOPELESS: NOT AT ALL
SUM OF ALL RESPONSES TO PHQ9 QUESTIONS 1 AND 2: 0
1. LITTLE INTEREST OR PLEASURE IN DOING THINGS: NOT AT ALL

## 2025-04-17 NOTE — CARE PLAN
The patient is Stable - Low risk of patient condition declining or worsening    Shift Goals  Clinical Goals: Patient will report a tolerable pain level, remain hemodynamically stable  Patient Goals: Pain control  Family Goals: KAEL    Progress made toward(s) clinical / shift goals:  Patient educated on the pain rating scale and medications available for interventions. Q4H pain assessments in place, Q2H PO pain medication assessments in place, and Q1H IV pain medication assessments in place. Patient medicated per MAR for pain this shift. Patient remained hemodynamically stable this shift. SBP remained in 120s.     Problem: Knowledge Deficit - Standard  Goal: Patient and family/care givers will demonstrate understanding of plan of care, disease process/condition, diagnostic tests and medications  Outcome: Progressing     Problem: Pain - Standard  Goal: Alleviation of pain or a reduction in pain to the patient’s comfort goal  Description: Target End Date:  Prior to discharge or change in level of careDocument on Vitals flowsheet1.  Document pain using the appropriate pain scale per order or unit policy2.  Educate and implement non-pharmacologic comfort measures (i.e. relaxation, distraction, massage, cold/heat therapy, etc.)3.  Pain management medications as ordered4.  Reassess pain after pain med administration per policy5.  If opiods administered assess patient's response to pain medication is appropriate per POSS sedation scale6.  Follow pain management plan developed in collaboration with patient and interdisciplinary team (including palliative care or pain specialists if applicable)  Outcome: Progressing     Problem: Hemodynamics  Goal: Patient's hemodynamics, fluid balance and neurologic status will be stable or improve  Description: Target End Date:  Prior to discharge or change in level of careDocument on Assessment and I/O flowsheet templates1.  Monitor vital signs, pulse oximetry and cardiac monitor per  provider order and/or policy2.  Maintain blood pressure per provider order3.  Hemodynamic monitoring per provider order4.  Manage IV fluids and IV infusions5.  Monitor intake and output6.  Daily weights per unit policy or provider order7.  Assess peripheral pulses and capillary refill8.  Assess color and body temperature9.  Position patient for maximum circulation/cardiac [id number]. Monitor for signs/symptoms of excessive [id number]. Assess mental status, restlessness and changes in level of [id number]. Monitor temperature and report fever or hypothermia to provider immediately. Consideration of targeted temperature management.  Outcome: Progressing       Patient is not progressing towards the following goals:

## 2025-04-17 NOTE — DISCHARGE SUMMARY
"Discharge Summary    CHIEF COMPLAINT ON ADMISSION  Chief Complaint   Patient presents with    Headache     Pt reports she was seen here a few days ago for migraine and chest pain. Pt has hypertension and was told this could be contributing to her migraines. Pt states that Dr. Bunn adjusted her HTN meds but it's not helping and her migraine persist. Pt also reports \"lump\" where migraine is coming from.        Reason for Admission  Headache     Admission Date  4/15/2025    CODE STATUS  Full Code    HPI & HOSPITAL COURSE  This is a 74 y.o. female who presented 4/15/2025 with past medical history of obstructive sleep apnea, hypertension, hyperlipidemia, migraines, asthma who presents emergency department with elevated blood pressure, occipital headache and neck discomfort.  The patient was seen in our emergency department recently with his elevated blood pressure and his complaints.  Her workup at that time was unremarkable.  Patient was referred back to her PCP for blood pressure management.  Her PCP did make some adjustments to her blood pressure medications but despite this ,her blood pressure still remains elevated in the 180s to 200s systolic.  Patient does deal with a lot of stress daily--> she has 2 adult disabled children that she is the primary caregiver for.     Here CTA head neck unremarkable. She has constant neck pain. MRI cspine ordered which showed mild degenerative changes in the cervical spine as described above. Bilateral thyroid nodules. Patient was treated supportively. Robaxin prn started. She reports neck pain has improved.     Regards HTN, she is started on hydralazine. Continue amlodipine, HCTZ and lisinopril. She is advised to check BP daily and follow up with PCP in one week.     For bilateral thyroid nodules seen on MRI, she is aware of this and her PCP has been already monitoring thyroid nodules. TSH unremarkable in March.      Patient has had a known diagnosis of obstructive sleep apnea " and has not been able to get herself set up with a CPAP machine. Sleep medicine referral placed.      Therefore, she is discharged in good and stable condition to home with close outpatient follow-up.    The patient met 2-midnight criteria for an inpatient stay at the time of discharge.    Discharge Date  4/17/2025    FOLLOW UP ITEMS POST DISCHARGE  PCP   Sleep medicine    DISCHARGE DIAGNOSES  Principal Problem:    Hypertensive urgency (POA: Yes)  Active Problems:    Hyperlipidemia (POA: Yes)    Tension type headache (POA: Yes)    Obstructive sleep apnea syndrome (POA: Yes)    Neck pain (POA: Yes)  Resolved Problems:    * No resolved hospital problems. *      FOLLOW UP  Future Appointments   Date Time Provider Department Center   4/21/2025  3:00 PM Luis Leos M.D. [address]   5/9/2025  1:30 PM [address]     Luis Leos M.D.  [address]  [facility] [address]  [facility] [address]  [phone]    Follow up in 1 week(s)        MEDICATIONS ON DISCHARGE     Medication List        START taking these medications        Instructions   hydrALAZINE 50 MG Tabs  Commonly known as: Apresoline   Take 1 Tablet by mouth every 8 hours.  Dose: 50 mg     methocarbamol 500 MG Tabs  Commonly known as: Robaxin   Take 1 Tablet by mouth 3 times a day as needed (muscle spasm) for up to 14 days.  Dose: 500 mg            CHANGE how you take these medications        Instructions   atorvastatin 80 MG tablet  What changed: when to take this  Commonly known as: Lipitor   Take 1 Tablet by mouth every evening.  Dose: 80 mg            CONTINUE taking these medications        Instructions   albuterol 108 (90 Base) MCG/ACT Aers inhalation aerosol   Doctor's comments: Give albuterol that is patient or insurance preference please  Inhale 2 Puffs every four hours as needed for Shortness of Breath.  Dose: 2 Puff     amLODIPine 10 MG Tabs  Commonly known as: Norvasc   Take 10 mg by mouth every day.  Dose: 10 mg     celecoxib  100 MG Caps  Commonly known as: CeleBREX   Take 100 mg by mouth every day.  Dose: 100 mg     hydroCHLOROthiazide 25 MG Tabs   Take 1 Tablet by mouth every day.  Dose: 25 mg     ICY HOT NO-MESS VAPOR GEL EX   Apply 1 Application topically 1 time a day as needed (neck pain).  Dose: 1 Application     lisinopril 20 MG Tabs  Commonly known as: Prinivil   Take 2 Tablets by mouth every day.  Dose: 40 mg     Multivitamin Adults 50+ Tabs   Take 1 Tablet by mouth 2 times a day.  Dose: 1 Tablet     omeprazole 20 MG delayed-release capsule  Commonly known as: PriLOSEC   Take 20 mg by mouth every day.  Dose: 20 mg     * sertraline 100 MG Tabs  Commonly known as: Zoloft   Take 1 Tablet by mouth every day.  Dose: 100 mg     * sertraline 50 MG Tabs  Commonly known as: Zoloft   Take 1 Tablet by mouth every day. ( Sertraline 150mg daily )  Dose: 50 mg           * This list has 2 medication(s) that are the same as other medications prescribed for you. Read the directions carefully, and ask your doctor or other care provider to review them with you.                STOP taking these medications      HYDROcodone-acetaminophen 5-325 MG Tabs per tablet  Commonly known as: Norco              Allergies  No Known Allergies    DIET  Orders Placed This Encounter   Procedures    Diet Order Diet: Cardiac     Standing Status:   Standing     Number of Occurrences:   1     Diet::   Cardiac [6]       ACTIVITY  As tolerated.  Weight bearing as tolerated    CONSULTATIONS  na    PROCEDURES  na    LABORATORY  Lab Results   Component Value Date    SODIUM 142 04/15/2025    POTASSIUM 3.8 04/15/2025    CHLORIDE 106 04/15/2025    CO2 25 04/15/2025    GLUCOSE 109 (H) 04/15/2025    BUN 20 04/15/2025    CREATININE 0.88 04/15/2025    CREATININE 1.0 03/27/2009        Lab Results   Component Value Date    WBC 5.6 04/15/2025    HEMOGLOBIN 13.8 04/15/2025    HEMATOCRIT 42.8 04/15/2025    PLATELETCT 270 04/15/2025      MR-CERVICAL SPINE-W/O   Final Result      1.   Mild degenerative changes in the cervical spine as described above. Bilateral thyroid nodules.   2.  Bilateral thyroid nodules. The largest nodule is in the left side measuring approximately 2 cm in size.      CT-CTA HEAD WITH & W/O-POST PROCESS   Final Result      No intracranial aneurysm, focal high-grade stenosis, or abrupt large vessel cut off.      CT-CTA NECK WITH & W/O-POST PROCESSING   Final Result      No focal high-grade stenosis, dissection or occlusion of the cervical carotid or vertebral arteries.          Total time of the discharge process 35 minutes.

## 2025-04-17 NOTE — RESPIRATORY CARE
"                               COPD EDUCATION by COPD CLINICAL EDUCATOR  4/17/2025  at  8:40 AM by Monserrat Storey, RRT     Patient interviewed by education team.  Patient declined or is unable to participate in the full program.  Therefore, a short intervention has been conducted.  A comprehensive packet including information about COPD, types of treatments to manage their disease and safe home Oxygen usage was provided and reviewed with patient at the bedside.      During last admission patient stated she was using her rescue inhaler frequently. A request for a maintenance inhaler was put in but it was never done. Patient does note her breathing has greatly improved since last admission and has not used her rescue in a few weeks. Patient did not follow-up for a sleep study a request for a sleep study referral was requested.        Is this a COPD exacerbation patient?: No  DME Company: none  DME Equipment Type: none prior  Physician Name: Luis Leos M.D.  Pulmonologist Name: none  $ Demo/Eval of SVN's, MDI's and Aerosols: Yes    PFT Results    No results found for: \"PFT\"    Meds to Beds  [facility] provides bedside medication delivery for all eligible patients at discharge and you have been automatically enrolled in the Meds to Beds Program. Would you like to opt out of this program for any reason?: No - Stay Opted In     MY COPD ACTION PLAN     It is recommended that patients and physicians/healthcare providers complete this action plan together. This plan should be discussed at each physician visit and updated as needed.    The green, yellow and red zones show groups of symptoms of COPD. This list of symptoms is not comprehensive, and you may experience other symptoms. In the \"Actions\" column, your healthcare provider has recommended actions for you to take based on your symptoms.    Patient Name: Fe Clark   YOB: 1950   Last Updated on: 4/17/2025  8:38 AM   Green Zone:  I am doing " "well today Actions     Usual activitiy and exercise level   Take daily medications     Usual amounts of cough and phlegm/mucus   Use oxygen as prescribed     Sleep well at night   Continue regular exercise/diet plan     Appetite is good   At all times avoid cigarette smoke, inhaled irritants     Daily Medications (these medications are taken every day):                Yellow Zone:  I am having a bad day or a COPD flare Actions     More breathless than usual   Continue daily medications     I have less energy for my daily activities   Use quick relief inhaler as ordered     Increased or thicker phlegm/mucus   Use oxygen as prescribed     Using quick relief inhaler/nebulizer more often   Get plenty of rest     Swelling of ankles more than usual   Use pursed lip breathing     More coughing than usual   At all times avoid cigarette smoke, inhaled irritants     I feel like I have a \"chest cold\"     Poor sleep and my symptoms woke me up     My appetite is not good     My medicine is not helping      Call provider immediately if symptoms don’t improve     Continue daily medications, add rescue medications:   Albuterol 2 Puffs Every 4 hours PRN       Medications to be used during a flare up, (as Discussed with Provider):           Additional Information:  Use a spacer if you are using your rescue more than usual seek medical care    Red Zone:  I need urgent medical care Actions     Severe shortness of breath even at rest   Call 911 or seek medical care immediately     Not able to do any activity because of breathing      Fever or shaking chills      Feeling confused or very drowsy       Chest pains      Coughing up blood                  "

## 2025-04-17 NOTE — CARE PLAN
The patient is Stable - Low risk of patient condition declining or worsening    Shift Goals  Clinical Goals: patient will report pain control to a 5/10 or less throughout shift and will have migraine pain controlled throughout shift.  Patient Goals: Pain control  Family Goals: KAEL    Progress made toward(s) clinical / shift goals:    Problem: Pain - Standard  Goal: Alleviation of pain or a reduction in pain to the patient’s comfort goal  Outcome: Progressing  Flowsheets  Taken 4/17/2025 1304  Non Verbal Scale: Calm  OB Pain Intervention: Medication - See MAR  Taken 4/17/2025 0915  Pain Rating Scale (NPRS): 7  Note: Patients pain has remained controlled throughout shift through pharmacological and nonpharmacological methods of pain management. Patients pain will continue to be assessed throughout shift and controlled appropriately.      Problem: Knowledge Deficit - Standard  Goal: Patient and family/care givers will demonstrate understanding of plan of care, disease process/condition, diagnostic tests and medications  Outcome: Progressing  Note: Patient has remained involved in their plan of care with all questions and concerns addressed at this time. Patient will continue to be involved in their plan of care throughout hospital stay.        Patient is not progressing towards the following goals:

## 2025-04-17 NOTE — PROGRESS NOTES
Report received from NOC RN and assumed patient care at 0700. Patient is A&Ox 4, on RA, and SBA with all precautions in place. Patient reporting a pain level of 5-6/10. Call light within reach and bed in lowest position. Reinforced the need to call for assistance. Plan of care discussed and patient does not have any further needs at this time.

## 2025-04-17 NOTE — PROGRESS NOTES
4 Eyes Skin Assessment Completed by MANDO Vilchis and MANDO Foster.    Head WDL  Ears WDL  Nose WDL  Mouth WDL  Neck WDL  Breast/Chest WDL  Shoulder Blades WDL  Spine WDL  (R) Arm/Elbow/Hand WDL  (L) Arm/Elbow/Hand WDL  Abdomen WDL  Groin WDL  Scrotum/Coccyx/Buttocks WDL  (R) Leg WDL  (L) Leg WDL  (R) Heel/Foot/Toe WDL  (L) Heel/Foot/Toe WDL          Devices In Places Blood Pressure Cuff      Interventions In Place Pressure Redistribution Mattress    Possible Skin Injury No    Pictures Uploaded Into Epic N/A  Wound Consult Placed N/A  RN Wound Prevention Protocol Ordered No

## 2025-04-17 NOTE — PROGRESS NOTES
Assumed patient care and received bedside report from Day RN. Patient is A&Ox4 and reports 10/10 pain of the head, medicated per MAR. Non-pharmacological interventions encouraged like ice packs. Patient on RA, awake, and alert.     Patient educated on the use of the call light and verbalized understanding. Bed in the lowest and locked position with personal belongings and call light within reach. Low Fall Risk precautions and hourly rounding in place. Safety education provided. POC discussed and patient declines any further needs at this time. Orders carried out.

## 2025-04-17 NOTE — DISCHARGE INSTRUCTIONS
Discharge Instructions per Wolfgang Dee M.D.  Check your blood pressure daily.  Please continue your home medication HCTZ, lisinopril and amlodipine.  You have a new blood pressure medicine hydralazine 50 mg 3 times a day.  If your systolic blood pressure less than 100 or if you dizziness, please hold the hydralazine and discussed with your primary care doctor  Apply warm/heat to neck area  Robaxin 3 times a day as needed for muscle spasm or neck pain  please follow-up with PCP as outpatient.    Return to ER in the event of new or worsening symptoms. Please note importance of compliance and the patient has agreed to proceed with all medical recommendations and follow up plan indicated above. All medications come with benefits and risks. Risks may include permanent injury or death and these risks can be minimized with close reassessment and monitoring. Please make it to your scheduled follow ups with PCP

## 2025-04-17 NOTE — PROGRESS NOTES
Fe Clark has been provided discharge instructions, to include follow up care, home medications, and activity/diet reviewed. Copy of discharge instructions in patient chart, signed and reviewed. Patient verbalizes the understanding of the discharge instructions. PIV removed, tip intact, pressure dressing applied. Arm band removed. Patient did not have home meds during admit. Meds to Beds verified and given. Questions and concerns addressed prior to leaving the discharge lounge. Transported via car, accompanied by adult daughter. Patient discharge to home.

## 2025-04-18 ENCOUNTER — TELEPHONE (OUTPATIENT)
Dept: HOSPITALIST | Facility: MEDICAL CENTER | Age: 75
End: 2025-04-18
Payer: MEDICARE

## 2025-04-18 ENCOUNTER — PATIENT OUTREACH (OUTPATIENT)
Dept: MEDICAL GROUP | Facility: MEDICAL CENTER | Age: 75
End: 2025-04-18
Payer: MEDICARE

## 2025-04-18 DIAGNOSIS — G47.33 OBSTRUCTIVE SLEEP APNEA SYNDROME: ICD-10-CM

## 2025-04-21 ENCOUNTER — OFFICE VISIT (OUTPATIENT)
Dept: MEDICAL GROUP | Facility: MEDICAL CENTER | Age: 75
End: 2025-04-21
Payer: MEDICARE

## 2025-04-21 VITALS
OXYGEN SATURATION: 97 % | HEART RATE: 43 BPM | BODY MASS INDEX: 29.22 KG/M2 | SYSTOLIC BLOOD PRESSURE: 118 MMHG | TEMPERATURE: 97.9 F | DIASTOLIC BLOOD PRESSURE: 68 MMHG | WEIGHT: 175.6 LBS

## 2025-04-21 DIAGNOSIS — M54.2 CERVICALGIA: ICD-10-CM

## 2025-04-21 DIAGNOSIS — E04.1 THYROID NODULE: ICD-10-CM

## 2025-04-21 DIAGNOSIS — F33.1 MODERATE EPISODE OF RECURRENT MAJOR DEPRESSIVE DISORDER (HCC): ICD-10-CM

## 2025-04-21 DIAGNOSIS — Z09 HOSPITAL DISCHARGE FOLLOW-UP: Primary | ICD-10-CM

## 2025-04-21 DIAGNOSIS — F41.9 ANXIETY: ICD-10-CM

## 2025-04-21 DIAGNOSIS — I10 PRIMARY HYPERTENSION: ICD-10-CM

## 2025-04-21 DIAGNOSIS — R51.9 UNILATERAL OCCIPITAL HEADACHE: ICD-10-CM

## 2025-04-21 DIAGNOSIS — I16.0 HYPERTENSIVE URGENCY: ICD-10-CM

## 2025-04-21 RX ORDER — LIDOCAINE 4 G/G
1 PATCH TOPICAL EVERY 24 HOURS
Qty: 10 PATCH | Refills: 1 | Status: SHIPPED | OUTPATIENT
Start: 2025-04-21

## 2025-04-21 RX ORDER — HYDRALAZINE HYDROCHLORIDE 50 MG/1
50 TABLET, FILM COATED ORAL EVERY 8 HOURS
Qty: 270 TABLET | Refills: 3 | Status: SHIPPED | OUTPATIENT
Start: 2025-04-21

## 2025-04-21 RX ORDER — CELECOXIB 100 MG/1
100 CAPSULE ORAL
Qty: 30 CAPSULE | Refills: 1 | Status: SHIPPED | OUTPATIENT
Start: 2025-04-21

## 2025-04-21 RX ORDER — SERTRALINE HYDROCHLORIDE 100 MG/1
200 TABLET, FILM COATED ORAL DAILY
Qty: 200 TABLET | Refills: 3 | Status: SHIPPED | OUTPATIENT
Start: 2025-04-21

## 2025-04-21 ASSESSMENT — FIBROSIS 4 INDEX: FIB4 SCORE: 1.43

## 2025-04-21 NOTE — PROGRESS NOTES
4/18 10:15 attempt to reach pt for TCM outreach. No answer, unable to leave message, no voicemail set up.   4/21 9:58 attempt to reach pt for TCM outreach.No answer, unable to leave message, no voicemail set up.

## 2025-04-21 NOTE — PROGRESS NOTES
Subjective:     Fe Clark is a 74 y.o. female who presents for Hospital Follow-up.    Transitional Care Management          HPI:   Recently hospitalized for   Pmh HTN, HLD ( hx ldl > 180) on HI atorvastatin, age related osteoporosis on fosamax ( poorly adherent) was referred for prolia 4/2024 did not receive , anxiety/ panic attack/caregiver burden, non-epileptic seizure , thyroid nodule     Zio pending  Ultrasound for thyroid nodule pending.     Verbal consent was acquired by the patient to use [employer] ambient listening note generation during this visit Yes   History of Present Illness  The patient is a 74-year-old female who presents for a hospital follow-up.    She was last seen approximately a month ago following her discharge from the hospital due to hypertension. A cardiac workup was performed, and an ultrasound was ordered to monitor her thyroid nodule. Additionally, a Zio patch was ordered which is pending, she report she had reaction with adhesive.     Blood pressure is well controlled on amlodipine 10 mg, lisinopril 20 mg twice a day, hydrochlorothiazide 25 mg, and hydralazine 50 mg three times a day.    She reports is in her usual state of health until around 3 weeks ago when she woke up with neck pain/headache.  She can pinpoint it to right below the occipital region.  She denies any trauma or any on setting event.  She report he was previously manageable with heat however she feels it has been getting worse thereby leading her to go to ER.  Imaging including CTA head and neck did not show any significant stenosis or vascular abnormality.  MRI cervical showed some degenerative changes foraminal stenosis, disc abnormality.      On examination patient is a point tenderness in the left paraspinal cervical region right under the occipital bone.  Range of motion of the neck intact, limited due to pain.  She reports some financial difficulty picking up new prescriptions are sent  today  Current medicines (including reconciliation performed today)  Current Outpatient Medications   Medication Sig Dispense Refill    hydrALAZINE (APRESOLINE) 50 MG Tab Take 1 Tablet by mouth every 8 hours. 270 Tablet 3    sertraline (ZOLOFT) 100 MG Tab Take 2 Tablets by mouth every day. 200 Tablet 3    lidocaine (ASPERFLEX) 4 % Patch Place 1 Patch on the skin every 24 hours. 10 Patch 1    celecoxib (CELEBREX) 100 MG Cap Take 1 Capsule by mouth 1 time a day as needed for Moderate Pain (cervicalgia). 30 Capsule 1    methocarbamol (ROBAXIN) 500 MG Tab Take 1 Tablet by mouth 3 times a day as needed (muscle spasm) for up to 14 days. 30 Tablet 0    amLODIPine (NORVASC) 10 MG Tab Take 10 mg by mouth every day.      Camphor-Eucalyptus-Menthol (ICY HOT NO-MESS VAPOR GEL EX) Apply 1 Application topically 1 time a day as needed (neck pain).      hydroCHLOROthiazide 25 MG Tab Take 1 Tablet by mouth every day. 100 Tablet 3    omeprazole (PRILOSEC) 20 MG delayed-release capsule Take 20 mg by mouth every day.      Multiple Vitamins-Minerals (MULTIVITAMIN ADULTS 50+) Tab Take 1 Tablet by mouth 2 times a day.      lisinopril (PRINIVIL) 20 MG Tab Take 2 Tablets by mouth every day. 90 Tablet 3    atorvastatin (LIPITOR) 80 MG tablet Take 1 Tablet by mouth every evening. (Patient taking differently: Take 80 mg by mouth every morning.) 90 Tablet 3    albuterol 108 (90 Base) MCG/ACT Aero Soln inhalation aerosol Inhale 2 Puffs every four hours as needed for Shortness of Breath. 1 Each 1     No current facility-administered medications for this visit.       Allergies:   Patient has no known allergies.    Social History     Tobacco Use    Smoking status: Never     Passive exposure: Current (2 people smoke,not in pt's room)    Smokeless tobacco: Never   Vaping Use    Vaping status: Never Used    Passive vaping exposure: Yes (2 people vape,not in pt's room)   Substance Use Topics    Alcohol use: Not Currently    Drug use: Not Currently        ROS:      Objective:     Vitals:    04/21/25 1442   BP: 118/68   BP Location: Right arm   Patient Position: Sitting   BP Cuff Size: Large adult   Pulse: (!) 43   Temp: 36.6 °C (97.9 °F)   TempSrc: Temporal   SpO2: 97%   Weight: 79.7 kg (175 lb 9.6 oz)     Body mass index is 29.22 kg/m².    Physical Exam:  Physical Exam  Constitutional:       Appearance: Normal appearance.   Cardiovascular:      Rate and Rhythm: Normal rate and regular rhythm.      Heart sounds: No murmur heard.  Pulmonary:      Effort: Pulmonary effort is normal.      Breath sounds: Normal breath sounds. No wheezing.   Musculoskeletal:         General: Normal range of motion.      Cervical back: Normal range of motion and neck supple.      Comments: There is point tenderness with palpation in the left cervical paraspinal region.   Lymphadenopathy:      Cervical: No cervical adenopathy.   Neurological:      Mental Status: She is alert.          Assessment and Plan:     1. Hospital discharge follow-up (Primary)  2. Unilateral occipital headache  3. Cervicalgia  Acute, stable  Recent hospital discharge for cervicalgia/unilateral occipital headache.  With workup including CTA head and neck withoutSignificant blood vessel disease CT spine was done which showed degenerative change without definite fracture or subluxation, MRI showed degenerative changes and foraminal stenosis.  Plan  Her history sounds musculoskeletal in nature,  Recommend continue conservative management  Will add lidocaine patch and celecoxib to patient's Tylenol and cyclobenzaprine  Will avoid opiate therapy  Will refer to pain management for targeted therapy  - lidocaine (ASPERFLEX) 4 % Patch; Place 1 Patch on the skin every 24 hours.  Dispense: 10 Patch; Refill: 1  - Referral to Pain Management  - celecoxib (CELEBREX) 100 MG Cap; Take 1 Capsule by mouth 1 time a day as needed for Moderate Pain (cervicalgia).  Dispense: 30 Capsule; Refill: 1    4. Hypertensive urgency  5.  Primary hypertension5. Primary hypertension  Blood pressure seems to be well-controlled with addition of the hydralazine 50 mg 3 times daily.  Patient is currently on amlodipine 10 mg, Lisinopril 40 mg and hydrochlorothiazide 25 mg  - hydrALAZINE (APRESOLINE) 50 MG Tab; Take 1 Tablet by mouth every 8 hours.  Dispense: 270 Tablet; Refill: 3          6. Anxiety  7. Moderate episode of recurrent major depressive disorder (HCC)  Chronic, stable will increase to sertraline 200 mg daily  He she continues to be in a lot of stress report currently lives with her son and her daughter for about disabled.  Reports sinus awaiting approval/for disability.  She is currently supporting him financially  - sertraline (ZOLOFT) 100 MG Tab; Take 2 Tablets by mouth every day.  Dispense: 200 Tablet; Refill: 3    8. Thyroid nodule  Thyroid nodules noted on imaging awaiting ultrasound scheduled twice/9/25      - Chart and discharge summary were reviewed.   - Hospitalization and results reviewed with patient.   - Medications reviewed including instructions regarding high risk medications, dosing and side effects.  - Recommended Services: No services needed at this time  - Advance directive/POLST on file?  No     Follow-up:Return in about 3 months (around 7/21/2025) for Lab review, Med check.

## 2025-04-24 ENCOUNTER — TELEPHONE (OUTPATIENT)
Dept: CARDIOLOGY | Facility: MEDICAL CENTER | Age: 75
End: 2025-04-24
Payer: MEDICARE

## 2025-04-25 ENCOUNTER — PATIENT OUTREACH (OUTPATIENT)
Dept: HEALTH INFORMATION MANAGEMENT | Facility: OTHER | Age: 75
End: 2025-04-25
Payer: MEDICARE

## 2025-04-25 DIAGNOSIS — G89.29 CHRONIC PAIN OF RIGHT KNEE: ICD-10-CM

## 2025-04-25 DIAGNOSIS — E78.2 MIXED HYPERLIPIDEMIA: ICD-10-CM

## 2025-04-25 DIAGNOSIS — J45.20 MILD INTERMITTENT ASTHMA WITHOUT COMPLICATION: ICD-10-CM

## 2025-04-25 DIAGNOSIS — Z59.9 FINANCIAL DIFFICULTY: ICD-10-CM

## 2025-04-25 DIAGNOSIS — M25.561 CHRONIC PAIN OF RIGHT KNEE: ICD-10-CM

## 2025-04-25 DIAGNOSIS — I10 PRIMARY HYPERTENSION: ICD-10-CM

## 2025-04-25 DIAGNOSIS — Z63.6 CAREGIVER BURDEN: ICD-10-CM

## 2025-04-25 DIAGNOSIS — F33.0 MILD EPISODE OF RECURRENT MAJOR DEPRESSIVE DISORDER (HCC): ICD-10-CM

## 2025-04-25 SDOH — SOCIAL STABILITY - SOCIAL INSECURITY: DEPENDENT RELATIVE NEEDING CARE AT HOME: Z63.6

## 2025-04-25 SDOH — ECONOMIC STABILITY - INCOME SECURITY: PROBLEM RELATED TO HOUSING AND ECONOMIC CIRCUMSTANCES, UNSPECIFIED: Z59.9

## 2025-04-28 ENCOUNTER — PATIENT OUTREACH (OUTPATIENT)
Dept: HEALTH INFORMATION MANAGEMENT | Facility: OTHER | Age: 75
End: 2025-04-28
Payer: MEDICARE

## 2025-04-28 ENCOUNTER — RESULTS FOLLOW-UP (OUTPATIENT)
Dept: MEDICAL GROUP | Facility: MEDICAL CENTER | Age: 75
End: 2025-04-28

## 2025-04-28 DIAGNOSIS — I10 PRIMARY HYPERTENSION: ICD-10-CM

## 2025-04-28 NOTE — PROGRESS NOTES
Community Health Worker Follow-Up    Reason for outreach: CHW called the pt to do the Homemakers application.    CHW Interventions: CHW and the pt filled out the [facility] Homemakers service application on line.     Specific Resources Provided:  Housing/Shelter: n/a  Transportation: n/a  Food: n/a  Financial: n/a  Social Supports: Homemaker service on line.  Other: n/a    Plan: CHW and the pt filled out the Homemaker service on line. Pt discussed she did not need any MOW or anything else.

## 2025-04-28 NOTE — PROGRESS NOTES
Reason for Follow-Up:  Monthly outreach follow up    Current Health Status:  HTN, major depressive disorder, chronic pain, hyperlipidemia     Medical Updates:    ED visit 2/22 COPD exacerbation  Hospital admission 3/7 chest pain/multiple psychosocial stressors   ED 4/11 Chest pain/cervicogenic headache  Hospital admission 4/15 hypertensive urgency     Medication Updates:    Sertraline increased to 200mg daily  Celebrex 100mg daily prn   Lidocaine patch 4% q24hrs.   Hydralazine 50mg q8hrs     Symptoms:    Reports she is tired after just getting home from walking about a mile to get some bread    Plan of Care Goals and Progress:    Goal 1. Hypertension education      Progress:  blood pressure was 118/68 at PCP follow up on 4/21. Reports compliance with medications. Reports her blood pressure cuff is not working      Barriers:  Age, progression of disease process, knowledge of healthcare, caregiver for disabled son and daughter, multiple family stressors, financial resource strain, transportation needs     Interventions:  Mail out resources on blood pressure management. Dicussed follow up with SOC for new BP cuff at home.     Education:  Encouraged to monitor BP at home, discussed normal levels for blood pressure     Goal 2. Depression education      Progress:  Reports she and her children are seeing a family counselor       Barriers:  Age, progression of disease process, knowledge of Healthcare, caregiver for disabled son and daughter, multiple family stressors, financial resource strain, transportation needs     Interventions:  Reviewed changes to sertraline dosage     Education:  Encouraged to continue to follow up family counseling       Patient's Concerns and Feedback (Self Management of Care):   Spoke with Fe for monthly outreach follow-up.  Reviewed hypertension education as above.  Reviewed her recent ER and hospital stays. Discussed pain management follow-up on 5 8 and new orders for lidocaine patches.   She states she has not picked them up yet but she plans to as of the first of the month.  States she needs a refill for her Celebrex and will get that at the first of the month too.  Richedilberto he states that she needs assistance around the house with cleaning and cooking.  Discussed private pay agencies available.  She cannot afford a private pay agency.  Asked if she is reached out to her state  she denied that she has a state .  CHW referral for application of homemaker services.  Discussed depression management as above.  She denies any other needs at this time encouraged to call with any questions or concerns    Next Steps:     Follow-Up Plan:  May 2025      Appointments:  5/8 Physical Medicine & Rehab, 5/9 Radiology, 7/27 PCP      Contact Information:  Call [phone] with any questions or concerns.

## 2025-04-28 NOTE — CARE PLAN
Problem: Knowledge deficit of self-monitoring blood pressure  Goal: Demonstrate the elements of self-monitoring blood pressure/long term 8/13/24-2/28/24  Note:   -Patient has verbalized commitment to self-monitoring blood pressure at least once a week.    -Patient will log home blood pressures and bring the log to medical provider appointments.       Problem: Knowledge deficit of signs and symptoms of depression and anxiety  Goal: Patient able to identify personalized signs and symptoms of depression and anxiety/long term 8/13/24-2/28/24  Outcome: Progressing  Intervention: Encourage psychoeducational learning opportunities  Intervention: Encourage patient to Engage in community-based support groups for older adults.     Problem: Recognize current health habits that may contribute to hypertension  Goal: Identify which modifiable health habits can be modified/long term 8/13/24-2/28/24  Outcome: Progressing  Note: Patient has verbalized willingness to change health habits too encourage for home monitoring of blood pressure.

## 2025-05-08 ENCOUNTER — APPOINTMENT (OUTPATIENT)
Dept: PHYSICAL MEDICINE AND REHAB | Facility: MEDICAL CENTER | Age: 75
End: 2025-05-08
Payer: MEDICARE

## 2025-05-09 ENCOUNTER — HOSPITAL ENCOUNTER (OUTPATIENT)
Dept: RADIOLOGY | Facility: MEDICAL CENTER | Age: 75
End: 2025-05-09
Attending: STUDENT IN AN ORGANIZED HEALTH CARE EDUCATION/TRAINING PROGRAM
Payer: MEDICARE

## 2025-05-09 DIAGNOSIS — E04.1 THYROID NODULE: ICD-10-CM

## 2025-05-09 PROCEDURE — 76536 US EXAM OF HEAD AND NECK: CPT

## 2025-05-12 ENCOUNTER — RESULTS FOLLOW-UP (OUTPATIENT)
Dept: MEDICAL GROUP | Facility: MEDICAL CENTER | Age: 75
End: 2025-05-12

## 2025-05-13 DIAGNOSIS — I16.0 HYPERTENSIVE URGENCY: ICD-10-CM

## 2025-05-13 DIAGNOSIS — I10 PRIMARY HYPERTENSION: ICD-10-CM

## 2025-05-13 RX ORDER — LISINOPRIL 40 MG/1
40 TABLET ORAL DAILY
Qty: 100 TABLET | Refills: 3 | Status: SHIPPED | OUTPATIENT
Start: 2025-05-13

## 2025-05-21 ENCOUNTER — OFFICE VISIT (OUTPATIENT)
Dept: MEDICAL GROUP | Facility: MEDICAL CENTER | Age: 75
End: 2025-05-21
Payer: MEDICARE

## 2025-05-21 VITALS
RESPIRATION RATE: 20 BRPM | HEIGHT: 65 IN | TEMPERATURE: 98.7 F | BODY MASS INDEX: 29.75 KG/M2 | DIASTOLIC BLOOD PRESSURE: 70 MMHG | OXYGEN SATURATION: 98 % | WEIGHT: 178.57 LBS | HEART RATE: 70 BPM | SYSTOLIC BLOOD PRESSURE: 122 MMHG

## 2025-05-21 DIAGNOSIS — Z77.22 EXPOSURE TO CIGARETTE SMOKE: ICD-10-CM

## 2025-05-21 DIAGNOSIS — M54.2 CERVICALGIA: ICD-10-CM

## 2025-05-21 DIAGNOSIS — M25.532 LEFT WRIST PAIN: ICD-10-CM

## 2025-05-21 DIAGNOSIS — I16.0 HYPERTENSIVE URGENCY: ICD-10-CM

## 2025-05-21 DIAGNOSIS — R06.83 SNORING: ICD-10-CM

## 2025-05-21 DIAGNOSIS — J44.9 CHRONIC OBSTRUCTIVE PULMONARY DISEASE, UNSPECIFIED COPD TYPE (HCC): ICD-10-CM

## 2025-05-21 DIAGNOSIS — R05.3 CHRONIC COUGH: ICD-10-CM

## 2025-05-21 DIAGNOSIS — E04.1 THYROID NODULE: ICD-10-CM

## 2025-05-21 DIAGNOSIS — E78.2 MIXED HYPERLIPIDEMIA: Primary | ICD-10-CM

## 2025-05-21 PROCEDURE — 3074F SYST BP LT 130 MM HG: CPT | Performed by: STUDENT IN AN ORGANIZED HEALTH CARE EDUCATION/TRAINING PROGRAM

## 2025-05-21 PROCEDURE — 99214 OFFICE O/P EST MOD 30 MIN: CPT | Performed by: STUDENT IN AN ORGANIZED HEALTH CARE EDUCATION/TRAINING PROGRAM

## 2025-05-21 PROCEDURE — 3078F DIAST BP <80 MM HG: CPT | Performed by: STUDENT IN AN ORGANIZED HEALTH CARE EDUCATION/TRAINING PROGRAM

## 2025-05-21 ASSESSMENT — FIBROSIS 4 INDEX: FIB4 SCORE: 1.43

## 2025-05-21 ASSESSMENT — ENCOUNTER SYMPTOMS
CHILLS: 0
DIZZINESS: 0
HEADACHES: 0
NAUSEA: 0
WEIGHT LOSS: 0
FEVER: 0
WHEEZING: 0
SHORTNESS OF BREATH: 0
PALPITATIONS: 0
VOMITING: 0
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
## 2025-05-30 ENCOUNTER — PATIENT OUTREACH (OUTPATIENT)
Dept: HEALTH INFORMATION MANAGEMENT | Facility: OTHER | Age: 75
End: 2025-05-30
Payer: MEDICARE

## 2025-05-30 DIAGNOSIS — I10 PRIMARY HYPERTENSION: Primary | ICD-10-CM

## 2025-05-30 DIAGNOSIS — J45.20 MILD INTERMITTENT ASTHMA WITHOUT COMPLICATION: ICD-10-CM

## 2025-05-30 DIAGNOSIS — Z63.6 CAREGIVER BURDEN: ICD-10-CM

## 2025-05-30 DIAGNOSIS — F33.0 MILD EPISODE OF RECURRENT MAJOR DEPRESSIVE DISORDER (HCC): ICD-10-CM

## 2025-05-30 SDOH — SOCIAL STABILITY - SOCIAL INSECURITY: DEPENDENT RELATIVE NEEDING CARE AT HOME: Z63.6

## 2025-06-03 NOTE — CARE PLAN
Problem: Risk of Falls  Goal: Reduce the Risk of Falls and Fall Related Injuries/long term 8/13/24-2/28/24  Outcome: Progressing  Intervention: Evaluate intrinsic risk factors (e.g., age, chronic conditions, medications, balance issues)     Problem: Knowledge deficit of signs and symptoms of depression and anxiety  Goal: Patient able to identify personalized signs and symptoms of depression and anxiety/long term 8/13/24-2/28/24  Outcome: Progressing  Intervention: Encourage patient to Identify a support person they can talk to     Problem: Patient expresses feelings of loneliness, losses associated with aging and/or depression or anxiety  Goal: Patient experiences a reduction in feelings of loneliness, grief, and sadness/long term   Outcome: Progressing     Problem: Patient barriers to managing high blood pressure  Goal: Identify barriers to managing blood pressure/long term 8/13/24-2/28/24  Outcome: Progressing  Note: Patient has vocalized commitment to lessening the barriers lack of equipment, has received new BP cuff   Intervention: Identify patient barriers to managing high blood pressure  Intervention: Identify with patient what the barriers are to self-management of blood pressure

## 2025-06-03 NOTE — PROGRESS NOTES
"Reason for Follow-Up:  Monthly outreach follow up    Current Health Status:  HTN, major depressive disorder, chronic pain, hyperlipidemia     Medical Updates:    Hospital admission 4/15 hypertensive urgency     Medication Updates:    No medication changes since last monthly outreach follow up    Symptoms:    Fe reports she is \"hanging in there.\"     Plan of Care Goals and Progress:    Goal 1. Hypertension education       Progress:  She has received a new blood pressure cuff from [facility]. She reports taking her blood pressure daily. She did not have exact readings with her. At her last PCP follow up her blood pressure was 122/70.       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 Depression education      Progress:  She reports she is feeling \"better.\" She also reports her son is making progress.       Barriers:  Age, progression of disease process, knowledge of healthcare, caregiver for disabled son and daughter, multiple family stressors, financial resource strain, transportation needs     Interventions:  Encouraged extra attention to self care (monitoring blood pressure, taking medications, exercise, fall prevention) when she is feeling stressed     Education:  Reviewed 988 crisis hotline, Encouraged Fe to call and talk with someone if she feels her symptoms are increases. She does not have wait until she is in a crisis.       Patient's Concerns and Feedback (Self Management of Care):   Spoke with Fe for monthly outreach follow up. She reports she is feeling better, she reports he son is improving as well. She discussed multiple family stressors. Reviewed depression and hypertension education as above. Reviewed fall prevention, see " care plan note.  She denied any needs at this time. Encouraged to call with any questions or concerns. Chart reviewed for Quarterly Assessment, patient continues to qualify and would benefit from PCM program.     Next Steps:     Follow-Up Plan:  July 2025      Appointments:  6/21 Radiology      Contact Information:  Call [phone] with any questions or concerns.

## 2025-06-11 ENCOUNTER — TELEPHONE (OUTPATIENT)
Dept: HEALTH INFORMATION MANAGEMENT | Facility: OTHER | Age: 75
End: 2025-06-11
Payer: MEDICARE

## 2025-06-17 ENCOUNTER — PATIENT MESSAGE (OUTPATIENT)
Dept: SCHEDULING | Facility: IMAGING CENTER | Age: 75
End: 2025-06-17
Payer: MEDICARE

## 2025-06-21 ENCOUNTER — HOSPITAL ENCOUNTER (OUTPATIENT)
Dept: RADIOLOGY | Facility: MEDICAL CENTER | Age: 75
End: 2025-06-21
Attending: STUDENT IN AN ORGANIZED HEALTH CARE EDUCATION/TRAINING PROGRAM
Payer: MEDICARE

## 2025-06-21 DIAGNOSIS — M25.532 LEFT WRIST PAIN: ICD-10-CM

## 2025-06-21 PROCEDURE — 73110 X-RAY EXAM OF WRIST: CPT | Mod: LT

## 2025-06-30 ENCOUNTER — APPOINTMENT (OUTPATIENT)
Dept: RADIOLOGY | Facility: MEDICAL CENTER | Age: 75
DRG: 603 | End: 2025-06-30
Attending: STUDENT IN AN ORGANIZED HEALTH CARE EDUCATION/TRAINING PROGRAM
Payer: MEDICARE

## 2025-06-30 ENCOUNTER — HOSPITAL ENCOUNTER (INPATIENT)
Facility: MEDICAL CENTER | Age: 75
LOS: 1 days | DRG: 603 | End: 2025-07-01
Attending: STUDENT IN AN ORGANIZED HEALTH CARE EDUCATION/TRAINING PROGRAM | Admitting: INTERNAL MEDICINE
Payer: MEDICARE

## 2025-06-30 ENCOUNTER — RESULTS FOLLOW-UP (OUTPATIENT)
Dept: MEDICAL GROUP | Facility: MEDICAL CENTER | Age: 75
End: 2025-06-30

## 2025-06-30 DIAGNOSIS — L03.115 CELLULITIS OF RIGHT FOOT: ICD-10-CM

## 2025-06-30 DIAGNOSIS — L03.115 CELLULITIS OF RIGHT LOWER EXTREMITY: Primary | ICD-10-CM

## 2025-06-30 DIAGNOSIS — L02.611 TOE ABSCESS, RIGHT: ICD-10-CM

## 2025-06-30 LAB
ALBUMIN SERPL BCP-MCNC: 4.2 G/DL (ref 3.2–4.9)
ALBUMIN/GLOB SERPL: 1.7 G/DL
ALP SERPL-CCNC: 81 U/L (ref 30–99)
ALT SERPL-CCNC: 21 U/L (ref 2–50)
ANION GAP SERPL CALC-SCNC: 13 MMOL/L (ref 7–16)
AST SERPL-CCNC: 20 U/L (ref 12–45)
BASOPHILS # BLD AUTO: 0.2 % (ref 0–1.8)
BASOPHILS # BLD: 0.03 K/UL (ref 0–0.12)
BILIRUB SERPL-MCNC: 0.9 MG/DL (ref 0.1–1.5)
BUN SERPL-MCNC: 25 MG/DL (ref 8–22)
CALCIUM ALBUM COR SERPL-MCNC: 9.7 MG/DL (ref 8.5–10.5)
CALCIUM SERPL-MCNC: 9.9 MG/DL (ref 8.5–10.5)
CHLORIDE SERPL-SCNC: 106 MMOL/L (ref 96–112)
CO2 SERPL-SCNC: 21 MMOL/L (ref 20–33)
CREAT SERPL-MCNC: 1.1 MG/DL (ref 0.5–1.4)
EOSINOPHIL # BLD AUTO: 0.11 K/UL (ref 0–0.51)
EOSINOPHIL NFR BLD: 0.9 % (ref 0–6.9)
ERYTHROCYTE [DISTWIDTH] IN BLOOD BY AUTOMATED COUNT: 47.4 FL (ref 35.9–50)
GFR SERPLBLD CREATININE-BSD FMLA CKD-EPI: 52 ML/MIN/1.73 M 2
GLOBULIN SER CALC-MCNC: 2.5 G/DL (ref 1.9–3.5)
GLUCOSE SERPL-MCNC: 105 MG/DL (ref 65–99)
HCT VFR BLD AUTO: 39.6 % (ref 37–47)
HGB BLD-MCNC: 13.1 G/DL (ref 12–16)
IMM GRANULOCYTES # BLD AUTO: 0.07 K/UL (ref 0–0.11)
IMM GRANULOCYTES NFR BLD AUTO: 0.5 % (ref 0–0.9)
LACTATE SERPL-SCNC: 0.8 MMOL/L (ref 0.5–2)
LYMPHOCYTES # BLD AUTO: 1.18 K/UL (ref 1–4.8)
LYMPHOCYTES NFR BLD: 9.1 % (ref 22–41)
MCH RBC QN AUTO: 31.6 PG (ref 27–33)
MCHC RBC AUTO-ENTMCNC: 33.1 G/DL (ref 32.2–35.5)
MCV RBC AUTO: 95.4 FL (ref 81.4–97.8)
MONOCYTES # BLD AUTO: 1.15 K/UL (ref 0–0.85)
MONOCYTES NFR BLD AUTO: 8.9 % (ref 0–13.4)
NEUTROPHILS # BLD AUTO: 10.4 K/UL (ref 1.82–7.42)
NEUTROPHILS NFR BLD: 80.4 % (ref 44–72)
NRBC # BLD AUTO: 0 K/UL
NRBC BLD-RTO: 0 /100 WBC (ref 0–0.2)
PLATELET # BLD AUTO: 256 K/UL (ref 164–446)
PMV BLD AUTO: 9.7 FL (ref 9–12.9)
POTASSIUM SERPL-SCNC: 3.4 MMOL/L (ref 3.6–5.5)
PROT SERPL-MCNC: 6.7 G/DL (ref 6–8.2)
RBC # BLD AUTO: 4.15 M/UL (ref 4.2–5.4)
SCCMEC + MECA PNL NOSE NAA+PROBE: NEGATIVE
SODIUM SERPL-SCNC: 140 MMOL/L (ref 135–145)
WBC # BLD AUTO: 12.9 K/UL (ref 4.8–10.8)

## 2025-06-30 PROCEDURE — 87641 MR-STAPH DNA AMP PROBE: CPT

## 2025-06-30 PROCEDURE — 73660 X-RAY EXAM OF TOE(S): CPT | Mod: RT

## 2025-06-30 PROCEDURE — 87077 CULTURE AEROBIC IDENTIFY: CPT

## 2025-06-30 PROCEDURE — 36415 COLL VENOUS BLD VENIPUNCTURE: CPT

## 2025-06-30 PROCEDURE — 87070 CULTURE OTHR SPECIMN AEROBIC: CPT

## 2025-06-30 PROCEDURE — 700111 HCHG RX REV CODE 636 W/ 250 OVERRIDE (IP): Mod: JZ | Performed by: STUDENT IN AN ORGANIZED HEALTH CARE EDUCATION/TRAINING PROGRAM

## 2025-06-30 PROCEDURE — 700102 HCHG RX REV CODE 250 W/ 637 OVERRIDE(OP): Performed by: STUDENT IN AN ORGANIZED HEALTH CARE EDUCATION/TRAINING PROGRAM

## 2025-06-30 PROCEDURE — A9270 NON-COVERED ITEM OR SERVICE: HCPCS | Performed by: STUDENT IN AN ORGANIZED HEALTH CARE EDUCATION/TRAINING PROGRAM

## 2025-06-30 PROCEDURE — 99285 EMERGENCY DEPT VISIT HI MDM: CPT

## 2025-06-30 PROCEDURE — 87205 SMEAR GRAM STAIN: CPT

## 2025-06-30 PROCEDURE — 99222 1ST HOSP IP/OBS MODERATE 55: CPT | Mod: AI | Performed by: INTERNAL MEDICINE

## 2025-06-30 PROCEDURE — 303977 HCHG I & D

## 2025-06-30 PROCEDURE — 304217 HCHG IRRIGATION SYSTEM

## 2025-06-30 PROCEDURE — 96366 THER/PROPH/DIAG IV INF ADDON: CPT

## 2025-06-30 PROCEDURE — 80053 COMPREHEN METABOLIC PANEL: CPT

## 2025-06-30 PROCEDURE — 96375 TX/PRO/DX INJ NEW DRUG ADDON: CPT

## 2025-06-30 PROCEDURE — 96365 THER/PROPH/DIAG IV INF INIT: CPT

## 2025-06-30 PROCEDURE — 700105 HCHG RX REV CODE 258: Performed by: STUDENT IN AN ORGANIZED HEALTH CARE EDUCATION/TRAINING PROGRAM

## 2025-06-30 PROCEDURE — 83605 ASSAY OF LACTIC ACID: CPT

## 2025-06-30 PROCEDURE — 85025 COMPLETE CBC W/AUTO DIFF WBC: CPT

## 2025-06-30 PROCEDURE — 770006 HCHG ROOM/CARE - MED/SURG/GYN SEMI*

## 2025-06-30 PROCEDURE — 87147 CULTURE TYPE IMMUNOLOGIC: CPT

## 2025-06-30 PROCEDURE — 87186 SC STD MICRODIL/AGAR DIL: CPT

## 2025-06-30 PROCEDURE — 87040 BLOOD CULTURE FOR BACTERIA: CPT

## 2025-06-30 RX ORDER — AMOXICILLIN 250 MG
2 CAPSULE ORAL EVERY EVENING
Status: DISCONTINUED | OUTPATIENT
Start: 2025-07-01 | End: 2025-07-01 | Stop reason: HOSPADM

## 2025-06-30 RX ORDER — ATORVASTATIN CALCIUM 40 MG/1
80 TABLET, FILM COATED ORAL EVERY MORNING
Status: DISCONTINUED | OUTPATIENT
Start: 2025-07-01 | End: 2025-07-01 | Stop reason: HOSPADM

## 2025-06-30 RX ORDER — POLYETHYLENE GLYCOL 3350 17 G/17G
1 POWDER, FOR SOLUTION ORAL
Status: DISCONTINUED | OUTPATIENT
Start: 2025-06-30 | End: 2025-07-01 | Stop reason: HOSPADM

## 2025-06-30 RX ORDER — HYDRALAZINE HYDROCHLORIDE 20 MG/ML
10 INJECTION INTRAMUSCULAR; INTRAVENOUS EVERY 4 HOURS PRN
Status: DISCONTINUED | OUTPATIENT
Start: 2025-06-30 | End: 2025-07-01 | Stop reason: HOSPADM

## 2025-06-30 RX ORDER — AMLODIPINE BESYLATE 10 MG/1
10 TABLET ORAL DAILY
Status: DISCONTINUED | OUTPATIENT
Start: 2025-07-01 | End: 2025-07-01 | Stop reason: HOSPADM

## 2025-06-30 RX ORDER — SERTRALINE HYDROCHLORIDE 100 MG/1
200 TABLET, FILM COATED ORAL DAILY
Status: DISCONTINUED | OUTPATIENT
Start: 2025-07-01 | End: 2025-07-01 | Stop reason: HOSPADM

## 2025-06-30 RX ORDER — HYDROMORPHONE HYDROCHLORIDE 1 MG/ML
0.25 INJECTION, SOLUTION INTRAMUSCULAR; INTRAVENOUS; SUBCUTANEOUS
Status: DISCONTINUED | OUTPATIENT
Start: 2025-06-30 | End: 2025-07-01 | Stop reason: HOSPADM

## 2025-06-30 RX ORDER — OXYCODONE HYDROCHLORIDE 5 MG/1
2.5 TABLET ORAL
Refills: 0 | Status: DISCONTINUED | OUTPATIENT
Start: 2025-06-30 | End: 2025-07-01 | Stop reason: HOSPADM

## 2025-06-30 RX ORDER — ENOXAPARIN SODIUM 100 MG/ML
40 INJECTION SUBCUTANEOUS DAILY
Status: DISCONTINUED | OUTPATIENT
Start: 2025-06-30 | End: 2025-07-01 | Stop reason: HOSPADM

## 2025-06-30 RX ORDER — ONDANSETRON 2 MG/ML
4 INJECTION INTRAMUSCULAR; INTRAVENOUS EVERY 4 HOURS PRN
Status: DISCONTINUED | OUTPATIENT
Start: 2025-06-30 | End: 2025-07-01 | Stop reason: HOSPADM

## 2025-06-30 RX ORDER — LISINOPRIL 20 MG/1
40 TABLET ORAL DAILY
Status: DISCONTINUED | OUTPATIENT
Start: 2025-07-01 | End: 2025-07-01 | Stop reason: HOSPADM

## 2025-06-30 RX ORDER — OMEPRAZOLE 20 MG/1
20 CAPSULE, DELAYED RELEASE ORAL DAILY
Status: DISCONTINUED | OUTPATIENT
Start: 2025-07-01 | End: 2025-07-01 | Stop reason: HOSPADM

## 2025-06-30 RX ORDER — CELECOXIB 100 MG/1
100 CAPSULE ORAL
Status: DISCONTINUED | OUTPATIENT
Start: 2025-06-30 | End: 2025-07-01 | Stop reason: HOSPADM

## 2025-06-30 RX ORDER — CEFAZOLIN 2 G/1
2 INJECTION, POWDER, FOR SOLUTION INTRAMUSCULAR; INTRAVENOUS ONCE
Status: COMPLETED | OUTPATIENT
Start: 2025-06-30 | End: 2025-06-30

## 2025-06-30 RX ORDER — ACETAMINOPHEN 500 MG
1000 TABLET ORAL ONCE
Status: COMPLETED | OUTPATIENT
Start: 2025-06-30 | End: 2025-06-30

## 2025-06-30 RX ORDER — ONDANSETRON 4 MG/1
4 TABLET, ORALLY DISINTEGRATING ORAL EVERY 4 HOURS PRN
Status: DISCONTINUED | OUTPATIENT
Start: 2025-06-30 | End: 2025-07-01 | Stop reason: HOSPADM

## 2025-06-30 RX ORDER — HYDRALAZINE HYDROCHLORIDE 50 MG/1
50 TABLET, FILM COATED ORAL EVERY 8 HOURS
Status: DISCONTINUED | OUTPATIENT
Start: 2025-06-30 | End: 2025-07-01 | Stop reason: HOSPADM

## 2025-06-30 RX ORDER — ALBUTEROL SULFATE 90 UG/1
2 INHALANT RESPIRATORY (INHALATION) EVERY 4 HOURS PRN
Status: DISCONTINUED | OUTPATIENT
Start: 2025-06-30 | End: 2025-07-01 | Stop reason: HOSPADM

## 2025-06-30 RX ORDER — OXYCODONE HYDROCHLORIDE 5 MG/1
5 TABLET ORAL
Refills: 0 | Status: DISCONTINUED | OUTPATIENT
Start: 2025-06-30 | End: 2025-07-01 | Stop reason: HOSPADM

## 2025-06-30 RX ORDER — ACETAMINOPHEN 325 MG/1
650 TABLET ORAL EVERY 6 HOURS PRN
Status: DISCONTINUED | OUTPATIENT
Start: 2025-06-30 | End: 2025-07-01 | Stop reason: HOSPADM

## 2025-06-30 RX ORDER — LINEZOLID 2 MG/ML
600 INJECTION, SOLUTION INTRAVENOUS EVERY 12 HOURS
Status: DISCONTINUED | OUTPATIENT
Start: 2025-06-30 | End: 2025-06-30

## 2025-06-30 RX ORDER — POTASSIUM CHLORIDE 1500 MG/1
40 TABLET, EXTENDED RELEASE ORAL ONCE
Status: COMPLETED | OUTPATIENT
Start: 2025-06-30 | End: 2025-07-01

## 2025-06-30 RX ADMIN — FENTANYL CITRATE 25 MCG: 50 INJECTION, SOLUTION INTRAMUSCULAR; INTRAVENOUS at 21:13

## 2025-06-30 RX ADMIN — CEFAZOLIN 2 G: 2 INJECTION, POWDER, FOR SOLUTION INTRAVENOUS at 21:48

## 2025-06-30 RX ADMIN — VANCOMYCIN HYDROCHLORIDE 2250 MG: 5 INJECTION, POWDER, LYOPHILIZED, FOR SOLUTION INTRAVENOUS at 21:52

## 2025-06-30 RX ADMIN — LIDOCAINE HYDROCHLORIDE 20 ML: 10 INJECTION, SOLUTION EPIDURAL; INFILTRATION; INTRACAUDAL; PERINEURAL at 22:47

## 2025-06-30 RX ADMIN — ACETAMINOPHEN 1000 MG: 500 TABLET ORAL at 21:01

## 2025-06-30 ASSESSMENT — ENCOUNTER SYMPTOMS
BLURRED VISION: 0
NECK PAIN: 0
FLANK PAIN: 0
FOCAL WEAKNESS: 0
CHILLS: 0
NAUSEA: 0
SPUTUM PRODUCTION: 0
BRUISES/BLEEDS EASILY: 0
HEADACHES: 0
POLYDIPSIA: 0
WEIGHT LOSS: 0
BACK PAIN: 0
PHOTOPHOBIA: 0
DOUBLE VISION: 0
HEARTBURN: 0
PALPITATIONS: 0
FEVER: 0
ORTHOPNEA: 0
COUGH: 0
HALLUCINATIONS: 0
HEMOPTYSIS: 0
TREMORS: 0
SPEECH CHANGE: 0
NERVOUS/ANXIOUS: 0
VOMITING: 0

## 2025-06-30 ASSESSMENT — PAIN DESCRIPTION - PAIN TYPE
TYPE: ACUTE PAIN

## 2025-06-30 ASSESSMENT — LIFESTYLE VARIABLES: SUBSTANCE_ABUSE: 0

## 2025-06-30 ASSESSMENT — FIBROSIS 4 INDEX: FIB4 SCORE: 1.43

## 2025-07-01 ENCOUNTER — PHARMACY VISIT (OUTPATIENT)
Dept: PHARMACY | Facility: MEDICAL CENTER | Age: 75
End: 2025-07-01
Payer: COMMERCIAL

## 2025-07-01 VITALS
SYSTOLIC BLOOD PRESSURE: 144 MMHG | OXYGEN SATURATION: 96 % | DIASTOLIC BLOOD PRESSURE: 67 MMHG | HEIGHT: 65 IN | HEART RATE: 64 BPM | TEMPERATURE: 98.3 F | BODY MASS INDEX: 31.65 KG/M2 | WEIGHT: 190 LBS | RESPIRATION RATE: 16 BRPM

## 2025-07-01 PROBLEM — E87.6 HYPOKALEMIA: Status: RESOLVED | Noted: 2018-01-05 | Resolved: 2025-07-01

## 2025-07-01 PROBLEM — I16.0 HYPERTENSIVE URGENCY: Status: RESOLVED | Noted: 2025-04-15 | Resolved: 2025-07-01

## 2025-07-01 PROBLEM — E83.42 HYPOMAGNESEMIA: Status: ACTIVE | Noted: 2025-07-01

## 2025-07-01 LAB
ALBUMIN SERPL BCP-MCNC: 3.7 G/DL (ref 3.2–4.9)
ALBUMIN/GLOB SERPL: 1.7 G/DL
ALP SERPL-CCNC: 74 U/L (ref 30–99)
ALT SERPL-CCNC: 17 U/L (ref 2–50)
ANION GAP SERPL CALC-SCNC: 13 MMOL/L (ref 7–16)
AST SERPL-CCNC: 19 U/L (ref 12–45)
BASOPHILS # BLD AUTO: 0.3 % (ref 0–1.8)
BASOPHILS # BLD: 0.04 K/UL (ref 0–0.12)
BILIRUB SERPL-MCNC: 0.5 MG/DL (ref 0.1–1.5)
BUN SERPL-MCNC: 25 MG/DL (ref 8–22)
CALCIUM ALBUM COR SERPL-MCNC: 9.5 MG/DL (ref 8.5–10.5)
CALCIUM SERPL-MCNC: 9.3 MG/DL (ref 8.5–10.5)
CHLORIDE SERPL-SCNC: 110 MMOL/L (ref 96–112)
CO2 SERPL-SCNC: 19 MMOL/L (ref 20–33)
CREAT SERPL-MCNC: 0.99 MG/DL (ref 0.5–1.4)
EOSINOPHIL # BLD AUTO: 0.19 K/UL (ref 0–0.51)
EOSINOPHIL NFR BLD: 1.6 % (ref 0–6.9)
ERYTHROCYTE [DISTWIDTH] IN BLOOD BY AUTOMATED COUNT: 47.8 FL (ref 35.9–50)
GFR SERPLBLD CREATININE-BSD FMLA CKD-EPI: 59 ML/MIN/1.73 M 2
GLOBULIN SER CALC-MCNC: 2.2 G/DL (ref 1.9–3.5)
GLUCOSE SERPL-MCNC: 94 MG/DL (ref 65–99)
GRAM STN SPEC: NORMAL
HCT VFR BLD AUTO: 37.4 % (ref 37–47)
HGB BLD-MCNC: 12.4 G/DL (ref 12–16)
IMM GRANULOCYTES # BLD AUTO: 0.07 K/UL (ref 0–0.11)
IMM GRANULOCYTES NFR BLD AUTO: 0.6 % (ref 0–0.9)
LYMPHOCYTES # BLD AUTO: 1.79 K/UL (ref 1–4.8)
LYMPHOCYTES NFR BLD: 14.8 % (ref 22–41)
MAGNESIUM SERPL-MCNC: 1.8 MG/DL (ref 1.5–2.5)
MCH RBC QN AUTO: 32 PG (ref 27–33)
MCHC RBC AUTO-ENTMCNC: 33.2 G/DL (ref 32.2–35.5)
MCV RBC AUTO: 96.4 FL (ref 81.4–97.8)
MONOCYTES # BLD AUTO: 1.15 K/UL (ref 0–0.85)
MONOCYTES NFR BLD AUTO: 9.5 % (ref 0–13.4)
NEUTROPHILS # BLD AUTO: 8.87 K/UL (ref 1.82–7.42)
NEUTROPHILS NFR BLD: 73.2 % (ref 44–72)
NRBC # BLD AUTO: 0 K/UL
NRBC BLD-RTO: 0 /100 WBC (ref 0–0.2)
PLATELET # BLD AUTO: 243 K/UL (ref 164–446)
PMV BLD AUTO: 10.3 FL (ref 9–12.9)
POTASSIUM SERPL-SCNC: 3.2 MMOL/L (ref 3.6–5.5)
PROT SERPL-MCNC: 5.9 G/DL (ref 6–8.2)
RBC # BLD AUTO: 3.88 M/UL (ref 4.2–5.4)
SIGNIFICANT IND 70042: NORMAL
SITE SITE: NORMAL
SODIUM SERPL-SCNC: 142 MMOL/L (ref 135–145)
SOURCE SOURCE: NORMAL
WBC # BLD AUTO: 12.1 K/UL (ref 4.8–10.8)

## 2025-07-01 PROCEDURE — 700111 HCHG RX REV CODE 636 W/ 250 OVERRIDE (IP): Mod: JZ,TB | Performed by: INTERNAL MEDICINE

## 2025-07-01 PROCEDURE — A9270 NON-COVERED ITEM OR SERVICE: HCPCS | Performed by: NURSE PRACTITIONER

## 2025-07-01 PROCEDURE — 700102 HCHG RX REV CODE 250 W/ 637 OVERRIDE(OP): Performed by: INTERNAL MEDICINE

## 2025-07-01 PROCEDURE — 36415 COLL VENOUS BLD VENIPUNCTURE: CPT

## 2025-07-01 PROCEDURE — 80053 COMPREHEN METABOLIC PANEL: CPT

## 2025-07-01 PROCEDURE — A9270 NON-COVERED ITEM OR SERVICE: HCPCS | Performed by: INTERNAL MEDICINE

## 2025-07-01 PROCEDURE — 700102 HCHG RX REV CODE 250 W/ 637 OVERRIDE(OP): Performed by: STUDENT IN AN ORGANIZED HEALTH CARE EDUCATION/TRAINING PROGRAM

## 2025-07-01 PROCEDURE — 99239 HOSP IP/OBS DSCHRG MGMT >30: CPT | Performed by: STUDENT IN AN ORGANIZED HEALTH CARE EDUCATION/TRAINING PROGRAM

## 2025-07-01 PROCEDURE — A9270 NON-COVERED ITEM OR SERVICE: HCPCS | Performed by: STUDENT IN AN ORGANIZED HEALTH CARE EDUCATION/TRAINING PROGRAM

## 2025-07-01 PROCEDURE — 83735 ASSAY OF MAGNESIUM: CPT

## 2025-07-01 PROCEDURE — 96366 THER/PROPH/DIAG IV INF ADDON: CPT

## 2025-07-01 PROCEDURE — 700105 HCHG RX REV CODE 258: Performed by: INTERNAL MEDICINE

## 2025-07-01 PROCEDURE — 85025 COMPLETE CBC W/AUTO DIFF WBC: CPT

## 2025-07-01 PROCEDURE — 700102 HCHG RX REV CODE 250 W/ 637 OVERRIDE(OP): Performed by: NURSE PRACTITIONER

## 2025-07-01 PROCEDURE — 96372 THER/PROPH/DIAG INJ SC/IM: CPT

## 2025-07-01 PROCEDURE — RXMED WILLOW AMBULATORY MEDICATION CHARGE: Performed by: STUDENT IN AN ORGANIZED HEALTH CARE EDUCATION/TRAINING PROGRAM

## 2025-07-01 RX ORDER — POTASSIUM CHLORIDE 1500 MG/1
40 TABLET, EXTENDED RELEASE ORAL EVERY 6 HOURS
Status: DISCONTINUED | OUTPATIENT
Start: 2025-07-01 | End: 2025-07-01 | Stop reason: HOSPADM

## 2025-07-01 RX ORDER — SODIUM CHLORIDE 9 MG/ML
INJECTION, SOLUTION INTRAVENOUS CONTINUOUS
Status: DISCONTINUED | OUTPATIENT
Start: 2025-07-01 | End: 2025-07-01 | Stop reason: HOSPADM

## 2025-07-01 RX ORDER — CALCIUM CARBONATE 500 MG/1
500 TABLET, CHEWABLE ORAL 3 TIMES DAILY PRN
Status: DISCONTINUED | OUTPATIENT
Start: 2025-07-01 | End: 2025-07-01 | Stop reason: HOSPADM

## 2025-07-01 RX ORDER — CEPHALEXIN 500 MG/1
1000 CAPSULE ORAL 3 TIMES DAILY
Qty: 36 CAPSULE | Refills: 0 | Status: ON HOLD | OUTPATIENT
Start: 2025-07-01 | End: 2025-07-07

## 2025-07-01 RX ADMIN — OMEPRAZOLE 20 MG: 20 CAPSULE, DELAYED RELEASE ORAL at 05:16

## 2025-07-01 RX ADMIN — POTASSIUM CHLORIDE 40 MEQ: 1500 TABLET, EXTENDED RELEASE ORAL at 10:10

## 2025-07-01 RX ADMIN — SERTRALINE 200 MG: 100 TABLET, FILM COATED ORAL at 05:16

## 2025-07-01 RX ADMIN — CEFAZOLIN 2 G: 2 INJECTION, POWDER, FOR SOLUTION INTRAVENOUS at 05:25

## 2025-07-01 RX ADMIN — LISINOPRIL 40 MG: 20 TABLET ORAL at 05:17

## 2025-07-01 RX ADMIN — OXYCODONE 5 MG: 5 TABLET ORAL at 06:46

## 2025-07-01 RX ADMIN — SODIUM CHLORIDE: 9 INJECTION, SOLUTION INTRAVENOUS at 05:24

## 2025-07-01 RX ADMIN — ANTACID TABLETS 500 MG: 500 TABLET, CHEWABLE ORAL at 06:43

## 2025-07-01 RX ADMIN — OXYCODONE 5 MG: 5 TABLET ORAL at 02:13

## 2025-07-01 RX ADMIN — AMLODIPINE BESYLATE 10 MG: 10 TABLET ORAL at 05:16

## 2025-07-01 RX ADMIN — ATORVASTATIN CALCIUM 80 MG: 40 TABLET, FILM COATED ORAL at 05:17

## 2025-07-01 RX ADMIN — ENOXAPARIN SODIUM 40 MG: 100 INJECTION SUBCUTANEOUS at 00:17

## 2025-07-01 RX ADMIN — HYDRALAZINE HYDROCHLORIDE 50 MG: 50 TABLET ORAL at 05:15

## 2025-07-01 RX ADMIN — POTASSIUM CHLORIDE 40 MEQ: 1500 TABLET, EXTENDED RELEASE ORAL at 00:16

## 2025-07-01 ASSESSMENT — SOCIAL DETERMINANTS OF HEALTH (SDOH)
WITHIN THE LAST YEAR, HAVE YOU BEEN AFRAID OF YOUR PARTNER OR EX-PARTNER?: NO
WITHIN THE PAST 12 MONTHS, YOU WORRIED THAT YOUR FOOD WOULD RUN OUT BEFORE YOU GOT THE MONEY TO BUY MORE: NEVER TRUE
WITHIN THE LAST YEAR, HAVE YOU BEEN KICKED, HIT, SLAPPED, OR OTHERWISE PHYSICALLY HURT BY YOUR PARTNER OR EX-PARTNER?: NO
WITHIN THE LAST YEAR, HAVE YOU BEEN HUMILIATED OR EMOTIONALLY ABUSED IN OTHER WAYS BY YOUR PARTNER OR EX-PARTNER?: NO
IN THE PAST 12 MONTHS, HAS THE ELECTRIC, GAS, OIL, OR WATER COMPANY THREATENED TO SHUT OFF SERVICE IN YOUR HOME?: NO
WITHIN THE LAST YEAR, HAVE TO BEEN RAPED OR FORCED TO HAVE ANY KIND OF SEXUAL ACTIVITY BY YOUR PARTNER OR EX-PARTNER?: NO
WITHIN THE PAST 12 MONTHS, THE FOOD YOU BOUGHT JUST DIDN'T LAST AND YOU DIDN'T HAVE MONEY TO GET MORE: NEVER TRUE

## 2025-07-01 ASSESSMENT — COGNITIVE AND FUNCTIONAL STATUS - GENERAL
DRESSING REGULAR LOWER BODY CLOTHING: A LITTLE
PERSONAL GROOMING: A LITTLE
MOBILITY SCORE: 18
WALKING IN HOSPITAL ROOM: A LITTLE
DRESSING REGULAR UPPER BODY CLOTHING: A LITTLE
MOVING TO AND FROM BED TO CHAIR: A LITTLE
SUGGESTED CMS G CODE MODIFIER MOBILITY: CK
HELP NEEDED FOR BATHING: A LITTLE
EATING MEALS: A LITTLE
SUGGESTED CMS G CODE MODIFIER DAILY ACTIVITY: CK
DAILY ACTIVITIY SCORE: 18
CLIMB 3 TO 5 STEPS WITH RAILING: A LITTLE
MOVING FROM LYING ON BACK TO SITTING ON SIDE OF FLAT BED: A LITTLE
STANDING UP FROM CHAIR USING ARMS: A LITTLE
TOILETING: A LITTLE
TURNING FROM BACK TO SIDE WHILE IN FLAT BAD: A LITTLE

## 2025-07-01 ASSESSMENT — PATIENT HEALTH QUESTIONNAIRE - PHQ9
1. LITTLE INTEREST OR PLEASURE IN DOING THINGS: NOT AT ALL
2. FEELING DOWN, DEPRESSED, IRRITABLE, OR HOPELESS: NOT AT ALL
SUM OF ALL RESPONSES TO PHQ9 QUESTIONS 1 AND 2: 0

## 2025-07-01 ASSESSMENT — LIFESTYLE VARIABLES
EVER FELT BAD OR GUILTY ABOUT YOUR DRINKING: NO
HAVE YOU EVER FELT YOU SHOULD CUT DOWN ON YOUR DRINKING: NO
TOTAL SCORE: 0
ALCOHOL_USE: YES
EVER HAD A DRINK FIRST THING IN THE MORNING TO STEADY YOUR NERVES TO GET RID OF A HANGOVER: NO
DOES PATIENT WANT TO STOP DRINKING: NO
TOTAL SCORE: 0
ON A TYPICAL DAY WHEN YOU DRINK ALCOHOL HOW MANY DRINKS DO YOU HAVE: 1
TOTAL SCORE: 0
AVERAGE NUMBER OF DAYS PER WEEK YOU HAVE A DRINK CONTAINING ALCOHOL: 0
HAVE PEOPLE ANNOYED YOU BY CRITICIZING YOUR DRINKING: NO
HOW MANY TIMES IN THE PAST YEAR HAVE YOU HAD 5 OR MORE DRINKS IN A DAY: 1
CONSUMPTION TOTAL: POSITIVE

## 2025-07-01 ASSESSMENT — PAIN DESCRIPTION - PAIN TYPE
TYPE: ACUTE PAIN

## 2025-07-01 NOTE — DISCHARGE SUMMARY
Discharge Summary    CHIEF COMPLAINT ON ADMISSION  Chief Complaint   Patient presents with    Digit Pain     Right foot big toe. Stepped on something 2 days ago    Leg Swelling     Right        Reason for Admission  Cellulitis of right lower extremity    Admission Date  6/30/2025    CODE STATUS  Full Code    HPI & HOSPITAL COURSE  Fe Clark is a 74 y.o. female who presented on 6/20/2025 with pain into right great toe and right foot with swelling.  This a pleasant woman with a history of primary hypertension, COPD, sleep apnea, dyslipidemia, migraines, GERD, and duodenal ulcer.  She reported stepping on something with her right foot 2 days ago when she was walking barefoot outside resulting in a small wound in the plantar aspect of the great toe.  The wound started to become increasingly red with swelling and pain.     In the emergency room, patient was noted to have white count elevated at 12.9.  X-ray of the great toe did not show any acute bony injury.  The ER physician expressed a small abscess on the great toe and culture was sent.  Patient was started on cefazolin and vancomycin.    Patient was admitted to the medical floor for further care and evaluation.  MRSA nares was negative and vancomycin was discontinued.  She was continued on cefazolin with improvement of her erythema and swelling as well as pain.  Blood and wound cultures remain negative.  Pain was controlled with oxycodone.  Patient request to be discharged to home with ongoing clinical improvement.  White count trended down slightly to 12.1 from 12.9.    Patient had transient increase in her blood pressures up to 193/79, which improved to 144/67 on the day of discharge despite holding her hydrochlorothiazide due to her hyperkalemia.    Therefore, she is discharged in fair and stable condition to home with close outpatient follow-up.    The patient recovered much more quickly than anticipated on admission.    Discharge  Date  7/1/2025    FOLLOW UP ITEMS POST DISCHARGE  -Follow-up primary care provider in 3 to 5 days.    DISCHARGE DIAGNOSES  Principal Problem:    Cellulitis of right foot (POA: Yes)  Active Problems:    GERD (gastroesophageal reflux disease) (POA: Yes)    Hypokalemia (POA: Yes)    Mild intermittent asthma without complication (POA: Yes)    Severe major depression (HCC) (POA: Yes)    Hypomagnesemia (POA: Unknown)  Resolved Problems:    Hypertensive urgency (POA: Yes)      FOLLOW UP  Future Appointments   Date Time Provider Department Center   7/24/2025  2:40 PM Luis Leos M.D. [facility] GARRICK Leos M.D.  [address]  [facility] [address]  [facility] [address]  [phone]    Follow up in 1 week(s)        MEDICATIONS ON DISCHARGE     Medication List        START taking these medications        Instructions   cephALEXin 500 MG Caps  Commonly known as: Keflex   Take 2 Capsules by mouth 3 times a day for 6 days.  Dose: 1,000 mg            CHANGE how you take these medications        Instructions   atorvastatin 80 MG tablet  What changed: when to take this  Commonly known as: Lipitor   Take 1 Tablet by mouth every evening.  Dose: 80 mg            CONTINUE taking these medications        Instructions   albuterol 108 (90 Base) MCG/ACT Aers inhalation aerosol   Doctor's comments: Give albuterol that is patient or insurance preference please  Inhale 2 Puffs every four hours as needed for Shortness of Breath.  Dose: 2 Puff     amLODIPine 10 MG Tabs  Commonly known as: Norvasc   Take 10 mg by mouth every day.  Dose: 10 mg     celecoxib 100 MG Caps  Commonly known as: CeleBREX   Take 1 Capsule by mouth 1 time a day as needed for Moderate Pain (cervicalgia).  Dose: 100 mg     hydrALAZINE 50 MG Tabs  Commonly known as: Apresoline   Take 1 Tablet by mouth every 8 hours.  Dose: 50 mg     ICY HOT NO-MESS VAPOR GEL EX   Apply 1 Application topically 1 time a day as needed (neck pain).  Dose: 1 Application     lisinopril  40 MG tablet  Commonly known as: Prinivil   Take 1 Tablet by mouth every day.  Dose: 40 mg     omeprazole 20 MG delayed-release capsule  Commonly known as: PriLOSEC   Take 20 mg by mouth every day.  Dose: 20 mg     sertraline 100 MG Tabs  Commonly known as: Zoloft   Take 2 Tablets by mouth every day.  Dose: 200 mg            STOP taking these medications      hydroCHLOROthiazide 25 MG Tabs            ASK your doctor about these medications        Instructions   lidocaine 4 % Ptch  Commonly known as: Asperflex   Place 1 Patch on the skin every 24 hours.  Dose: 1 Patch              Allergies  Allergies[1]    DIET  Orders Placed This Encounter   Procedures    Diet Order Diet: Regular     Standing Status:   Standing     Number of Occurrences:   1     Diet::   Regular [1]       ACTIVITY  As tolerated.  Weight bearing as tolerated    CONSULTATIONS  None    PROCEDURES  None    LABORATORY  Lab Results   Component Value Date    SODIUM 142 07/01/2025    POTASSIUM 3.2 (L) 07/01/2025    CHLORIDE 110 07/01/2025    CO2 19 (L) 07/01/2025    GLUCOSE 94 07/01/2025    BUN 25 (H) 07/01/2025    CREATININE 0.99 07/01/2025    CREATININE 1.0 03/27/2009        Lab Results   Component Value Date    WBC 12.1 (H) 07/01/2025    HEMOGLOBIN 12.4 07/01/2025    HEMATOCRIT 37.4 07/01/2025    PLATELETCT 243 07/01/2025        Total time of the discharge process 34 minutes.         [1] No Known Allergies

## 2025-07-01 NOTE — ED PROVIDER NOTES
ED Provider Note    CHIEF COMPLAINT  Chief Complaint   Patient presents with    Digit Pain     Right foot big toe. Stepped on something 2 days ago    Leg Swelling     Right        EXTERNAL RECORDS REVIEWED  Inpatient Notes patient was admitted 4/15/2025 and discharged 2 days later in the setting of an intractable headache, hypertensive urgency.    HPI/ROS  LIMITATION TO HISTORY   Select: : None      Fe Clark is a 74 y.o. female who presents to the emergency department for evaluation of toe pain, leg pain redness and swelling.  She states that 2 days ago she was walking outside barefoot and felt that she stepped on something with her right great toe.  She sustained a small wound to the plantar aspect of the toe but attempted to search it for splinter and was not able to identify anything.  She states that since that time however the great toe has gotten red, swollen and very painful and now she is having redness streak up her foot and leg.  She reports chills and subjective fever as well though presented to the ER afebrile.  She denies chest pain or shortness of breath.  She denies history of DVT or PE.  She does not take blood thinners.    PAST MEDICAL HISTORY   has a past medical history of ASTHMA, COPD (chronic obstructive pulmonary disease) (HCC), Diverticulosis, Duodenal ulcer, unspecified as acute or chronic, without hemorrhage, perforation, or obstruction, GERD (gastroesophageal reflux disease), Hiatal hernia, High cholesterol, Hypertension, and Psychiatric problem.    SURGICAL HISTORY   has a past surgical history that includes bladder suspension; bowel resection; colonoscopy; hysterectomy radical; other orthopedic surgery; ankle arthroscopy (7/31/2013); hardware removal ortho (7/31/2013); lacrimal duct probe (3/11/2015); submandible abscess incision and drainage (11/19/2016); and dental extraction(s) (11/19/2016).    FAMILY HISTORY  Family History   Problem Relation Age of Onset    No Known  "Problems Mother         2017 is age 91    Other Father 57        unknown issue       SOCIAL HISTORY  Social History     Tobacco Use    Smoking status: Never     Passive exposure: Current (2 people smoke,not in pt's room)    Smokeless tobacco: Never   Vaping Use    Vaping status: Never Used    Passive vaping exposure: Yes (2 people vape,not in pt's room)   Substance and Sexual Activity    Alcohol use: Yes     Comment: occ    Drug use: Not Currently    Sexual activity: Not Currently     Partners: Male       CURRENT MEDICATIONS  Home Medications       Reviewed by Monica Antony R.N. (Registered Nurse) on 06/30/25 at 1839  Med List Status: Partial     Medication Last Dose Status   albuterol 108 (90 Base) MCG/ACT Aero Soln inhalation aerosol  Active   amLODIPine (NORVASC) 10 MG Tab  Active   atorvastatin (LIPITOR) 80 MG tablet  Active   Camphor-Eucalyptus-Menthol (ICY HOT NO-MESS VAPOR GEL EX)  Active   celecoxib (CELEBREX) 100 MG Cap  Active   hydrALAZINE (APRESOLINE) 50 MG Tab  Active   hydroCHLOROthiazide 25 MG Tab  Active   lidocaine (ASPERFLEX) 4 % Patch  Active   lisinopril (PRINIVIL) 40 MG tablet  Active   Multiple Vitamins-Minerals (MULTIVITAMIN ADULTS 50+) Tab  Active   omeprazole (PRILOSEC) 20 MG delayed-release capsule  Active   sertraline (ZOLOFT) 100 MG Tab  Active                  Audit from Redirected Encounters    **Home medications have not yet been reviewed for this encounter**         ALLERGIES  Allergies[1]    PHYSICAL EXAM  VITAL SIGNS: BP (!) 193/79   Pulse 72   Temp 36.1 °C (96.9 °F) (Temporal)   Resp 20   Ht 1.651 m (5' 5\")   Wt 86.2 kg (190 lb)   SpO2 94%   BMI 31.62 kg/m²    Constitutional: No acute distress, very pleasant, uncomfortable appearing..  HENT: Normocephalic, Atraumatic, Bilateral external ears normal. Nose normal.   Eyes: Pupils are equal and reactive. Conjunctiva normal, non-icteric.   Cardiovascular: 2+ dorsalis pedis pulse on the right.  Capillary refill less than 2 " seconds in the right great toe.  Musculoskeletal: Right great toe erythema, edema and tenderness predominantly to the plantar aspect surrounding a centralized area of fluctuance and induration with a central scabbed over laceration.  Limited range of motion of the great toe secondary to pain.  Skin: Warm, Dry, erythema encompassing the right great toe and extending up the dorsum of the right foot linearly to the right anterior leg diffusely  Neurologic: Alert and oriented x 3, preserved distal sensation to the right great toe and normal motor function of the right ankle.  Psychiatric: Appropriate affect for situation      EKG/LABS  Labs Reviewed   CBC WITH DIFFERENTIAL - Abnormal; Notable for the following components:       Result Value    WBC 12.9 (*)     RBC 4.15 (*)     Neutrophils-Polys 80.40 (*)     Lymphocytes 9.10 (*)     Neutrophils (Absolute) 10.40 (*)     Monos (Absolute) 1.15 (*)     All other components within normal limits   COMP METABOLIC PANEL - Abnormal; Notable for the following components:    Potassium 3.4 (*)     Glucose 105 (*)     Bun 25 (*)     All other components within normal limits   ESTIMATED GFR - Abnormal; Notable for the following components:    GFR (CKD-EPI) 52 (*)     All other components within normal limits   LACTIC ACID   LACTIC ACID   LACTIC ACID   BLOOD CULTURE   BLOOD CULTURE   MRSA BY PCR (AMP)   CULTURE WOUND W/ GRAM STAIN       RADIOLOGY/PROCEDURES   I have independently interpreted the diagnostic imaging associated with this visit and am waiting the final reading from the radiologist.   My preliminary interpretation is as follows: No underlying bony fracture or radiopaque foreign body    Radiologist interpretation:  DX-TOE(S) 2+ RIGHT   Final Result         1.  No acute traumatic bony injury identified.           Incision and Drainage Procedure Note    Indication: Abscess    Procedure: The patient was positioned appropriately and the skin over the incision site was prepped  with alcohol. Local anesthesia was obtained with a full digital block of the right first (great toe) using 1% Lidocaine without epinephrine.  An incision was then made over the apex of the lesion and approximately 1 cc of purulent and bloody material was expressed. Loculations were not present. The drainage cavity was then irrigated. The patient’s tetanus status was up to date and did not require a booster dose.    The patient tolerated the procedure well.    Complications: None      COURSE & MEDICAL DECISION MAKING    ASSESSMENT, COURSE AND PLAN  Care Narrative:     Patient presents to the ER for evaluation of right leg redness swelling and pain associated with what started as a wound to the plantar aspect of the right great toe 2 days ago.  Her vital signs are stable and she is afebrile.  Her examination is suggestive of a significant soft tissue infection with cellulitic change to the dorsum of the right foot and anterior right leg associated with a wound to the plantar aspect of the great toe.  Bedside ultrasound performed which demonstrates a small abscess at the site of her prior wound.  No visualized foreign body appreciated on ultrasound.  Given the degree of her cellulitis I did elect to obtain labs including lactate and blood cultures for sepsis evaluation and initiated broad-spectrum antibiotic therapy with cefazolin and vancomycin per pharmacy recommendations.  Patient was treated for pain with acetaminophen and fentanyl.  An x-ray was obtained demonstrating no radiopaque foreign body, underlying abscess or evidence of osteomyelitis.  Laboratory workup ultimately remarkable for a leukocytosis with a left shift but no evidence of endorgan hypoperfusion or lactic acidosis.  Bedside incision and drainage performed and a wound culture was sent for evaluation.  Discussed with the patient I would recommend admission to the hospital for monitoring to ensure appropriate resolution of her infection with  antibiotics.  She is comfortable with this plan.  Case discussed with on-call hospitalist Dr. Harrington accepts for admission.      ADDITIONAL PROBLEMS MANAGED  None    DISPOSITION AND DISCUSSIONS  I have discussed management of the patient with the following physicians and JORGE's: Hospitalist as above    FINAL DIAGNOSIS  1. Cellulitis of right lower extremity    2. Toe abscess, right         Electronically signed by: Jose C Bell M.D., 6/30/2025 8:44 PM           [1] No Known Allergies

## 2025-07-01 NOTE — H&P
Hospital Medicine History & Physical Note    Date of Service  6/30/2025    Primary Care Physician  Luis Leos M.D.      Code Status  Full Code    Chief Complaint  Chief Complaint   Patient presents with    Digit Pain     Right foot big toe. Stepped on something 2 days ago    Leg Swelling     Right        History of Presenting Illness  Fe Clark is a 74 y.o. female with history of hypertension, COPD, sleep apnea, dyslipidemia, migraines, GERD, duodenal ulcer, who presented 6/30/2025 with complaints of right great toe pain and right foot swelling after patient allegedly stepped on something 2 days ago when she was walking barefoot outside, sustaining small wound to the plantar aspect of the great toe.  It became red swollen and painful.  Upon evaluation patient noted to have leukocytosis 12.9.  X-ray of the great toe does not show acute bony injury.  Dr. Bell/ERP expressed small abscess on the great toe and wound culture was sent.  Patient was given cefazolin and vancomycin    I discussed the plan of care with patient, bedside RN, and ERP.    Review of Systems  Review of Systems   Constitutional:  Negative for chills, fever and weight loss.   HENT:  Negative for ear pain, hearing loss and tinnitus.    Eyes:  Negative for blurred vision, double vision and photophobia.   Respiratory:  Negative for cough, hemoptysis and sputum production.    Cardiovascular:  Positive for leg swelling. Negative for chest pain, palpitations and orthopnea.   Gastrointestinal:  Negative for heartburn, nausea and vomiting.   Genitourinary:  Negative for dysuria, flank pain, frequency and hematuria.   Musculoskeletal:  Positive for joint pain. Negative for back pain and neck pain.   Skin:  Negative for itching and rash.   Neurological:  Negative for tremors, speech change, focal weakness and headaches.   Endo/Heme/Allergies:  Negative for environmental allergies and polydipsia. Does not bruise/bleed easily.    Psychiatric/Behavioral:  Negative for hallucinations and substance abuse. The patient is not nervous/anxious.        Past Medical History   has a past medical history of ASTHMA, COPD (chronic obstructive pulmonary disease) (HCC), Diverticulosis, Duodenal ulcer, unspecified as acute or chronic, without hemorrhage, perforation, or obstruction, GERD (gastroesophageal reflux disease), Hiatal hernia, High cholesterol, Hypertension, and Psychiatric problem.    Surgical History   has a past surgical history that includes bladder suspension; bowel resection; colonoscopy; hysterectomy radical; other orthopedic surgery; ankle arthroscopy (7/31/2013); hardware removal ortho (7/31/2013); lacrimal duct probe (3/11/2015); submandible abscess incision and drainage (11/19/2016); and dental extraction(s) (11/19/2016).     Family History  Family History   Problem Relation Age of Onset    No Known Problems Mother         2017 is age 91    Other Father 57        unknown issue        Family history reviewed with patient. There is no family history that is pertinent to the chief complaint.     Social History   reports that she has never smoked. She has been exposed to tobacco smoke. She has never used smokeless tobacco. She reports current alcohol use. She reports that she does not currently use drugs.    Allergies  Allergies[1]    Medications  Prior to Admission Medications   Prescriptions Last Dose Informant Patient Reported? Taking?   Camphor-Eucalyptus-Menthol (ICY HOT NO-MESS VAPOR GEL EX)  Patient Yes No   Sig: Apply 1 Application topically 1 time a day as needed (neck pain).   Multiple Vitamins-Minerals (MULTIVITAMIN ADULTS 50+) Tab  Patient Yes No   Sig: Take 1 Tablet by mouth 2 times a day.   albuterol 108 (90 Base) MCG/ACT Aero Soln inhalation aerosol  Patient No No   Sig: Inhale 2 Puffs every four hours as needed for Shortness of Breath.   amLODIPine (NORVASC) 10 MG Tab  Patient's Home Pharmacy Yes No   Sig: Take 10 mg by  mouth every day.   atorvastatin (LIPITOR) 80 MG tablet  Patient No No   Sig: Take 1 Tablet by mouth every evening.   Patient taking differently: Take 80 mg by mouth every morning.   celecoxib (CELEBREX) 100 MG Cap   No No   Sig: Take 1 Capsule by mouth 1 time a day as needed for Moderate Pain (cervicalgia).   hydrALAZINE (APRESOLINE) 50 MG Tab   No No   Sig: Take 1 Tablet by mouth every 8 hours.   hydroCHLOROthiazide 25 MG Tab  Patient No No   Sig: Take 1 Tablet by mouth every day.   lidocaine (ASPERFLEX) 4 % Patch   No No   Sig: Place 1 Patch on the skin every 24 hours.   lisinopril (PRINIVIL) 40 MG tablet   No No   Sig: Take 1 Tablet by mouth every day.   omeprazole (PRILOSEC) 20 MG delayed-release capsule  Patient Yes No   Sig: Take 20 mg by mouth every day.   sertraline (ZOLOFT) 100 MG Tab   No No   Sig: Take 2 Tablets by mouth every day.      Facility-Administered Medications: None       Physical Exam  Temp:  [36.1 °C (96.9 °F)] 36.1 °C (96.9 °F)  Pulse:  [72-85] 72  Resp:  [18-20] 20  BP: (149-193)/(78-83) 193/79  SpO2:  [94 %-98 %] 94 %  Blood Pressure : (!) 193/79   Temperature: 36.1 °C (96.9 °F)   Pulse: 72   Respiration: 20   Pulse Oximetry: 94 %       Physical Exam  Vitals and nursing note reviewed.   Constitutional:       General: She is not in acute distress.     Appearance: Normal appearance. She is ill-appearing.   HENT:      Head: Normocephalic and atraumatic.      Nose: Nose normal.      Mouth/Throat:      Mouth: Mucous membranes are moist.   Eyes:      Extraocular Movements: Extraocular movements intact.      Pupils: Pupils are equal, round, and reactive to light.   Cardiovascular:      Rate and Rhythm: Normal rate and regular rhythm.   Pulmonary:      Effort: Pulmonary effort is normal.      Breath sounds: Normal breath sounds.   Abdominal:      General: Abdomen is flat. There is no distension.      Tenderness: There is no abdominal tenderness.   Musculoskeletal:         General: No swelling or  "deformity. Normal range of motion.      Cervical back: Normal range of motion and neck supple.      Comments: Right great toe erythema, edema and tenderness  to the plantar aspect, drained small abscess.     erythema encompassing the right great toe and extending up the dorsum of the right foot linearly to the right anterior leg diffusely  Pulse on dorsalis pedis 2+ on the right side   Skin:     General: Skin is warm and dry.   Neurological:      General: No focal deficit present.      Mental Status: She is alert and oriented to person, place, and time.   Psychiatric:         Mood and Affect: Mood normal.         Behavior: Behavior normal.         Laboratory:  Recent Labs     06/30/25 2127   WBC 12.9*   RBC 4.15*   HEMOGLOBIN 13.1   HEMATOCRIT 39.6   MCV 95.4   MCH 31.6   MCHC 33.1   RDW 47.4   PLATELETCT 256   MPV 9.7     Recent Labs     06/30/25 2127   SODIUM 140   POTASSIUM 3.4*   CHLORIDE 106   CO2 21   GLUCOSE 105*   BUN 25*   CREATININE 1.10   CALCIUM 9.9     Recent Labs     06/30/25 2127   ALTSGPT 21   ASTSGOT 20   ALKPHOSPHAT 81   TBILIRUBIN 0.9   GLUCOSE 105*         No results for input(s): \"NTPROBNP\" in the last 72 hours.      No results for input(s): \"TROPONINT\" in the last 72 hours.    Imaging:  DX-TOE(S) 2+ RIGHT   Final Result         1.  No acute traumatic bony injury identified.             X-Ray:  I have personally reviewed the images and compared with prior images.    Assessment/Plan:  Justification for Admission Status  I anticipate this patient will require at least two midnights for appropriate medical management, necessitating inpatient admission because right foot cellulitis    Patient will need a Med/Surg bed on MEDICAL service .  The need is secondary to right foot cellulitis.    * Cellulitis of right foot- (present on admission)  Assessment & Plan  74-year-old female without history of diabetes presented with right foot cellulitis secondary to injury to the right great toe.  X-ray is " negative for traumatic bony injuries  Plan: Continue vancomycin, cefazolin  Follow abscess culture  Consider MRI of the foot depending on clinical course  Pain control with scheduled Tylenol, oxycodone as needed    Hypertensive urgency- (present on admission)  Assessment & Plan  Blood pressure 193/79  Restart home medications lisinopril, amlodipine, hydralazine  Hydralazine IV as needed  Monitor blood pressure    Severe major depression (HCC)- (present on admission)  Assessment & Plan  History of  Resume sertraline    Mild intermittent asthma without complication- (present on admission)  Assessment & Plan  Not in exacerbation  Albuterol as needed for rescue    Hypokalemia- (present on admission)  Assessment & Plan  Potassium 3.4  P.o. replacement ordered    GERD (gastroesophageal reflux disease)- (present on admission)  Assessment & Plan  Continue omeprazole        VTE prophylaxis: enoxaparin ppx         [1] No Known Allergies

## 2025-07-01 NOTE — PROGRESS NOTES
Assumed care of patient at 0700 . Received Report from Paulina GILMORE Patient A&Ox4. Call light within reach, fall prevention education given belongings within reach, bed alarm is on and bed in lowest position. All questions answered, plan of care discussed and pt verbalized understanding

## 2025-07-01 NOTE — ED NOTES
Pt ambulated to restroom with steady gate with this RN. Voiding without difficulties.       ERP at bedside

## 2025-07-01 NOTE — ASSESSMENT & PLAN NOTE
Blood pressure 193/79  Restart home medications lisinopril, amlodipine, hydralazine  Hydralazine IV as needed  Monitor blood pressure

## 2025-07-01 NOTE — CARE PLAN
Problem: Knowledge Deficit - Standard  Goal: Patient and family/care givers will demonstrate understanding of plan of care, disease process/condition, diagnostic tests and medications  Outcome: Progressing   The patient is Stable - Low risk of patient condition declining or worsening    Shift Goals  Clinical Goals: ABX  Patient Goals: Patient wants to dishage  Family Goals: KAEL    Progress made toward(s) clinical / shift goals:  Patient has discharge orders.waiting for wound care.    Patient is not progressing towards the following goals:

## 2025-07-01 NOTE — DISCHARGE INSTRUCTIONS
Thank you for coming to [facility].  It has been my pleasure to take care of you!    -Please follow-up with your primary care provider in 3 to 5 days with labs.

## 2025-07-01 NOTE — ED NOTES
PIV placed, medicated per MAR.    Labs collected and sent. Blood cultures x1 collected and sent   MRSA viral swab collected and sent.

## 2025-07-01 NOTE — CARE PLAN
The patient is Stable - Low risk of patient condition declining or worsening    Shift Goals  Clinical Goals: Pain control to a comfort level of 3/10  Patient Goals: Rest  Family Goals: KAEL    Progress made toward(s) clinical / shift goals:        Problem: Pain - Standard  Goal: Alleviation of pain or a reduction in pain to the patient’s comfort goal  Outcome: Progressing  Note: Patient's pain managed through pharmacologic and non-pharmacologic measures. s able to rest comfortably after PRN pain medication was given. Educated to report any onset of pain. Patient's pain level will continually be monitored for the rest of the shift.     Problem: Fall Risk  Goal: Patient will remain free from falls  Outcome: Progressing  Note: Safety education provided; patient verbalized understanding. Bed is placed on low position with bed alarm on; care items within reach; emphasized to use the call button to seek assistance. Hourly rounding done. Remains free from thus far this shift. Monitoring to continue for the rest of the shift.       Patient is not progressing towards the following goals:

## 2025-07-01 NOTE — PROGRESS NOTES
4 Eyes Skin Assessment Completed by MANDO Gallardo and MANDO Atkinson.    Skin assessment is primarily focused on high risk bony prominences. Pay special attention to skin beneath and around medical devices, high risk bony prominences, skin to skin areas and areas where the patient lacks sensation to feel pain and areas where the patient previously had breakdown.     Head (Occipital):  WDL   Ears (Under Medical Devices): WDL   Nose (Under Medical Devices): WDL   Mouth:  WDL   Neck: WDL   Breast/Chest:  WDL   Shoulder Blades:  WDL   Spine:   WDL   (R) Arm/Elbow/Hand: WDL   (L) Arm/Elbow/Hand: Bruising   Abdomen: WDL   Pannus/Groin:  WDL   Sacrum/Coccyx:   Red and Blanching   (R) Ischial Tuberosity (Sit Bones):  WDL   (L) Ischial Tuberosity (Sit Bones):  WDL   (R) Leg:  Red, Blanching, and Edema   (L) Leg:  WDL   (R) Heel:  Pink and Blanching   (R) Foot/Toe: Open wound, Red, and Blanching   (L) Heel: Red and Blanching   (L) Foot/Toe:  WDL               DEVICES IN USE:   Respiratory Devices:  NA, patient on room air  Feeding Devices:  N/A   Lines & BP Monitoring Devices:  Peripheral IV and BP cuff    Orthopedic Devices:  N/A  Miscellaneous Devices:  N/A    PROTOCOL INTERVENTIONS:   Standard/Trauma Bed:  Already in place  Float Heels with Pillows:  Applied this assessment  Barrier Paste:  Applied this assessment    WOUND PHOTOS:   Completed and in EPIC     WOUND CONSULT:   Consult ordered for the following areas Right big toe / hallux

## 2025-07-01 NOTE — PROGRESS NOTES
Received patient from ED with transport staff via [facility]. Patient is on room air and not on apparent distress. Patient is A&Ox4. Patient is able to stand and transfer from the gurney to her bed in the room. Safety education provided, bed locked and placed on low position, care items within reach. Bed alarm placed. Plan of care discussed and patient verbalized understanding. Expressed no further needs at this time.

## 2025-07-01 NOTE — ED NOTES
Med Rec completed per patient      Allergies reviewed: yes    Oral antibiotics in the past 30 days: no    Anticoagulant in past 14 days: no    Dispense history available in EPIC: partial    Pharmacy patient utilizes: [facility] [address] [place] [address]  [phone]

## 2025-07-01 NOTE — PROGRESS NOTES
Pharmacy Vancomycin Kinetics Note for 6/30/2025     74 y.o. female on Vancomycin day # 1     Vancomycin Indication (AUC Dosing): Skin/skin structure infection    Provider specified end date: 07/06/25    Active Antibiotics (From admission, onward)      Ordered     Ordering Provider       Mon Jun 30, 2025 11:38 PM    06/30/25 2338  vancomycin (Vancocin) 750 mg in  mL IVPB  (vancomycin (VANCOCIN) IV (LD + Maintenance))  EVERY 12 HOURS         Vikas Harrington M.D.       Mon Jun 30, 2025 11:27 PM    06/30/25 2327  ceFAZolin (Ancef) 2 g in  mL IVPB  EVERY 8 HOURS         Vikas Harrington M.D.    06/30/25 2327  MD Alert...Vancomycin per Pharmacy  PHARMACY TO DOSE        Question:  Indication(s) for vancomycin?  Answer:  Skin and soft tissue infection    Vikas Harrington M.D.       Mon Jun 30, 2025  8:57 PM    06/30/25 2057  vancomycin (Vancocin) 2,250 mg in  mL IVPB  (vancomycin (VANCOCIN) IV (LD + Maintenance))  ONCE         Jose C Bell M.D.            Dosing Weight: 86.2 kg (190 lb 0.6 oz)      Admission History: Admitted on 6/30/2025 for Cellulitis of right foot [L03.115]  Pertinent history: Patient presents post puncture wound to the foot with swelling reddness and streaking up leg.    Allergies:     Patient has no known allergies.     Pertinent cultures to date:     Results       Procedure Component Value Units Date/Time    Blood Culture - Draw one from central line and one from peripheral site [[id number]] Collected: 06/30/25 2127    Order Status: Sent Specimen: Blood from Line Updated: 06/30/25 2330    MRSA By PCR (Amp) [[id number]] Collected: 06/30/25 2128    Order Status: Completed Specimen: Blood from Nares Updated: 06/30/25 2327     MRSA by PCR Negative    CULTURE WOUND W/ GRAM STAIN [[id number]]     Order Status: Sent Specimen: Wound from Abscess     Blood Culture - Draw one from central line and one from peripheral site [[id number]]     Order Status: Sent Specimen: Blood from  "Peripheral             Labs:     Estimated Creatinine Clearance: 47.9 mL/min (by C-G formula based on SCr of 1.1 mg/dL).  Recent Labs     25   WBC 12.9*   NEUTSPOLYS 80.40*     Recent Labs     25   BUN 25*   CREATININE 1.10   ALBUMIN 4.2     No intake or output data in the 24 hours ending 25 2339   BP (!) 146/114   Pulse 74   Temp 36.1 °C (96.9 °F) (Temporal)   Resp 16   Ht 1.651 m (5' 5\")   Wt 86.2 kg (190 lb)   SpO2 90%  Temp (24hrs), Av.1 °C (96.9 °F), Min:36.1 °C (96.9 °F), Max:36.1 °C (96.9 °F)      List concerns for Vancomycin clearance:     Age    Pharmacokinetics:     AUC kinetics:   Ke (hr ^-1): 0.0442 hr^-1  Half life: 15.68 hr  Clearance: 2.728  Estimated TDD: 1364  Estimated Dose: 1006  Estimated interval: 17.7  A/P:     -  Vancomycin dose: 2250 mg load, 750 mg every 12 hours maintenance.    -  Next vancomycin level(s):    None ordered at this time. Likely obtain levels after the 4th maintenance dose unless clinical status or renal function changes.       -  Predicted vancomycin AUC from initial AUC test calculator: 550 mg·hr/L    -  Comments: Consider de escalation after MRSA nares results, if patient clearance is outside of calculated consider adjustment of dose.    Werner Moreno, PharmD    "

## 2025-07-01 NOTE — ASSESSMENT & PLAN NOTE
74-year-old female without history of diabetes presented with right foot cellulitis secondary to injury to the right great toe.  X-ray is negative for traumatic bony injuries  Plan: Continue vancomycin, cefazolin  Follow abscess culture  Consider MRI of the foot depending on clinical course  Pain control with scheduled Tylenol, oxycodone as needed

## 2025-07-01 NOTE — PROGRESS NOTES
Fe lCark has been provided discharge instructions, to include follow up care, home medications, and activity/diet reviewed. Copy of discharge instructions in patient chart, signed and reviewed. Patient verbalizes the understanding of the discharge instructions. PIV was removed prior to coming to [facility]. Arm band removed. Patient did not have home meds during admit. Meds to Beds verified and given to pt. Questions and concerns addressed prior to leaving the discharge lounge. Transported via car. Patient discharge to home.

## 2025-07-01 NOTE — RESPIRATORY CARE
"COPD EDUCATION by COPD CLINICAL EDUCATOR  7/1/2025  at  9:15 AM by Lynnette Mason, RRT     Patient interviewed by education team.  Patient declined or is unable to participate in the full program.  Therefore, a short intervention has been conducted.  A comprehensive packet including information about COPD, types of treatments to manage their disease and safe home Oxygen usage was provided to patient on April admission.  She has no questions regarding COPD.  Reminded patient of OP pulmonary referral that is pending and to follow up after discharge.    COPD Assessment  COPD Clinical Specialists ONLY  COPD Education Initiated: Yes--Short Intervention   Is this a COPD exacerbation patient?: No  Pulmonary Follow Up Appointment:  (referral to [facility] active)  Pulmonary Rehab: No  Smoking Cessation: N/A  Hospice: No  Home Health Care: No  Mobile Urgent Care Services: No  Geriatric Specialty Group: No  Private In-Home Care Agency: No  Interdisciplinary Rounds: Attendance at Rounds (30 Min)    PFT Results    No results found for: \"PFT\"    Meds to Beds  [facility] provides bedside medication delivery for all eligible patients at discharge and you have been automatically enrolled in the Meds to Beds Program. Would you like to opt out of this program for any reason?: No - Stay Opted In     MY COPD ACTION PLAN     It is recommended that patients and physicians/healthcare providers complete this action plan together. This plan should be discussed at each physician visit and updated as needed.    The green, yellow and red zones show groups of symptoms of COPD. This list of symptoms is not comprehensive, and you may experience other symptoms. In the \"Actions\" column, your healthcare provider has recommended actions for you to take based on your symptoms.    Patient Name: Fe Clark   YOB: 1950   Last Updated on: 7/1/2025  9:15 AM   Green Zone:  I am doing well today Actions     Usual activitiy and " "exercise level   Take daily medications     Usual amounts of cough and phlegm/mucus   Use oxygen as prescribed     Sleep well at night   Continue regular exercise/diet plan     Appetite is good   At all times avoid cigarette smoke, inhaled irritants     Daily Medications (these medications are taken every day):                Yellow Zone:  I am having a bad day or a COPD flare Actions     More breathless than usual   Continue daily medications     I have less energy for my daily activities   Use quick relief inhaler as ordered     Increased or thicker phlegm/mucus   Use oxygen as prescribed     Using quick relief inhaler/nebulizer more often   Get plenty of rest     Swelling of ankles more than usual   Use pursed lip breathing     More coughing than usual   At all times avoid cigarette smoke, inhaled irritants     I feel like I have a \"chest cold\"     Poor sleep and my symptoms woke me up     My appetite is not good     My medicine is not helping      Call provider immediately if symptoms don’t improve     Continue daily medications, add rescue medications:   Albuterol 2 Puffs Every 4 hours PRN       Medications to be used during a flare up, (as Discussed with Provider):           Additional Information:  Use spacer with inhaler    Red Zone:  I need urgent medical care Actions     Severe shortness of breath even at rest   Call 911 or seek medical care immediately     Not able to do any activity because of breathing      Fever or shaking chills      Feeling confused or very drowsy       Chest pains      Coughing up blood                  "

## 2025-07-02 ENCOUNTER — PATIENT OUTREACH (OUTPATIENT)
Dept: MEDICAL GROUP | Facility: MEDICAL CENTER | Age: 75
End: 2025-07-02
Payer: MEDICARE

## 2025-07-02 ENCOUNTER — TELEPHONE (OUTPATIENT)
Dept: HEALTH INFORMATION MANAGEMENT | Facility: OTHER | Age: 75
End: 2025-07-02
Payer: MEDICARE

## 2025-07-02 ENCOUNTER — DOCUMENTATION (OUTPATIENT)
Dept: HEALTH INFORMATION MANAGEMENT | Facility: OTHER | Age: 75
End: 2025-07-02
Payer: MEDICARE

## 2025-07-02 NOTE — PROGRESS NOTES
Transitional Care Management  TCM Outreach Date and Time: Filed (7/2/2025  8:33 AM)    Discharge Questions  Actual Discharge Date: 07/01/25  Now that you are home, how are you feeling?: Fair (red streak on R leg, there at the hospital, but was not assessed per pt in hospital-goes from ankle almost to knee; 5/10-tylenol not helping at this time)  Did you receive any new prescriptions?: Yes (cephalexin; STOP hydrochlorothiazide)  Were you able to get them filled?: Yes  Meds to Bed or Pharmacy filled?: Meds to Bed  Do you have any questions about your current medications or new medications (Review Med Rec)?: No  Did you have any durable medical equipment ordered?: No  Do you have a follow up appointment scheduled with your PCP?: Yes  Was an appointment scheduled for the patient?: Yes  Appointment Date: 07/03/25  Appointment Time: 1100  Any issues or paperwork you wish to discuss with your PCP?: Yes (Please specify) (foot is still painful, pt states there is a red streaking that has not resolved, swelling is still present)  Are you (patient) able to get to the appointment?: Yes  If Home Health was ordered, have they contacted you (Patient): Not Applicable  Did you have enough support after your last discharge?: Yes  Does this patient qualify for the CCM program?: Yes (followed by Layne Boucher RN)    Transitional Care  Number of attempts made to contact patient: 1  Current or previous attempts completed within two business days of discharge? : Yes  Provided education regarding treatment plan, medications, self-management, ADLs?: Yes (ED precautions, elevate leg)  Has patient completed an Advanced Directive?: No  Has the Care Manager's phone number provided?: Yes  Is there anything else I can help you with?: No    Discharge Summary  Chief Complaint: Digit Pain        Right foot big toe. Stepped on something 2 days ago    Leg Swelling        Right  Admitting Diagnosis: Cellulitis of right lower extremity  Discharge  Diagnosis: Cellulitis of right foot

## 2025-07-03 ENCOUNTER — OFFICE VISIT (OUTPATIENT)
Dept: MEDICAL GROUP | Facility: MEDICAL CENTER | Age: 75
End: 2025-07-03
Payer: MEDICARE

## 2025-07-03 VITALS
BODY MASS INDEX: 28.29 KG/M2 | OXYGEN SATURATION: 96 % | DIASTOLIC BLOOD PRESSURE: 50 MMHG | SYSTOLIC BLOOD PRESSURE: 124 MMHG | HEIGHT: 65 IN | WEIGHT: 169.8 LBS | HEART RATE: 65 BPM | RESPIRATION RATE: 14 BRPM | TEMPERATURE: 97.1 F

## 2025-07-03 DIAGNOSIS — E87.6 HYPOKALEMIA: ICD-10-CM

## 2025-07-03 DIAGNOSIS — E78.2 MIXED HYPERLIPIDEMIA: ICD-10-CM

## 2025-07-03 DIAGNOSIS — I10 PRIMARY HYPERTENSION: ICD-10-CM

## 2025-07-03 DIAGNOSIS — L03.115 CELLULITIS OF RIGHT FOOT: Primary | ICD-10-CM

## 2025-07-03 DIAGNOSIS — K21.9 GASTROESOPHAGEAL REFLUX DISEASE, UNSPECIFIED WHETHER ESOPHAGITIS PRESENT: ICD-10-CM

## 2025-07-03 DIAGNOSIS — F32.2 SEVERE MAJOR DEPRESSION (HCC): ICD-10-CM

## 2025-07-03 LAB
BACTERIA WND AEROBE CULT: ABNORMAL
BACTERIA WND AEROBE CULT: ABNORMAL
GRAM STN SPEC: ABNORMAL
SIGNIFICANT IND 70042: ABNORMAL
SITE SITE: ABNORMAL
SOURCE SOURCE: ABNORMAL

## 2025-07-03 RX ORDER — OMEPRAZOLE 20 MG/1
20 CAPSULE, DELAYED RELEASE ORAL 2 TIMES DAILY
Qty: 200 CAPSULE | Refills: 3 | Status: SHIPPED | OUTPATIENT
Start: 2025-07-03 | End: 2025-07-25 | Stop reason: SDUPTHER

## 2025-07-03 RX ORDER — SULFAMETHOXAZOLE AND TRIMETHOPRIM 800; 160 MG/1; MG/1
1 TABLET ORAL 2 TIMES DAILY
Qty: 20 TABLET | Refills: 0 | Status: ON HOLD | OUTPATIENT
Start: 2025-07-03 | End: 2025-07-07

## 2025-07-03 RX ORDER — DOXYCYCLINE HYCLATE 100 MG
100 TABLET ORAL 2 TIMES DAILY
Qty: 20 TABLET | Refills: 0 | Status: SHIPPED | OUTPATIENT
Start: 2025-07-03 | End: 2025-07-03

## 2025-07-03 ASSESSMENT — FIBROSIS 4 INDEX: FIB4 SCORE: 1.42

## 2025-07-03 NOTE — PROGRESS NOTES
Subjective:     Fe Clark is a 74 y.o. female who presents for Hospital Follow-up.    HPI:   Recently hospitalized for     History of Present Illness  The patient presents for evaluation of a right great toe infection.    Right Great Toe Infection  She reports discomfort in her right toe, which she initially attributed to stepping on an object. Despite attempts to remove the suspected foreign body herself, she was unsuccessful. A physician also failed to locate any foreign material but did identify a large abscess, which was subsequently drained on 07/01/2025. Since then, she has noticed a white, sticky discharge from the site. The redness extends up her leg, and the area is extremely sensitive to touch. The swelling has remained consistent since the onset of symptoms. She expresses concern about potential blood poisoning. Occasionally, she observes worsening redness in her leg. She has been unable to wear shoes due to the swelling and has noticed her toe turning yellow and hardening. She has been using ice and heat for relief. This is her first experience with such a condition, although she recalls a similar incident from her childhood when she stepped on a nail.  - Onset: Initially attributed to stepping on an object; abscess drained on 07/01/2025.  - Location: Right great toe, with redness extending up her leg.  - Duration: Persistent since the onset of symptoms.  - Character: Discomfort, white sticky discharge, redness, swelling, yellowing, and hardening of the toe.  - Alleviating/Aggravating Factors: Using ice and heat for relief; unable to wear shoes due to swelling.  - Severity: Extremely sensitive to touch; expresses concern about potential blood poisoning.    Heartburn  She is currently on Prilosec for heartburn but finds it ineffective, often resorting to Rolaids or Tums for relief.  - Alleviating/Aggravating Factors: Prilosec is ineffective; uses Rolaids or Tums for  "relief.    Depression  She is on Zoloft 200 mg for depression and is interested in reducing her dosage of Zoloft.  - Timing: Currently on Zoloft 200 mg.  - Severity: Interested in reducing dosage.    Blood Pressure Management  She is on lisinopril 40 mg and amlodipine 10 mg for blood pressure management. She takes hydralazine as needed for blood pressure control.  - Timing: Regular use of lisinopril and amlodipine; hydralazine as needed.    Asthma and COPD  She has asthma and COPD.    History of Falls and Fractures  She experiences dizziness and is considered a fall risk. She has a history of shoulder fractures due to frequent falls. She has a history of wrist fracture from a fall.    FAMILY HISTORY  Her son is disabled due to multiple gunshot wounds and subsequent strokes. Her daughter has ADHD, bipolar disorder, manic depression, IgA neuropathy, and narcolepsy.        Current medicines (including reconciliation performed today)  Current Medications[1]    Allergies:   Patient has no known allergies.    Social History[2]    ROS:  See HPI    Objective:     Vitals:    07/03/25 1057   BP: 124/50   BP Location: Left arm   Patient Position: Sitting   BP Cuff Size: Adult   Pulse: 65   Resp: 14   Temp: 36.2 °C (97.1 °F)   TempSrc: Temporal   SpO2: 96%   Weight: 77 kg (169 lb 12.8 oz)   Height: 1.651 m (5' 5\")     Body mass index is 28.26 kg/m².    Physical Exam:  Physical Exam  General Appearance: Normal.  Vital signs: Blood pressure reading is 124/50.  HEENT: Within normal limits.  Respiratory: Within normal limits.  Extremities: Right great toe is red and swollen on the dorsal medial side. There is a small scab present. Pedal pulse is normal.  Skin: Warm and dry, no rash.  Neurological: Normal.  Other observations: Blood pressure reading is 124/50.      Results  Labs   - Lactic Acid: 06/30/2025, 0.8   - White Blood Cell Count: 06/30/2025, 12.9 with a left shift, neutrophils elevated to 10.4   - Potassium: 06/30/2025, " 3.4   - Creatinine: 06/30/2025, 1.1   - Liver Enzymes: 06/30/2025, Normal   - Blood Culture: 06/30/2025, No growth after 5 days   - MRSA Test: 06/30/2025, Negative   - Wound Culture: 06/30/2025, Staphylococcus aureus light growth, methicillin resistant by screening method    Imaging   - X-ray of the right toe: 06/30/2025, No fracture, no obvious foreign body, no trauma to the bone   - X-ray of the left wrist: 06/21/2025, Recently healed fracture of the distal left radius       Assessment and Plan:   1. Cellulitis of right foot  - sulfamethoxazole-trimethoprim (BACTRIM DS) 800-160 MG tablet; Take 1 Tablet by mouth 2 times a day for 10 days.  Dispense: 20 Tablet; Refill: 0    2. Gastroesophageal reflux disease, unspecified whether esophagitis present  - omeprazole (PRILOSEC) 20 MG delayed-release capsule; Take 1 Capsule by mouth 2 times a day.  Dispense: 200 Capsule; Refill: 3    3. Severe major depression (HCC)    4. Primary hypertension    5. Mixed hyperlipidemia    6. Hypokalemia      Assessment & Plan  Right great toe infection: Acute.  - Lactic acid 0.8 on 06/30/2024, indicating no sepsis. White blood cell count 12.9 with left shift and neutrophils 10.4, consistent with infection. Potassium 3.4, creatinine 1.1, and normal liver enzymes. Blood cultures showed no growth after 5 days. Wound culture revealed light growth of methicillin-resistant Staphylococcus aureus. X-ray showed no fracture or foreign body.  - Complete the 6-day course of Keflex 1000 mg three times daily.  - Prescribe Bactrim for 10 days, to be taken twice daily.  - Consume a full glass of water with Bactrim  - Use warm compresses as needed.  - Monitor for improvement; if no improvement, schedule a follow-up visit.    Heartburn: Chronic.  - Prilosec reported as ineffective.  - Increase Prilosec dosage to twice daily.  - Continue Rolaids or Tums as needed for additional relief.  - Monitor heartburn symptoms to assess the effectiveness of the  increased Prilosec dosage.    Depression: Chronic.  - Currently taking Zoloft 200 mg.  - Discussed potential contribution of Zoloft to heartburn.  - Consult primary care physician about gradual reduction of Zoloft dosage after infection resolution.  - No changes to Zoloft dosage at this time.   - Discussed Zoloft can contribute to heartburn    Blood pressure management: Stable.  - Blood pressure 124/50 mmHg.  - Currently taking lisinopril 40 mg, amlodipine 10 mg, and hydralazine as needed.  - No changes to current blood pressure medications.  - Continued monitoring of blood pressure.    Hyperlipidemia chronic and stable  - Last lipid panel was March 2025 and generally at goal range, no elevation in liver enzymes  - Continue atorvastatin 80 mg    Hypokalemia acute  - Hospitalist has arranged for follow-up labs please complete this prior to seeing your PCP on July 24      Follow-up  - Follow-up appointment with Dr. Sims on 07/24/2024.  - Complete follow-up labs to recheck kidney and liver function along with potassium levels before the appointment.        - Chart and discharge summary were reviewed.   - Hospitalization and results reviewed with patient.   - Medications reviewed including instructions regarding high risk medications, dosing and side effects.  - Recommended Services: No services needed at this time  - Advance directive/POLST on file?  No     Follow-up:No follow-ups on file.    Face-to-face transitional care management services with HIGH (today's visit is within days post discharge & LACE+ score 59+) medical decision complexity were provided.     LACE+ Historical Score Over Time (0-28: Low, 29-58: Medium, 59+: High): 78                    [1]   Current Outpatient Medications   Medication Sig Dispense Refill    omeprazole (PRILOSEC) 20 MG delayed-release capsule Take 1 Capsule by mouth 2 times a day. 200 Capsule 3    sulfamethoxazole-trimethoprim (BACTRIM DS) 800-160 MG tablet Take 1 Tablet by mouth 2  times a day for 10 days. 20 Tablet 0    cephALEXin (KEFLEX) 500 MG Cap Take 2 Capsules by mouth 3 times a day for 6 days. 36 Capsule 0    lisinopril (PRINIVIL) 40 MG tablet Take 1 Tablet by mouth every day. 100 Tablet 3    hydrALAZINE (APRESOLINE) 50 MG Tab Take 1 Tablet by mouth every 8 hours. 270 Tablet 3    sertraline (ZOLOFT) 100 MG Tab Take 2 Tablets by mouth every day. (Patient taking differently: Take 200 mg by mouth every day. 200 mg = 2 tablets) 200 Tablet 3    lidocaine (ASPERFLEX) 4 % Patch Place 1 Patch on the skin every 24 hours. 10 Patch 1    celecoxib (CELEBREX) 100 MG Cap Take 1 Capsule by mouth 1 time a day as needed for Moderate Pain (cervicalgia). 30 Capsule 1    amLODIPine (NORVASC) 10 MG Tab Take 10 mg by mouth every day.      Camphor-Eucalyptus-Menthol (ICY HOT NO-MESS VAPOR GEL EX) Apply 1 Application topically 1 time a day as needed (neck pain).      atorvastatin (LIPITOR) 80 MG tablet Take 1 Tablet by mouth every evening. (Patient taking differently: Take 80 mg by mouth every morning.) 90 Tablet 3    albuterol 108 (90 Base) MCG/ACT Aero Soln inhalation aerosol Inhale 2 Puffs every four hours as needed for Shortness of Breath. 1 Each 1     No current facility-administered medications for this visit.   [2]   Social History  Tobacco Use    Smoking status: Never     Passive exposure: Current (2 people smoke,not in pt's room)    Smokeless tobacco: Never   Vaping Use    Vaping status: Never Used    Passive vaping exposure: Yes (2 people vape,not in pt's room)   Substance Use Topics    Alcohol use: Yes     Comment: occ    Drug use: Not Currently

## 2025-07-05 LAB
BACTERIA BLD CULT: NORMAL
SIGNIFICANT IND 70042: NORMAL
SITE SITE: NORMAL
SOURCE SOURCE: NORMAL

## 2025-07-06 ENCOUNTER — APPOINTMENT (OUTPATIENT)
Dept: RADIOLOGY | Facility: MEDICAL CENTER | Age: 75
DRG: 603 | End: 2025-07-06
Attending: EMERGENCY MEDICINE
Payer: MEDICARE

## 2025-07-06 ENCOUNTER — OFFICE VISIT (OUTPATIENT)
Dept: URGENT CARE | Facility: CLINIC | Age: 75
End: 2025-07-06
Payer: MEDICARE

## 2025-07-06 ENCOUNTER — HOSPITAL ENCOUNTER (INPATIENT)
Facility: MEDICAL CENTER | Age: 75
LOS: 1 days | DRG: 603 | End: 2025-07-07
Attending: EMERGENCY MEDICINE | Admitting: INTERNAL MEDICINE
Payer: MEDICARE

## 2025-07-06 VITALS
WEIGHT: 176.8 LBS | BODY MASS INDEX: 30.19 KG/M2 | HEIGHT: 64 IN | RESPIRATION RATE: 16 BRPM | OXYGEN SATURATION: 95 % | HEART RATE: 74 BPM | TEMPERATURE: 97.9 F | DIASTOLIC BLOOD PRESSURE: 74 MMHG | SYSTOLIC BLOOD PRESSURE: 120 MMHG

## 2025-07-06 DIAGNOSIS — B95.62 MRSA CELLULITIS OF RIGHT FOOT: ICD-10-CM

## 2025-07-06 DIAGNOSIS — F41.9 ANXIETY: ICD-10-CM

## 2025-07-06 DIAGNOSIS — L02.611 ABSCESS OF GREAT TOE OF RIGHT FOOT: Primary | ICD-10-CM

## 2025-07-06 DIAGNOSIS — L03.115 CELLULITIS OF RIGHT LOWER EXTREMITY: ICD-10-CM

## 2025-07-06 DIAGNOSIS — F33.1 MODERATE EPISODE OF RECURRENT MAJOR DEPRESSIVE DISORDER (HCC): ICD-10-CM

## 2025-07-06 DIAGNOSIS — L03.031 CELLULITIS OF TOE OF RIGHT FOOT: Primary | ICD-10-CM

## 2025-07-06 DIAGNOSIS — L03.115 MRSA CELLULITIS OF RIGHT FOOT: ICD-10-CM

## 2025-07-06 PROBLEM — N18.9 ACUTE KIDNEY INJURY SUPERIMPOSED ON CHRONIC KIDNEY DISEASE (HCC): Status: ACTIVE | Noted: 2025-07-06

## 2025-07-06 PROBLEM — N17.9 ACUTE KIDNEY INJURY SUPERIMPOSED ON CHRONIC KIDNEY DISEASE (HCC): Status: ACTIVE | Noted: 2025-07-06

## 2025-07-06 PROBLEM — E87.20 METABOLIC ACIDOSIS: Status: ACTIVE | Noted: 2025-07-06

## 2025-07-06 LAB
ALBUMIN SERPL BCP-MCNC: 4.3 G/DL (ref 3.2–4.9)
ALBUMIN/GLOB SERPL: 1.5 G/DL
ALP SERPL-CCNC: 96 U/L (ref 30–99)
ALT SERPL-CCNC: 25 U/L (ref 2–50)
ANION GAP SERPL CALC-SCNC: 14 MMOL/L (ref 7–16)
AST SERPL-CCNC: 26 U/L (ref 12–45)
BASOPHILS # BLD AUTO: 0.3 % (ref 0–1.8)
BASOPHILS # BLD: 0.03 K/UL (ref 0–0.12)
BILIRUB SERPL-MCNC: 0.5 MG/DL (ref 0.1–1.5)
BUN SERPL-MCNC: 23 MG/DL (ref 8–22)
CALCIUM ALBUM COR SERPL-MCNC: 9.5 MG/DL (ref 8.5–10.5)
CALCIUM SERPL-MCNC: 9.7 MG/DL (ref 8.5–10.5)
CHLORIDE SERPL-SCNC: 106 MMOL/L (ref 96–112)
CO2 SERPL-SCNC: 19 MMOL/L (ref 20–33)
CREAT SERPL-MCNC: 1.2 MG/DL (ref 0.5–1.4)
CRP SERPL HS-MCNC: 2.53 MG/DL (ref 0–0.75)
EOSINOPHIL # BLD AUTO: 0.22 K/UL (ref 0–0.51)
EOSINOPHIL NFR BLD: 2.4 % (ref 0–6.9)
ERYTHROCYTE [DISTWIDTH] IN BLOOD BY AUTOMATED COUNT: 48.2 FL (ref 35.9–50)
ERYTHROCYTE [SEDIMENTATION RATE] IN BLOOD BY WESTERGREN METHOD: 57 MM/HOUR (ref 0–25)
GFR SERPLBLD CREATININE-BSD FMLA CKD-EPI: 47 ML/MIN/1.73 M 2
GLOBULIN SER CALC-MCNC: 2.8 G/DL (ref 1.9–3.5)
GLUCOSE SERPL-MCNC: 103 MG/DL (ref 65–99)
HCT VFR BLD AUTO: 38.2 % (ref 37–47)
HGB BLD-MCNC: 12.5 G/DL (ref 12–16)
IMM GRANULOCYTES # BLD AUTO: 0.05 K/UL (ref 0–0.11)
IMM GRANULOCYTES NFR BLD AUTO: 0.5 % (ref 0–0.9)
LACTATE SERPL-SCNC: 0.9 MMOL/L (ref 0.5–2)
LYMPHOCYTES # BLD AUTO: 1.92 K/UL (ref 1–4.8)
LYMPHOCYTES NFR BLD: 20.7 % (ref 22–41)
MCH RBC QN AUTO: 31.9 PG (ref 27–33)
MCHC RBC AUTO-ENTMCNC: 32.7 G/DL (ref 32.2–35.5)
MCV RBC AUTO: 97.4 FL (ref 81.4–97.8)
MONOCYTES # BLD AUTO: 0.76 K/UL (ref 0–0.85)
MONOCYTES NFR BLD AUTO: 8.2 % (ref 0–13.4)
NEUTROPHILS # BLD AUTO: 6.29 K/UL (ref 1.82–7.42)
NEUTROPHILS NFR BLD: 67.9 % (ref 44–72)
NRBC # BLD AUTO: 0 K/UL
NRBC BLD-RTO: 0 /100 WBC (ref 0–0.2)
PLATELET # BLD AUTO: 359 K/UL (ref 164–446)
PMV BLD AUTO: 9.4 FL (ref 9–12.9)
POTASSIUM SERPL-SCNC: 3.9 MMOL/L (ref 3.6–5.5)
PROCALCITONIN SERPL-MCNC: 0.06 NG/ML
PROT SERPL-MCNC: 7.1 G/DL (ref 6–8.2)
RBC # BLD AUTO: 3.92 M/UL (ref 4.2–5.4)
SCCMEC + MECA PNL NOSE NAA+PROBE: POSITIVE
SODIUM SERPL-SCNC: 139 MMOL/L (ref 135–145)
WBC # BLD AUTO: 9.3 K/UL (ref 4.8–10.8)

## 2025-07-06 PROCEDURE — 36415 COLL VENOUS BLD VENIPUNCTURE: CPT

## 2025-07-06 PROCEDURE — 85652 RBC SED RATE AUTOMATED: CPT

## 2025-07-06 PROCEDURE — 700111 HCHG RX REV CODE 636 W/ 250 OVERRIDE (IP): Performed by: EMERGENCY MEDICINE

## 2025-07-06 PROCEDURE — 700102 HCHG RX REV CODE 250 W/ 637 OVERRIDE(OP): Performed by: INTERNAL MEDICINE

## 2025-07-06 PROCEDURE — 84145 PROCALCITONIN (PCT): CPT

## 2025-07-06 PROCEDURE — 80053 COMPREHEN METABOLIC PANEL: CPT

## 2025-07-06 PROCEDURE — 99497 ADVNCD CARE PLAN 30 MIN: CPT | Mod: 25 | Performed by: INTERNAL MEDICINE

## 2025-07-06 PROCEDURE — 86140 C-REACTIVE PROTEIN: CPT

## 2025-07-06 PROCEDURE — A9270 NON-COVERED ITEM OR SERVICE: HCPCS | Performed by: INTERNAL MEDICINE

## 2025-07-06 PROCEDURE — 85025 COMPLETE CBC W/AUTO DIFF WBC: CPT

## 2025-07-06 PROCEDURE — 87040 BLOOD CULTURE FOR BACTERIA: CPT

## 2025-07-06 PROCEDURE — 99285 EMERGENCY DEPT VISIT HI MDM: CPT

## 2025-07-06 PROCEDURE — 700101 HCHG RX REV CODE 250: Performed by: INTERNAL MEDICINE

## 2025-07-06 PROCEDURE — 770001 HCHG ROOM/CARE - MED/SURG/GYN PRIV*

## 2025-07-06 PROCEDURE — 700105 HCHG RX REV CODE 258: Performed by: EMERGENCY MEDICINE

## 2025-07-06 PROCEDURE — 99223 1ST HOSP IP/OBS HIGH 75: CPT | Mod: AI | Performed by: INTERNAL MEDICINE

## 2025-07-06 PROCEDURE — 96365 THER/PROPH/DIAG IV INF INIT: CPT

## 2025-07-06 PROCEDURE — 700105 HCHG RX REV CODE 258: Performed by: INTERNAL MEDICINE

## 2025-07-06 PROCEDURE — 73660 X-RAY EXAM OF TOE(S): CPT | Mod: RT

## 2025-07-06 PROCEDURE — 83605 ASSAY OF LACTIC ACID: CPT

## 2025-07-06 PROCEDURE — 99214 OFFICE O/P EST MOD 30 MIN: CPT

## 2025-07-06 PROCEDURE — 96372 THER/PROPH/DIAG INJ SC/IM: CPT

## 2025-07-06 PROCEDURE — 700111 HCHG RX REV CODE 636 W/ 250 OVERRIDE (IP): Mod: JZ | Performed by: INTERNAL MEDICINE

## 2025-07-06 PROCEDURE — 87641 MR-STAPH DNA AMP PROBE: CPT

## 2025-07-06 RX ORDER — AMLODIPINE BESYLATE 10 MG/1
10 TABLET ORAL DAILY
Status: DISCONTINUED | OUTPATIENT
Start: 2025-07-07 | End: 2025-07-07 | Stop reason: HOSPADM

## 2025-07-06 RX ORDER — HYDRALAZINE HYDROCHLORIDE 50 MG/1
50 TABLET, FILM COATED ORAL EVERY 8 HOURS
Status: DISCONTINUED | OUTPATIENT
Start: 2025-07-06 | End: 2025-07-07 | Stop reason: HOSPADM

## 2025-07-06 RX ORDER — ENOXAPARIN SODIUM 100 MG/ML
40 INJECTION SUBCUTANEOUS DAILY
Status: DISCONTINUED | OUTPATIENT
Start: 2025-07-06 | End: 2025-07-07 | Stop reason: HOSPADM

## 2025-07-06 RX ORDER — LISINOPRIL 20 MG/1
40 TABLET ORAL DAILY
Status: DISCONTINUED | OUTPATIENT
Start: 2025-07-07 | End: 2025-07-07 | Stop reason: HOSPADM

## 2025-07-06 RX ORDER — LIDOCAINE 4 G/G
1 PATCH TOPICAL EVERY 24 HOURS
Status: DISCONTINUED | OUTPATIENT
Start: 2025-07-06 | End: 2025-07-07 | Stop reason: HOSPADM

## 2025-07-06 RX ORDER — LINEZOLID 2 MG/ML
600 INJECTION, SOLUTION INTRAVENOUS EVERY 12 HOURS
Status: DISCONTINUED | OUTPATIENT
Start: 2025-07-06 | End: 2025-07-06

## 2025-07-06 RX ORDER — SERTRALINE HYDROCHLORIDE 100 MG/1
200 TABLET, FILM COATED ORAL DAILY
Status: DISCONTINUED | OUTPATIENT
Start: 2025-07-07 | End: 2025-07-07 | Stop reason: HOSPADM

## 2025-07-06 RX ORDER — ACETAMINOPHEN 325 MG/1
650 TABLET ORAL EVERY 6 HOURS PRN
Status: DISCONTINUED | OUTPATIENT
Start: 2025-07-06 | End: 2025-07-07 | Stop reason: HOSPADM

## 2025-07-06 RX ORDER — LINEZOLID 2 MG/ML
600 INJECTION, SOLUTION INTRAVENOUS EVERY 12 HOURS
Status: DISCONTINUED | OUTPATIENT
Start: 2025-07-07 | End: 2025-07-07 | Stop reason: HOSPADM

## 2025-07-06 RX ORDER — LABETALOL HYDROCHLORIDE 5 MG/ML
10 INJECTION, SOLUTION INTRAVENOUS EVERY 4 HOURS PRN
Status: DISCONTINUED | OUTPATIENT
Start: 2025-07-06 | End: 2025-07-07 | Stop reason: HOSPADM

## 2025-07-06 RX ORDER — SODIUM CHLORIDE 9 MG/ML
INJECTION, SOLUTION INTRAVENOUS CONTINUOUS
Status: DISCONTINUED | OUTPATIENT
Start: 2025-07-06 | End: 2025-07-07

## 2025-07-06 RX ORDER — ONDANSETRON 2 MG/ML
4 INJECTION INTRAMUSCULAR; INTRAVENOUS EVERY 4 HOURS PRN
Status: DISCONTINUED | OUTPATIENT
Start: 2025-07-06 | End: 2025-07-07 | Stop reason: HOSPADM

## 2025-07-06 RX ORDER — ATORVASTATIN CALCIUM 40 MG/1
80 TABLET, FILM COATED ORAL EVERY MORNING
Status: DISCONTINUED | OUTPATIENT
Start: 2025-07-07 | End: 2025-07-07 | Stop reason: HOSPADM

## 2025-07-06 RX ORDER — OMEPRAZOLE 20 MG/1
20 CAPSULE, DELAYED RELEASE ORAL 2 TIMES DAILY
Status: DISCONTINUED | OUTPATIENT
Start: 2025-07-06 | End: 2025-07-07 | Stop reason: HOSPADM

## 2025-07-06 RX ORDER — ONDANSETRON 4 MG/1
4 TABLET, ORALLY DISINTEGRATING ORAL EVERY 4 HOURS PRN
Status: DISCONTINUED | OUTPATIENT
Start: 2025-07-06 | End: 2025-07-07 | Stop reason: HOSPADM

## 2025-07-06 RX ADMIN — SODIUM CHLORIDE: 9 INJECTION, SOLUTION INTRAVENOUS at 18:19

## 2025-07-06 RX ADMIN — VANCOMYCIN HYDROCHLORIDE 2000 MG: 5 INJECTION, POWDER, LYOPHILIZED, FOR SOLUTION INTRAVENOUS at 15:12

## 2025-07-06 RX ADMIN — LIDOCAINE 1 PATCH: 4 PATCH TOPICAL at 18:16

## 2025-07-06 RX ADMIN — ENOXAPARIN SODIUM 40 MG: 100 INJECTION SUBCUTANEOUS at 18:16

## 2025-07-06 RX ADMIN — OMEPRAZOLE 20 MG: 20 CAPSULE, DELAYED RELEASE ORAL at 18:15

## 2025-07-06 RX ADMIN — HYDRALAZINE HYDROCHLORIDE 50 MG: 50 TABLET ORAL at 22:57

## 2025-07-06 ASSESSMENT — ENCOUNTER SYMPTOMS
DEPRESSION: 0
HEADACHES: 0
DIZZINESS: 0
WEAKNESS: 0
FEVER: 0
LOSS OF CONSCIOUSNESS: 0
SHORTNESS OF BREATH: 0
MYALGIAS: 0
VOMITING: 0
COUGH: 0
DIARRHEA: 0
FALLS: 0
SPUTUM PRODUCTION: 0
FEVER: 0
PALPITATIONS: 0
TINGLING: 0
ABDOMINAL PAIN: 0
NAUSEA: 0
CONSTIPATION: 0
STRIDOR: 0
CHILLS: 0

## 2025-07-06 ASSESSMENT — FIBROSIS 4 INDEX
FIB4 SCORE: 1.42
FIB4 SCORE: 1.42

## 2025-07-06 ASSESSMENT — PAIN DESCRIPTION - PAIN TYPE
TYPE: ACUTE PAIN
TYPE: ACUTE PAIN

## 2025-07-06 NOTE — PROGRESS NOTES
Subjective:     CHIEF COMPLAINT  Chief Complaint   Patient presents with    Toe Pain     Right big toe infection, antibiotics are not working, wants something else,  right leg swelling        HPI  Fe Clark is a very pleasant 74 y.o. female who presents with pain, erythema, warmth, and swelling surrounding her right great toe.  She was previously seen in the emergency department on 7/1/2025 after stepping on an unspecified foreign body in her garden.  She was concerned that a foreign body was retained in her great toe, which prompted her to be seen in the ED.  She had x-ray imaging performed of the toe while in the ED without evidence of foreign body, although an abscess was found on physical exam and was drained.  She was initiated on a combination of cephalexin as well as Bactrim and reports worsening of symptoms since then.  She had follow-up with Dr. Jones at [facility] on 7/3/2025 with no improvement in symptoms.  She is concerned that her symptoms are worsening and reports that she has been feeling feverish and fatigued.  She has not had any measured fevers at home.     REVIEW OF SYSTEMS  Review of Systems   Constitutional:  Negative for fever (Has felt feverish, no measurement of temperature).       PAST MEDICAL HISTORY  Patient Active Problem List    Diagnosis Date Noted    Hypomagnesemia 07/01/2025    Cellulitis of right foot 06/30/2025    Neck pain 04/16/2025    Tension type headache 04/15/2025    Obstructive sleep apnea syndrome 04/15/2025    Chest pain 03/07/2025    PTSD (post-traumatic stress disorder) 03/07/2025    Palpitations 03/07/2025    ACP (advance care planning) 03/07/2025    Overweight (BMI 25.0-29.9) 02/24/2025    Simple chronic bronchitis (HCC) 02/24/2025    Coronary artery disease involving native coronary artery of native heart without angina pectoris 02/24/2025    Left carpal tunnel syndrome 02/06/2025    Bilateral carpal tunnel syndrome 11/26/2024     Atherosclerosis of aorta (HCC) 08/26/2024    Severe major depression (HCC) 08/01/2024    Screening for colorectal cancer 08/01/2024    Financial difficulty 08/01/2024    Mild intermittent asthma without complication 04/09/2024    Multinodular goiter 07/21/2023    Caregiver burden 07/02/2023    Polyarthralgia 06/19/2023    Age related osteoporosis 04/17/2023    Seborrheic dermatitis 11/22/2022    Uncomplicated alcohol dependence (HCC) 10/21/2022    Current use of proton pump inhibitor 10/21/2022    Chronic pain of right knee 10/21/2022    Hypokalemia 01/05/2018    GERD (gastroesophageal reflux disease) 02/07/2016    Hyperlipidemia 02/07/2016    HTN (hypertension) 02/07/2016       SURGICAL HISTORY   has a past surgical history that includes bladder suspension; bowel resection; colonoscopy; hysterectomy radical; other orthopedic surgery; ankle arthroscopy (7/31/2013); hardware removal ortho (7/31/2013); lacrimal duct probe (3/11/2015); submandible abscess incision and drainage (11/19/2016); and dental extraction(s) (11/19/2016).    ALLERGIES  Allergies[1]    CURRENT MEDICATIONS  Home Medications       Reviewed by HECTOR EvansCAiden (Physician Assistant) on 07/06/25 at 1204  Med List Status: <None>     Medication Last Dose Status   albuterol 108 (90 Base) MCG/ACT Aero Soln inhalation aerosol PRN Active   amLODIPine (NORVASC) 10 MG Tab Taking Active   atorvastatin (LIPITOR) 80 MG tablet Taking Active   Camphor-Eucalyptus-Menthol (ICY HOT NO-MESS VAPOR GEL EX) PRN Active   celecoxib (CELEBREX) 100 MG Cap Taking Active   cephALEXin (KEFLEX) 500 MG Cap Taking Active   hydrALAZINE (APRESOLINE) 50 MG Tab Taking Active   lidocaine (ASPERFLEX) 4 % Patch PRN Active   lisinopril (PRINIVIL) 40 MG tablet Taking Active   omeprazole (PRILOSEC) 20 MG delayed-release capsule PRN Active   sertraline (ZOLOFT) 100 MG Tab  Active   sulfamethoxazole-trimethoprim (BACTRIM DS) 800-160 MG tablet Taking Active               "      SOCIAL HISTORY  Social History     Tobacco Use    Smoking status: Never     Passive exposure: Current (2 people smoke,not in pt's room)    Smokeless tobacco: Never   Vaping Use    Vaping status: Never Used    Passive vaping exposure: Yes (2 people vape,not in pt's room)   Substance and Sexual Activity    Alcohol use: Yes     Comment: occ    Drug use: Not Currently    Sexual activity: Not Currently     Partners: Male       FAMILY HISTORY  Family History   Problem Relation Age of Onset    No Known Problems Mother         2017 is age 91    Other Father 57        unknown issue          Objective:     VITAL SIGNS: /74   Pulse 74   Temp 36.6 °C (97.9 °F) (Temporal)   Resp 16   Ht 1.626 m (5' 4\")   Wt 80.2 kg (176 lb 12.8 oz)   SpO2 95%   PF 95 L/min   BMI 30.35 kg/m²     PHYSICAL EXAM  Physical Exam  Vitals reviewed.   Constitutional:       General: She is not in acute distress.     Appearance: Normal appearance. She is not ill-appearing or toxic-appearing.   HENT:      Head: Normocephalic and atraumatic.      Mouth/Throat:      Mouth: Mucous membranes are moist.   Eyes:      Conjunctiva/sclera: Conjunctivae normal.      Pupils: Pupils are equal, round, and reactive to light.   Cardiovascular:      Rate and Rhythm: Normal rate.   Pulmonary:      Effort: Pulmonary effort is normal. No respiratory distress.   Musculoskeletal:      Right lower leg: No lacerations. 2+ Edema present.      Left lower le+ Edema present.        Legs:         Feet:       Comments: Erythema subtly traveling up anterior surface of right shin with slight tenderness to palpation present.     Feet:      Right foot:      Skin integrity: Erythema and warmth present. No ulcer.      Comments: Abscess formation to medial surface of right great toe with large fluctuance and purulent fluid present.  Erythema and warmth surrounding right great toe and spreading down dorsum of right foot.  No skin streaking.  Erythema also now present on " anterior surface of right shin with 2+ pitting edema.  Skin:     General: Skin is warm and dry.      Coloration: Skin is not pale.   Neurological:      General: No focal deficit present.      Mental Status: She is alert and oriented to person, place, and time.   Psychiatric:         Mood and Affect: Mood normal.         Assessment/Plan:     1. Abscess of great toe of right foot    2. Cellulitis of right lower extremity  - Patient has been referred to the emergency department for further management given failure of dual therapy with cephalexin and Bactrim    MDM/Comments:  I have prepared for this visit by personally reviewing the patient's prevous medical records, vitals, and labs including: wound culture from 6/30/25 showing MRSA with sensitivity to Bactrim. Patient has stable vital signs and is non-toxic appearing.  Patient has failed treatment with combination therapy of Bactrim and cephalexin and has cellulitis spreading down the foot and into the right lower extremity.  Additionally, the abscess to the medial surface of her great toe seems to have refilled with a large area of fluctuance.  Given failure of Bactrim and cephalexin, patient has been referred to the emergency department for further management.  Concerns have been discussed with the patient and she has agreed to be seen at the ED promptly.  The [facility] transfer center has been called ahead and informed of impending transfer to [facility].     Differential diagnosis, natural history, supportive care, and indications for immediate follow-up discussed. All questions answered. Patient agrees with the plan of care.    Follow-up as needed if symptoms worsen or fail to improve to PCP, Urgent care or Emergency Room.    I have personally reviewed prior external notes and test results pertinent to today's visit.  I have independently reviewed and interpreted all diagnostics ordered during this urgent care acute visit.   Discussed management  options (risks,benefits, and alternatives to treatment). Pt expresses understanding and the treatment plan was agreed upon. Questions were encouraged and answered to pt's satisfaction.    Please note that this dictation was created using voice recognition software. I have made a reasonable attempt to correct obvious errors, but I expect that there are errors of grammar and possibly content that I did not discover before finalizing the note.       [1] No Known Allergies

## 2025-07-06 NOTE — ED PROVIDER NOTES
ED Provider Note    CHIEF COMPLAINT  Chief Complaint   Patient presents with    Digit Pain     Right great toe       EXTERNAL RECORDS REVIEWED  Inpatient Notes I reviewed the discharge summary from July 1, 2025, at that time she was admitted for cellulitis of the right great toe    HPI/ROS  LIMITATION TO HISTORY   Select: : None  OUTSIDE HISTORIAN(S):  none    Fe Clark is a 74 y.o. female who presents with past medical history significant for she recently was admitted with a right great toe infection COPD, GERD, high cholesterol, hypertension, after receiving IV antibiotics was discharged on Keflex which she is still taking.  It appears that her wound culture has grown MRSA.  She is back with redness and swelling of the toe and now redness of the right lower leg.  She states she feels rundown, she denies any vomiting or fever.    PAST MEDICAL HISTORY   has a past medical history of ASTHMA, COPD (chronic obstructive pulmonary disease) (HCC), Diverticulosis, Duodenal ulcer, unspecified as acute or chronic, without hemorrhage, perforation, or obstruction, GERD (gastroesophageal reflux disease), Hiatal hernia, High cholesterol, Hypertension, and Psychiatric problem.    SURGICAL HISTORY   has a past surgical history that includes bladder suspension; bowel resection; colonoscopy; hysterectomy radical; other orthopedic surgery; ankle arthroscopy (7/31/2013); hardware removal ortho (7/31/2013); lacrimal duct probe (3/11/2015); submandible abscess incision and drainage (11/19/2016); and dental extraction(s) (11/19/2016).    FAMILY HISTORY  Family History   Problem Relation Age of Onset    No Known Problems Mother         2017 is age 91    Other Father 57        unknown issue       SOCIAL HISTORY  Social History     Tobacco Use    Smoking status: Never     Passive exposure: Current (2 people smoke,not in pt's room)    Smokeless tobacco: Never   Vaping Use    Vaping status: Never Used    Passive vaping  "exposure: Yes (2 people vape,not in pt's room)   Substance and Sexual Activity    Alcohol use: Yes     Comment: occ    Drug use: Not Currently    Sexual activity: Not Currently     Partners: Male       CURRENT MEDICATIONS  Keflex        ALLERGIES  Allergies[1]    PHYSICAL EXAM  VITAL SIGNS: /87   Pulse 61   Temp 36.1 °C (96.9 °F) (Temporal)   Resp 20   Ht 1.626 m (5' 4\")   Wt 80.2 kg (176 lb 12.9 oz)   SpO2 96%   BMI 30.35 kg/m²      Right lower extremity: The patient has pus and erythema of the right great toe with erythema of the right lower leg consistent with associated cellulitis.    LABS      Labs Reviewed   CBC WITH DIFFERENTIAL - Abnormal; Notable for the following components:       Result Value    RBC 3.92 (*)     Lymphocytes 20.70 (*)     All other components within normal limits   COMP METABOLIC PANEL - Abnormal; Notable for the following components:    Co2 19 (*)     Glucose 103 (*)     Bun 23 (*)     All other components within normal limits   SED RATE - Abnormal; Notable for the following components:    Sed Rate Westergren 57 (*)     All other components within normal limits   CRP QUANTITIVE (NON-CARDIAC) - Abnormal; Notable for the following components:    Stat C-Reactive Protein 2.53 (*)     All other components within normal limits   ESTIMATED GFR - Abnormal; Notable for the following components:    GFR (CKD-EPI) 47 (*)     All other components within normal limits   LACTIC ACID   PROCALCITONIN   BLOOD CULTURE   BLOOD CULTURE   MRSA BY PCR (AMP)         RADIOLOGY/PROCEDURES   I have independently interpreted the diagnostic imaging associated with this visit and am waiting the final reading from the radiologist.   My preliminary interpretation is as follows: Foot x-ray with no periosteal elevation    Radiologist interpretation:  DX-TOE(S) 2+ RIGHT   Final Result      No acute osseous abnormality.          COURSE & MEDICAL DECISION MAKING    ASSESSMENT, COURSE AND PLAN  Care Narrative: "     Patient presents with recurrent right great toe infection with associated cellulitis of the right lower extremity.  Cultures from last visit revealed MRSA.  I have ordered IV vancomycin.    3:27 PM patient's white blood count is normal.  C-reactive protein is elevated 2.5.  Procalcitonin is negative.  CO2 is low at 19, BUN elevated 23 the rest of the CMP is unremarkable.  Sed rate elevated 57.    The patient will be admitted for IV antibiotics.  I spoke with the [facility] to assess the patient for hospitalization.              ADDITIONAL PROBLEMS MANAGED  Toe infection, cellulitis, MRSA    DISPOSITION AND DISCUSSIONS  I have discussed management of the patient with the following physicians and JORGE's: I spoke with Luc Blue who assessed the patient for hospitalization.    Discussion of management with other [facility] or appropriate source(s): None    Escalation of care considered, and ultimately not performed: Patient did not meet sepsis criteria    Barriers to care at this time, including but not limited to: None.     Decision tools and prescription drugs considered including, but not limited to: Patient did not meet sepsis criteria.    FINAL DIAGNOSIS  1. Cellulitis of toe of right foot    2.      MRSA     Electronically signed by: Bryce Eduardo M.D., 7/6/2025 2:14 PM           [1] No Known Allergies

## 2025-07-06 NOTE — ASSESSMENT & PLAN NOTE
"I did discuss CODE STATUS with the patient  Patient and myself were present  Patient was alert and oriented, able to understand the discussion  I discussed inpatient options including full code, DNR and a DNR/I okay  After lengthy discussion, patient did decide to be full code, she stated \"I am only 74, and I am going to live another 20 years\"  Time spent on CODE STATUS discussion was greater than 16 minutes  "
Continue home PPI  
Continue home Zoloft  
Continue home amlodipine, lisinopril and hydralazine  Start as needed labetalol  Adjust as needed  
Continue home statin  
Mild worsening but I think there is some dehydration  Patient also has an infection but has a normal lactic acid level  Start IV fluids x 1 L  Repeat BMP in the morning  Closely monitor urine output  
Mild, start IV fluids  Repeat BMP in the morning  
Patient did have a bedside I&D performed in the ER last time she was here  Culture is now growing MRSA  Patient was sent home on Keflex, now with worsening  Patient has worsening kidney function, will start linezolid  If no marked improvement, may need orthostatic surgery consultation but at this point in time I do not see a significant wound, certainly no necrosis  Patient is not septic  
Yes

## 2025-07-06 NOTE — DISCHARGE PLANNING
TCN following. HTH/SCP chart review completed. Note pt currently in ED 2' to c/o R great toe infection.  Per chart review, she was seen at  and sent here.       Note pt has a [facility] PCP and has a PCP appointment:  Established Patient with Physician Luis Leos M.D. on Thursday July 24 2:25 PM.  Per review, she is ambulatory to triage and does not have a need for O2 at this time. Based on current review, it is anticipated that pt will dc to home with close OP f/u however is highly dependent on medical course (either directly from ED or after admission to [facility] if warranted).     If patient does not warrant admission/ inpatient status to [facility] and is unable to functionally discharge home, please reach out to TCN for assist with SCP auth to discharge directly from ED to [facility].     If patient admits to [facility], please reach out to TCN via VOALTE if post acute transitional care needs are warranted for dc planning. Thank you.

## 2025-07-06 NOTE — H&P
Hospital Medicine History & Physical Note    Date of Service  7/6/2025    Primary Care Physician  Luis Leos M.D.    Consultants  None    Specialist Names: none    Code Status  Full Code    Chief Complaint  Chief Complaint   Patient presents with    Digit Pain     Right great toe       History of Presenting Illness  Fe Clark is a 74 y.o. female who presented 7/6/2025 with right toe pain, swelling and redness.  Patient was recently admitted, diagnosed with a toe infection, started on broad-spectrum antibiotics.  She did have improvement and was discharged on Keflex.  Patient began having a worsening a few days later, today her pain became severe so she presented to the emergency department.  She denies any systemic symptoms.  Upon arrival, it was noted that her previous I&D that was performed in the ER was growing MRSA.  I did discuss the case including labs and imaging with the ER physician.    I discussed the plan of care with patient.    Review of Systems  Review of Systems   Constitutional:  Negative for chills, fever and malaise/fatigue.   HENT:  Negative for congestion.    Respiratory:  Negative for cough, sputum production, shortness of breath and stridor.    Cardiovascular:  Negative for chest pain, palpitations and leg swelling.   Gastrointestinal:  Negative for abdominal pain, constipation, diarrhea, nausea and vomiting.   Genitourinary:  Negative for dysuria and urgency.   Musculoskeletal:  Positive for joint pain (right great toe). Negative for falls and myalgias.   Skin:         Redness on right great toe   Neurological:  Negative for dizziness, tingling, loss of consciousness, weakness and headaches.   Psychiatric/Behavioral:  Negative for depression and suicidal ideas.    All other systems reviewed and are negative.      Past Medical History   has a past medical history of ASTHMA, COPD (chronic obstructive pulmonary disease) (HCC), Diverticulosis, Duodenal ulcer, unspecified as  acute or chronic, without hemorrhage, perforation, or obstruction, GERD (gastroesophageal reflux disease), Hiatal hernia, High cholesterol, Hypertension, and Psychiatric problem.    Surgical History   has a past surgical history that includes bladder suspension; bowel resection; colonoscopy; hysterectomy radical; other orthopedic surgery; ankle arthroscopy (7/31/2013); hardware removal ortho (7/31/2013); lacrimal duct probe (3/11/2015); submandible abscess incision and drainage (11/19/2016); and dental extraction(s) (11/19/2016).     Family History  Family History   Problem Relation Age of Onset    No Known Problems Mother         2017 is age 91    Other Father 57        unknown issue        Family history reviewed with patient. There is no family history that is pertinent to the chief complaint.     Social History   reports that she has never smoked. She has been exposed to tobacco smoke. She has never used smokeless tobacco. She reports current alcohol use. She reports that she does not currently use drugs.    Allergies  Allergies[1]    Medications  Prior to Admission Medications   Prescriptions Last Dose Informant Patient Reported? Taking?   Camphor-Eucalyptus-Menthol (ICY HOT NO-MESS VAPOR GEL EX)  Patient Yes No   Sig: Apply 1 Application topically 1 time a day as needed (neck pain).   albuterol 108 (90 Base) MCG/ACT Aero Soln inhalation aerosol  Patient No No   Sig: Inhale 2 Puffs every four hours as needed for Shortness of Breath.   amLODIPine (NORVASC) 10 MG Tab  Patient Yes No   Sig: Take 10 mg by mouth every day.   atorvastatin (LIPITOR) 80 MG tablet  Patient No No   Sig: Take 1 Tablet by mouth every evening.   Patient taking differently: Take 80 mg by mouth every morning.   celecoxib (CELEBREX) 100 MG Cap  Patient No No   Sig: Take 1 Capsule by mouth 1 time a day as needed for Moderate Pain (cervicalgia).   cephALEXin (KEFLEX) 500 MG Cap   No No   Sig: Take 2 Capsules by mouth 3 times a day for 6 days.    hydrALAZINE (APRESOLINE) 50 MG Tab  Patient No No   Sig: Take 1 Tablet by mouth every 8 hours.   lidocaine (ASPERFLEX) 4 % Patch  Patient No No   Sig: Place 1 Patch on the skin every 24 hours.   lisinopril (PRINIVIL) 40 MG tablet  Patient No No   Sig: Take 1 Tablet by mouth every day.   omeprazole (PRILOSEC) 20 MG delayed-release capsule   No No   Sig: Take 1 Capsule by mouth 2 times a day.   sertraline (ZOLOFT) 100 MG Tab  Patient No No   Sig: Take 2 Tablets by mouth every day.   Patient taking differently: Take 200 mg by mouth every day. 200 mg = 2 tablets   sulfamethoxazole-trimethoprim (BACTRIM DS) 800-160 MG tablet   No No   Sig: Take 1 Tablet by mouth 2 times a day for 10 days.      Facility-Administered Medications: None       Physical Exam  Temp:  [36.1 °C (96.9 °F)-36.6 °C (97.9 °F)] 36.1 °C (96.9 °F)  Pulse:  [61-74] 61  Resp:  [16-20] 20  BP: (120-146)/(66-87) 137/87  SpO2:  [94 %-97 %] 96 %  Blood Pressure : 137/87   Temperature: 36.1 °C (96.9 °F)   Pulse: 61   Respiration: 20   Pulse Oximetry: 96 %       Physical Exam  Vitals and nursing note reviewed.   Constitutional:       General: She is not in acute distress.     Appearance: She is well-developed. She is not diaphoretic.   HENT:      Head: Normocephalic and atraumatic.      Right Ear: External ear normal.      Left Ear: External ear normal.      Nose: Nose normal. No congestion or rhinorrhea.      Mouth/Throat:      Mouth: Mucous membranes are moist.      Pharynx: No oropharyngeal exudate.   Eyes:      General:         Right eye: No discharge.         Left eye: No discharge.   Neck:      Trachea: No tracheal deviation.   Cardiovascular:      Rate and Rhythm: Normal rate and regular rhythm.      Heart sounds: No murmur heard.     No friction rub. No gallop.   Pulmonary:      Effort: Pulmonary effort is normal. No respiratory distress.      Breath sounds: Normal breath sounds. No stridor. No wheezing or rales.   Chest:      Chest wall: No  "tenderness.   Abdominal:      General: Bowel sounds are normal. There is no distension.      Palpations: Abdomen is soft.      Tenderness: There is no abdominal tenderness.   Musculoskeletal:      Cervical back: Neck supple.      Right lower leg: No edema.      Left lower leg: No edema.      Comments: Right great toe with erythema, swelling and tenderness   Lymphadenopathy:      Cervical: No cervical adenopathy.   Skin:     General: Skin is warm and dry.      Findings: No erythema or rash.   Neurological:      Mental Status: She is alert and oriented to person, place, and time.      Cranial Nerves: No cranial nerve deficit.   Psychiatric:         Mood and Affect: Mood normal.         Behavior: Behavior normal.         Thought Content: Thought content normal.         Judgment: Judgment normal.         Laboratory:  Recent Labs     07/06/25  1410   WBC 9.3   RBC 3.92*   HEMOGLOBIN 12.5   HEMATOCRIT 38.2   MCV 97.4   MCH 31.9   MCHC 32.7   RDW 48.2   PLATELETCT 359   MPV 9.4     Recent Labs     07/06/25  1410   SODIUM 139   POTASSIUM 3.9   CHLORIDE 106   CO2 19*   GLUCOSE 103*   BUN 23*   CREATININE 1.20   CALCIUM 9.7     Recent Labs     07/06/25  1410   ALTSGPT 25   ASTSGOT 26   ALKPHOSPHAT 96   TBILIRUBIN 0.5   GLUCOSE 103*         No results for input(s): \"NTPROBNP\" in the last 72 hours.      No results for input(s): \"TROPONINT\" in the last 72 hours.    Imaging:  DX-TOE(S) 2+ RIGHT   Final Result      No acute osseous abnormality.          X-Ray:  I have personally reviewed the images and compared with prior images.    Assessment/Plan:  Justification for Admission Status  I anticipate this patient will require at least two midnights for appropriate medical management, necessitating inpatient admission because MRSA cellulitis of the right foot    Patient will need a Med/Surg bed on SURGICAL service .  The need is secondary to MRSA cellulitis.    * MRSA cellulitis of right foot- (present on admission)  Assessment & " "Plan  Patient did have a bedside I&D performed in the ER last time she was here  Culture is now growing MRSA  Patient was sent home on Keflex, now with worsening  Patient has worsening kidney function, will start linezolid  If no marked improvement, may need orthostatic surgery consultation but at this point in time I do not see a significant wound, certainly no necrosis  Patient is not septic    Acute kidney injury superimposed on chronic kidney disease (HCC)- (present on admission)  Assessment & Plan  Mild worsening but I think there is some dehydration  Patient also has an infection but has a normal lactic acid level  Start IV fluids x 1 L  Repeat BMP in the morning  Closely monitor urine output    Metabolic acidosis- (present on admission)  Assessment & Plan  Mild, start IV fluids  Repeat BMP in the morning    Severe major depression (HCC)- (present on admission)  Assessment & Plan  Continue home Zoloft    HTN (hypertension)- (present on admission)  Assessment & Plan  Continue home amlodipine, lisinopril and hydralazine  Start as needed labetalol  Adjust as needed    Hyperlipidemia- (present on admission)  Assessment & Plan  Continue home statin    GERD (gastroesophageal reflux disease)- (present on admission)  Assessment & Plan  Continue home PPI    ACP (advance care planning)- (present on admission)  Assessment & Plan  I did discuss CODE STATUS with the patient  Patient and myself were present  Patient was alert and oriented, able to understand the discussion  I discussed inpatient options including full code, DNR and a DNR/I okay  After lengthy discussion, patient did decide to be full code, she stated \"I am only 74, and I am going to live another 20 years\"  Time spent on CODE STATUS discussion was greater than 16 minutes        VTE prophylaxis: SCDs/TEDs and enoxaparin ppx       [1] No Known Allergies    "

## 2025-07-06 NOTE — ED TRIAGE NOTES
"Pt ambulated into triage with c/o Right great toe infection. Pt was seen at  and sent here. Pt sts the infection has been ongoing for a little over a week. Pt sts it is starting to spread. Pt denies hx of DM. Pt sts that she is \"resistant to antibiotics\". Pt is A&O and ambulatory. Placed in lobby pending ER room.   "

## 2025-07-07 ENCOUNTER — PATIENT OUTREACH (OUTPATIENT)
Dept: HEALTH INFORMATION MANAGEMENT | Facility: OTHER | Age: 75
End: 2025-07-07
Payer: MEDICARE

## 2025-07-07 ENCOUNTER — PHARMACY VISIT (OUTPATIENT)
Dept: PHARMACY | Facility: MEDICAL CENTER | Age: 75
End: 2025-07-07
Payer: COMMERCIAL

## 2025-07-07 VITALS
HEART RATE: 104 BPM | RESPIRATION RATE: 16 BRPM | OXYGEN SATURATION: 92 % | HEIGHT: 64 IN | WEIGHT: 176.81 LBS | BODY MASS INDEX: 30.19 KG/M2 | TEMPERATURE: 97.3 F | SYSTOLIC BLOOD PRESSURE: 133 MMHG | DIASTOLIC BLOOD PRESSURE: 89 MMHG

## 2025-07-07 DIAGNOSIS — I10 PRIMARY HYPERTENSION: Primary | ICD-10-CM

## 2025-07-07 LAB
ANION GAP SERPL CALC-SCNC: 9 MMOL/L (ref 7–16)
BUN SERPL-MCNC: 18 MG/DL (ref 8–22)
CALCIUM SERPL-MCNC: 9.3 MG/DL (ref 8.5–10.5)
CHLORIDE SERPL-SCNC: 109 MMOL/L (ref 96–112)
CO2 SERPL-SCNC: 20 MMOL/L (ref 20–33)
CREAT SERPL-MCNC: 0.97 MG/DL (ref 0.5–1.4)
ERYTHROCYTE [DISTWIDTH] IN BLOOD BY AUTOMATED COUNT: 48.6 FL (ref 35.9–50)
GFR SERPLBLD CREATININE-BSD FMLA CKD-EPI: 61 ML/MIN/1.73 M 2
GLUCOSE SERPL-MCNC: 124 MG/DL (ref 65–99)
HCT VFR BLD AUTO: 38.4 % (ref 37–47)
HGB BLD-MCNC: 12.5 G/DL (ref 12–16)
MCH RBC QN AUTO: 32.1 PG (ref 27–33)
MCHC RBC AUTO-ENTMCNC: 32.6 G/DL (ref 32.2–35.5)
MCV RBC AUTO: 98.5 FL (ref 81.4–97.8)
PLATELET # BLD AUTO: 340 K/UL (ref 164–446)
PMV BLD AUTO: 9.5 FL (ref 9–12.9)
POTASSIUM SERPL-SCNC: 4.1 MMOL/L (ref 3.6–5.5)
RBC # BLD AUTO: 3.9 M/UL (ref 4.2–5.4)
SODIUM SERPL-SCNC: 138 MMOL/L (ref 135–145)
WBC # BLD AUTO: 8.1 K/UL (ref 4.8–10.8)

## 2025-07-07 PROCEDURE — 700111 HCHG RX REV CODE 636 W/ 250 OVERRIDE (IP): Performed by: INTERNAL MEDICINE

## 2025-07-07 PROCEDURE — 700102 HCHG RX REV CODE 250 W/ 637 OVERRIDE(OP): Performed by: INTERNAL MEDICINE

## 2025-07-07 PROCEDURE — A9270 NON-COVERED ITEM OR SERVICE: HCPCS | Performed by: INTERNAL MEDICINE

## 2025-07-07 PROCEDURE — RXMED WILLOW AMBULATORY MEDICATION CHARGE: Performed by: STUDENT IN AN ORGANIZED HEALTH CARE EDUCATION/TRAINING PROGRAM

## 2025-07-07 PROCEDURE — 85027 COMPLETE CBC AUTOMATED: CPT

## 2025-07-07 PROCEDURE — 80048 BASIC METABOLIC PNL TOTAL CA: CPT

## 2025-07-07 PROCEDURE — 99239 HOSP IP/OBS DSCHRG MGMT >30: CPT | Performed by: STUDENT IN AN ORGANIZED HEALTH CARE EDUCATION/TRAINING PROGRAM

## 2025-07-07 RX ORDER — LINEZOLID 600 MG/1
600 TABLET, FILM COATED ORAL 2 TIMES DAILY
Qty: 18 TABLET | Refills: 0 | Status: ACTIVE | OUTPATIENT
Start: 2025-07-07 | End: 2025-07-16

## 2025-07-07 RX ADMIN — HYDRALAZINE HYDROCHLORIDE 50 MG: 50 TABLET ORAL at 06:07

## 2025-07-07 RX ADMIN — SERTRALINE 200 MG: 100 TABLET, FILM COATED ORAL at 06:07

## 2025-07-07 RX ADMIN — AMLODIPINE BESYLATE 10 MG: 10 TABLET ORAL at 06:07

## 2025-07-07 RX ADMIN — LINEZOLID 600 MG: 600 INJECTION, SOLUTION INTRAVENOUS at 06:03

## 2025-07-07 RX ADMIN — ACETAMINOPHEN 650 MG: 325 TABLET ORAL at 01:27

## 2025-07-07 RX ADMIN — ACETAMINOPHEN 650 MG: 325 TABLET ORAL at 12:57

## 2025-07-07 RX ADMIN — ATORVASTATIN CALCIUM 80 MG: 40 TABLET, FILM COATED ORAL at 06:08

## 2025-07-07 RX ADMIN — OMEPRAZOLE 20 MG: 20 CAPSULE, DELAYED RELEASE ORAL at 06:07

## 2025-07-07 ASSESSMENT — SOCIAL DETERMINANTS OF HEALTH (SDOH)

## 2025-07-07 ASSESSMENT — COGNITIVE AND FUNCTIONAL STATUS - GENERAL
SUGGESTED CMS G CODE MODIFIER DAILY ACTIVITY: CJ
HELP NEEDED FOR BATHING: A LITTLE
DRESSING REGULAR UPPER BODY CLOTHING: A LITTLE
DAILY ACTIVITIY SCORE: 21
DRESSING REGULAR LOWER BODY CLOTHING: A LITTLE
CLIMB 3 TO 5 STEPS WITH RAILING: A LITTLE
MOBILITY SCORE: 23
SUGGESTED CMS G CODE MODIFIER MOBILITY: CI

## 2025-07-07 ASSESSMENT — LIFESTYLE VARIABLES
HAVE YOU EVER FELT YOU SHOULD CUT DOWN ON YOUR DRINKING: NO
TOTAL SCORE: 0
TOTAL SCORE: 0
ALCOHOL_USE: YES
HOW MANY TIMES IN THE PAST YEAR HAVE YOU HAD 5 OR MORE DRINKS IN A DAY: 0
EVER HAD A DRINK FIRST THING IN THE MORNING TO STEADY YOUR NERVES TO GET RID OF A HANGOVER: NO
DOES PATIENT WANT TO STOP DRINKING: NO
ON A TYPICAL DAY WHEN YOU DRINK ALCOHOL HOW MANY DRINKS DO YOU HAVE: 1
TOTAL SCORE: 0
CONSUMPTION TOTAL: NEGATIVE
AVERAGE NUMBER OF DAYS PER WEEK YOU HAVE A DRINK CONTAINING ALCOHOL: 1
HAVE PEOPLE ANNOYED YOU BY CRITICIZING YOUR DRINKING: NO
EVER FELT BAD OR GUILTY ABOUT YOUR DRINKING: NO

## 2025-07-07 ASSESSMENT — PAIN DESCRIPTION - PAIN TYPE
TYPE: ACUTE PAIN
TYPE: ACUTE PAIN

## 2025-07-07 NOTE — ED NOTES
Med rec updated and complete. Allergies reviewed.      Confirmed  name and date of birth.      Pt is currently on Cephalexin and Bactrim DS .  Start date on cephalexin 07/01/25 ; start date on Bactrim DS 07/03/25.  Last doses 07/06/25  Pt denies any other medications for any type of infection. No antiviral, no antifungal, no antiparasitic.    Denies  anticoagulant and antiplatelet medications.      Pt recently discharged from [facility].    [phone]      Discharge prescriptions filled at   [facility]= [phone]

## 2025-07-07 NOTE — ED NOTES
IVF Vanco completed. Pt ambulated to bathroom, no balance issues observed, successful and appropriate walking mobility.

## 2025-07-07 NOTE — DISCHARGE PLANNING
ATTN: Case Management  RE: Referral for Home Health    As of 07.07.2025, we have accepted the Home Health referral for the patient listed above.    A [facility]  will contact the patient within 48 hours. If you have any questions or concerns regarding the patient’s transition to Home Health, please do not hesitate to contact us at [phone].      We look forward to collaborating with you,  [facility] Team

## 2025-07-07 NOTE — DISCHARGE INSTRUCTIONS
You have been prescribed a new medication for treatment of your toe infection, take this medication until complete even if your toe appears better   This antibiotic can interact with sertraline, recommend holding your sertraline, can resume the day of your last antibiotic dose  If you notice increased redness, swelling, drainage, new wound, develop fevers or chills, seek medical attention   Recommend follow up with your primary care doctor I the next 1-2 weeks to ensure improvement/resolution of infection   Do not soak your foot, shower and pat dry, keep the toe clean and dry, avoid tight foot wear

## 2025-07-07 NOTE — PROGRESS NOTES
Patient arrived to the floor with personal belongings, via gurney. Patient A&Ox4, vital signs stable, see flow sheet.    4 Eyes Skin Assessment Completed by Danni RN and MANDO Syed.    Skin assessment is primarily focused on high risk bony prominences. Pay special attention to skin beneath and around medical devices, high risk bony prominences, skin to skin areas and areas where the patient lacks sensation to feel pain and areas where the patient previously had breakdown.     Head (Occipital):  WDL   Ears (Under Medical Devices): WDL   Nose (Under Medical Devices): WDL   Mouth:  WDL   Neck: WDL   Breast/Chest:  WDL   Shoulder Blades:  WDL   Spine:   WDL   (R) Arm/Elbow/Hand: WDL   (L) Arm/Elbow/Hand: Bruising   Abdomen: WDL   Pannus/Groin:  WDL   Sacrum/Coccyx:   WDL   (R) Ischial Tuberosity (Sit Bones):  WDL   (L) Ischial Tuberosity (Sit Bones):  WDL   (R) Leg:  Red, Blanching, and Edema   (L) Leg:  Edema, Scar from previous surgery   (R) Heel:  WDL   (R) Foot/Toe: Edema and wound Great toe   (L) Heel: WDL   (L) Foot/Toe:  WDL       DEVICES IN USE:   Respiratory Devices:  NA, patient on room air  Feeding Devices:  N/A   Lines & BP Monitoring Devices:  Peripheral IV, BP cuff, and Pulse ox    Orthopedic Devices:  N/A  Miscellaneous Devices:  N/A    PROTOCOL INTERVENTIONS:   Standard/Trauma Bed:  Already in place  Glide Sheet:  Already in place    WOUND PHOTOS:   Completed and in EPIC     WOUND CONSULT:   Consult to be ordered for the following areas R great toe

## 2025-07-07 NOTE — DISCHARGE SUMMARY
Discharge Summary    CHIEF COMPLAINT ON ADMISSION  Chief Complaint   Patient presents with    Digit Pain     Right great toe       Reason for Admission  sent by      Admission Date  7/6/2025    CODE STATUS  Full Code    HPI & HOSPITAL COURSE  Fe Clark is a 74 y.o. female who presented 7/6/2025 with right toe pain, swelling and redness.  Patient was recently admitted, diagnosed with a toe infection, started on broad-spectrum antibiotics.  She did have improvement and was discharged on Keflex.  Patient began having a worsening a few days later, today her pain became severe so she presented to the emergency department.  She denies any systemic symptoms.  Upon arrival, it was noted that her previous I&D that was performed in the ER was growing MRSA that she was started on linezolid and admitted for further evaluation and care.  She did have a mild FABRIZIO on admission which improved with IV fluids.  Patient symptoms of pain and erythema did improve.  She had no significant wound or concerns for deep infection such as osteomyelitis.  Patient symptoms likely failed to improve secondary to antibiotic resistance.  Opted to evaluate and monitor the patient for an additional day however she felt well enough to go home with oral antibiotics that she was discharged home with linezolid.  She was ordered home health on discharge recommend she follow-up with wound care or podiatry to ensure wound healing.    Therefore, she is discharged in fair and stable condition to home with organized home healthcare and close outpatient follow-up.    The patient met 2-midnight criteria for an inpatient stay at the time of discharge.    Discharge Date  7/7/2025    FOLLOW UP ITEMS POST DISCHARGE  Resolution of right toe cellulitis  Chronic medical conditions    DISCHARGE DIAGNOSES  Principal Problem:    MRSA cellulitis of right foot (POA: Yes)  Active Problems:    GERD (gastroesophageal reflux disease) (POA: Yes)    Hyperlipidemia  (POA: Yes)    HTN (hypertension) (POA: Yes)    Severe major depression (HCC) (POA: Yes)    ACP (advance care planning) (POA: Yes)    Metabolic acidosis (POA: Yes)    Acute kidney injury superimposed on chronic kidney disease (HCC) (POA: Yes)  Resolved Problems:    * No resolved hospital problems. *      FOLLOW UP  Future Appointments   Date Time Provider Department Center   7/24/2025  2:40 PM Luis Leos M.D. [facility] Leos M.D.  [address]  [facility] [address]  [place] [address]  [phone]    Schedule an appointment as soon as possible for a visit in 2 week(s)  hospital follow up      MEDICATIONS ON DISCHARGE     Medication List        START taking these medications        Instructions   linezolid 600 MG Tabs  Commonly known as: Zyvox   Take 1 Tablet by mouth 2 times a day for 9 days.  Dose: 600 mg            CHANGE how you take these medications        Instructions   atorvastatin 80 MG tablet  What changed: when to take this  Commonly known as: Lipitor   Take 1 Tablet by mouth every evening.  Dose: 80 mg            CONTINUE taking these medications        Instructions   albuterol 108 (90 Base) MCG/ACT Aers inhalation aerosol   Doctor's comments: Give albuterol that is patient or insurance preference please  Inhale 2 Puffs every four hours as needed for Shortness of Breath.  Dose: 2 Puff     amLODIPine 10 MG Tabs  Commonly known as: Norvasc   Take 10 mg by mouth every day.  Dose: 10 mg     celecoxib 100 MG Caps  Commonly known as: CeleBREX   Take 1 Capsule by mouth 1 time a day as needed for Moderate Pain (cervicalgia).  Dose: 100 mg     hydrALAZINE 50 MG Tabs  Commonly known as: Apresoline   Take 1 Tablet by mouth every 8 hours.  Dose: 50 mg     lidocaine 4 %  Commonly known as: Asperflex   Place 1 Patch on the skin every 24 hours.  Dose: 1 Patch     lisinopril 40 MG tablet  Commonly known as: Prinivil   Take 1 Tablet by mouth every day.  Dose: 40 mg     omeprazole 20 MG delayed-release  capsule  Commonly known as: PriLOSEC   Take 1 Capsule by mouth 2 times a day.  Dose: 20 mg     sertraline 100 MG Tabs  Commonly known as: Zoloft   Take 2 Tablets by mouth every day.  Dose: 200 mg            STOP taking these medications      cephALEXin 500 MG Caps  Commonly known as: Keflex     sulfamethoxazole-trimethoprim 800-160 MG tablet  Commonly known as: Bactrim DS              Allergies  Allergies[1]    DIET  Orders Placed This Encounter   Procedures    Diet Order Diet: Regular     Standing Status:   Standing     Number of Occurrences:   1     Diet::   Regular [1]       ACTIVITY  As tolerated.  Weight bearing as tolerated    CONSULTATIONS  N/A    PROCEDURES  N/A    LABORATORY  Lab Results   Component Value Date    SODIUM 138 07/07/2025    POTASSIUM 4.1 07/07/2025    CHLORIDE 109 07/07/2025    CO2 20 07/07/2025    GLUCOSE 124 (H) 07/07/2025    BUN 18 07/07/2025    CREATININE 0.97 07/07/2025    CREATININE 1.0 03/27/2009        Lab Results   Component Value Date    WBC 8.1 07/07/2025    HEMOGLOBIN 12.5 07/07/2025    HEMATOCRIT 38.4 07/07/2025    PLATELETCT 340 07/07/2025        Total time of the discharge process exceeds 33 minutes.         [1] No Known Allergies

## 2025-07-07 NOTE — PROGRESS NOTES
Fe was discharged from the hospital on 7/1, TCM follow up call placed, PCM monthly outreach call deferred to next month

## 2025-07-07 NOTE — CARE PLAN
The patient is Stable - Low risk of patient condition declining or worsening    Shift Goals  Clinical Goals: pain control, rest  Patient Goals: rest  Family Goals: not present    Progress made toward(s) clinical / shift goals:  Yes    Problem: Pain - Standard  Goal: Alleviation of pain or a reduction in pain to the patient’s comfort goal  Outcome: Progressing     Problem: Knowledge Deficit - Standard  Goal: Patient and family/care givers will demonstrate understanding of plan of care, disease process/condition, diagnostic tests and medications  Outcome: Progressing       Patient is not progressing towards the following goals:

## 2025-07-07 NOTE — PROGRESS NOTES
Discharge orders received.  Patient arrived to the discharge lounge.  PIV removed by floor RN. Meds to beds medications verified by discharge RN, bag with medication given to patient.  Instructions given, medications reviewed and general discharge education provided to patient.  Follow up appointments discussed.  Patient verbalized understanding of dc instructions and prescriptions. Patient educated on signs and symptoms of infection, including: fever, site redness/swelling/warmth/tenderness/foul smelling drainage. If signs and symptoms of infection are noted patient to call 911 or come back to emergency room. Patient signed discharge instructions.  Patient verbalized she had all belongings with her, Denied having any home medications locked in our inpatient pharmacy that  she needs back. Patient wheeled by discharge lounge staff from discharge lounge to private vehicle. Patient left via car with self to home in stable condition.

## 2025-07-07 NOTE — ED NOTES
Pt leaving ER with transport tech at this time. All belongings accounted for and leaving with pt.  AAOx4, NAD, respirations even and unlabored at time of transfer.

## 2025-07-07 NOTE — PROGRESS NOTES
Virtual Nurse rounding and admission profile complete.    Round Needs: Other: Linen change and Notified of Patient Needs: CNA

## 2025-07-07 NOTE — CARE PLAN
The patient is Stable - Low risk of patient condition declining or worsening    Shift Goals  Clinical Goals: pain control, wound care, rest  Patient Goals: comfort  Family Goals: not present    Progress made toward(s) clinical / shift goals:    Problem: Mobility  Goal: Patient's capacity to carry out activities will improve  Outcome: Progressing  Flowsheets  Taken 7/7/2025 0930  Mobility:   Encouraged mobilization per interdisciplinary team recommendations   Monitored for signs of activity intolerance   Provided assistive devices   Provided rest periods between activities   Administered pain management to allow progressive mobilization   Collaborated with PT/OT  Taken 7/7/2025 0750  Level of Mobility: Level IV  Activity Performed: Up to bathroom  Time Activity Tolerated: 5 min  Distance Per Occurrence (ft.): 15 feet  # of Times Distance was Traveled: 2  Assistance: Standby Assist     Problem: Self Care  Goal: Patient will have the ability to perform ADLs independently or with assistance (bathe, groom, dress, toilet and feed)  Outcome: Progressing     Problem: Infection - Standard  Goal: Patient will remain free from infection  Outcome: Progressing  Flowsheets (Taken 7/7/2025 0930)  Standard Infection Interventions:   Assessed for signs and symptoms of infection   Implemented standard precautions   Instructed patient/family on signs and symptoms of infection   Provided education on proper hand hygiene and infection prevention measures   Assessed for removal IV, central lines, intra-arterial or urinary catheters     Problem: Wound/ / Incision Healing  Goal: Patient's wound/surgical incision will decrease in size and heals properly  Outcome: Progressing

## 2025-07-07 NOTE — ED NOTES
Bedside report received from off going RN/tech: Mihaela/Mert, assumed care of patient.  POC discussed with patient. Call light within reach, all needs addressed at this time.       Fall risk interventions in place: Move the patient closer to the nurse's station, Patient's personal possessions are with in their safe reach, Keep floor surfaces clean and dry, and Accompanied to restroom (all applicable per [place] Fall risk assessment)   Continuous monitoring: Cardiac Leads, Pulse Ox, or Blood Pressure  IVF/IV medications: Not Applicable   Oxygen: Room Air  Bedside sitter: Not Applicable   Isolation: Isolation precautions for Contact (Isolation order)

## 2025-07-07 NOTE — RESPIRATORY CARE
"COPD EDUCATION by COPD CLINICAL EDUCATOR  7/7/2025 at 7:51 AM by Lynnette Mason, RRT     Patient interviewed by COPD education team. Patient refused COPD program at this time. An Action Plan was updated in the EMR to reflect current Respiratory Medication use.    Patient seen by COPD team in April and one week ago on previous admission.  Patient has active pulmonary referral, reminded to follow up for appointment.  Patient has never had PFT's or formal COPD diagnosis.                 COPD Assessment  COPD Clinical Specialists ONLY  COPD Education Initiated: Yes--Short Intervention   Is this a COPD exacerbation patient?: No  DME Company: N/A  DME Equipment Type: None  Physician Follow Up Appointment: 07/24/25  Appt Time: 1440  Physician Name: Luis Leos M.D.  Pulmonary Follow Up Appointment:  (has active referral, wants to make own appt)  Pulmonary Rehab: No  Smoking Cessation: N/A  Hospice: No  Home Health Care: No  Mobile Urgent Care Services: Declined  Geriatric Specialty Group: No  Private In-Home Care Agency: No  (OP) Pulmonary Function Testing:  (has never done per pt)  Interdisciplinary Rounds: Attendance at Rounds (30 Min)    PFT Results    No results found for: \"PFT\"    Meds to Beds        MY COPD ACTION PLAN     It is recommended that patients and physicians/healthcare providers complete this action plan together. This plan should be discussed at each physician visit and updated as needed.    The green, yellow and red zones show groups of symptoms of COPD. This list of symptoms is not comprehensive, and you may experience other symptoms. In the \"Actions\" column, your healthcare provider has recommended actions for you to take based on your symptoms.    Patient Name: Fe Clark   YOB: 1950   Last Updated on: 7/7/2025  7:51 AM   Green Zone:  I am doing well today Actions     Usual activitiy and exercise level   Take daily medications     Usual amounts of cough and " "phlegm/mucus   Use oxygen as prescribed     Sleep well at night   Continue regular exercise/diet plan     Appetite is good   At all times avoid cigarette smoke, inhaled irritants     Daily Medications (these medications are taken every day):                Yellow Zone:  I am having a bad day or a COPD flare Actions     More breathless than usual   Continue daily medications     I have less energy for my daily activities   Use quick relief inhaler as ordered     Increased or thicker phlegm/mucus   Use oxygen as prescribed     Using quick relief inhaler/nebulizer more often   Get plenty of rest     Swelling of ankles more than usual   Use pursed lip breathing     More coughing than usual   At all times avoid cigarette smoke, inhaled irritants     I feel like I have a \"chest cold\"     Poor sleep and my symptoms woke me up     My appetite is not good     My medicine is not helping      Call provider immediately if symptoms don’t improve     Continue daily medications, add rescue medications:   Albuterol 2 Puffs Every 4 hours PRN       Medications to be used during a flare up, (as Discussed with Provider):           Additional Information:  Use spacer with inhaler    Red Zone:  I need urgent medical care Actions     Severe shortness of breath even at rest   Call 911 or seek medical care immediately     Not able to do any activity because of breathing      Fever or shaking chills      Feeling confused or very drowsy       Chest pains      Coughing up blood                  "

## 2025-07-07 NOTE — DISCHARGE PLANNING
Care Transition Team Assessment    RNCM met with patient at the bedside.  Patient A&Ox4 and able to verify the information on the face sheet.  Patient lives with her daughter, Ankita and son, Rito, in [facility].  Patient has access to transportation.  Pt has [facility] and [facility] for medical coverage.  Patient's PCP is Luis Leos MD.  Pt owns a FWW, and uses it as needed.  Pt denies home oxygen use.  Patient has never had HH in the past.  MD placed order for HH and patient is agreeable.  Patient gave choice for #1 [facility] #2 [facility].  RNCM faxed choice form to [facility] and asked [facility] to send HH referral.  Patient was accepted by [facility].      Information Source  Orientation Level: Oriented X4  Information Given By: Patient  Who is responsible for making decisions for patient? : Patient    Readmission Evaluation  Is this a readmission?: No    Elopement Risk  Legal Hold: No  Ambulatory or Self Mobile in Wheelchair: Yes  Disoriented: No  Psychiatric Symptoms: None  History of Wandering: No  Elopement this Admit: No  Vocalizing Wanting to Leave: No  Displays Behaviors, Body Language Wanting to Leave: No-Not at Risk for Elopement  Elopement Risk: Not at Risk for Elopement    Interdisciplinary Discharge Planning  Lives with - Patient's Self Care Capacity: Adult Children  Patient or legal guardian wants to designate a caregiver: No  Support Systems: Family Member(s)  Housing / Facility: [address]    Discharge Preparedness  What is your plan after discharge?: Home health care  What are your discharge supports?: Child  Prior Functional Level: Ambulatory, Independent with Activities of Daily Living  Difficulity with ADLs: None  Difficulity with IADLs: None    Functional Assesment  Prior Functional Level: Ambulatory, Independent with Activities of Daily Living    Finances  Financial Barriers to Discharge: No  Prescription Coverage: Yes    Advance Directive  Advance Directive?: None    Domestic Abuse  Have you  ever been the victim of abuse or violence?: No  Possible Abuse/Neglect Reported to:: Not Applicable    Psychological Assessment  History of Substance Abuse: None  History of Psychiatric Problems: No  Non-compliant with Treatment: No  Newly Diagnosed Illness: Yes    Discharge Risks or Barriers  Discharge risks or barriers?: Complex medical needs  Patient risk factors: Complex medical needs    Anticipated Discharge Information  Discharge Disposition: D/T to home under A care in anticipation of covered skilled care (06)  Discharge Address: [address] [place] [address] [place] [address]  Discharge Contact Phone Number: [phone]

## 2025-07-08 ENCOUNTER — PATIENT OUTREACH (OUTPATIENT)
Dept: MEDICAL GROUP | Facility: MEDICAL CENTER | Age: 75
End: 2025-07-08
Payer: MEDICARE

## 2025-07-08 ENCOUNTER — TELEPHONE (OUTPATIENT)
Dept: HOME HEALTH SERVICES | Facility: HOME HEALTHCARE | Age: 75
End: 2025-07-08
Payer: MEDICARE

## 2025-07-08 DIAGNOSIS — I10 PRIMARY HYPERTENSION: Primary | ICD-10-CM

## 2025-07-08 NOTE — TELEPHONE ENCOUNTER
"Home Transitional Care Management (TCM) Initial Contact Within 48 Hours of Discharge From Inpatient Setting:    This RN verfied the patient has [facility] ([facility]) and [facility] ([facility]).    This RN contacted patient Fe via telephone.   This RN introduced self and provided a brief discription of [facility] TCM program and explained what services Home TCM provides.  This RN asked if they would like to utilize Home TCM for optimal care.   Fe verbally consented to proceed with Home TCM services.     Discharge date from inpatient settin2025  Business date Home TCM began: 2025 Home TCM 30 day service period ends: 2025    Now that you are home, how are you feelings: \"I think my toe may be worse, there's bubbles.\"  Do you have a follow up appointment scheduled with your PCP and or specialists: yes  Appointment date(s): 2025 PCP  Are you able to get to your appointment(s): yes  Assistance needed scheduling follow up appointments or establishing with PCP: no  Has [facility] contacted you: yes SOC scheduled: TBD      Did you receive any new prescriptions: yes  Where you able to get your new prescriptions: yes  Medication reconciliation not completed. Fe declined and said the nurse at the hospital did it. Fe reported that she is taking her antibiotics twice a day and knows not to take Zoloft while taking antibiotics.   Medication questions answered: none    Do you have and barriers to health care such as transportation, food, paying for medications, lack of durable medical equipment or lack of caregivers: No barriers to health care and no DME needs identified.   Who is helping you at home: Lives with daughter Ankita and son.    Do you have questions or concerns regarding your reason for admission to the hospital: no  Do you have any questions regarding your discharge instructions: no  Education provided regarding signs and symptoms for infection and when to contact Home " TCM before next appointment with questions or concerns.    TCM home care contact information provided.    Current or previous attempts completed within 2 business days of discharge: 1

## 2025-07-08 NOTE — PROGRESS NOTES
"      Transitional Care Management Home Visit      Fe Clark  74 y.o. female  MRN [id number]  PCP Luis Leos M.D.  Location of visit: Patient home    History of Present Illness:    Hospital Course: Fe is a 73 yo female hospitalized 6/30/2025-7/1/2025 d/t right foot big toe pain after stepping on something outside barefooted. Small abscess expressed and culture sent. Started on cefazolin and vancomycin. Dx cellulitis of RLE. MRSA nares negative, vancomycin discontinued. Erythema, swelling and pain improved with cefazolin. Discharged on Keflex 1000mg TID. Fe followed up with PCP 7/3/2025 with continued concern for right great toe infection. To complete Keflex as prescribed and added Bactrim BID for 10 days. Fe was then seen in Urgent Care 7/6/2025 with complaints of no improvement in symptoms, sent to ED for evaluation. Hospitalized overnight, dx MRSA cellulitis of right foot, started linezolid. Discharged home 7/7/2025.    Today: Fe is seen today in her home for an initial transitional care management visit. \"I think my toe is getting worse\" \"swollen, unable to put any tennis shoes on\" \"Feeling tired and no energy\" \"I am afraid it is going to get into the bone\". Daughter and son live with her but appears their assistance is minimal. Taking her antibiotic as prescribed. Pain today 5/10, states she tries to avoid prolonged standing as this makes her toe throb. She is not taking her Zoloft right now due to the current antibiotic but is not sure how much it is helping her anyway. Overall she is most concerned about her toe and is worried it is not healing as it should be. She does have a PCP visit scheduled 7/24/2025.      Review of Systems   Constitutional:  Negative for chills and fever.   Respiratory:  Positive for shortness of breath.         States chronic SOB due to asthma and COPD   Cardiovascular:  Positive for leg swelling. Negative for chest pain and palpitations. "   Gastrointestinal:  Positive for diarrhea. Negative for abdominal pain, constipation, nausea and vomiting.   Genitourinary:  Positive for frequency. Negative for dysuria, hematuria and urgency.   Musculoskeletal:  Positive for back pain. Negative for falls.   Neurological:  Positive for headaches. Negative for dizziness.   Psychiatric/Behavioral:  Positive for depression.        Assessment and Plan:    Primary Problem #1: Hospital follow up, MRSA cellulitis of right foot     Plan: Discharged home on linezolid, appears she was referred to Orthopedics - she is concerned about the look of her toe and an infection getting into her bone, states toe is numb, during dressing change this morning states there was yellow discharge on the bandage  - dressing removed during visit, right big toe appears erythematous and enlarged (especially compared to left big toe), no streaking - slight RLE edema - she is able to change the dressing as needed, keeping foot clean and dry with sock on and slide on sandal - I am sending a referral to outpatient wound clinic for evaluation as there may need to be an evaluation for possible incision or drainage     Primary Problem #2: HTN    Plan: Lisinopril 40mg daily,  amlodipine 10mg daily, states hydralazine 50mg as needed - BP today 132/70 - encouraged/reinforced taking meds everyday to manage BP      Other major co-morbidities not addressed during today's visit: COPD, sleep apnea, dyslipidemia, migraines, GERD, and duodenal ulcer     Physical Exam Findings:  Physical Exam  Constitutional:       Appearance: Normal appearance. She is obese.   Cardiovascular:      Rate and Rhythm: Normal rate and regular rhythm.      Pulses: Normal pulses.   Pulmonary:      Effort: Pulmonary effort is normal.      Breath sounds: Normal breath sounds.   Abdominal:      General: Bowel sounds are normal.      Palpations: Abdomen is soft.   Musculoskeletal:      Right lower leg: Edema present.      Left lower leg:  No edema.   Feet:      Comments: Right big toe erythematous, enlarged, hardened area to inner aspect of big toe - no streaking - slight RLE edema   Skin:     General: Skin is warm and dry.   Neurological:      Mental Status: She is alert and oriented to person, place, and time.   Psychiatric:         Mood and Affect: Mood normal.         Behavior: Behavior normal.         /70 (BP Location: Right arm, Patient Position: Sitting, BP Cuff Size: Large adult)   Pulse 65   Temp 36.4 °C (97.6 °F) (Temporal)   Resp 16   SpO2 96%       Past Medical and Surgical History:  Past Medical History[1]    Past Surgical History[2]    Current Medications:  Current Medications[3]    Medication Allergies:  Patient has no known allergies.    Thank you for allowing me the opportunity to participate in the care of Fe Clark    This visit was conducted within 7 days from hospital discharge. This is a high medical complexity visit, which in addition to above, included, if applicable, medication reconciliation and management, obtaining and reviewing discharge information, reviewing the need for diagnostic tests/treatments and/or follow-up on pending diagnostic tests/treatments, medical education, establishing or re-establishing referrals with community providers and services and assistance with scheduling follow-up visits with providers and services.    I spent a total of 90 minutes reviewing medical records, direct face-to-face time with the patient and/or family, documentation and coordination of care. This is separate from the time spent on advance care planning, if applicable to this visit.     GEMA Che  [facility] Transitional Care Management  [id number] [place] [address]  P. [phone]  F. [phone]  Available on Voalte         [1]   Past Medical History:  Diagnosis Date    ASTHMA     COPD (chronic obstructive pulmonary disease) (HCC)     Diverticulosis     Duodenal ulcer,  unspecified as acute or chronic, without hemorrhage, perforation, or obstruction     GERD (gastroesophageal reflux disease)     Hiatal hernia     High cholesterol     Hypertension     Psychiatric problem    [2]   Past Surgical History:  Procedure Laterality Date    SUBMANDIBLE ABSCESS INCISION AND DRAINAGE  11/19/2016    Procedure: SUBMANDIBLE ABSCESS INCISION AND DRAINAGE;  Surgeon: Lior Hutchison D.D.S.;  Location: [facility];  Service:     DENTAL EXTRACTION(S)  11/19/2016    Procedure: DENTAL EXTRACTION(S);  Surgeon: Lior Hutchison D.D.S.;  Location: SURGERY [facility];  Service:     LACRIMAL DUCT PROBE  3/11/2015    Performed by Alo Cheatham M.D. at SURGERY [facility]    ANKLE ARTHROSCOPY  7/31/2013    Performed by Paco Clifton M.D. at SURGERY [facility]    HARDWARE REMOVAL ORTHO  7/31/2013    Performed by Paco Clifton M.D. at SURGERY [facility]    BLADDER SUSPENSION      BOWEL RESECTION      COLONOSCOPY      with removal of pollyps    HYSTERECTOMY RADICAL      OTHER ORTHOPEDIC SURGERY      L ankle   [3]   Current Outpatient Medications:     linezolid (ZYVOX) 600 MG Tab, Take 1 Tablet by mouth 2 times a day for 9 days., Disp: 18 Tablet, Rfl: 0    omeprazole (PRILOSEC) 20 MG delayed-release capsule, Take 1 Capsule by mouth 2 times a day., Disp: 200 Capsule, Rfl: 3    lisinopril (PRINIVIL) 40 MG tablet, Take 1 Tablet by mouth every day., Disp: 100 Tablet, Rfl: 3    hydrALAZINE (APRESOLINE) 50 MG Tab, Take 1 Tablet by mouth every 8 hours., Disp: 270 Tablet, Rfl: 3    [Paused] sertraline (ZOLOFT) 100 MG Tab, Take 2 Tablets by mouth every day. (Patient taking differently: Take 200 mg by mouth every day. 200 mg = 2 tablets), Disp: 200 Tablet, Rfl: 3    lidocaine (ASPERFLEX) 4 % Patch, Place 1 Patch on the skin every 24 hours., Disp: 10 Patch, Rfl: 1    celecoxib (CELEBREX) 100 MG Cap, Take 1 Capsule by mouth 1 time a day as needed for Moderate Pain (cervicalgia)., Disp: 30  Capsule, Rfl: 1    amLODIPine (NORVASC) 10 MG Tab, Take 10 mg by mouth every day., Disp: , Rfl:     atorvastatin (LIPITOR) 80 MG tablet, Take 1 Tablet by mouth every evening. (Patient taking differently: Take 80 mg by mouth every morning.), Disp: 90 Tablet, Rfl: 3    albuterol 108 (90 Base) MCG/ACT Aero Soln inhalation aerosol, Inhale 2 Puffs every four hours as needed for Shortness of Breath., Disp: 1 Each, Rfl: 1

## 2025-07-09 ENCOUNTER — TELEPHONE (OUTPATIENT)
Dept: HEALTH INFORMATION MANAGEMENT | Facility: OTHER | Age: 75
End: 2025-07-09
Payer: MEDICARE

## 2025-07-09 ENCOUNTER — OFF SITE VISIT (OUTPATIENT)
Dept: PALLIATIVE MEDICINE | Facility: HOSPICE | Age: 75
End: 2025-07-09
Payer: MEDICARE

## 2025-07-09 VITALS
TEMPERATURE: 97.6 F | SYSTOLIC BLOOD PRESSURE: 132 MMHG | DIASTOLIC BLOOD PRESSURE: 70 MMHG | RESPIRATION RATE: 16 BRPM | OXYGEN SATURATION: 96 % | HEART RATE: 65 BPM

## 2025-07-09 DIAGNOSIS — I10 PRIMARY HYPERTENSION: ICD-10-CM

## 2025-07-09 DIAGNOSIS — S90.934D: ICD-10-CM

## 2025-07-09 DIAGNOSIS — L08.9: ICD-10-CM

## 2025-07-09 DIAGNOSIS — L03.115 MRSA CELLULITIS OF RIGHT FOOT: Primary | ICD-10-CM

## 2025-07-09 DIAGNOSIS — B95.62 MRSA CELLULITIS OF RIGHT FOOT: Primary | ICD-10-CM

## 2025-07-09 ASSESSMENT — ENCOUNTER SYMPTOMS
VOMITING: 0
FALLS: 0
DIZZINESS: 0
CONSTIPATION: 0
SHORTNESS OF BREATH: 1
DIARRHEA: 1
CHILLS: 0
ABDOMINAL PAIN: 0
NAUSEA: 0
FEVER: 0
PALPITATIONS: 0
HEADACHES: 1
BACK PAIN: 1
DEPRESSION: 1

## 2025-07-09 ASSESSMENT — PAIN SCALES - GENERAL: PAINLEVEL_OUTOF10: 5=MODERATE PAIN

## 2025-07-11 ENCOUNTER — TELEPHONE (OUTPATIENT)
Dept: HOME HEALTH SERVICES | Facility: HOME HEALTHCARE | Age: 75
End: 2025-07-11
Payer: MEDICARE

## 2025-07-11 LAB
BACTERIA BLD CULT: NORMAL
BACTERIA BLD CULT: NORMAL
SIGNIFICANT IND 70042: NORMAL
SIGNIFICANT IND 70042: NORMAL
SITE SITE: NORMAL
SITE SITE: NORMAL
SOURCE SOURCE: NORMAL
SOURCE SOURCE: NORMAL

## 2025-07-11 NOTE — TELEPHONE ENCOUNTER
This RN called Fe to notify her that the referral has been processed and to call [facility] to schedule an appointment. Phone number for [facility] texted to Fe.

## 2025-07-15 ENCOUNTER — OFFICE VISIT (OUTPATIENT)
Dept: WOUND CARE | Facility: MEDICAL CENTER | Age: 75
End: 2025-07-15
Attending: NURSE PRACTITIONER
Payer: MEDICARE

## 2025-07-15 DIAGNOSIS — G62.9 PERIPHERAL POLYNEUROPATHY: ICD-10-CM

## 2025-07-15 DIAGNOSIS — T14.8XXA WOUND INFECTION: ICD-10-CM

## 2025-07-15 DIAGNOSIS — S91.101D OPEN WOUND OF RIGHT GREAT TOE, SUBSEQUENT ENCOUNTER: Primary | ICD-10-CM

## 2025-07-15 DIAGNOSIS — L08.9 WOUND INFECTION: ICD-10-CM

## 2025-07-15 PROCEDURE — 99213 OFFICE O/P EST LOW 20 MIN: CPT

## 2025-07-15 PROCEDURE — 99214 OFFICE O/P EST MOD 30 MIN: CPT | Mod: 25 | Performed by: NURSE PRACTITIONER

## 2025-07-15 PROCEDURE — 11042 DBRDMT SUBQ TIS 1ST 20SQCM/<: CPT | Performed by: NURSE PRACTITIONER

## 2025-07-15 PROCEDURE — 11042 DBRDMT SUBQ TIS 1ST 20SQCM/<: CPT

## 2025-07-15 ASSESSMENT — ENCOUNTER SYMPTOMS
VOMITING: 0
CLAUDICATION: 0
NAUSEA: 0
DIARRHEA: 0
COUGH: 0
CONSTIPATION: 0
FEVER: 1
SHORTNESS OF BREATH: 0
CHILLS: 0

## 2025-07-15 NOTE — PROGRESS NOTES
Assessment and Cleansing summary:  The wound on the right distal hallux was assessed and debrided by the provider in the wound clinic today. Following debridement, the wound was irrigated with normal saline and cleansed using hypochlorous acid-soaked gauze. The patient tolerated the procedure well.    Wound Dressing Applied:  The periwound skin was prepped with a no-sting barrier film to the outer periwound area. A Hydrofiber Silver dressing was applied directly to the wound bed, followed by a layer of non-adhesive foam and secured with Hypafix tape. Elastic tubular stockings (size E) were applied to both lower extremities for compression support. Refer to the wound care flow sheet for additional details. The patient was provided with leftover wound care supplies for continued use at home.    Patient education:  The patient was educated on the dressing regimen, including the use of Hydrofiber Silver. Its antimicrobial and absorptive properties were reviewed, along with proper application techniques, recommended dressing change frequency, and signs indicating the need for earlier replacement. Additional education was provided on the benefits of compression therapy in managing edema and promoting venous return. The importance of leg elevation was emphasized as a key component in supporting healing and swelling reduction.

## 2025-07-15 NOTE — PATIENT INSTRUCTIONS
"The following information is a summary of the education provided in the clinic today. This is not an exhaustive list of the education provided during your appointment.       DRESSING CHANGES    Keep your wound dressing clean, dry, and intact. Change your dressing every 2-3 days AND/OR if the dressing becomes soiled, leaks, gets wet, or falls off.      Please do not take baths, or swim in the ocean, lakes, rivers, pools, or hot tubs.      Wounds do not need to \"air out\" or \"breathe\". Gently dry your wound before placing a new dressing.     After you get out of the shower, wash the wound a second time (with soap and water, wound cleanser, or saline). Gently dry the wound before you place a new dressing.       If you need to change your dressings at home, you should wash your wound. Use normal saline, wound cleanser, hypochlorous acid, or unscented soap and water. Do not use hydrogen peroxide or rubbing alcohol to clean your wounds. Hydrogen peroxide and rubbing alcohol will damage new cells and tissue. Do not use betadine or iodine unless told to by your wound care team.    Do not soak your wounds in epsom salt baths. This can worsen your wound(s) or delay wound healing. It can also lead to infection or maceration (tissue is too wet).     If you do not have home health, the clinic will give you with leftover supplies from your appointment. We do not give out extra dressing supplies. We will order you supplies through a [employer] company. Your insurance may or may not pay for all these supplies. The company will reach out to you if insurance does not cover supplies. These supplies will be sent to your home within a few days. If you do have home health, they will provide wound care supplies.     The dressings we use may change as your wound changes.     COMPRESSION   -Compression helps reduce swelling and helps wounds heal quicker. It is still important to elevate your feet several times daily to reduce swelling, even when " you are wearing compression. It is important to wear compression every day, to help your wound heal, and to prevent new wounds from developing.       GENERAL HEALTH ADVICE   -People with loss of protective sensation, also called neuropathy, might not be able to feel their feet well. It is important to check your feet every day, as you may not feel new injuries. You should also check your shoes every day, to make sure there are no objects inside the shoe that may hurt you. Check for rough areas inside the shoe as well. Never walk barefoot, even in your own home. Wear rubber-soled shoes to protect your feet.     -Nutrition is important for wound healing. Unless told otherwise by your doctor, eat more protein and consider supplementing with a multi-vitamin, zinc, and vitamin C.       OFFLOADING  -Offloading is important as it takes pressure off your wound. Reduced pressure helps wounds heal. Wounds that are not correctly offloaded may get worse or be slow to heal.       CLINIC INFORMATION  The clinic's hours are Monday-Friday, 7:30 AM to 5:00 PM. We are closed most holidays and on weekends. If you leave us a message, please allow 24 hours for someone to return your call. If you have concerns or are having a medical emergency, call 911 or go to the hospital emergency room.     You might not see the same nurse or provider every visit.     If you notice any large changes in your wound(s), or signs of infection (redness, swelling, localized heat, increased pain, fever > 101 F, chills, nausea/vomiting) or have any questions about your home care instructions, please call the wound center at [phone]. If it's after hours, contact your primary care physician or go to the hospital emergency room. If you are admitted to any hospital, you will need a new referral to come back to the wound clinic. Any wound care appointments that you already have may be cancelled.    If you are 5 or more minutes late for an appointment, we  reserve the right to cancel and reschedule that appointment. For example, if your appointment is at 1:00 PM, and you arrive at 1:06 PM, you are more than five minutes late and might not be seen. If you are consistently late or not coming to your appointments (typically 3 late cancellations and/or no shows), we reserve the right to cancel your future appointments or discharge you from the clinic. It is then your responsibility to obtain a new referral if wound care is still needed.

## 2025-07-15 NOTE — PROGRESS NOTES
Transitional Care Management Home Visit      Fe Clark  74 y.o. female  MRN [id number]  PCP Luis Leos M.D.  Location of visit: Patient home    History of Present Illness:    Hospital Course: Fe is a 75 yo female hospitalized 6/30/2025-7/1/2025 d/t right foot big toe pain after stepping on something outside barefooted. Small abscess expressed and culture sent. Started on cefazolin and vancomycin. Dx cellulitis of RLE. MRSA nares negative, vancomycin discontinued. Erythema, swelling and pain improved with cefazolin. Discharged on Keflex 1000mg TID. Fe followed up with PCP 7/3/2025 with continued concern for right great toe infection. To complete Keflex as prescribed and added Bactrim BID for 10 days. Fe was then seen in Urgent Care 7/6/2025 with complaints of no improvement in symptoms, sent to ED for evaluation. Hospitalized overnight, dx MRSA cellulitis of right foot, started linezolid. Discharged home 7/7/2025.     Today: Fe is seen today in her home for a follow up transitional care management visit. States she is not feeling very good at the moment. Daughter, Zulema, states she took her pills today but has not eaten yet so she is eating to see if this helps. Saw outpatient wound clinic yesterday. Appears they did some debriding to right great toe. Overall is does appear better. Has one more antibiotic pill to take tonight which will complete the course. Wound care taught her how to do her own dressing which she demonstrated during the visit today. Has a PCP appointment scheduled tomorrow 7/17/2025.      Review of Systems   Constitutional:  Negative for chills and fever.        Feeling tired today   Respiratory:  Negative for shortness of breath.    Cardiovascular:  Negative for chest pain, palpitations and leg swelling.   Gastrointestinal:  Negative for abdominal pain, constipation and diarrhea.   Genitourinary:  Negative for dysuria.   Neurological:  Positive for  dizziness and headaches.       Assessment and Plan:    Primary Problem #1: MRSA cellulitis of right foot      Plan: Discharged home on linezolid, last dose 7/16/2025 - completed Outpatient wound care appt 7/15/2025, next appt scheduled Monday 7/21/2025 - overall right great toe is looking better, erythema improved, no streaking, no warmth - she is able to perform dressing changes - denies fever/chills - denies pain today - reinforced use of Tylenol as needed - seeing PCP tomorrow for f/u as well - appears she was referred to Orthopedics, but has not heard for them; however, unsure their role at this point     Primary Problem #2: HTN     Plan: Lisinopril 40mg daily,  amlodipine 10mg daily, states hydralazine 50mg as needed, has not had to take hydralazine - BP today 104/60 - encouraged/reinforced taking meds everyday to manage BP - encouraged water intake        Other major co-morbidities not addressed during today's visit: COPD, sleep apnea, dyslipidemia, migraines, GERD, and duodenal ulcer     Physical Exam Findings:  Physical Exam  Constitutional:       Appearance: Normal appearance. She is obese.   Cardiovascular:      Rate and Rhythm: Normal rate and regular rhythm.   Pulmonary:      Effort: Pulmonary effort is normal.      Breath sounds: Normal breath sounds.   Abdominal:      General: Bowel sounds are normal.      Palpations: Abdomen is soft.   Musculoskeletal:      Right lower leg: No edema.      Left lower leg: No edema.   Feet:      Comments: Right great toe with improving erythema - no streaking, no edema, no warmth   Skin:     General: Skin is warm and dry.   Neurological:      Mental Status: She is alert and oriented to person, place, and time.   Psychiatric:         Mood and Affect: Mood normal.         Behavior: Behavior normal.         /60 (BP Location: Left arm, Patient Position: Sitting, BP Cuff Size: Large adult)   Pulse 68   Temp 37.7 °C (99.8 °F) (Temporal)   Resp 16   SpO2 94%        Past Medical and Surgical History:  Past Medical History[1]    Past Surgical History[2]    Current Medications:  Current Medications[3]    Medication Allergies:  Patient has no known allergies.    Thank you for allowing me the opportunity to participate in the care of Fe Clark    This is a moderate medical complexity visit, which in addition to above, included, if applicable, medication reconciliation and management, obtaining and reviewing discharge information, reviewing the need for diagnostic tests/treatments and/or follow-up on pending diagnostic tests/treatments, medical education, establishing or re-establishing referrals with community providers and services and assistance with scheduling follow-up visits with providers and services.      I spent a total of 60 minutes reviewing medical records, direct face-to-face time with the patient and/or family, documentation and coordination of care. This is separate from the time spent on advance care planning, if applicable to this visit.     GEMA Che  [facility]  [address] [address] [place] [address]  P. [phone]  F. [phone]  Available on Voalte         [1]   Past Medical History:  Diagnosis Date    ASTHMA     COPD (chronic obstructive pulmonary disease) (HCC)     Diverticulosis     Duodenal ulcer, unspecified as acute or chronic, without hemorrhage, perforation, or obstruction     GERD (gastroesophageal reflux disease)     Hiatal hernia     High cholesterol     Hypertension     Psychiatric problem    [2]   Past Surgical History:  Procedure Laterality Date    SUBMANDIBLE ABSCESS INCISION AND DRAINAGE  11/19/2016    Procedure: SUBMANDIBLE ABSCESS INCISION AND DRAINAGE;  Surgeon: Lior Hutchison D.D.S.;  Location: [facility];  Service:     DENTAL EXTRACTION(S)  11/19/2016    Procedure: DENTAL EXTRACTION(S);  Surgeon: Lior Hutchison D.D.S.;  Location: [facility];   Service:     LACRIMAL DUCT PROBE  3/11/2015    Performed by Alo Cheatham M.D. at [facility]    ANKLE ARTHROSCOPY  7/31/2013    Performed by Paco Clifton M.D. at [facility]    HARDWARE REMOVAL ORTHO  7/31/2013    Performed by Paco Clifton M.D. at [facility]    BLADDER SUSPENSION      BOWEL RESECTION      COLONOSCOPY      with removal of pollyps    HYSTERECTOMY RADICAL      OTHER ORTHOPEDIC SURGERY      L ankle   [3]   Current Outpatient Medications:     linezolid (ZYVOX) 600 MG Tab, Take 1 Tablet by mouth 2 times a day for 9 days., Disp: 18 Tablet, Rfl: 0    omeprazole (PRILOSEC) 20 MG delayed-release capsule, Take 1 Capsule by mouth 2 times a day., Disp: 200 Capsule, Rfl: 3    lisinopril (PRINIVIL) 40 MG tablet, Take 1 Tablet by mouth every day., Disp: 100 Tablet, Rfl: 3    hydrALAZINE (APRESOLINE) 50 MG Tab, Take 1 Tablet by mouth every 8 hours., Disp: 270 Tablet, Rfl: 3    [Paused] sertraline (ZOLOFT) 100 MG Tab, Take 2 Tablets by mouth every day. (Patient taking differently: Take 200 mg by mouth every day. 200 mg = 2 tablets), Disp: 200 Tablet, Rfl: 3    celecoxib (CELEBREX) 100 MG Cap, Take 1 Capsule by mouth 1 time a day as needed for Moderate Pain (cervicalgia)., Disp: 30 Capsule, Rfl: 1    amLODIPine (NORVASC) 10 MG Tab, Take 10 mg by mouth every day., Disp: , Rfl:     atorvastatin (LIPITOR) 80 MG tablet, Take 1 Tablet by mouth every evening. (Patient taking differently: Take 80 mg by mouth every morning.), Disp: 90 Tablet, Rfl: 3    albuterol 108 (90 Base) MCG/ACT Aero Soln inhalation aerosol, Inhale 2 Puffs every four hours as needed for Shortness of Breath., Disp: 1 Each, Rfl: 1

## 2025-07-15 NOTE — PROGRESS NOTES
Provider Encounter- Full Thickness wound    HISTORY OF PRESENT ILLNESS  Wound History:    START OF CARE IN CLINIC: 7/15/2025    REFERRING PROVIDER: Ann Vega      WOUND- Full Thickness Wound   LOCATION: Right plantar/distal hallux   HISTORY: Patient presented to the emergency room with pain, swelling and redness of her right great toe.  She thought she had stepped on something while walking barefoot outside 2 days prior.  The ER provider performed an I&D of a small abscess and sent for culture.  She was admitted to the medical floor for further management.  Treated with IV antibiotics and pain medication.  She was discharged the following day on p.o. Keflex.   Wound culture came back positive for MRSA.  PCP started her on Bactrim on 7/3.  She returned to the emergency room on 7/6 with worsening symptoms.  She was started on IV vancomycin and admitted.  She was discharged home the following day on p.o. linezolid.  A few days later she was seen by transitional care management nurse practitioner, at which time patient was still worried about her toe, felt it was getting worse.  She was referred to [facility] for further management.   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  She spends much of her day on her feet.    Pertinent Medical History: Caregiver burden, CAD, asthma, overweight, alcohol use    TOBACCO USE: She is never smoked or use smokeless tobacco    Patient's problem list, allergies, and current medications reviewed and updated in Epic    Interval History:  7/15/2025 : Clinic visit with VIANNEY eGorge, ANDREA-BC, EDUARDN, CFCAREN.   Patient states overall she is feeling well, denies fevers, chills, nausea, vomiting, cough or shortness of breath.  She has completed her linezolid, still has another day on Bactrim.   Toe wound presents with undermining.  Measures significantly larger after excisional debridement    REVIEW OF SYSTEMS:   Review of Systems    Constitutional:  Positive for fever. Negative for chills.        States she has felt febrile off-and-on over the past few days, however has not checked her temperature   Respiratory:  Negative for cough and shortness of breath.    Cardiovascular:  Positive for leg swelling. Negative for chest pain and claudication.        Swelling in both legs, worse at the end of day   Gastrointestinal:  Negative for constipation, diarrhea, nausea and vomiting.        Loose stools while on antibiotic   Genitourinary:  Negative for dysuria.   Neurological:         Slight numbness in feet       PHYSICAL EXAMINATION:   There were no vitals taken for this visit.    Physical Exam  Constitutional:       Appearance: Normal appearance.   Cardiovascular:      Pulses: Normal pulses.      Comments: Right DP pulse easily palpated, +2  Right DP and PT pulses brisk multiphasic by Doppler  Pulmonary:      Effort: Pulmonary effort is normal.   Skin:     Comments: Full-thickness wound to right distal/plantar hallux: Initial assessment.  Undermining along distal edge.  Wound measures significantly larger after debridement of tissue over undermining.  Thick slough to wound bed.  Minimal serosanguineous drainage, no odor.  Entire toe edematous, and slightly erythemic   Neurological:      Mental Status: She is alert.      Comments: Sensation in right foot diminished, patient sensed 4/10 on monofilament test  Sensation in left foot slightly diminished, patient sensed 9/10 on monofilament test         WOUND ASSESSMENT  Wound 07/01/25 Other (Comments) Toe, Hallux Right . (Active)   Wound Image    07/15/25 0800   Site Assessment Pink;Red 07/15/25 0800   Periwound Assessment Dry;Callused 07/15/25 0800   Margins Unattached edges 07/15/25 0800   Closure Secondary intention 07/15/25 0800   Drainage Amount Moderate 07/15/25 0800   Drainage Description Serosanguineous;Yellow;Cloudy/turbid 07/15/25 0800   Treatments Cleansed;Topical Lidocaine;Provider  debridement;Silver nitrate;Site care 07/15/25 0800   Wound Cleansing Normal Saline Irrigation;Hypochlorus Acid 07/15/25 0800   Periwound Protectant No-sting Skin Prep 07/15/25 0800   Dressing Changed New 07/15/25 0800   Dressing Cleansing/Solutions Not Applicable 07/15/25 0800   Dressing Options Hydrofiber Silver;Nonadhesive Foam;Hypafix Tape;Elastic tubular bandage 07/15/25 0800   Dressing Change/Treatment Frequency Every 72 hrs, and As Needed 07/15/25 0800   Wound Team Following Weekly 07/15/25 0800   Non-staged Wound Description Full thickness 07/15/25 0800   Wound Length (cm) 0.4 cm 07/15/25 0800   Wound Width (cm) 0.6 cm 07/15/25 0800   Wound Surface Area (cm^2) 0.19 cm^2 07/15/25 0800   Post-Procedure Length (cm) 0.7 cm 07/15/25 0800   Post-Procedure Width (cm) 0.8 cm 07/15/25 0800   Post-Procedure Depth (cm) 0.2 cm 07/15/25 0800   Post-Procedure Surface Area (cm^2) 0.44 cm^2 07/15/25 0800   Post-Procedure Volume (cm^3) 0.059 cm^3 07/15/25 0800   Tunneling (cm) 0 cm 07/15/25 0800   Undermining (cm) 0.1 cm 07/15/25 0800   Undermining of Wound, 1st Location From 8 o'clock;To 8 o'clock 07/15/25 0800   Wound Odor None 07/15/25 0800   Right Foot Monofilament 10-point exam (Sensate) 4/10 07/15/25 0800   Left Foot Monofilament 10-point exam (Sensate) 9/10 07/15/25 0800   Exposed Structures None 07/15/25 0800   Number of days: 14         PROCEDURE:   -2% viscous lidocaine applied topically to wound bed for approximately 5 minutes prior to debridement  -Scissors and forceps used to excise skin over undermining  -Curette then used to debride wound bed.  Excisional debridement was performed to remove devitalized tissue until healthy, bleeding tissue was visualized.   Entire surface of wound, 0.44 cm² debrided.  Tissue debrided into the subcutaneous layer.    -Bleeding controlled with manual pressure.    -Wound care completed by wound RN, refer to flowsheet  -Patient tolerated the procedure well, without c/o pain or  discomfort.       Pertinent Labs and Diagnostics:    Labs:     A1c:   Lab Results   Component Value Date/Time    HBA1C 5.8 (H) 11/10/2022 11:31 AM    HBA1C 6.5 (H) 06/15/2018 11:19 AM          IMAGING:   FINDINGS:     Soft tissue swelling about the big toe.     Diffuse osseous demineralization.     No acute fracture or dislocation. No joint osteoarthritis.     Old fracture deformity of the fifth metatarsal.     IMPRESSION:     No acute osseous abnormality.7/6/2025-x-ray of toes, right foot      VASCULAR STUDIES: No pertinent studies found in epic    LAST  WOUND CULTURE:  DATE :   Lab Results   Component Value Date/Time    CULTRSULT No growth after 5 days of incubation. 07/06/2025 03:10 PM         ASSESSMENT AND PLAN:     1. Open wound of right great toe, subsequent encounter    7/15/2025: Wound first noted by patient on 6/28/2025.  She believes she stepped on something while walking outside barefoot.  Infection resulting in 2 short hospital stays, several ER visits  -Excisional debridement of wound in clinic today, medically necessary to promote wound healing.  Skin over undermining excised in clinic today.  Post debridement, wound measures significantly larger  -Patient to return to clinic weekly for assessment and debridement  -Patient to change dressing 1-2 times per week in between clinic visits.  Instruction provided, patient given leftover supplies from today's visit.      Wound care: Silver Hydrofiber, foam cover dressing, Hypafix tape, Tubigrip    2. Wound infection    7/15/2025: Patient has presented to the emergency room for this wound several times over the past 6 weeks.  Initially prescribed Keflex, then Bactrim, then linezolid  - Patient has completed linezolid, will be completing Bactrim in the next day or 2  - Toe is slightly edematous and erythemic.  Per patient much improved  - Monitor for signs and symptoms of infection each clinic visit  -Pt advised to go to ER for any increased redness, swelling,  drainage or odor, or if he develops fever, chills, nausea or vomiting.     3. Peripheral polyneuropathy    7/15/2025: Patient states she has slight numbness in her feet.  Monofilament testing in clinic on initial visit-patient sensed 4/10 on the right, 9/10 on the left  - Implications of loss of protective sensation (LOPS) discussed with patient- including increased risk for amputation.  Advised to check feet at least daily, moisturize feet, and to always wear protective foot wear.    - Patient presents today wearing an open toed house slipper.  States she is not able to get shoes on due to swelling.  If wound fails to progress, consider more appropriate footwear such as offloading shoe.  However, as she is not diabetic, will not likely be covered by her insurance    PATIENT EDUCATION  - Importance of adequate nutrition for wound healing  -Advised to go to ER for any increased redness, swelling, drainage, or odor, or if patient develops fever, chills, nausea or vomiting.     My total time spent caring for the patient on the day of the encounter was 30 minutes.   This does not include time spent on separately billable procedures/tests.       Please note that this note may have been created using voice recognition software. I have worked with technical experts from [facility] to optimize the interface.  I have made every reasonable attempt to correct obvious errors, but there may be errors of grammar and possibly content that I did not discover before finalizing the note.    N

## 2025-07-16 ENCOUNTER — OFF SITE VISIT (OUTPATIENT)
Dept: PALLIATIVE MEDICINE | Facility: HOSPICE | Age: 75
End: 2025-07-16
Payer: MEDICARE

## 2025-07-16 ENCOUNTER — TELEPHONE (OUTPATIENT)
Dept: HEALTH INFORMATION MANAGEMENT | Facility: OTHER | Age: 75
End: 2025-07-16
Payer: MEDICARE

## 2025-07-16 VITALS
TEMPERATURE: 99.8 F | HEART RATE: 68 BPM | OXYGEN SATURATION: 94 % | DIASTOLIC BLOOD PRESSURE: 60 MMHG | SYSTOLIC BLOOD PRESSURE: 104 MMHG | RESPIRATION RATE: 16 BRPM

## 2025-07-16 DIAGNOSIS — L03.115 MRSA CELLULITIS OF RIGHT FOOT: Primary | ICD-10-CM

## 2025-07-16 DIAGNOSIS — I10 PRIMARY HYPERTENSION: ICD-10-CM

## 2025-07-16 DIAGNOSIS — B95.62 MRSA CELLULITIS OF RIGHT FOOT: Primary | ICD-10-CM

## 2025-07-16 PROCEDURE — 99350 HOME/RES VST EST HIGH MDM 60: CPT | Performed by: NURSE PRACTITIONER

## 2025-07-16 ASSESSMENT — ENCOUNTER SYMPTOMS
HEADACHES: 1
DIZZINESS: 1
ABDOMINAL PAIN: 0
PALPITATIONS: 0
FEVER: 0
CONSTIPATION: 0
DIARRHEA: 0
CHILLS: 0
SHORTNESS OF BREATH: 0

## 2025-07-16 ASSESSMENT — PAIN SCALES - GENERAL: PAINLEVEL_OUTOF10: NO PAIN

## 2025-07-17 ENCOUNTER — OFFICE VISIT (OUTPATIENT)
Dept: MEDICAL GROUP | Facility: MEDICAL CENTER | Age: 75
End: 2025-07-17
Payer: MEDICARE

## 2025-07-17 VITALS
SYSTOLIC BLOOD PRESSURE: 130 MMHG | HEIGHT: 64 IN | OXYGEN SATURATION: 96 % | TEMPERATURE: 97.1 F | BODY MASS INDEX: 30.05 KG/M2 | RESPIRATION RATE: 14 BRPM | DIASTOLIC BLOOD PRESSURE: 70 MMHG | HEART RATE: 70 BPM | WEIGHT: 176 LBS

## 2025-07-17 DIAGNOSIS — E78.2 MIXED HYPERLIPIDEMIA: ICD-10-CM

## 2025-07-17 DIAGNOSIS — I10 PRIMARY HYPERTENSION: ICD-10-CM

## 2025-07-17 DIAGNOSIS — R42 VERTIGO: ICD-10-CM

## 2025-07-17 DIAGNOSIS — R73.01 IFG (IMPAIRED FASTING GLUCOSE): Primary | ICD-10-CM

## 2025-07-17 DIAGNOSIS — E66.9 OBESITY (BMI 30-39.9): ICD-10-CM

## 2025-07-17 DIAGNOSIS — R42 LIGHTHEADEDNESS: ICD-10-CM

## 2025-07-17 DIAGNOSIS — L03.90 CELLULITIS, UNSPECIFIED CELLULITIS SITE: ICD-10-CM

## 2025-07-17 ASSESSMENT — ENCOUNTER SYMPTOMS
WEIGHT LOSS: 0
WHEEZING: 0
HEADACHES: 0
CHILLS: 0
NAUSEA: 0
PALPITATIONS: 0
DIZZINESS: 0
SHORTNESS OF BREATH: 0
VOMITING: 0
FEVER: 0

## 2025-07-17 ASSESSMENT — FIBROSIS 4 INDEX: FIB4 SCORE: 1.15

## 2025-07-17 NOTE — PROGRESS NOTES
Subjective:     Fe Clark is a 74 y.o. female who presents for Hospital Follow-up.    HPI:   Recently hospitalized for         Pmh HTN, HLD ( hx ldl > 180) on [place] atorvastatin, age related osteoporosis on fosamax ( poorly adherent) was referred for prolia 4/2024 did not receive , anxiety/ panic attack/caregiver burden, non-epileptic seizure , thyroid nodule ( repeat 2026, 2028, 2030), snoring        Verbal consent was acquired by the patient to use [employer] ambient listening note generation during this visit Yes   History of Present Illness  The patient presents for evaluation of MRSA, dizziness, and night sweats.    She was hospitalized for 2 days due to a MRSA infection, during which she received IV antibiotics. She is currently under the care of a wound specialist. A home nurse visited her but did not unwrap or examine the wound. She was advised to rewrap it after taking a picture. She continues to visit the wound center. She is unsure if she has diabetes as she has never been diagnosed with it. The wound was caused by stepping on an object outdoors. Wound care applied silver to the wound. She changes the dressing several times a day as it gets wet. The wound is sensitive and causes shooting pain. Initially, her leg was red, similar to a sunburn. She believes she had cellulitis that got infected. She needs gauze and tape to rewrap the wound at home. Her toe appears slightly red, which she had not noticed before. She completed her course of antibiotics yesterday.    She experiences severe leg swelling and was given stockings to wear, but they became dirty and she no longer uses them. She is more concerned about her low blood pressure. She feels lethargic and lacks energy.    She experiences dizziness, particularly when walking and turning corners, and has had severe headaches. She also has night sweats, which leave her hair, neck, and body drenched, and her sheets wet every night.    She had  "scans on her wrist, which revealed an old fracture. She thinks it might still be carpal tunnel syndrome. She went to the orthopedic doctor in [place], who gave her a cortisone injection in her hands, but it did not help. Her hand feels weak and it is painful occasionally when she lifts things. The doctor said there was arthritis in her hand.    Social History:  Sleep: She reports feeling lethargic and lacking energy.  Living Condition: She is taking care of her daughter and son.    PAST SURGICAL HISTORY:  - Cortisone injection in hands for suspected carpal tunnel syndrome  - MRI cervical done in 2025 showing mild degenerative changes      Current medicines (including reconciliation performed today)  Current Medications[1]    Allergies:   Patient has no known allergies.    Social History[2]    ROS:  Review of Systems   Constitutional:  Negative for chills, fever and weight loss.   HENT:  Negative for hearing loss.    Respiratory:  Negative for shortness of breath and wheezing.    Cardiovascular:  Negative for chest pain and palpitations.   Gastrointestinal:  Negative for nausea and vomiting.   Genitourinary:  Negative for frequency and urgency.   Skin:  Negative for rash.   Neurological:  Negative for dizziness and headaches.        Objective:     Vitals:    07/17/25 1630 07/17/25 1655   BP: (!) 84/48 130/70   BP Location: Left arm Left arm   Patient Position: Sitting Sitting   BP Cuff Size: Large adult    Pulse: 70    Resp: 14    Temp: 36.2 °C (97.1 °F)    TempSrc: Temporal    SpO2: 96%    Weight: 79.8 kg (176 lb)    Height: 1.626 m (5' 4\")      Body mass index is 30.21 kg/m².    Physical Exam:  Physical Exam  Constitutional:       Appearance: Normal appearance.   Cardiovascular:      Rate and Rhythm: Normal rate and regular rhythm.      Heart sounds: No murmur heard.  Pulmonary:      Effort: Pulmonary effort is normal.      Breath sounds: Normal breath sounds. No wheezing.   Musculoskeletal:      Cervical back: " Normal range of motion and neck supple.   Neurological:      Mental Status: She is alert.          Assessment and Plan:     1. Cellulitis, unspecified cellulitis site  Continue follow-up with wound care  Examination toe wound with good granulation tissue, no significant erythema warmth or discharge or induration noted on exam  Patient follow-up with wound care 2 days ago     - CBC WITHOUT DIFFERENTIAL; Future    2. IFG (impaired fasting glucose) (Primary)  History of IFG/prediabetes  Recheck A1c  Denies signs symptoms of hyperglycemia  - HEMOGLOBIN A1C; Future    3. Mixed hyperlipidemia  Chronic, stable on atorvastatin 80 mg daily  Will recheck LDL  - Lipid Profile; Future    4. Primary hypertension  Blood pressure well-controlled on current regimen  - TSH WITH REFLEX TO FT4; Future  - Comp Metabolic Panel; Future    5. Lightheadedness  6. Vertigo  Patient report history of episodic vertigo/lightheadedness that may be related to sudden changes in her head and also incidentally associated when she moves her eyes.  She reportedly is not constant.  She denies any weakness  - Referral to Physical Therapy    7. Obesity (BMI 30-39.9)  Chronic, stable  - Patient identified as having weight management issue.  Appropriate orders and counseling given.    Assessment & Plan  1. Methicillin-resistant Staphylococcus aureus (MRSA).  - The wound appears to be healing well.  - She was admitted from 07/06/2025 to 07/07/2025 and started on IV antibiotics. Fluid was administered due to kidney concerns, which may have led to increased fluid retention. Kidney function improved before discharge.  - Blood sugar levels were elevated during hospitalization, but no significant increase was observed. An A1c test will be conducted during the next blood work.  - She is advised to continue follow-up with wound care. Gauze and tape will be provided for wound dressing.    2. Dizziness.  - Dizziness occurs mostly when walking and turning corners or  moving the head quickly.  - Blood pressure is currently normal at 130/70 mmHg.  - A referral to the vertigo clinic will be made for further evaluation.  - Hydralazine can be restarted.    3. Night sweats.  - Night sweats may be due to sertraline, which can increase heat sensitivity.  - She reports feeling fatigued and lacking energy.  - Blood work will be ordered to rule out other potential causes.  - Hydralazine can be restarted.    4. Leg swelling.  - Legs are extremely swollen.  - Stockings were provided but are not currently being used.  - Another pair of stockings will be provided, and she is advised to wash and use them.  - A referral to physical therapy will be made for neck pain management.      - Chart and discharge summary were reviewed.   - Hospitalization and results reviewed with patient.   - Medications reviewed including instructions regarding high risk medications, dosing and side effects.  - Recommended Services: No services needed at this time  - Advance directive/POLST on file?  No     Follow-up:Return in about 3 months (around 10/17/2025) for Lab review, Med check.                     [1]   Current Outpatient Medications   Medication Sig Dispense Refill    omeprazole (PRILOSEC) 20 MG delayed-release capsule Take 1 Capsule by mouth 2 times a day. 200 Capsule 3    lisinopril (PRINIVIL) 40 MG tablet Take 1 Tablet by mouth every day. 100 Tablet 3    sertraline (ZOLOFT) 100 MG Tab Take 2 Tablets by mouth every day. 200 Tablet 3    celecoxib (CELEBREX) 100 MG Cap Take 1 Capsule by mouth 1 time a day as needed for Moderate Pain (cervicalgia). 30 Capsule 1    amLODIPine (NORVASC) 10 MG Tab Take 10 mg by mouth every day.      atorvastatin (LIPITOR) 80 MG tablet Take 1 Tablet by mouth every evening. 90 Tablet 3    albuterol 108 (90 Base) MCG/ACT Aero Soln inhalation aerosol Inhale 2 Puffs every four hours as needed for Shortness of Breath. 1 Each 1    hydrALAZINE (APRESOLINE) 50 MG Tab Take 1 Tablet by  mouth every 8 hours. (Patient not taking: Reported on 7/17/2025) 270 Tablet 3     No current facility-administered medications for this visit.   [2]   Social History  Tobacco Use    Smoking status: Never     Passive exposure: Current (2 people smoke,not in pt's room)    Smokeless tobacco: Never   Vaping Use    Vaping status: Never Used    Passive vaping exposure: Yes (2 people vape,not in pt's room)   Substance Use Topics    Alcohol use: Yes     Comment: occ    Drug use: Not Currently

## 2025-07-21 ENCOUNTER — NON-PROVIDER VISIT (OUTPATIENT)
Dept: WOUND CARE | Facility: MEDICAL CENTER | Age: 75
End: 2025-07-21
Attending: NURSE PRACTITIONER
Payer: MEDICARE

## 2025-07-21 PROCEDURE — 99213 OFFICE O/P EST LOW 20 MIN: CPT

## 2025-07-21 PROCEDURE — 97597 DBRDMT OPN WND 1ST 20 CM/<: CPT

## 2025-07-21 NOTE — PROGRESS NOTES
"2% lidocaine allowed to dwell on wound bed for five minutes. Conservative sharp wound debridement performed using a curette to remove slough and periwound callus/crust. Total area of less than 1 cm^2 debrided. Patient tolerated well.     Dressing changed to honey w/ hydrofiber and nonadhesive foam due to adherent slough. Left over supplies sent with patient. Patient reports she hasn't been applying a dressing and only wearing a sock. Wound cleansing, dressing application and rationale, the importance of moisture control and wound coverage, and how frequently to change dressing discussed. She also said she was doing \"skin removal\" at home and using her dressings for her son, who has wounds but does not want to come into clinic for treatment. This RN advised against home debridement and recommended her son seek medical help as her dressings are tailored to her specific wound. She verbalized understanding of all education. Refer to AVS and flowsheets for further details.   "

## 2025-07-21 NOTE — PATIENT INSTRUCTIONS
"The following information is a summary of the education provided in the clinic today. This is not an exhaustive list of the education provided during your appointment.       DRESSING CHANGES    Keep your wound dressing clean, dry, and intact. Change your dressing every 2-3 days AND/OR if the dressing becomes soiled, leaks, gets wet, or falls off.      You may  shower with the dressing(s) off. Please do not take baths, or swim in the ocean, lakes, rivers, pools, or hot tubs.      Wounds do not need to \"air out\" or \"breathe\". Gently dry your wound before placing a new dressing.     After you get out of the shower, wash the wound a second time (with soap and water, wound cleanser, or saline). Gently dry the wound before you place a new dressing.       If you need to change your dressings at home, you should wash your wound. Use normal saline, wound cleanser, hypochlorous acid, or unscented soap and water. Do not use hydrogen peroxide or rubbing alcohol to clean your wounds. Hydrogen peroxide and rubbing alcohol will damage new cells and tissue. Do not use betadine or iodine unless told to by your wound care team.    Do not soak your wounds in epsom salt baths. This can worsen your wound(s) or delay wound healing. It can also lead to infection or maceration (tissue is too wet).     If you do not have home health, the clinic will give you with leftover supplies from your appointment. We do not give out extra dressing supplies. We will order you supplies through a [employer] company. Your insurance may or may not pay for all these supplies. The company will reach out to you if insurance does not cover supplies. These supplies will be sent to your home within a few days. If you do have home health, they will provide wound care supplies.     The dressings we use may change as your wound changes.       GENERAL HEALTH ADVICE   -People with loss of protective sensation, also called neuropathy, might not be able to feel their feet " well. It is important to check your feet every day, as you may not feel new injuries. You should also check your shoes every day, to make sure there are no objects inside the shoe that may hurt you. Check for rough areas inside the shoe as well. Never walk barefoot, even in your own home. Wear rubber-soled shoes to protect your feet.     -Nutrition is important for wound healing. Unless told otherwise by your doctor, eat more protein and consider supplementing with a multi-vitamin, zinc, and vitamin C.             CLINIC INFORMATION  The clinic's hours are Monday-Friday, 7:30 AM to 5:00 PM. We are closed most holidays and on weekends. If you leave us a message, please allow 24 hours for someone to return your call. If you have concerns or are having a medical emergency, call 911 or go to the hospital emergency room.     You might not see the same nurse or provider every visit.     If you notice any large changes in your wound(s), or signs of infection (redness, swelling, localized heat, increased pain, fever > 101 F, chills, nausea/vomiting) or have any questions about your home care instructions, please call the wound center at [phone]. If it's after hours, contact your primary care physician or go to the hospital emergency room. If you are admitted to any hospital, you will need a new referral to come back to the wound clinic. Any wound care appointments that you already have may be cancelled.    If you are 5 or more minutes late for an appointment, we reserve the right to cancel and reschedule that appointment. For example, if your appointment is at 1:00 PM, and you arrive at 1:06 PM, you are more than five minutes late and might not be seen. If you are consistently late or not coming to your appointments (typically 3 late cancellations and/or no shows), we reserve the right to cancel your future appointments or discharge you from the clinic. It is then your responsibility to obtain a new referral if wound  care is still needed.

## 2025-07-22 ENCOUNTER — APPOINTMENT (OUTPATIENT)
Dept: WOUND CARE | Facility: MEDICAL CENTER | Age: 75
End: 2025-07-22
Attending: NURSE PRACTITIONER
Payer: MEDICARE

## 2025-07-25 DIAGNOSIS — R51.9 UNILATERAL OCCIPITAL HEADACHE: ICD-10-CM

## 2025-07-25 DIAGNOSIS — F41.9 ANXIETY: ICD-10-CM

## 2025-07-25 DIAGNOSIS — F33.1 MODERATE EPISODE OF RECURRENT MAJOR DEPRESSIVE DISORDER (HCC): ICD-10-CM

## 2025-07-25 DIAGNOSIS — M54.2 CERVICALGIA: ICD-10-CM

## 2025-07-25 DIAGNOSIS — I16.0 HYPERTENSIVE URGENCY: ICD-10-CM

## 2025-07-25 DIAGNOSIS — I10 PRIMARY HYPERTENSION: ICD-10-CM

## 2025-07-25 DIAGNOSIS — K21.9 GASTROESOPHAGEAL REFLUX DISEASE, UNSPECIFIED WHETHER ESOPHAGITIS PRESENT: ICD-10-CM

## 2025-07-25 NOTE — TELEPHONE ENCOUNTER
Received request via: Pharmacy    Was the patient seen in the last year in this department? Yes    Does the patient have an active prescription (recently filled or refills available) for medication(s) requested? No    Pharmacy Name: [id number] - [place], [place] - [address]     Does the patient have FDC Plus and need 100-day supply? (This applies to ALL medications) Yes, quantity updated to 100 days

## 2025-07-25 NOTE — TELEPHONE ENCOUNTER
Received request via: Pharmacy    Was the patient seen in the last year in this department? Yes    Does the patient have an active prescription (recently filled or refills available) for medication(s) requested? No    Pharmacy Name: [id number] - [place], [place] - [address]     Does the patient have assisted Plus and need 100-day supply? (This applies to ALL medications) Yes, quantity updated to 100 days

## 2025-07-28 ENCOUNTER — NON-PROVIDER VISIT (OUTPATIENT)
Dept: WOUND CARE | Facility: MEDICAL CENTER | Age: 75
End: 2025-07-28
Attending: NURSE PRACTITIONER
Payer: MEDICARE

## 2025-07-28 PROCEDURE — 99213 OFFICE O/P EST LOW 20 MIN: CPT

## 2025-07-28 PROCEDURE — 97597 DBRDMT OPN WND 1ST 20 CM/<: CPT

## 2025-07-28 RX ORDER — HYDRALAZINE HYDROCHLORIDE 50 MG/1
50 TABLET, FILM COATED ORAL EVERY 8 HOURS
Qty: 270 TABLET | Refills: 3 | Status: SHIPPED | OUTPATIENT
Start: 2025-07-28

## 2025-07-28 RX ORDER — SERTRALINE HYDROCHLORIDE 100 MG/1
200 TABLET, FILM COATED ORAL DAILY
Qty: 200 TABLET | Refills: 3 | Status: SHIPPED | OUTPATIENT
Start: 2025-07-28

## 2025-07-28 RX ORDER — CELECOXIB 100 MG/1
100 CAPSULE ORAL
Qty: 30 CAPSULE | Refills: 1 | Status: SHIPPED | OUTPATIENT
Start: 2025-07-28

## 2025-07-28 RX ORDER — LISINOPRIL 40 MG/1
40 TABLET ORAL DAILY
Qty: 100 TABLET | Refills: 3 | Status: SHIPPED | OUTPATIENT
Start: 2025-07-28

## 2025-07-28 RX ORDER — OMEPRAZOLE 20 MG/1
20 CAPSULE, DELAYED RELEASE ORAL 2 TIMES DAILY
Qty: 200 CAPSULE | Refills: 3 | Status: SHIPPED | OUTPATIENT
Start: 2025-07-28

## 2025-07-28 NOTE — PATIENT INSTRUCTIONS
"The following information is a summary of the education provided in the clinic today. This is not an exhaustive list of the education provided during your appointment.       DRESSING CHANGES    Keep your wound dressing clean, dry, and intact. Change your dressing every 3-4 days AND/OR if the dressing becomes soiled, leaks, gets wet, or falls off.      You may  shower with the dressing(s) off. Please do not take baths, or swim in the ocean, lakes, rivers, pools, or hot tubs.      Wounds do not need to \"air out\" or \"breathe\". Gently dry your wound before placing a new dressing.     After you get out of the shower, wash the wound a second time (with soap and water, wound cleanser, or saline). Gently dry the wound before you place a new dressing.       If you need to change your dressings at home, you should wash your wound. Use normal saline, wound cleanser, hypochlorous acid, or unscented soap and water. Do not use hydrogen peroxide or rubbing alcohol to clean your wounds. Hydrogen peroxide and rubbing alcohol will damage new cells and tissue. Do not use betadine or iodine unless told to by your wound care team.    Do not soak your wounds in epsom salt baths. This can worsen your wound(s) or delay wound healing. It can also lead to infection or maceration (tissue is too wet).     If you do not have home health, the clinic will give you with leftover supplies from your appointment. We do not give out extra dressing supplies. We will order you supplies through a [employer] company. Your insurance may or may not pay for all these supplies. The company will reach out to you if insurance does not cover supplies. These supplies will be sent to your home within a few days. If you do have home health, they will provide wound care supplies.     The dressings we use may change as your wound changes.     GENERAL HEALTH ADVICE   -People with loss of protective sensation, also called neuropathy, might not be able to feel their feet well. " It is important to check your feet every day, as you may not feel new injuries. You should also check your shoes every day, to make sure there are no objects inside the shoe that may hurt you. Check for rough areas inside the shoe as well. Never walk barefoot, even in your own home. Wear rubber-soled shoes to protect your feet.     -Nutrition is important for wound healing. Unless told otherwise by your doctor, eat more protein and consider supplementing with a multi-vitamin, zinc, and vitamin C.         OFFLOADING  -Offloading is important as it takes pressure off your wound. Reduced pressure helps wounds heal. Wear your Offloading shoe every time you are up and walking, even when in your own home. Wounds that are not correctly offloaded may get worse or be slow to heal.         CLINIC INFORMATION  The clinic's hours are Monday-Friday, 7:30 AM to 5:00 PM. We are closed most holidays and on weekends. If you leave us a message, please allow 24 hours for someone to return your call. If you have concerns or are having a medical emergency, call 911 or go to the hospital emergency room.     You might not see the same nurse or provider every visit.     If you notice any large changes in your wound(s), or signs of infection (redness, swelling, localized heat, increased pain, fever > 101 F, chills, nausea/vomiting) or have any questions about your home care instructions, please call the wound center at [phone]. If it's after hours, contact your primary care physician or go to the hospital emergency room. If you are admitted to any hospital, you will need a new referral to come back to the wound clinic. Any wound care appointments that you already have may be cancelled.    If you are 5 or more minutes late for an appointment, we reserve the right to cancel and reschedule that appointment. For example, if your appointment is at 1:00 PM, and you arrive at 1:06 PM, you are more than five minutes late and might not be  seen. If you are consistently late or not coming to your appointments (typically 3 late cancellations and/or no shows), we reserve the right to cancel your future appointments or discharge you from the clinic. It is then your responsibility to obtain a new referral if wound care is still needed.

## 2025-07-28 NOTE — PROGRESS NOTES
CSWD using curette to remove nonviable tissue from right hallux wound bed. Patient tolerated well; denied pain.     Calluses removed from right 2nd and 3rd toes    Changed dressing to Hydrofera Blue: Discussed antimicrobial and moisture-balancing benefits. Instructed on proper application, routine change frequency, and early change indicators such as saturation, odor, or dressing dislodgement.  With Melina Collagen: Educated on its role in supporting granulation tissue formation and moisture management. Reviewed proper application technique, typical change frequency, and signs indicating need for earlier dressing changes.    Educated on how to use and apply dressing.  See AVS for additional education provided.     Patient counseled again to not debride son's wounds at home and to encourage him to see a medical provider.

## 2025-07-29 ENCOUNTER — APPOINTMENT (OUTPATIENT)
Dept: WOUND CARE | Facility: MEDICAL CENTER | Age: 75
End: 2025-07-29
Attending: NURSE PRACTITIONER
Payer: MEDICARE

## 2025-07-29 DIAGNOSIS — E78.2 MIXED HYPERLIPIDEMIA: ICD-10-CM

## 2025-07-29 RX ORDER — ATORVASTATIN CALCIUM 80 MG/1
80 TABLET, FILM COATED ORAL EVERY EVENING
Qty: 100 TABLET | Refills: 3 | Status: SHIPPED | OUTPATIENT
Start: 2025-07-29

## 2025-08-04 ENCOUNTER — NON-PROVIDER VISIT (OUTPATIENT)
Dept: WOUND CARE | Facility: MEDICAL CENTER | Age: 75
End: 2025-08-04
Attending: NURSE PRACTITIONER
Payer: MEDICARE

## 2025-08-04 PROCEDURE — 97597 DBRDMT OPN WND 1ST 20 CM/<: CPT

## 2025-08-04 PROCEDURE — 99213 OFFICE O/P EST LOW 20 MIN: CPT

## 2025-08-06 ENCOUNTER — TELEPHONE (OUTPATIENT)
Dept: HOME HEALTH SERVICES | Facility: HOME HEALTHCARE | Age: 75
End: 2025-08-06
Payer: MEDICARE

## 2025-08-11 ENCOUNTER — NON-PROVIDER VISIT (OUTPATIENT)
Dept: WOUND CARE | Facility: MEDICAL CENTER | Age: 75
End: 2025-08-11
Attending: NURSE PRACTITIONER
Payer: MEDICARE

## 2025-08-14 ENCOUNTER — PATIENT OUTREACH (OUTPATIENT)
Dept: HEALTH INFORMATION MANAGEMENT | Facility: OTHER | Age: 75
End: 2025-08-14
Payer: MEDICARE

## 2025-08-14 DIAGNOSIS — F33.0 MILD EPISODE OF RECURRENT MAJOR DEPRESSIVE DISORDER (HCC): ICD-10-CM

## 2025-08-14 DIAGNOSIS — I10 PRIMARY HYPERTENSION: Primary | ICD-10-CM

## 2025-08-14 DIAGNOSIS — Z63.6 CAREGIVER BURDEN: ICD-10-CM

## 2025-08-14 SDOH — SOCIAL STABILITY - SOCIAL INSECURITY: DEPENDENT RELATIVE NEEDING CARE AT HOME: Z63.6

## 2025-08-18 ENCOUNTER — OFFICE VISIT (OUTPATIENT)
Dept: WOUND CARE | Facility: MEDICAL CENTER | Age: 75
End: 2025-08-18
Attending: NURSE PRACTITIONER
Payer: MEDICARE

## 2025-08-18 DIAGNOSIS — S91.101D OPEN WOUND OF RIGHT GREAT TOE, SUBSEQUENT ENCOUNTER: Primary | ICD-10-CM

## 2025-08-18 DIAGNOSIS — L08.9 WOUND INFECTION: ICD-10-CM

## 2025-08-18 DIAGNOSIS — G62.9 PERIPHERAL POLYNEUROPATHY: ICD-10-CM

## 2025-08-18 DIAGNOSIS — T14.8XXA WOUND INFECTION: ICD-10-CM

## 2025-08-18 PROCEDURE — 11042 DBRDMT SUBQ TIS 1ST 20SQCM/<: CPT | Performed by: STUDENT IN AN ORGANIZED HEALTH CARE EDUCATION/TRAINING PROGRAM

## 2025-08-18 PROCEDURE — 99213 OFFICE O/P EST LOW 20 MIN: CPT

## 2025-08-18 PROCEDURE — 11042 DBRDMT SUBQ TIS 1ST 20SQCM/<: CPT

## 2025-08-25 ENCOUNTER — NON-PROVIDER VISIT (OUTPATIENT)
Dept: WOUND CARE | Facility: MEDICAL CENTER | Age: 75
End: 2025-08-25
Attending: NURSE PRACTITIONER
Payer: MEDICARE

## 2025-08-25 PROCEDURE — 99213 OFFICE O/P EST LOW 20 MIN: CPT
